# Patient Record
Sex: FEMALE | Race: WHITE | HISPANIC OR LATINO | Employment: PART TIME | URBAN - METROPOLITAN AREA
[De-identification: names, ages, dates, MRNs, and addresses within clinical notes are randomized per-mention and may not be internally consistent; named-entity substitution may affect disease eponyms.]

---

## 2017-02-28 ENCOUNTER — ALLSCRIPTS OFFICE VISIT (OUTPATIENT)
Dept: OTHER | Facility: OTHER | Age: 37
End: 2017-02-28

## 2017-04-05 ENCOUNTER — TRANSCRIBE ORDERS (OUTPATIENT)
Dept: ADMINISTRATIVE | Facility: HOSPITAL | Age: 37
End: 2017-04-05

## 2017-04-05 ENCOUNTER — HOSPITAL ENCOUNTER (OUTPATIENT)
Dept: RADIOLOGY | Facility: HOSPITAL | Age: 37
Discharge: HOME | End: 2017-04-05
Payer: COMMERCIAL

## 2017-04-05 ENCOUNTER — TRANSCRIBE ORDERS (OUTPATIENT)
Dept: RADIOLOGY | Facility: HOSPITAL | Age: 37
End: 2017-04-05

## 2017-04-05 DIAGNOSIS — R10.31 ABDOMINAL PAIN, RIGHT LOWER QUADRANT: Primary | ICD-10-CM

## 2017-04-05 DIAGNOSIS — M25.559 PAIN IN JOINT, PELVIC REGION AND THIGH, UNSPECIFIED LATERALITY: Primary | ICD-10-CM

## 2017-04-05 DIAGNOSIS — M77.11 RIGHT LATERAL EPICONDYLITIS: ICD-10-CM

## 2017-04-05 DIAGNOSIS — M25.559 PAIN IN JOINT, PELVIC REGION AND THIGH, UNSPECIFIED LATERALITY: ICD-10-CM

## 2017-04-05 PROCEDURE — 73080 X-RAY EXAM OF ELBOW: CPT

## 2017-04-05 PROCEDURE — 73521 X-RAY EXAM HIPS BI 2 VIEWS: CPT

## 2017-04-10 ENCOUNTER — HOSPITAL ENCOUNTER (OUTPATIENT)
Dept: RADIOLOGY | Facility: HOSPITAL | Age: 37
Discharge: HOME | End: 2017-04-10
Payer: COMMERCIAL

## 2017-04-10 DIAGNOSIS — R10.31 ABDOMINAL PAIN, RIGHT LOWER QUADRANT: ICD-10-CM

## 2017-04-10 PROCEDURE — 76830 TRANSVAGINAL US NON-OB: CPT

## 2017-04-10 PROCEDURE — 76856 US EXAM PELVIC COMPLETE: CPT

## 2017-05-21 ENCOUNTER — HOSPITAL ENCOUNTER (EMERGENCY)
Facility: HOSPITAL | Age: 37
Discharge: HOME | End: 2017-05-21
Attending: EMERGENCY MEDICINE | Admitting: EMERGENCY MEDICINE
Payer: COMMERCIAL

## 2017-05-21 ENCOUNTER — APPOINTMENT (EMERGENCY)
Dept: RADIOLOGY | Facility: HOSPITAL | Age: 37
End: 2017-05-21
Payer: COMMERCIAL

## 2017-05-21 VITALS
DIASTOLIC BLOOD PRESSURE: 84 MMHG | TEMPERATURE: 97.9 F | SYSTOLIC BLOOD PRESSURE: 145 MMHG | HEART RATE: 67 BPM | HEIGHT: 64 IN | RESPIRATION RATE: 18 BRPM | BODY MASS INDEX: 32.44 KG/M2 | OXYGEN SATURATION: 99 % | WEIGHT: 190 LBS

## 2017-05-21 DIAGNOSIS — S46.911A STRAIN OF RIGHT UPPER ARM, INITIAL ENCOUNTER: Primary | ICD-10-CM

## 2017-05-21 PROCEDURE — 73060 X-RAY EXAM OF HUMERUS: CPT

## 2017-05-21 PROCEDURE — 99283 EMERGENCY DEPT VISIT LOW MDM: CPT

## 2017-05-21 PROCEDURE — 73030 X-RAY EXAM OF SHOULDER: CPT

## 2017-05-21 RX ORDER — NAPROXEN 500 MG/1
500 TABLET ORAL 2 TIMES DAILY WITH MEALS
Qty: 10 TABLET | Refills: 0 | Status: SHIPPED | OUTPATIENT
Start: 2017-05-21 | End: 2017-05-26 | Stop reason: ALTCHOICE

## 2017-05-21 RX ORDER — IBUPROFEN 600 MG/1
600 TABLET ORAL ONCE
Status: COMPLETED | OUTPATIENT
Start: 2017-05-21 | End: 2017-05-21

## 2017-05-21 RX ADMIN — IBUPROFEN 600 MG: 600 TABLET, FILM COATED ORAL at 19:21

## 2017-11-07 ENCOUNTER — ALLSCRIPTS OFFICE VISIT (OUTPATIENT)
Dept: OTHER | Facility: OTHER | Age: 37
End: 2017-11-07

## 2018-01-13 VITALS
WEIGHT: 191 LBS | SYSTOLIC BLOOD PRESSURE: 123 MMHG | DIASTOLIC BLOOD PRESSURE: 76 MMHG | HEIGHT: 64 IN | BODY MASS INDEX: 32.61 KG/M2

## 2018-01-16 NOTE — PROGRESS NOTES
Chief Complaint  depo injection      Active Problems    1  Abdominal pain (789 00) (R10 9)   2  Candidal intertrigo (112 3) (B37 2)   3  Encounter for Depo-Provera contraception (V25 49) (Z30 42)   4  Encounter for routine gynecological examination (V72 31) (Z01 419)   5  General counseling for initiation of other contraceptive measures (V25 02) (Z30 09)   6  Genital herpes simplex (054 10) (A60 00)   7  Hereditary Disease Possibly Affecting Fetus, Affecting Care Of Mother - Antepartum   Condition Or Complication (978 19)   8  Hidradenitis suppurativa (705 83) (L73 2)   9  Obesity (278 00) (E66 9)    Current Meds   1  Clotrimazole-Betamethasone 1-0 05 % External Cream; APPLY  AND RUB  IN A THIN   FILM TO AFFECTED AREAS TWICE DAILY  (AM AND PM); Therapy: [id number] to (Last Rx:01Apr2015)  Requested for: 01Apr2015 Ordered   2  ValACYclovir HCl - 500 MG Oral Tablet; TAKE 1 TABLET Daily As Directed Begin   supression with 1 tab daily  Starting at [address], [place], operated by [facility] gestation; Therapy: [id number] to (Mary Beth Davis)  Requested for: 01Apr2015; Last   Rx:01Apr2015 Ordered    Allergies    1  No Known Drug Allergies    2  No Known Environmental Allergies   3  No Known Food Allergies    Vitals  Signs [Data Includes: Current Encounter]    Systolic: 856  Diastolic: 70  Height: 5 ft 4 in  Weight: 193 lb 3 2 oz  BMI Calculated: 33 16  BSA Calculated: 1 93  Pain Scale: 0    Assessment    1   History of pregnancy (V13 29)    Plan  Encounter for Depo-Provera contraception    · MedroxyPROGESTERone Acetate 150 MG/ML Intramuscular Suspension    Signatures   Electronically signed by : RIP Narvaez ; Apr 29 2016 11:08AM EST                       (Author)

## 2018-01-30 ENCOUNTER — OFFICE VISIT (OUTPATIENT)
Dept: OBGYN CLINIC | Facility: HOSPITAL | Age: 38
End: 2018-01-30
Payer: COMMERCIAL

## 2018-01-30 VITALS
WEIGHT: 182.4 LBS | HEIGHT: 64 IN | BODY MASS INDEX: 31.14 KG/M2 | HEART RATE: 80 BPM | SYSTOLIC BLOOD PRESSURE: 122 MMHG | DIASTOLIC BLOOD PRESSURE: 80 MMHG

## 2018-01-30 DIAGNOSIS — Z30.42 ENCOUNTER FOR SURVEILLANCE OF INJECTABLE CONTRACEPTIVE: Primary | ICD-10-CM

## 2018-01-30 PROCEDURE — 96372 THER/PROPH/DIAG INJ SC/IM: CPT | Performed by: NURSE PRACTITIONER

## 2018-01-30 PROCEDURE — 99213 OFFICE O/P EST LOW 20 MIN: CPT | Performed by: NURSE PRACTITIONER

## 2018-01-30 RX ORDER — MEDROXYPROGESTERONE ACETATE 150 MG/ML
150 INJECTION, SUSPENSION INTRAMUSCULAR ONCE
Status: COMPLETED | OUTPATIENT
Start: 2018-01-30 | End: 2018-01-30

## 2018-01-30 RX ORDER — MEDROXYPROGESTERONE ACETATE 150 MG/ML
150 INJECTION, SUSPENSION INTRAMUSCULAR
Qty: 1 ML | Refills: 0 | Status: SHIPPED | OUTPATIENT
Start: 2018-01-30 | End: 2018-04-24 | Stop reason: SDUPTHER

## 2018-01-30 RX ORDER — MEDROXYPROGESTERONE ACETATE 150 MG/ML
INJECTION, SUSPENSION INTRAMUSCULAR
COMMUNITY
Start: 2016-12-06 | End: 2018-01-30 | Stop reason: SDUPTHER

## 2018-01-30 RX ORDER — VALACYCLOVIR HYDROCHLORIDE 500 MG/1
1 TABLET, FILM COATED ORAL DAILY
COMMUNITY
Start: 2017-02-28 | End: 2018-04-24 | Stop reason: SDUPTHER

## 2018-01-30 RX ADMIN — MEDROXYPROGESTERONE ACETATE 150 MG: 150 INJECTION, SUSPENSION INTRAMUSCULAR at 09:40

## 2018-01-30 NOTE — PATIENT INSTRUCTIONS
Medroxyprogesterone (By injection)   Medroxyprogesterone (wv-vfsl-od-proe-NATALIA-ter-one)  Prevents pregnancy  Also treats endometriosis and is used with other medicines to help relieve symptoms of cancer, including uterine or kidney cancer  Brand Name(s): Depo-Provera, Depo-Provera Contraceptive, Depo-SubQ Provera 104   There may be other brand names for this medicine  When This Medicine Should Not Be Used: This medicine is not right for everyone  You should not receive it if you had an allergic reaction to medroxyprogesterone or if you have a history of breast cancer or blood clots (including heart attack or stroke)  In most cases, you should not use this medicine while you are pregnant  How to Use This Medicine:   Injectable  · A nurse or other health provider will give you this medicine  This medicine is given as a shot into a muscle or just under the skin  · Your exact treatment schedule depends on the reason you are using this medicine  You doctor will explain your personal schedule  ¨ For treatment of cancer symptoms, you may start with a shot once per week  You may need fewer shots as your treatment goes forward  ¨ For birth control or endometriosis, you will need a shot every 3 months (13 weeks)  Read and follow the patient instructions that come with this medicine  Talk to your doctor or pharmacist if you have any questions  ¨ You might need to have the first shot during the first 5 days of your normal menstrual period, to make sure you are not pregnant  If you have just had a baby, you may receive a shot 5 days after birth if you are not breastfeeding or 6 weeks after birth if you are breastfeeding  · Missed dose: You must receive a shot every 3 months if you want to prevent pregnancy  Talk to your doctor or pharmacist if you do not receive your medicine on time, because you may need another form of birth control    Drugs and Foods to Avoid:   Ask your doctor or pharmacist before using any other medicine, including over-the-counter medicines, vitamins, and herbal products  · Some foods and medicines can affect how medroxyprogesterone works  Tell your doctor if you are using any of the following:  ¨ Aminoglutethimide, bosentan, carbamazepine, felbamate, griseofulvin, nefazodone, oxcarbazepine, phenobarbital, phenytoin, rifabutin, rifampin, rifapentine, Magalis's wort, topiramate  ¨ Medicine to treat an infection (including clarithromycin, itraconazole, ketoconazole, telithromycin, voriconazole)  ¨ Medicine to treat HIV/AIDS (including atazanavir, indinavir, nelfinavir, ritonavir, saquinavir)  Warnings While Using This Medicine:   · Tell your doctor right away if you think you have become pregnant  · Tell your doctor if you are breastfeeding or if you have liver disease, kidney disease, asthma, diabetes, heart disease, seizures, migraine headaches, an eating disorder, osteoporosis, or a history of depression  Tell your doctor if you smoke  · This medicine may cause the following problems:  ¨ Blood clots, which could lead to stroke, heart attack, or other serious problems  ¨ Possible increased risk of breast cancer  ¨ Weak or thin bones, especially with long-term use  · You should not use this medicine for long-term birth control unless you cannot use any other form of birth control  · This medicine will not protect you from HIV/AIDS or other sexually transmitted diseases  · Tell any doctor or dentist who treats you that you are using this medicine  This medicine may affect certain medical test results  · Your doctor will check your progress and the effects of this medicine at regular visits  Keep all appointments    Possible Side Effects While Using This Medicine:   Call your doctor right away if you notice any of these side effects:  · Allergic reaction: Itching or hives, swelling in your face or hands, swelling or tingling in your mouth or throat, chest tightness, trouble breathing  · Chest pain, trouble breathing, or coughing up blood  · Dark urine or pale stools, nausea, vomiting, loss of appetite, stomach pain, yellow skin or eyes  · Heavy or nonstop vaginal bleeding  · Loss of vision, double vision  · Numbness or weakness on one side of your body, sudden or severe headache, problems with vision, speech, or walking  · Severe stomach pain or cramps  If you notice these less serious side effects, talk with your doctor:   · Headache  · Light or missed monthly periods, spotting between periods  · Nervousness or dizziness  · Pain, redness, burning, swelling, or a lump under your skin where the shot was given  · Weight gain  If you notice other side effects that you think are caused by this medicine, tell your doctor  Call your doctor for medical advice about side effects  You may report side effects to FDA at 7-169-FDA-3533  © 2017 [address] [employer] Information is for End User's use only and may not be sold, redistributed or otherwise used for commercial purposes  The above information is an  only  It is not intended as medical advice for individual conditions or treatments  Talk to your doctor, nurse or pharmacist before following any medical regimen to see if it is safe and effective for you  Tubal Ligation   AMBULATORY CARE:   What you need to know about a tubal ligation:  A tubal ligation is surgery to close your fallopian tubes to prevent pregnancy  How to prepare for a tubal ligation:  Your healthcare provider will talk to you about how to prepare for surgery  He may tell you not to eat or drink anything within 8 hours before your surgery  He will tell you what medicines to take or not take on the day of your surgery  Arrange for someone to drive you home and stay with you after surgery  What will happen during a tubal ligation:   · You may be given general anesthesia to keep you asleep and free from pain during surgery   You may instead be given regional anesthesia or local anesthesia  Regional anesthesia numbs the lower part of your body  Local anesthesia numbs the surgery area  With local anesthesia, you may still feel pressure or pushing during surgery, but you should not feel any pain  You may have a minilaparotomy or laparoscopic tubal ligation  During a minilaparotomy, your surgeon will make a small incision in your lower abdomen  · During laparoscopic tubal ligation, your surgeon will make one or more small incisions in your abdomen  A laparoscope and other instruments will be put into your abdomen through the small incisions  The laparoscope is a long metal tube with a light and camera on the end  Your abdomen will be filled with a gas  This allows your surgeon to see inside your abdomen more clearly  Your fallopian tubes will be cut and closed with thread  Your healthcare provider may instead seal your tubes with heat, or with a clip or ring  After the surgery is done, your incisions are closed with stitches or staples  The incisions may be covered with a bandage  What will happen after a tubal ligation:  You will have abdominal pain and cramps for the first few days after surgery  You may feel dizzy, nauseated, bloated, or have gas  You may also have pain in your shoulders or near your ribs if gas was put in your abdomen  Risks of a tubal ligation:  You may bleed more than expected or get an infection  Blood vessels or organs such as your bowel or bladder could be injured during surgery  Although pregnancy is unlikely after a tubal ligation, there is still a small chance that you may get pregnant  If pregnancy does occur, there is an increased risk for an ectopic pregnancy (tubal pregnancy)  You may get a blood clot in your leg or arm  This may become life-threatening  Call 911 for any of the following:   · You cough up blood  · You feel lightheaded, short of breath, and have chest pain    Seek care immediately if:   · Your arm or leg feels warm, tender, and painful  It may look swollen and red  · You have severe abdominal pain  Contact your surgeon or gynecologist if:   · You have a fever  · You have heavy bleeding from your incisions  · Your stitches or staples come apart  · You have trouble urinating, burning when you urinate, or bloody urine  · Your incision is swollen, red, or has pus coming from it  · You have questions or concerns about your condition or care  Medicines: You may need any of the following:  · Prescription pain medicine  may be given  Ask how to take this medicine safely  · Acetaminophen  decreases pain  It is available without a doctor's order  Ask how much to take and how often to take it  Follow directions  Acetaminophen can cause liver damage if not taken correctly  · NSAIDs , such as ibuprofen, help decrease swelling, pain, and fever  NSAIDs can cause stomach bleeding or kidney problems in certain people  If you take blood thinner medicine, always ask your healthcare provider if NSAIDs are safe for you  Always read the medicine label and follow directions  · Take your medicine as directed  Contact your healthcare provider if you think your medicine is not helping or if you have side effects  Tell him or her if you are allergic to any medicine  Keep a list of the medicines, vitamins, and herbs you take  Include the amounts, and when and why you take them  Bring the list or the pill bottles to follow-up visits  Carry your medicine list with you in case of an emergency  Activity:   · Avoid strenuous activities  such as lifting heavy objects and intense exercise for 7 days after your surgery  Ask when it is safe for you to drive, return to work, and return to other regular activities  · Do not strain during bowel movements  High-fiber foods and extra liquids can help you prevent constipation  Examples of high-fiber foods are fruit and bran  Prune juice and water are good liquids to drink       · Do not have sex  for at least 1 week  If your tubal ligation surgery was done after you had a baby, you will need to wait longer  Ask your healthcare provider when you can have sex  · Do not go into a bath or hot tub  for 10 days or as directed  Take a shower instead of taking a bath  Wound care:  Care for your wound as directed  Carefully wash the wound with soap and water  Dry the area and put on new, clean bandages as directed  Change your bandages when they get wet or dirty  Follow up with your healthcare provider within 7 to 10 days or as directed:  Write down your questions so you remember to ask them during your visits  © 2017 [address] [facility] Information is for End User's use only and may not be sold, redistributed or otherwise used for commercial purposes  All illustrations and images included in CareNotes® are the copyrighted property of [employer]  or [employer] Marro[employer]  The above information is an  only  It is not intended as medical advice for individual conditions or treatments  Talk to your doctor, nurse or pharmacist before following any medical regimen to see if it is safe and effective for you

## 2018-01-30 NOTE — PROGRESS NOTES
Assessment/Plan:      Diagnoses and all orders for this visit:    Encounter for surveillance of injectable contraceptive  -     medroxyPROGESTERone (DEPO-PROVERA) 150 mg/mL injection; Inject 1 mL (150 mg total) into the shoulder, thigh, or buttocks every 3 (three) months    Other orders  -     Discontinue: medroxyPROGESTERone (DEPO-PROVERA) 150 mg/mL injection; Inject into the shoulder, thigh, or buttocks  -     valACYclovir (VALTREX) 500 mg tablet; Take 1 tablet by mouth daily          Subjective:     Patient ID: Jami Osorio is a 40 y o  female  HPI   27-year-old [id number]  Here for Depo refill  Desires to discuss permanent sterilization  Has completed her family  Doing well on Depo and is satisfied but would prefer not to have to pay Co pays at this point  Last annual exam February of 2017  Last Pap smear 7/2015 resulted NILM  Pap due 2018  Review of Systems   Constitutional: Negative for activity change  Gastrointestinal: Negative  Skin: Negative  Psychiatric/Behavioral: Negative  Objective:     Physical Exam   Constitutional: She is oriented to person, place, and time  She appears well-developed and well-nourished  Cardiovascular: Normal rate, regular rhythm and normal heart sounds  Pulmonary/Chest: Effort normal and breath sounds normal    Neurological: She is alert and oriented to person, place, and time  Skin: Skin is warm and dry  Psychiatric: She has a normal mood and affect  Her behavior is normal        Plan:   Discussed  Tubal ligation versus salpingectomy versus continuation on Depo-Provera  State paper signed today  Written information provided with patient  Medroxyprogesterone 1 injection [facility] to [facility] with no refills  Patient to for annual exam   Will return to office for annual exam in the next 1-2 months  All questions answered at this visit

## 2018-01-31 NOTE — PROGRESS NOTES
I have reviewed the notes, assessments, and/or procedures performed by Soila Duong, I concur with her documentation of [facility]

## 2018-02-02 ENCOUNTER — APPOINTMENT (EMERGENCY)
Dept: RADIOLOGY | Facility: HOSPITAL | Age: 38
End: 2018-02-02
Payer: COMMERCIAL

## 2018-02-02 ENCOUNTER — HOSPITAL ENCOUNTER (EMERGENCY)
Facility: HOSPITAL | Age: 38
Discharge: HOME | End: 2018-02-02
Attending: EMERGENCY MEDICINE
Payer: COMMERCIAL

## 2018-02-02 ENCOUNTER — APPOINTMENT (EMERGENCY)
Dept: CT IMAGING | Facility: HOSPITAL | Age: 38
End: 2018-02-02
Payer: COMMERCIAL

## 2018-02-02 VITALS
TEMPERATURE: 98.4 F | DIASTOLIC BLOOD PRESSURE: 81 MMHG | WEIGHT: 181 LBS | BODY MASS INDEX: 31.07 KG/M2 | HEART RATE: 71 BPM | SYSTOLIC BLOOD PRESSURE: 141 MMHG | OXYGEN SATURATION: 99 % | RESPIRATION RATE: 16 BRPM
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
## 2018-02-02 PROCEDURE — 73030 X-RAY EXAM OF SHOULDER: CPT

## 2018-02-02 PROCEDURE — 70450 CT HEAD/BRAIN W/O DYE: CPT

## 2018-02-02 PROCEDURE — 96374 THER/PROPH/DIAG INJ IV PUSH: CPT

## 2018-02-02 PROCEDURE — 72100 X-RAY EXAM L-S SPINE 2/3 VWS: CPT

## 2018-02-02 PROCEDURE — 99284 EMERGENCY DEPT VISIT MOD MDM: CPT

## 2018-02-02 PROCEDURE — 72220 X-RAY EXAM SACRUM TAILBONE: CPT

## 2018-02-02 PROCEDURE — 72072 X-RAY EXAM THORAC SPINE 3VWS: CPT

## 2018-02-02 PROCEDURE — 72125 CT NECK SPINE W/O DYE: CPT

## 2018-02-02 RX ORDER — ACETAMINOPHEN 500 MG
1000 TABLET ORAL EVERY 8 HOURS PRN
Qty: 30 TABLET | Refills: 0 | Status: SHIPPED | OUTPATIENT
Start: 2018-02-02 | End: 2021-03-25

## 2018-02-02 RX ORDER — KETOROLAC TROMETHAMINE 30 MG/ML
15 INJECTION, SOLUTION INTRAMUSCULAR; INTRAVENOUS ONCE
Status: COMPLETED | OUTPATIENT
Start: 2018-02-02 | End: 2018-02-02

## 2018-02-02 RX ORDER — NAPROXEN 500 MG/1
500 TABLET ORAL 2 TIMES DAILY WITH MEALS
Qty: 14 TABLET | Refills: 0 | Status: SHIPPED | OUTPATIENT
Start: 2018-02-02 | End: 2018-10-11

## 2018-02-02 RX ORDER — METHOCARBAMOL 750 MG/1
750 TABLET, FILM COATED ORAL EVERY 4 HOURS PRN
Qty: 30 TABLET | Refills: 0 | Status: SHIPPED | OUTPATIENT
Start: 2018-02-02 | End: 2018-10-11

## 2018-02-02 RX ORDER — ACETAMINOPHEN 325 MG/1
975 TABLET ORAL ONCE
Status: COMPLETED | OUTPATIENT
Start: 2018-02-02 | End: 2018-02-02

## 2018-02-02 RX ORDER — KETOROLAC TROMETHAMINE 30 MG/ML
30 INJECTION, SOLUTION INTRAMUSCULAR; INTRAVENOUS ONCE
Status: DISCONTINUED | OUTPATIENT
Start: 2018-02-02 | End: 2018-02-02

## 2018-02-02 RX ADMIN — KETOROLAC TROMETHAMINE 15 MG: 30 INJECTION, SOLUTION INTRAMUSCULAR at 19:20

## 2018-02-02 RX ADMIN — ACETAMINOPHEN 975 MG: 325 TABLET, FILM COATED ORAL at 19:20

## 2018-02-03 NOTE — DISCHARGE INSTRUCTIONS

## 2018-02-03 NOTE — ED PROVIDER NOTES
History  Chief Complaint   Patient presents with    Head Injury     Pt reports fall in parking lot this AM, hitting back of head  pt unsure if LOC  Pt c/o dizziness and nausea with pressure in "front to back of head"   Back Pain     C/o pain in "tailbone all the way up"     Patient is a 15-year-old female with no significant past medical history presents to the emergency department for evaluation of fall and head injury  Patient states that about 12 hours prior to arrival she slipped on ice this morning hitting the back of her head on the pavement  She states that she also felt pain in her upper thoracic region as well  She also reports neck pain  She states she able to go to work afterwards but the pain became too intense stated week leave work early  She does report having headache  Slight nausea  No change in vision  No upper lower extremity numbness/tingling  No gait dysfunction  No perineal or saddle paresthesia  No bladder or bowel incontinence  Prior to Admission Medications   Prescriptions Last Dose Informant Patient Reported? Taking? medroxyPROGESTERone (DEPO-PROVERA) 150 mg/mL injection   No No   Sig: Inject 1 mL (150 mg total) into the shoulder, thigh, or buttocks every 3 (three) months   naproxen (NAPROSYN) 500 mg tablet   No No   Sig: Take 1 tablet by mouth 2 (two) times a day with meals for 5 days   valACYclovir (VALTREX) 500 mg tablet  Self Yes No   Sig: Take 1 tablet by mouth daily      Facility-Administered Medications: None       Past Medical History:   Diagnosis Date    Herpes     MRSA carrier        History reviewed  No pertinent surgical history  History reviewed  No pertinent family history  I have reviewed and agree with the history as documented      Social History   Substance Use Topics    Smoking status: Current Every Day Smoker     Packs/day: 0 50     Types: Cigarettes    Smokeless tobacco: Never Used    Alcohol use Yes      Comment: social Review of Systems   Constitutional: Negative for activity change, chills, fatigue and fever  HENT: Negative for congestion, ear pain, mouth sores, sore throat and trouble swallowing  Eyes: Negative for photophobia and visual disturbance  Respiratory: Negative for chest tightness, shortness of breath and wheezing  Cardiovascular: Negative for chest pain and palpitations  Gastrointestinal: Negative for abdominal pain, constipation, diarrhea, nausea and vomiting  Genitourinary: Negative for decreased urine volume, difficulty urinating, dysuria, genital sores and hematuria  Musculoskeletal: Positive for neck pain  Negative for arthralgias, myalgias and neck stiffness  Skin: Negative for rash and wound  Neurological: Positive for headaches  Negative for dizziness, syncope, weakness, light-headedness and numbness  Hematological: Negative for adenopathy  All other systems reviewed and are negative  Physical Exam  ED Triage Vitals   Temperature Pulse Respirations Blood Pressure SpO2   02/02/18 1613 02/02/18 1612 02/02/18 1613 02/02/18 1613 02/02/18 1613   98 4 °F (36 9 °C) 78 16 164/78 100 %      Temp Source Heart Rate Source Patient Position - Orthostatic VS BP Location FiO2 (%)   02/02/18 1612 02/02/18 1924 02/02/18 1924 02/02/18 1924 --   Oral Monitor Lying Right arm       Pain Score       02/02/18 1613       Worst Possible Pain           Orthostatic Vital Signs  Vitals:    02/02/18 1612 02/02/18 1613 02/02/18 1924   BP:  164/78 141/81   Pulse: 78 74 71   Patient Position - Orthostatic VS:   Lying       Physical Exam   Constitutional: She is oriented to person, place, and time  She appears well-developed and well-nourished  No distress  HENT:   Head: Normocephalic and atraumatic     Right Ear: External ear normal    Left Ear: External ear normal    Nose: Nose normal    Mouth/Throat: Oropharynx is clear and moist    Eyes: Conjunctivae and EOM are normal  Pupils are equal, round, and reactive to light  No scleral icterus  Neck: Normal range of motion  Neck supple  Cardiovascular: Normal rate, regular rhythm, normal heart sounds and intact distal pulses  Exam reveals no gallop and no friction rub  No murmur heard  Pulmonary/Chest: Effort normal and breath sounds normal  No respiratory distress  She has no wheezes  Abdominal: Soft  She exhibits no distension  There is no tenderness  There is no guarding  Musculoskeletal: Normal range of motion  She exhibits tenderness  She exhibits no edema or deformity  Cervical and high thoracic midline tenderness, midline lumbar tenderness  Diffuse paraspinal spasm noted  Full passive lower extremity ROM with no radiculopathy noted  Lymphadenopathy:     She has no cervical adenopathy  Neurological: She is alert and oriented to person, place, and time  She exhibits normal muscle tone  Skin: Skin is warm and dry  Capillary refill takes less than 2 seconds  She is not diaphoretic  Nursing note and vitals reviewed  ED Medications  Medications   ketorolac (TORADOL) injection 15 mg (15 mg Intravenous Given 2/2/18 1920)   acetaminophen (TYLENOL) tablet 975 mg (975 mg Oral Given 2/2/18 1920)       Diagnostic Studies  Results Reviewed     None                 XR spine lumbar 2 or 3 views injury   Final Result by Sophie Campbell MD (02/02 1910)      No radiographic evidence of acute lumbar spine fracture  Workstation performed: [id number]         XR sacrum and coccyx   Final Result by Sophie Campbell MD (02/02 1909)      No acute sacral or coccygeal fracture  Workstation performed: [id number]         XR shoulder 2+ views RIGHT   Final Result by Sophie Campbell MD (02/02 1908)      No acute right shoulder fracture or dislocation           Workstation performed: [id number]         XR spine thoracic 3 vw   Final Result by Sophie Campbell MD (02/02 1907)      No radiographic evidence of acute thoracic spine fracture  Workstation performed: [id number]         CT spine cervical without contrast   Final Result by Jenny Rizvi DO (02/02 0735)   1  Progressive, mild degenerative changes  No cervical spine fracture or traumatic malalignment  2   Incidental thyroid nodule(s) for which thyroid routine, outpatient, follow-up ultrasound is recommended  Workstation performed: [id number]         CT head without contrast   Final Result by Rob Brooke MD (02/02 1832)      No acute intracranial hemorrhage, mass effect or extra-axial collection  Workstation performed: [id number]                    Procedures  Procedures       Phone Contacts  ED Phone Contact    ED Course  ED Course                                MDM  Number of Diagnoses or Management Options  Fall, initial encounter: new and requires workup  Diagnosis management comments: Patient is a 60-year-old female with no significant past medical history presents emergency department after fall this morning on ice  Patient states she was walking out of her child  when she slipped on ice in the parking lot striking her upper back, neck, back of head  No loss of consciousness  Patient with progressive soreness throughout the day and development of headache throughout the day as well  No numbness or tingling or weakness in bilateral upper extremities  Patient does have midline cervical and upper thoracic pain to palpation  Mild lumbar midline tenderness to palpation as well as paraspinal muscle spasm  Plan will be to get CT head with reports of in intense, severe head injury  CT cervical spine with midline tenderness  For thoracic x-ray which midline thoracic pain  Lumbar x-ray to rule out x-ray  Will x-ray all other extremities that were found to be tender on physical exam   Will give Toradol if no intracranial bleed noted on CT for pain         Amount and/or Complexity of Data Reviewed  Clinical lab tests: ordered and reviewed  Tests in the radiology section of CPT®: ordered and reviewed    Risk of Complications, Morbidity, and/or Mortality  Presenting problems: low  Diagnostic procedures: low  Management options: low    Patient Progress  Patient progress: stable    CritCare Time    Disposition  Final diagnoses:   Fall, initial encounter     Time reflects when diagnosis was documented in both MDM as applicable and the Disposition within this note     Time User Action Codes Description Comment    2/2/2018  8:05 PM Jona Shine Add [M21  Bonnetta Prader, initial encounter       ED Disposition     ED Disposition Condition Comment    Discharge  [facility] discharge to home/self care      Condition at discharge: Stable        Follow-up Information     Follow up With Specialties Details Why Contact Info Additional Information    Martín Zuluaga MD Family Medicine In 5 days ED follow up  [address] [phone]       [facility] Emergency Department Emergency Medicine  If symptoms worsen [address]  [phone] [facility] ED,  [address], [place], [address]        Discharge Medication List as of 2/2/2018  8:09 PM      START taking these medications    Details   acetaminophen (TYLENOL) 500 mg tablet Take 2 tablets (1,000 mg total) by mouth every 8 (eight) hours as needed for mild pain or moderate pain, Starting Fri 2/2/2018, Print      methocarbamol (ROBAXIN) 750 mg tablet Take 1 tablet (750 mg total) by mouth every 4 (four) hours as needed for muscle spasms, Starting Fri 2/2/2018, Print         CONTINUE these medications which have CHANGED    Details   naproxen (NAPROSYN) 500 mg tablet Take 1 tablet (500 mg total) by mouth 2 (two) times a day with meals, Starting Fri 2/2/2018, Print         CONTINUE these medications which have NOT CHANGED    Details   medroxyPROGESTERone (DEPO-PROVERA) 150 mg/mL injection Inject 1 mL (150 mg total) into the shoulder, thigh, or buttocks every 3 (three) months, Starting Tue 1/30/2018, Normal      valACYclovir (VALTREX) 500 mg tablet Take 1 tablet by mouth daily, Starting Tue 2/28/2017, Historical Med           No discharge procedures on file      ED Provider  Electronically Signed by           Koby Abel PA-C  02/08/18 9386

## 2018-04-24 ENCOUNTER — OFFICE VISIT (OUTPATIENT)
Dept: OBGYN CLINIC | Facility: HOSPITAL | Age: 38
End: 2018-04-24

## 2018-04-24 VITALS
DIASTOLIC BLOOD PRESSURE: 91 MMHG | SYSTOLIC BLOOD PRESSURE: 137 MMHG | HEIGHT: 64 IN | HEART RATE: 77 BPM | BODY MASS INDEX: 31.45 KG/M2 | WEIGHT: 184.2 LBS

## 2018-04-24 DIAGNOSIS — B00.9 HSV INFECTION: ICD-10-CM

## 2018-04-24 DIAGNOSIS — Z30.42 ENCOUNTER FOR SURVEILLANCE OF INJECTABLE CONTRACEPTIVE: ICD-10-CM

## 2018-04-24 DIAGNOSIS — N64.52 NIPPLE DISCHARGE IN FEMALE: ICD-10-CM

## 2018-04-24 DIAGNOSIS — Z01.419 WOMEN'S ANNUAL ROUTINE GYNECOLOGICAL EXAMINATION: Primary | ICD-10-CM

## 2018-04-24 PROCEDURE — 99213 OFFICE O/P EST LOW 20 MIN: CPT | Performed by: NURSE PRACTITIONER

## 2018-04-24 PROCEDURE — 96372 THER/PROPH/DIAG INJ SC/IM: CPT | Performed by: NURSE PRACTITIONER

## 2018-04-24 PROCEDURE — 3725F SCREEN DEPRESSION PERFORMED: CPT | Performed by: NURSE PRACTITIONER

## 2018-04-24 RX ORDER — MEDROXYPROGESTERONE ACETATE 150 MG/ML
150 INJECTION, SUSPENSION INTRAMUSCULAR ONCE
Status: COMPLETED | OUTPATIENT
Start: 2018-04-24 | End: 2018-04-24

## 2018-04-24 RX ORDER — MEDROXYPROGESTERONE ACETATE 150 MG/ML
150 INJECTION, SUSPENSION INTRAMUSCULAR
Qty: 1 ML | Refills: 3 | Status: SHIPPED | OUTPATIENT
Start: 2018-04-24 | End: 2019-06-10 | Stop reason: SDUPTHER

## 2018-04-24 RX ORDER — VALACYCLOVIR HYDROCHLORIDE 500 MG/1
500 TABLET, FILM COATED ORAL DAILY
Qty: 30 TABLET | Refills: 11 | Status: SHIPPED | OUTPATIENT
Start: 2018-04-24 | End: 2019-07-15 | Stop reason: ALTCHOICE

## 2018-04-24 RX ADMIN — MEDROXYPROGESTERONE ACETATE 150 MG: 150 INJECTION, SUSPENSION INTRAMUSCULAR at 14:01

## 2018-04-24 NOTE — PROGRESS NOTES
Yovanny Doss is a 40 y o  female who presents for annual well woman exam  Last pap  6/17/15 resulted NILM and HR HPV negative  Next pap due 2020  LMP approximately 4 years ago, started depo provera after last delivery  C/o b/l nipple discharge x 4 years states " my breasts fill and empty once a day"  Denies headache, visual changes  Requesting refill of Depo-Provera and Valtrex today  Denies spotting, or discharge  Current contraception: Depo-Provera injections  History of abnormal Pap smear: no  Family history of uterine or ovarian cancer: no  Regular self breast exam: yes  History of abnormal mammogram: NA  Family history of breast cancer: no  History of abnormal lipids: no  Menstrual History:  OB History      Para Term  AB Living    3 2 2   1 2    SAB TAB Ectopic Multiple Live Births    1                 Menarche age: 16-   Amenorrhea on Depo-Provera  Patient's last menstrual period was 2014 (within years)  The following portions of the patient's history were reviewed and updated as appropriate: allergies, current medications, past family history, past medical history, past social history, past surgical history and problem list     Review of Systems  Pertinent items are noted in HPI        Objective      /91 (BP Location: Left arm)   Pulse 77   Ht 5' 4" (1 626 m)   Wt 83 6 kg (184 lb 3 2 oz)   LMP 2014 (Within Years)   BMI 31 62 kg/m²     General:   alert and oriented, in no acute distress, alert, appears stated age and cooperative   Heart: regular rate and rhythm, S1, S2 normal, no murmur, click, rub or gallop   Lungs: clear to auscultation bilaterally   Abdomen: soft, non-tender, without masses or organomegaly, nontender and no masses palpated   Vulva: normal   Vagina: normal mucosa, normal discharge   Cervix: no cervical motion tenderness and no lesions   Uterus: non-tender, normal shape and consistency   Adnexa: no mass, fullness, tenderness Breasts: no palpable masses, non-tender, no skin changes BL  Positive nipple discharge  Bilaterally- milky thin consistency       Assessment      @well woman  no contraindication to continue hormonal therapy@   Plan      Breast self exam technique reviewed and patient encouraged to perform self-exam monthly  Contraception: Depo-Provera injections  Diagnosis explained in detail, including differential   Discussed healthy lifestyle modifications  Educational material distributed  Follow up in 12  weeks Depo inj  Follow up as needed    Breast awareness reviewed      Bilateral nipple discharge      - TSH and Prolactin ordered -  Patient aware to fast, avoid nipple stimulation for 48-72 hours prior to blood work,      - RTO 1 month to review labs    HSV     -  Valtrex 500 mg 1 tablet daily refilled

## 2018-04-24 NOTE — PATIENT INSTRUCTIONS
Medroxyprogesterone (By injection)   Medroxyprogesterone (vf-[id number]-[id number]-proe-NATALIA-ter-one)  Prevents pregnancy  Also treats endometriosis and is used with other medicines to help relieve symptoms of cancer, including uterine or kidney cancer  Brand Name(s): Depo-Provera, Depo-Provera Contraceptive, Depo-SubQ Provera 104   There may be other brand names for this medicine  When This Medicine Should Not Be Used: This medicine is not right for everyone  You should not receive it if you had an allergic reaction to medroxyprogesterone or if you have a history of breast cancer or blood clots (including heart attack or stroke)  In most cases, you should not use this medicine while you are pregnant  How to Use This Medicine:   Injectable  · A nurse or other health provider will give you this medicine  This medicine is given as a shot into a muscle or just under the skin  · Your exact treatment schedule depends on the reason you are using this medicine  You doctor will explain your personal schedule  ¨ For treatment of cancer symptoms, you may start with a shot once per week  You may need fewer shots as your treatment goes forward  ¨ For birth control or endometriosis, you will need a shot every 3 months (13 weeks)  Read and follow the patient instructions that come with this medicine  Talk to your doctor or pharmacist if you have any questions  ¨ You might need to have the first shot during the first 5 days of your normal menstrual period, to make sure you are not pregnant  If you have just had a baby, you may receive a shot 5 days after birth if you are not breastfeeding or 6 weeks after birth if you are breastfeeding  · Missed dose: You must receive a shot every 3 months if you want to prevent pregnancy  Talk to your doctor or pharmacist if you do not receive your medicine on time, because you may need another form of birth control    Drugs and Foods to Avoid:   Ask your doctor or pharmacist before using any other medicine, including over-the-counter medicines, vitamins, and herbal products  · Some foods and medicines can affect how medroxyprogesterone works  Tell your doctor if you are using any of the following:  ¨ Aminoglutethimide, bosentan, carbamazepine, felbamate, griseofulvin, nefazodone, oxcarbazepine, phenobarbital, phenytoin, rifabutin, rifampin, rifapentine, Magalis's wort, topiramate  ¨ Medicine to treat an infection (including clarithromycin, itraconazole, ketoconazole, telithromycin, voriconazole)  ¨ Medicine to treat HIV/AIDS (including atazanavir, indinavir, nelfinavir, ritonavir, saquinavir)  Warnings While Using This Medicine:   · Tell your doctor right away if you think you have become pregnant  · Tell your doctor if you are breastfeeding or if you have liver disease, kidney disease, asthma, diabetes, heart disease, seizures, migraine headaches, an eating disorder, osteoporosis, or a history of depression  Tell your doctor if you smoke  · This medicine may cause the following problems:  ¨ Blood clots, which could lead to stroke, heart attack, or other serious problems  ¨ Possible increased risk of breast cancer  ¨ Weak or thin bones, especially with long-term use  · You should not use this medicine for long-term birth control unless you cannot use any other form of birth control  · This medicine will not protect you from HIV/AIDS or other sexually transmitted diseases  · Tell any doctor or dentist who treats you that you are using this medicine  This medicine may affect certain medical test results  · Your doctor will check your progress and the effects of this medicine at regular visits  Keep all appointments    Possible Side Effects While Using This Medicine:   Call your doctor right away if you notice any of these side effects:  · Allergic reaction: Itching or hives, swelling in your face or hands, swelling or tingling in your mouth or throat, chest tightness, trouble breathing  · Chest pain, trouble breathing, or coughing up blood  · Dark urine or pale stools, nausea, vomiting, loss of appetite, stomach pain, yellow skin or eyes  · Heavy or nonstop vaginal bleeding  · Loss of vision, double vision  · Numbness or weakness on one side of your body, sudden or severe headache, problems with vision, speech, or walking  · Severe stomach pain or cramps  If you notice these less serious side effects, talk with your doctor:   · Headache  · Light or missed monthly periods, spotting between periods  · Nervousness or dizziness  · Pain, redness, burning, swelling, or a lump under your skin where the shot was given  · Weight gain  If you notice other side effects that you think are caused by this medicine, tell your doctor  Call your doctor for medical advice about side effects  You may report side effects to FDA at 1-360-FDA-2617  © 2017 [address] Information is for End User's use only and may not be sold, redistributed or otherwise used for commercial purposes  The above information is an  only  It is not intended as medical advice for individual conditions or treatments  Talk to your doctor, nurse or pharmacist before following any medical regimen to see if it is safe and effective for you  Pap Smear   GENERAL INFORMATION:   What is a Pap smear? A Pap smear, or Pap test, is a procedure to check your cervix for abnormal cells  The cervix is the narrow opening at the bottom of your uterus  The cervix meets the top part of the vagina  How do I prepare for a Pap smear? The best time to schedule the test is right after your period stops  Do not have a Pap smear during your monthly period  Do not have intercourse or put anything in your vagina for 24 hours before your test    What will happen during a Pap smear? · You will lie on your back and place your feet on footrests called stirrups  Your caregiver will gently insert a device called a speculum into your vagina   The speculum is used to spread the walls of your vagina so he can see your cervix  He will use a thin brush or cotton swab to collect cells from the inside of your cervix  · Your caregiver will also collect cells from the surface of your cervix with a plastic or wooden tool called a spatula  He may also gently scrape the upper part of your vagina for a sample  The samples are placed in a container with liquid or on a glass slide  They are sent to a lab and examined for abnormal cells  How often do I need a Pap smear? Pap smears are usually done every 1 to 3 years  You may need a Pap smear more often if you have any of the following:  · Positive test result for the human papillomavirus (HPV)    · Cervical intraepithelial neoplasm or cervical cancer    · HIV    · A weak immune system    · Exposure to diethylstilbestrol (BEST) medicine when your mother was pregnant with you  CARE AGREEMENT:   You have the right to help plan your care  Learn about your health condition and how it may be treated  Discuss treatment options with your caregivers to decide what care you want to receive  You always have the right to refuse treatment  The above information is an  only  It is not intended as medical advice for individual conditions or treatments  Talk to your doctor, nurse or pharmacist before following any medical regimen to see if it is safe and effective for you  © 2014 [address] is for End User's use only and may not be sold, redistributed or otherwise used for commercial purposes  All illustrations and images included in CareNotes® are the copyrighted property of "[employer]  or [employer]  Breast Self Exam for Women   WHAT YOU NEED TO KNOW:   What is a breast self-exam (BSE)? A BSE is a way to check your breasts for lumps and other changes  Regular BSEs can help you know how your breasts normally look and feel   Most breast lumps or changes are not cancer, but you should always have them checked by a healthcare provider  Your healthcare provider can also watch you do a BSE and can tell you if you are doing your BSE correctly  Why should I do a BSE? Breast cancer is the most common type of cancer in women  Even if you have mammograms, you may still want to do a BSE regularly  If you know how your breasts normally feel and look, it may help you know when to contact your healthcare provider  Mammograms can miss some cancers  You may find a lump during a BSE that did not show up on your mammogram   When should I do a BSE? Dennys your calendar to help you remember to do BSE on a regular schedule  One easy way to remember to do a BSE is to do the exam on the same day of each month  If you have periods, you may want to do your BSE 1 week after your period ends  This is the time when your breasts may be the least swollen, lumpy, or tender  You can do regular BSEs even if you are breastfeeding or have breast implants  How should I do a BSE? · Look at your breasts in a mirror  Look at the size and shape of each breast and nipple  Check for swelling, lumps, dimpling, scaly skin, or other skin changes  Look for nipple changes, such as a nipple that is painful or beginning to pull inward  Gently squeeze both nipples and check to see if fluid (that is not breast milk) comes out of them  If you find any of these or other breast changes, contact your healthcare provider  Check your breasts while you sit or  the following 3 positions:    [facility] your arms down at your sides  ¨ Raise your hands and join them behind your head  ¨ Put firm pressure with your hands on your hips  Bend slightly forward while you look at your breasts in the mirror  · Lie down and feel your breasts  When you lie down, your breast tissue spreads out evenly over your chest  This makes it easier for you to feel for lumps and anything that may not be normal for your breasts   Do a BSE on one breast at a time  ¨ Place a small pillow or towel under your left shoulder  Put your left arm behind your head  ¨ Use the 3 middle fingers of your right hand  Use your fingertip pads, on the top of your fingers  Your fingertip pad is the most sensitive part of your finger  ¨ Use small circles to feel your breast tissue  Use your fingertip pads to make dime-sized, overlapping circles on your breast and armpits  Use light, medium, and firm pressure  First, press lightly  Second, press with medium pressure to feel a little deeper into the breast  Last, use firm pressure to feel deep within your breast     ¨ Examine your entire breast area  Examine the breast area from above the breast to below the breast where you feel only ribs  Make small circles with your fingertips, starting in the middle of your armpit  Make circles going up and down the breast area  Continue toward your breast and all the way across it  Examine the area from your armpit all the way over to the middle of your chest (breastbone)  Stop at the middle of your chest     ¨ Move the pillow or towel to your right shoulder, and put your right arm behind your head  Use the 3 fingertip pads of your left hand, and repeat the above steps to do a BSE on your right breast        What else can I do to check for breast problems or cancer? Some experts suggest that women 36years of age or older should have a mammogram every year  Other experts suggest that women between the ages of 48and 76years old should have a mammogram every 2 years  Talk to your healthcare provider about when you should have a mammogram   When should I contact my healthcare provider? · You find any lumps or changes in your breasts  · You have breast pain or fluid coming from your nipples  · You have questions or concerns or concerns about your condition or care  CARE AGREEMENT:   You have the right to help plan your care   Learn about your health condition and how it may be treated  Discuss treatment options with your caregivers to decide what care you want to receive  You always have the right to refuse treatment  The above information is an  only  It is not intended as medical advice for individual conditions or treatments  Talk to your doctor, nurse or pharmacist before following any medical regimen to see if it is safe and effective for you  © 2017 [address] Information is for End User's use only and may not be sold, redistributed or otherwise used for commercial purposes  All illustrations and images included in CareNotes® are the copyrighted property of [employer]  or [address]  Genital Herpes Simplex   WHAT YOU NEED TO KNOW:   What is genital herpes? Genital herpes is a sexually transmitted infection (STI) that is caused by the herpes simplex virus (HSV)  It is spread through oral, vaginal, or anal sex  It may be spread even if you do not see blisters  It can also be spread to other areas of your body, including your eyes, by touching open blisters  If you are pregnant, it may be spread to your baby while he is still in your womb or during vaginal delivery  Unprotected sex or sex with multiple partners increases your risk for genital herpes  What are the signs and symptoms of genital herpes? The most common symptoms are blisters that appear on your genital area, thighs, or buttocks  The blisters will open, leak fluid, and then dry up (crust over)  Usually these sores will go away without leaving a scar  Other symptoms may include any of the following:  · Redness, burning, itching, or tingling in your genital area    · Fever or chills    · Headache, body weakness, or muscle pains    · Swollen lymph nodes in your groin    · Sore throat or loss of appetite    · Fluid or blood leaking from your vagina    · Pain when you urinate  How is genital herpes diagnosed?   Your healthcare provider will ask about your health history and examine you  He or she will need to know when your symptoms started  Tell your provider about any STIs you or your partners may have  You may also need any of the following:  · Blood tests  may show the HSV  You may also have this test if you have no symptoms but have a partner with genital herpes  · A fluid sample from a blister  may show the HSV  How is genital herpes treated? There is no cure for genital herpes  You may need any of the following:  · Antivirals  may help decrease your symptoms  · Numbing cream or ointment  may help decrease pain  · NSAIDs , such as ibuprofen, help decrease swelling, pain, and fever  This medicine is available with or without a doctor's order  NSAIDs can cause stomach bleeding or kidney problems in certain people  If you take blood thinner medicine, always ask your healthcare provider if NSAIDs are safe for you  Always read the medicine label and follow directions  How can I manage my symptoms? Do the following to be more comfortable when your infection is active:  · Keep the blisters clean and dry  Wash them with soap and warm water, and pat dry gently  · Wear cotton underwear and loose clothing  This may help to keep the blisters dry and keep clothes from rubbing  · Apply ice  on the area for 15 to 20 minutes every hour or as directed  Use an ice pack, or put crushed ice in a plastic bag  Cover it with a towel  Ice helps prevent tissue damage and decreases swelling and pain  · Apply heat  on the area for 20 to 30 minutes every 2 hours for as many days as directed  A warm bath may also help  Heat helps decrease pain and muscle spasms  How can I prevent the spread of genital herpes? · Use condoms  Use a latex condom when you have oral, genital, and anal sex  Use a new condom each time  Use a polyurethane condom if you are allergic to latex  · Try not to touch your blisters  Wash your hands before and after you touch the area   Do not kiss anyone if you have blisters around your mouth  Do not breastfeed if you have blisters on your breast      · Tell your partners  that you have genital herpes  Do not have sex until he or she knows that you have genital herpes  Ask your healthcare provider for ways to tell partners about your infection  · Tell your healthcare providers  that you have genital herpes  If you are pregnant, your baby may need special monitoring  Inform your healthcare provider of your condition to avoid spreading the infection to your baby  Call 911 for any of the following:   · You have trouble breathing  · You have a seizure  · Your neck is stiff  · You have trouble thinking clearly  When should I contact my healthcare provider? · You have chills or a fever  · You have painful blisters on your penis, vagina, anus, or mouth  · Fluid or blood is coming out of your genitals  · You have trouble urinating  · You think you are pregnant and you are bleeding from your vagina  · You have trouble chewing or swallowing  · Your symptoms do not get better, or they get worse, even after treatment  · You have questions or concerns about your condition or care  CARE AGREEMENT:   You have the right to help plan your care  Learn about your health condition and how it may be treated  Discuss treatment options with your caregivers to decide what care you want to receive  You always have the right to refuse treatment  The above information is an  only  It is not intended as medical advice for individual conditions or treatments  Talk to your doctor, nurse or pharmacist before following any medical regimen to see if it is safe and effective for you  © 2017 [address]  Information is for End User's use only and may not be sold, redistributed or otherwise used for commercial purposes   All illustrations and images included in CareNotes® are the copyrighted property of [employer] Inc  or [facility]  Valacyclovir (By mouth)   Valacyclovir (pem-vv-IAV-kloe-vir)  Treats herpes virus infections, including shingles, cold sores, and genital herpes  This medicine will not cure herpes, but may prevent a breakout of herpes sores or blisters  Also treats chicken pox  Brand Name(s): Valtrex   There may be other brand names for this medicine  When This Medicine Should Not Be Used: This medicine is not right for everyone  Do not use it if you had an allergic reaction to valacyclovir or acyclovir  How to Use This Medicine:   Tablet  · Your doctor will tell you how much medicine to use  Do not use more than directed  · This medicine works best when you take it at the first sign of a herpes breakout  · Drink extra fluids so you will urinate more often and help prevent kidney problems  · Missed dose: Take a dose as soon as you remember  If it is almost time for your next dose, wait until then and take a regular dose  Do not take extra medicine to make up for a missed dose  · Store the medicine in a closed container at room temperature, away from heat, moisture, and direct light  Store the oral liquid in the refrigerator  Discard any unused medicine after 28 days  Drugs and Foods to Avoid:      Ask your doctor or pharmacist before using any other medicine, including over-the-counter medicines, vitamins, and herbal products  Warnings While Using This Medicine:   · Tell your doctor if you are pregnant or breastfeeding, or if you have kidney disease, HIV or AIDS, or had a bone marrow or kidney transplant  · This medicine may cause the following problems:   ¨ Kidney problems  ¨ Nervous system problems  ¨ Thrombotic thrombocytopenic purpura/hemolytic uremic syndrome (in patients who have HIV or had a bone marrow or kidney transplant)  · Do not have sex while you have herpes sores  Valacyclovir will not stop the spread of herpes during sex    · Even if you have no signs of a herpes infection, it is still possible to spread the virus to others  Always use condoms made from latex or polyurethane when you have sexual contact  · Call your doctor if your symptoms do not improve or if they get worse  · Tell any doctor or dentist who treats you that you are using this medicine  This medicine may affect certain medical test results  · Do not stop using this medicine suddenly, or change how you take it, without talking to your doctor  · Keep all medicine out of the reach of children  Never share your medicine with anyone  Possible Side Effects While Using This Medicine:   Call your doctor right away if you notice any of these side effects:  · Allergic reaction: Itching or hives, swelling in your face or hands, swelling or tingling in your mouth or throat, chest tightness, trouble breathing  · Confusion, agitation, depression, or other behavior changes  · Decrease in how much or how often you urinate  · Fast heartbeat, fainting, or extreme weakness  · Pinpoint red spots on your skin, unusual bleeding or bruising, blood in your urine or stools  · Problems with walking, speaking, or coordination, seeing or hearing things that are not there  · Seizures or tremors  · Yellow skin or eyes  If you notice these less serious side effects, talk with your doctor:   · Headache or dizziness  · Nausea, vomiting, diarrhea, stomach pain  If you notice other side effects that you think are caused by this medicine, tell your doctor  Call your doctor for medical advice about side effects  You may report side effects to FDA at 5-322-FDA-6201  © 2017 [address] Information is for End User's use only and may not be sold, redistributed or otherwise used for commercial purposes  The above information is an  only  It is not intended as medical advice for individual conditions or treatments   Talk to your doctor, nurse or pharmacist before following any medical regimen to see if it is safe and effective for you

## 2018-07-17 ENCOUNTER — CLINICAL SUPPORT (OUTPATIENT)
Dept: OBGYN CLINIC | Facility: HOSPITAL | Age: 38
End: 2018-07-17
Payer: COMMERCIAL

## 2018-07-17 DIAGNOSIS — Z30.42 ENCOUNTER FOR DEPO-PROVERA CONTRACEPTION: Primary | ICD-10-CM

## 2018-07-17 PROCEDURE — 96372 THER/PROPH/DIAG INJ SC/IM: CPT

## 2018-07-17 RX ORDER — MEDROXYPROGESTERONE ACETATE 150 MG/ML
150 INJECTION, SUSPENSION INTRAMUSCULAR
Status: COMPLETED | OUTPATIENT
Start: 2018-07-17 | End: 2019-06-10

## 2018-07-17 RX ADMIN — MEDROXYPROGESTERONE ACETATE 150 MG: 150 INJECTION, SUSPENSION INTRAMUSCULAR at 12:52

## 2018-07-17 NOTE — PROGRESS NOTES
Depo-Provera      [x]   Patient provided box yes    Syringe   Last  Annual/Pap Date: 04/24/2018  Cristal Odom Last Depo date: 04/24/2018   Side effects:    HCG; if applicable: n/a   Given by: Papo Whitten RN   Site: Left deltoid   Next appt  due: 11 weeks   Calcium supplement daily teaching, condoms for 2 weeks following first injection dose

## 2018-10-09 ENCOUNTER — CLINICAL SUPPORT (OUTPATIENT)
Dept: OBGYN CLINIC | Facility: HOSPITAL | Age: 38
End: 2018-10-09
Payer: COMMERCIAL

## 2018-10-09 DIAGNOSIS — Z30.42 ENCOUNTER FOR SURVEILLANCE OF INJECTABLE CONTRACEPTIVE: ICD-10-CM

## 2018-10-09 PROCEDURE — 96372 THER/PROPH/DIAG INJ SC/IM: CPT

## 2018-10-09 RX ADMIN — MEDROXYPROGESTERONE ACETATE 150 MG: 150 INJECTION, SUSPENSION INTRAMUSCULAR at 08:43

## 2018-10-09 NOTE — PROGRESS NOTES
Depo-Provera      [x]   Patient provided reginald Cruz Goon or syringe    Last  Annual/Pap Date: sol    Last Depo date:7/2018   Side effects:    HCG; if applicable: na   Given by: cf   Site: ld   Next appt  due: 1/2/19   Calcium supplement daily teaching, condoms for 2 weeks following first injection dose

## 2018-10-11 ENCOUNTER — APPOINTMENT (EMERGENCY)
Dept: RADIOLOGY | Facility: HOSPITAL | Age: 38
End: 2018-10-11
Payer: COMMERCIAL

## 2018-10-11 ENCOUNTER — HOSPITAL ENCOUNTER (EMERGENCY)
Facility: HOSPITAL | Age: 38
Discharge: HOME | End: 2018-10-11
Attending: EMERGENCY MEDICINE | Admitting: EMERGENCY MEDICINE
Payer: COMMERCIAL

## 2018-10-11 VITALS
BODY MASS INDEX: 31.3 KG/M2 | SYSTOLIC BLOOD PRESSURE: 128 MMHG | HEART RATE: 64 BPM | WEIGHT: 182.32 LBS | RESPIRATION RATE: 18 BRPM | OXYGEN SATURATION: 100 % | TEMPERATURE: 98.7 F | DIASTOLIC BLOOD PRESSURE: 71 MMHG

## 2018-10-11 DIAGNOSIS — S63.602A SPRAIN OF LEFT THUMB, UNSPECIFIED SITE OF FINGER, INITIAL ENCOUNTER: Primary | ICD-10-CM

## 2018-10-11 PROCEDURE — 99283 EMERGENCY DEPT VISIT LOW MDM: CPT

## 2018-10-11 PROCEDURE — 73130 X-RAY EXAM OF HAND: CPT

## 2018-10-11 NOTE — DISCHARGE INSTRUCTIONS
Skier's Thumb   WHAT YOU NEED TO KNOW:   Skier's thumb, or gamekeeper's thumb, is when a ligament in your thumb is stretched or torn  Ligaments are strong tissues that connect bones and keep them in place, and support your joints  DISCHARGE INSTRUCTIONS:   Return to the emergency department if:   · You have severe thumb pain  · Your thumb or fingers look pale and feel cold  Contact your healthcare provider if:   · You have numbness or tingling in your thumb or fingers  · Your symptoms get worse, even with treatment  · You have questions or concerns about your condition or care  Medicines:   · NSAIDs , such as ibuprofen, help decrease swelling, pain, and fever  This medicine is available with or without a doctor's order  NSAIDs can cause stomach bleeding or kidney problems in certain people  If you take blood thinner medicine, always ask your healthcare provider if NSAIDs are safe for you  Always read the medicine label and follow directions  · Acetaminophen  decreases pain and fever  It is available without a doctor's order  Ask how much to take and how often to take it  Follow directions  Acetaminophen can cause liver damage if not taken correctly  · Prescription pain medicine  may be given  Ask your healthcare provider how to take this medicine safely  Some prescription pain medicines contain acetaminophen  Do not take other medicines that contain acetaminophen without talking to your healthcare provider  Too much acetaminophen may cause liver damage  Prescription pain medicine may cause constipation  Ask your healthcare provider how to prevent or treat constipation  · Take your medicine as directed  Contact your healthcare provider if you think your medicine is not helping or if you have side effects  Tell him of her if you are allergic to any medicine  Keep a list of the medicines, vitamins, and herbs you take  Include the amounts, and when and why you take them   Bring the list or the pill bottles to follow-up visits  Carry your medicine list with you in case of an emergency  Support devices:  Support devices include a removable splint, elastic bandage, or thumb cast  These are used to decrease or prevent movement of your thumb so it can heal  They are also used to prevent further damage to your thumb  These devices may be worn for 3 to 6 weeks  Manage your symptoms:   · Rest  your hand as directed  You will need to limit certain activities while your injury heals  Ask your healthcare provider what activities are safe to do  · Apply ice  on your thumb for 15 to 20 minutes every hour or as directed  Use an ice pack, or put crushed ice in a plastic bag  Cover it with a towel  Ice helps prevent tissue damage and decreases swelling and pain  · Elevate  your hand above the level of your heart as often as you can  This will help decrease swelling and pain  Prop your arm on pillows or blankets to keep it elevated comfortably  Physical therapy:  Physical therapy may be recommended after your injury has healed  A physical therapist teaches you exercises to help improve movement and strength, and to decrease pain  Follow up with your healthcare provider as directed:  Write down your questions so you remember to ask them during your visits  © 2017 [employer] Information is for End User's use only and may not be sold, redistributed or otherwise used for commercial purposes  All illustrations and images included in CareNotes® are the copyrighted property of [employer]  or [employer]  The above information is an  only  It is not intended as medical advice for individual conditions or treatments  Talk to your doctor, nurse or pharmacist before following any medical regimen to see if it is safe and effective for you
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
## 2019-01-02 ENCOUNTER — CLINICAL SUPPORT (OUTPATIENT)
Dept: OBGYN CLINIC | Facility: CLINIC | Age: 39
End: 2019-01-02

## 2019-01-02 DIAGNOSIS — Z30.42 ENCOUNTER FOR DEPO-PROVERA CONTRACEPTION: ICD-10-CM

## 2019-01-02 PROCEDURE — 96372 THER/PROPH/DIAG INJ SC/IM: CPT

## 2019-01-02 RX ADMIN — MEDROXYPROGESTERONE ACETATE 150 MG: 150 INJECTION, SUSPENSION INTRAMUSCULAR at 14:12

## 2019-01-02 NOTE — PROGRESS NOTES
Depo-Provera      [x]   Patient provided box    Syringe    Last  Annual/Pap Date: 04/24/2018  [place] Last Depo date: 10/09/2018   Side effects: None   HCG; if applicable: N/a   Given by: Radha Canseco RN   Site: Left deltoid   Next appt  due: 03/20/2019   Calcium supplement daily teaching, condoms for 2 weeks following first injection dose

## 2019-03-19 ENCOUNTER — CLINICAL SUPPORT (OUTPATIENT)
Dept: OBGYN CLINIC | Facility: CLINIC | Age: 39
End: 2019-03-19

## 2019-03-19 DIAGNOSIS — Z30.42 ENCOUNTER FOR SURVEILLANCE OF INJECTABLE CONTRACEPTIVE: ICD-10-CM

## 2019-03-19 PROCEDURE — 96372 THER/PROPH/DIAG INJ SC/IM: CPT

## 2019-03-19 RX ADMIN — MEDROXYPROGESTERONE ACETATE 150 MG: 150 INJECTION, SUSPENSION INTRAMUSCULAR at 08:23

## 2019-03-19 NOTE — PROGRESS NOTES
Depo-Provera      [x]   Patient provided box yes   Vial or syringe    Last  Annual/Pap Date: 4/24/19  [facility] Last Depo date: 1/2/19   Side effects:    HCG; if applicable: no   Given by: rosa isela   Site: [place]   Next appt  due: 12w   Calcium supplement daily teaching, condoms for 2 weeks following first injection dose

## 2019-04-25 ENCOUNTER — APPOINTMENT (OUTPATIENT)
Dept: LAB | Facility: HOSPITAL | Age: 39
End: 2019-04-25
Payer: COMMERCIAL

## 2019-04-25 ENCOUNTER — TRANSCRIBE ORDERS (OUTPATIENT)
Dept: LAB | Facility: HOSPITAL | Age: 39
End: 2019-04-25

## 2019-04-25 DIAGNOSIS — M25.50 PAIN IN JOINT, MULTIPLE SITES: Primary | ICD-10-CM

## 2019-04-25 DIAGNOSIS — Z00.00 ROUTINE GENERAL MEDICAL EXAMINATION AT A HEALTH CARE FACILITY: ICD-10-CM

## 2019-04-25 DIAGNOSIS — M25.50 PAIN IN JOINT, MULTIPLE SITES: ICD-10-CM

## 2019-04-25 LAB
25(OH)D3 SERPL-MCNC: 10.7 NG/ML (ref 30–100)
ALBUMIN SERPL BCP-MCNC: 4 G/DL (ref 3.5–5)
ALP SERPL-CCNC: 64 U/L (ref 46–116)
ALT SERPL W P-5'-P-CCNC: 14 U/L (ref 12–78)
ANION GAP SERPL CALCULATED.3IONS-SCNC: 6 MMOL/L (ref 4–13)
AST SERPL W P-5'-P-CCNC: 9 U/L (ref 5–45)
BASOPHILS # BLD AUTO: 0.06 THOUSANDS/ΜL (ref 0–0.1)
BASOPHILS NFR BLD AUTO: 1 % (ref 0–1)
BILIRUB SERPL-MCNC: 0.41 MG/DL (ref 0.2–1)
BUN SERPL-MCNC: 5 MG/DL (ref 5–25)
CALCIUM SERPL-MCNC: 8.2 MG/DL (ref 8.3–10.1)
CHLORIDE SERPL-SCNC: 109 MMOL/L (ref 100–108)
CHOLEST SERPL-MCNC: 149 MG/DL (ref 50–200)
CO2 SERPL-SCNC: 23 MMOL/L (ref 21–32)
CREAT SERPL-MCNC: 0.7 MG/DL (ref 0.6–1.3)
EOSINOPHIL # BLD AUTO: 0.31 THOUSAND/ΜL (ref 0–0.61)
EOSINOPHIL NFR BLD AUTO: 3 % (ref 0–6)
ERYTHROCYTE [DISTWIDTH] IN BLOOD BY AUTOMATED COUNT: 13.8 % (ref 11.6–15.1)
ERYTHROCYTE [SEDIMENTATION RATE] IN BLOOD: 16 MM/HOUR (ref 0–20)
GFR SERPL CREATININE-BSD FRML MDRD: 111 ML/MIN/1.73SQ M
GLUCOSE P FAST SERPL-MCNC: 71 MG/DL (ref 65–99)
HCT VFR BLD AUTO: 45.3 % (ref 34.8–46.1)
HDLC SERPL-MCNC: 33 MG/DL (ref 40–60)
HGB BLD-MCNC: 14.7 G/DL (ref 11.5–15.4)
IMM GRANULOCYTES # BLD AUTO: 0.04 THOUSAND/UL (ref 0–0.2)
IMM GRANULOCYTES NFR BLD AUTO: 0 % (ref 0–2)
LDLC SERPL CALC-MCNC: 103 MG/DL (ref 0–100)
LYMPHOCYTES # BLD AUTO: 4.2 THOUSANDS/ΜL (ref 0.6–4.47)
LYMPHOCYTES NFR BLD AUTO: 34 % (ref 14–44)
MCH RBC QN AUTO: 29.2 PG (ref 26.8–34.3)
MCHC RBC AUTO-ENTMCNC: 32.5 G/DL (ref 31.4–37.4)
MCV RBC AUTO: 90 FL (ref 82–98)
MONOCYTES # BLD AUTO: 0.74 THOUSAND/ΜL (ref 0.17–1.22)
MONOCYTES NFR BLD AUTO: 6 % (ref 4–12)
NEUTROPHILS # BLD AUTO: 7.07 THOUSANDS/ΜL (ref 1.85–7.62)
NEUTS SEG NFR BLD AUTO: 56 % (ref 43–75)
NONHDLC SERPL-MCNC: 116 MG/DL
NRBC BLD AUTO-RTO: 0 /100 WBCS
PLATELET # BLD AUTO: 340 THOUSANDS/UL (ref 149–390)
PMV BLD AUTO: 9.3 FL (ref 8.9–12.7)
POTASSIUM SERPL-SCNC: 3.5 MMOL/L (ref 3.5–5.3)
PROT SERPL-MCNC: 8.1 G/DL (ref 6.4–8.2)
RBC # BLD AUTO: 5.03 MILLION/UL (ref 3.81–5.12)
SODIUM SERPL-SCNC: 138 MMOL/L (ref 136–145)
TRIGL SERPL-MCNC: 64 MG/DL
URATE SERPL-MCNC: 3.4 MG/DL (ref 2–6.8)
WBC # BLD AUTO: 12.42 THOUSAND/UL (ref 4.31–10.16)

## 2019-04-25 PROCEDURE — 86618 LYME DISEASE ANTIBODY: CPT

## 2019-04-25 PROCEDURE — 84550 ASSAY OF BLOOD/URIC ACID: CPT

## 2019-04-25 PROCEDURE — 85025 COMPLETE CBC W/AUTO DIFF WBC: CPT

## 2019-04-25 PROCEDURE — 80061 LIPID PANEL: CPT

## 2019-04-25 PROCEDURE — 36415 COLL VENOUS BLD VENIPUNCTURE: CPT

## 2019-04-25 PROCEDURE — 85652 RBC SED RATE AUTOMATED: CPT

## 2019-04-25 PROCEDURE — 80053 COMPREHEN METABOLIC PANEL: CPT

## 2019-04-25 PROCEDURE — 82306 VITAMIN D 25 HYDROXY: CPT

## 2019-04-25 PROCEDURE — 86430 RHEUMATOID FACTOR TEST QUAL: CPT

## 2019-04-26 LAB — RHEUMATOID FACT SER QL LA: NEGATIVE

## 2019-04-27 LAB
B BURGDOR IGG SER IA-ACNC: 0.25
B BURGDOR IGM SER IA-ACNC: 0.59

## 2019-05-20 ENCOUNTER — OFFICE VISIT (OUTPATIENT)
Dept: OBGYN CLINIC | Facility: HOSPITAL | Age: 39
End: 2019-05-20
Payer: COMMERCIAL

## 2019-05-20 VITALS
SYSTOLIC BLOOD PRESSURE: 125 MMHG | BODY MASS INDEX: 31.34 KG/M2 | WEIGHT: 183.6 LBS | HEIGHT: 64 IN | HEART RATE: 71 BPM | DIASTOLIC BLOOD PRESSURE: 87 MMHG

## 2019-05-20 DIAGNOSIS — M75.41 IMPINGEMENT SYNDROME OF RIGHT SHOULDER: Primary | ICD-10-CM

## 2019-05-20 PROCEDURE — 20610 DRAIN/INJ JOINT/BURSA W/O US: CPT | Performed by: ORTHOPAEDIC SURGERY

## 2019-05-20 PROCEDURE — 99203 OFFICE O/P NEW LOW 30 MIN: CPT | Performed by: ORTHOPAEDIC SURGERY

## 2019-05-20 RX ORDER — BETAMETHASONE SODIUM PHOSPHATE AND BETAMETHASONE ACETATE 3; 3 MG/ML; MG/ML
6 INJECTION, SUSPENSION INTRA-ARTICULAR; INTRALESIONAL; INTRAMUSCULAR; SOFT TISSUE
Status: COMPLETED | OUTPATIENT
Start: 2019-05-20 | End: 2019-05-20

## 2019-05-20 RX ORDER — BUPIVACAINE HYDROCHLORIDE 2.5 MG/ML
2 INJECTION, SOLUTION INFILTRATION; PERINEURAL
Status: COMPLETED | OUTPATIENT
Start: 2019-05-20 | End: 2019-05-20

## 2019-05-20 RX ORDER — MELOXICAM 15 MG/1
15 TABLET ORAL DAILY
Refills: 0 | COMMUNITY
Start: 2019-04-23 | End: 2019-07-15 | Stop reason: ALTCHOICE

## 2019-05-20 RX ADMIN — BUPIVACAINE HYDROCHLORIDE 2 ML: 2.5 INJECTION, SOLUTION INFILTRATION; PERINEURAL at 15:35

## 2019-05-20 RX ADMIN — BETAMETHASONE SODIUM PHOSPHATE AND BETAMETHASONE ACETATE 6 MG: 3; 3 INJECTION, SUSPENSION INTRA-ARTICULAR; INTRALESIONAL; INTRAMUSCULAR; SOFT TISSUE at 15:35

## 2019-06-04 ENCOUNTER — APPOINTMENT (OUTPATIENT)
Dept: LAB | Facility: HOSPITAL | Age: 39
End: 2019-06-04
Payer: COMMERCIAL

## 2019-06-04 ENCOUNTER — OFFICE VISIT (OUTPATIENT)
Dept: OBGYN CLINIC | Facility: HOSPITAL | Age: 39
End: 2019-06-04
Payer: COMMERCIAL

## 2019-06-04 ENCOUNTER — HOSPITAL ENCOUNTER (OUTPATIENT)
Dept: RADIOLOGY | Facility: HOSPITAL | Age: 39
Discharge: HOME | End: 2019-06-04
Attending: ORTHOPAEDIC SURGERY
Payer: COMMERCIAL

## 2019-06-04 VITALS
HEIGHT: 64 IN | BODY MASS INDEX: 31.07 KG/M2 | DIASTOLIC BLOOD PRESSURE: 89 MMHG | WEIGHT: 182 LBS | SYSTOLIC BLOOD PRESSURE: 133 MMHG | HEART RATE: 73 BPM

## 2019-06-04 DIAGNOSIS — M25.551 PAIN IN RIGHT HIP: ICD-10-CM

## 2019-06-04 DIAGNOSIS — M25.551 PAIN IN RIGHT HIP: Primary | ICD-10-CM

## 2019-06-04 DIAGNOSIS — M05.20 RHEUMATOID ARTERITIS (HCC): ICD-10-CM

## 2019-06-04 LAB
ALBUMIN SERPL BCP-MCNC: 3.6 G/DL (ref 3.5–5)
ALP SERPL-CCNC: 54 U/L (ref 46–116)
ALT SERPL W P-5'-P-CCNC: 14 U/L (ref 12–78)
ANION GAP SERPL CALCULATED.3IONS-SCNC: 5 MMOL/L (ref 4–13)
AST SERPL W P-5'-P-CCNC: 6 U/L (ref 5–45)
BILIRUB SERPL-MCNC: 0.48 MG/DL (ref 0.2–1)
BUN SERPL-MCNC: 4 MG/DL (ref 5–25)
CALCIUM SERPL-MCNC: 8 MG/DL (ref 8.3–10.1)
CHLORIDE SERPL-SCNC: 109 MMOL/L (ref 100–108)
CO2 SERPL-SCNC: 24 MMOL/L (ref 21–32)
CREAT SERPL-MCNC: 0.67 MG/DL (ref 0.6–1.3)
CRP SERPL QL: <3 MG/L
ERYTHROCYTE [DISTWIDTH] IN BLOOD BY AUTOMATED COUNT: 14.3 % (ref 11.6–15.1)
ERYTHROCYTE [SEDIMENTATION RATE] IN BLOOD: 18 MM/HOUR (ref 0–20)
GFR SERPL CREATININE-BSD FRML MDRD: 112 ML/MIN/1.73SQ M
GLUCOSE P FAST SERPL-MCNC: 80 MG/DL (ref 65–99)
HCT VFR BLD AUTO: 41.2 % (ref 34.8–46.1)
HGB BLD-MCNC: 13.5 G/DL (ref 11.5–15.4)
MCH RBC QN AUTO: 29.6 PG (ref 26.8–34.3)
MCHC RBC AUTO-ENTMCNC: 32.8 G/DL (ref 31.4–37.4)
MCV RBC AUTO: 90 FL (ref 82–98)
PLATELET # BLD AUTO: 331 THOUSANDS/UL (ref 149–390)
PMV BLD AUTO: 9 FL (ref 8.9–12.7)
POTASSIUM SERPL-SCNC: 3.4 MMOL/L (ref 3.5–5.3)
PROT SERPL-MCNC: 7.5 G/DL (ref 6.4–8.2)
RBC # BLD AUTO: 4.56 MILLION/UL (ref 3.81–5.12)
SODIUM SERPL-SCNC: 138 MMOL/L (ref 136–145)
WBC # BLD AUTO: 13.2 THOUSAND/UL (ref 4.31–10.16)

## 2019-06-04 PROCEDURE — 36415 COLL VENOUS BLD VENIPUNCTURE: CPT

## 2019-06-04 PROCEDURE — 86431 RHEUMATOID FACTOR QUANT: CPT

## 2019-06-04 PROCEDURE — 85652 RBC SED RATE AUTOMATED: CPT

## 2019-06-04 PROCEDURE — 20610 DRAIN/INJ JOINT/BURSA W/O US: CPT | Performed by: PHYSICIAN ASSISTANT

## 2019-06-04 PROCEDURE — 81374 HLA I TYPING 1 ANTIGEN LR: CPT

## 2019-06-04 PROCEDURE — 73502 X-RAY EXAM HIP UNI 2-3 VIEWS: CPT

## 2019-06-04 PROCEDURE — 86140 C-REACTIVE PROTEIN: CPT

## 2019-06-04 PROCEDURE — 80053 COMPREHEN METABOLIC PANEL: CPT

## 2019-06-04 PROCEDURE — 85027 COMPLETE CBC AUTOMATED: CPT

## 2019-06-04 PROCEDURE — 99213 OFFICE O/P EST LOW 20 MIN: CPT | Performed by: PHYSICIAN ASSISTANT

## 2019-06-04 PROCEDURE — 86430 RHEUMATOID FACTOR TEST QUAL: CPT

## 2019-06-04 PROCEDURE — 86038 ANTINUCLEAR ANTIBODIES: CPT

## 2019-06-04 RX ADMIN — BETAMETHASONE SODIUM PHOSPHATE AND BETAMETHASONE ACETATE 6 MG: 3; 3 INJECTION, SUSPENSION INTRA-ARTICULAR; INTRALESIONAL; INTRAMUSCULAR; SOFT TISSUE at 17:26

## 2019-06-04 RX ADMIN — BUPIVACAINE HYDROCHLORIDE 2 ML: 2.5 INJECTION, SOLUTION INFILTRATION; PERINEURAL at 17:26

## 2019-06-04 RX ADMIN — LIDOCAINE HYDROCHLORIDE 1 ML: 20 INJECTION, SOLUTION INFILTRATION; PERINEURAL at 17:26

## 2019-06-05 LAB
CRYOGLOB RF SER-ACNC: ABNORMAL [IU]/ML
RHEUMATOID FACT SER QL LA: POSITIVE

## 2019-06-06 RX ORDER — BUPIVACAINE HYDROCHLORIDE 2.5 MG/ML
2 INJECTION, SOLUTION INFILTRATION; PERINEURAL
Status: COMPLETED | OUTPATIENT
Start: 2019-06-04 | End: 2019-06-04

## 2019-06-06 RX ORDER — LIDOCAINE HYDROCHLORIDE 20 MG/ML
1 INJECTION, SOLUTION INFILTRATION; PERINEURAL
Status: COMPLETED | OUTPATIENT
Start: 2019-06-04 | End: 2019-06-04

## 2019-06-06 RX ORDER — BETAMETHASONE SODIUM PHOSPHATE AND BETAMETHASONE ACETATE 3; 3 MG/ML; MG/ML
6 INJECTION, SUSPENSION INTRA-ARTICULAR; INTRALESIONAL; INTRAMUSCULAR; SOFT TISSUE
Status: COMPLETED | OUTPATIENT
Start: 2019-06-04 | End: 2019-06-04

## 2019-06-07 LAB — RYE IGE QN: NEGATIVE

## 2019-06-10 ENCOUNTER — OFFICE VISIT (OUTPATIENT)
Dept: OBGYN CLINIC | Facility: CLINIC | Age: 39
End: 2019-06-10

## 2019-06-10 VITALS
BODY MASS INDEX: 31.07 KG/M2 | DIASTOLIC BLOOD PRESSURE: 84 MMHG | HEART RATE: 78 BPM | SYSTOLIC BLOOD PRESSURE: 148 MMHG | WEIGHT: 182 LBS | HEIGHT: 64 IN

## 2019-06-10 DIAGNOSIS — E66.09 CLASS 1 OBESITY DUE TO EXCESS CALORIES WITHOUT SERIOUS COMORBIDITY WITH BODY MASS INDEX (BMI) OF 31.0 TO 31.9 IN ADULT: ICD-10-CM

## 2019-06-10 DIAGNOSIS — F17.200 TOBACCO USE DISORDER: ICD-10-CM

## 2019-06-10 DIAGNOSIS — Z01.419 WOMEN'S ANNUAL ROUTINE GYNECOLOGICAL EXAMINATION: Primary | ICD-10-CM

## 2019-06-10 DIAGNOSIS — Z30.42 ENCOUNTER FOR SURVEILLANCE OF INJECTABLE CONTRACEPTIVE: ICD-10-CM

## 2019-06-10 LAB — HLA-B27 QL NAA+PROBE: NEGATIVE

## 2019-06-10 PROCEDURE — 96372 THER/PROPH/DIAG INJ SC/IM: CPT

## 2019-06-10 PROCEDURE — 99395 PREV VISIT EST AGE 18-39: CPT | Performed by: NURSE PRACTITIONER

## 2019-06-10 RX ORDER — MEDROXYPROGESTERONE ACETATE 150 MG/ML
150 INJECTION, SUSPENSION INTRAMUSCULAR
Qty: 1 ML | Refills: 3 | Status: SHIPPED | OUTPATIENT
Start: 2019-06-10 | End: 2020-08-03 | Stop reason: SDUPTHER

## 2019-06-10 RX ADMIN — MEDROXYPROGESTERONE ACETATE 150 MG: 150 INJECTION, SUSPENSION INTRAMUSCULAR at 09:12

## 2019-06-25 ENCOUNTER — EVALUATION (OUTPATIENT)
Dept: PHYSICAL THERAPY | Facility: CLINIC | Age: 39
End: 2019-06-25
Payer: COMMERCIAL

## 2019-06-25 DIAGNOSIS — M25.551 RIGHT HIP PAIN: Primary | ICD-10-CM

## 2019-06-25 DIAGNOSIS — M25.511 CHRONIC RIGHT SHOULDER PAIN: ICD-10-CM

## 2019-06-25 DIAGNOSIS — M54.41 CHRONIC BILATERAL LOW BACK PAIN WITH RIGHT-SIDED SCIATICA: ICD-10-CM

## 2019-06-25 DIAGNOSIS — G89.29 CHRONIC RIGHT SHOULDER PAIN: ICD-10-CM

## 2019-06-25 DIAGNOSIS — G89.29 CHRONIC BILATERAL LOW BACK PAIN WITH RIGHT-SIDED SCIATICA: ICD-10-CM

## 2019-06-25 PROCEDURE — 97110 THERAPEUTIC EXERCISES: CPT | Performed by: PHYSICAL THERAPIST

## 2019-06-25 PROCEDURE — 97162 PT EVAL MOD COMPLEX 30 MIN: CPT | Performed by: PHYSICAL THERAPIST

## 2019-07-02 ENCOUNTER — OFFICE VISIT (OUTPATIENT)
Dept: PHYSICAL THERAPY | Facility: CLINIC | Age: 39
End: 2019-07-02
Payer: COMMERCIAL

## 2019-07-02 DIAGNOSIS — G89.29 CHRONIC RIGHT SHOULDER PAIN: ICD-10-CM

## 2019-07-02 DIAGNOSIS — M54.41 CHRONIC BILATERAL LOW BACK PAIN WITH RIGHT-SIDED SCIATICA: ICD-10-CM

## 2019-07-02 DIAGNOSIS — M25.551 RIGHT HIP PAIN: Primary | ICD-10-CM

## 2019-07-02 DIAGNOSIS — M25.511 CHRONIC RIGHT SHOULDER PAIN: ICD-10-CM

## 2019-07-02 DIAGNOSIS — G89.29 CHRONIC BILATERAL LOW BACK PAIN WITH RIGHT-SIDED SCIATICA: ICD-10-CM

## 2019-07-02 PROCEDURE — 97110 THERAPEUTIC EXERCISES: CPT | Performed by: PHYSICAL THERAPIST

## 2019-07-02 PROCEDURE — 97140 MANUAL THERAPY 1/> REGIONS: CPT | Performed by: PHYSICAL THERAPIST

## 2019-07-02 PROCEDURE — 97112 NEUROMUSCULAR REEDUCATION: CPT | Performed by: PHYSICAL THERAPIST
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
## 2019-07-06 ENCOUNTER — APPOINTMENT (EMERGENCY)
Dept: RADIOLOGY | Facility: HOSPITAL | Age: 39
End: 2019-07-06
Payer: COMMERCIAL

## 2019-07-06 ENCOUNTER — HOSPITAL ENCOUNTER (EMERGENCY)
Facility: HOSPITAL | Age: 39
Discharge: HOME | End: 2019-07-06
Attending: EMERGENCY MEDICINE | Admitting: EMERGENCY MEDICINE
Payer: COMMERCIAL

## 2019-07-06 VITALS
OXYGEN SATURATION: 94 % | WEIGHT: 179 LBS | RESPIRATION RATE: 16 BRPM | BODY MASS INDEX: 30.73 KG/M2 | HEART RATE: 94 BPM | TEMPERATURE: 98.6 F | DIASTOLIC BLOOD PRESSURE: 84 MMHG | SYSTOLIC BLOOD PRESSURE: 139 MMHG

## 2019-07-06 DIAGNOSIS — S46.912A STRAIN OF LEFT ELBOW, INITIAL ENCOUNTER: Primary | ICD-10-CM

## 2019-07-06 PROCEDURE — 73080 X-RAY EXAM OF ELBOW: CPT

## 2019-07-06 PROCEDURE — 99283 EMERGENCY DEPT VISIT LOW MDM: CPT

## 2019-07-06 PROCEDURE — 99283 EMERGENCY DEPT VISIT LOW MDM: CPT | Performed by: EMERGENCY MEDICINE

## 2019-07-06 RX ORDER — MELOXICAM 7.5 MG/1
7.5 TABLET ORAL DAILY
Qty: 30 TABLET | Refills: 0 | Status: SHIPPED | OUTPATIENT
Start: 2019-07-06 | End: 2019-08-26 | Stop reason: SDUPTHER

## 2019-07-06 RX ADMIN — DICLOFENAC 2 G: 10 GEL TOPICAL at 09:24

## 2019-07-06 NOTE — ED PROVIDER NOTES
History  Chief Complaint   Patient presents with    Arm Pain     left elbow pain with radiation down forearm , heard a pop after carrying bags     46 yr female -  In mist of rheumatology workup with pos  Blood work - with various jt comps-- to see rheumatology -- c/o  Left posterior elbow pain  Felt pop in area after carrying [id number]  The other day c/o posterior elbow pain  With decreased rom -- no other comps or injuries      History provided by:  Patient   used: No    Arm Pain       Prior to Admission Medications   Prescriptions Last Dose Informant Patient Reported? Taking?   acetaminophen (TYLENOL) 500 mg tablet  Self No Yes   Sig: Take 2 tablets (1,000 mg total) by mouth every 8 (eight) hours as needed for mild pain or moderate pain   medroxyPROGESTERone (DEPO-PROVERA) 150 mg/mL injection   No Yes   Sig: Inject 1 mL (150 mg total) into a muscle every 3 (three) months   meloxicam (MOBIC) 15 mg tablet   Yes Yes   Sig: Take 15 mg by mouth daily   valACYclovir (VALTREX) 500 mg tablet   No Yes   Sig: Take 1 tablet (500 mg total) by mouth daily      Facility-Administered Medications: None       Past Medical History:   Diagnosis Date    Arthritis     Depression     DJD (degenerative joint disease)     Herpes     Hidradenitis     MRSA carrier        History reviewed  No pertinent surgical history      Family History   Problem Relation Age of Onset    Arthritis Mother     Hypertension Mother     Diabetes Father     Stroke Father     Heart attack Father     Post-traumatic stress disorder Father     Other Father         nerve gas exposure-war    Schizophrenia Sister     Bipolar disorder Brother     Schizophrenia Brother     Sleep apnea Daughter     Obesity Daughter     Depression Son     Post-traumatic stress disorder Son     Heart attack Maternal Grandfather     Diabetes Paternal Grandmother     Stroke Paternal Grandmother     Heart attack Sid Aguirre Post-traumatic stress disorder Paternal Grandfather     Ovarian cysts Sister      I have reviewed and agree with the history as documented  Social History     Tobacco Use    Smoking status: Current Every Day Smoker     Packs/day: 0 50     Types: Cigarettes    Smokeless tobacco: Never Used   Substance Use Topics    Alcohol use: Yes     Comment: social    Drug use: Yes     Types: Marijuana     Comment: few times a month         Review of Systems   Constitutional: Negative  HENT: Negative  Eyes: Negative  Respiratory: Negative  Cardiovascular: Negative  Gastrointestinal: Negative  Endocrine: Negative  Genitourinary: Negative  Musculoskeletal: Negative  Left elbow    Skin: Negative  Allergic/Immunologic: Negative  Neurological: Negative  Hematological: Negative  Psychiatric/Behavioral: Negative  Physical Exam  Physical Exam   Constitutional: She appears well-developed and well-nourished  No distress  avss-- pulse ox 94 % on ra- interpretation is  Low- normal- no intervention    Musculoskeletal:   lue- posterior left elbow tenderness  Decreased extension at elbow- with pain  Posteriorly in elbow - no jt effusion -- no radial head/ lateral epicondylitis type pain -- normal lue distal pulse-sensation/strength/rom/ cap refill   Skin: She is not diaphoretic  Nursing note and vitals reviewed        Vital Signs  ED Triage Vitals [07/06/19 0828]   Temperature Pulse Respirations Blood Pressure SpO2   98 6 °F (37 °C) 94 16 139/84 94 %      Temp src Heart Rate Source Patient Position - Orthostatic VS BP Location FiO2 (%)   -- -- -- -- --      Pain Score       Worst Possible Pain           Vitals:    07/06/19 0828   BP: 139/84   Pulse: 94         Visual Acuity      ED Medications  Medications   diclofenac sodium (VOLTAREN) 1 % topical gel 2 g (has no administration in time range)       Diagnostic Studies  Results Reviewed     None                 XR elbow 3+ views LEFT (Results Pending)              Procedures  Procedures       ED Course  ED Course as of Jul 06 0953   Sat Jul 06, 2019   9476 Er md note- recent labs reviewed by er md       0903 Left elbow xray - no fx/ jt effusion /djd      0909 Er md note-  pt states has skin lesion on posterior upper right thigh-- inspect by er md- no abscess/cellulitis non vesicular      0911 Er md  procedure note- 2 gram of diclofenac gel placed over posterior left elbow and rubbed in by er md- pt tolerated well with no comps      0943 Er md note-  pt - re-evaluated in er feels improved  with left elbow pain- asking for refill of meloxicam rx- will refill and discuss one verse the other  with diclofenac gel                                  MDM    Disposition  Final diagnoses:   None     ED Disposition     None      Follow-up Information    None         Patient's Medications   Discharge Prescriptions    No medications on file     No discharge procedures on file      ED Provider  Electronically Signed by           Brent Foley MD  07/06/19 5735

## 2019-07-06 NOTE — DISCHARGE INSTRUCTIONS
Diagnosis; left elbow strain     - activity/ range of motion of left elbow as tolerated- sling as needed for support - as pain subsides in elbow need to start moving  elbow as tolerated     - can use the diclofenac gel -- 2 gram application - on strip in box- up to 4 times per day to left elbow    - meloxicam -- 1 tablet once a day as needed - note- want to try to minimize using both the gel and the meloxicam together     - if no improvement in elbow after 1 week - please follow up with your orthopedist

## 2019-07-06 NOTE — ED NOTES
Sling applied to left arm, pt instructed to remove it before driving home, receptive     Lorra Necessary, RN  07/06/19 0696

## 2019-07-09 ENCOUNTER — APPOINTMENT (OUTPATIENT)
Dept: PHYSICAL THERAPY | Facility: CLINIC | Age: 39
End: 2019-07-09
Payer: COMMERCIAL

## 2019-07-11 ENCOUNTER — APPOINTMENT (OUTPATIENT)
Dept: PHYSICAL THERAPY | Facility: CLINIC | Age: 39
End: 2019-07-11
Payer: COMMERCIAL

## 2019-07-15 ENCOUNTER — OFFICE VISIT (OUTPATIENT)
Dept: OBGYN CLINIC | Facility: HOSPITAL | Age: 39
End: 2019-07-15
Payer: COMMERCIAL

## 2019-07-15 VITALS
HEIGHT: 64 IN | DIASTOLIC BLOOD PRESSURE: 88 MMHG | WEIGHT: 179 LBS | SYSTOLIC BLOOD PRESSURE: 133 MMHG | HEART RATE: 63 BPM | BODY MASS INDEX: 30.56 KG/M2

## 2019-07-15 DIAGNOSIS — M75.41 IMPINGEMENT SYNDROME OF RIGHT SHOULDER: Primary | ICD-10-CM

## 2019-07-15 PROCEDURE — 99213 OFFICE O/P EST LOW 20 MIN: CPT | Performed by: PHYSICIAN ASSISTANT

## 2019-07-15 NOTE — PROGRESS NOTES
Assessment  Diagnoses and all orders for this visit:    Impingement syndrome of right shoulder      Discussion and Plan:    Britatni Gregory will continue with therapy  She has not had enough therapy to determine if it is working or not  Brittani Gregory will follow up in 6 weeks  If she is not making appropriate improvement after 6 weeks of dedicated therapy, then advanced imaging is required  All questions answered    Subjective:   Patient ID: Susana Kapoor is a 46 y o  female      Brittani Gregory presents in follow up of the right shoulder  She states she still has pain but has only attended 2 therapy sessions  She is also being treated for her back and hip in therapy  She is trying to be compliant with home exercise program, but she has a lot going on at work and home  She recently injured the left elbow and presents in a sling today  She was evaluated in the ER and told to rest the elbow  She denies numbness or tingling in the right arm  /88   Pulse 63   Ht 5' 4" (1 626 m)   Wt 81 2 kg (179 lb)   BMI 30 73 kg/m²     The following portions of the patient's history were reviewed and updated as appropriate: allergies, current medications, past family history, past medical history, past social history, past surgical history and problem list     Review of Systems   Constitutional: Negative for chills and fever  HENT: Negative for hearing loss  Eyes: Negative for visual disturbance  Respiratory: Negative for shortness of breath  Cardiovascular: Negative for chest pain  Gastrointestinal: Negative for abdominal pain  Musculoskeletal:        As reviewed in the HPI   Skin: Negative for rash  Neurological:        As reviewed in the HPI   Psychiatric/Behavioral: Negative for agitation  Objective:  Right Shoulder Exam     Tenderness   Right shoulder tenderness location: trapezius  Range of Motion   The patient has normal right shoulder ROM      Muscle Strength   External rotation: 5/5 Supraspinatus: 4/5     Tests   Moses test: positive  Impingement: positive  Drop arm: negative    Other   Erythema: absent  Sensation: normal  Pulse: present    Comments:    Positive [place]riens  Positive Speeds            Physical Exam   Constitutional: She is oriented to person, place, and time  She appears well-developed and well-nourished  HENT:   Head: Normocephalic  Eyes: EOM are normal    Neck: Normal range of motion  Pulmonary/Chest: Breath sounds normal  She has no wheezes  Neurological: She is alert and oriented to person, place, and time  Skin: Skin is warm and dry  Psychiatric: She has a normal mood and affect   Her behavior is normal  Judgment and thought content normal

## 2019-07-16 ENCOUNTER — OFFICE VISIT (OUTPATIENT)
Dept: OBGYN CLINIC | Facility: HOSPITAL | Age: 39
End: 2019-07-16
Payer: COMMERCIAL

## 2019-07-16 ENCOUNTER — APPOINTMENT (OUTPATIENT)
Dept: PHYSICAL THERAPY | Facility: CLINIC | Age: 39
End: 2019-07-16
Payer: COMMERCIAL

## 2019-07-16 VITALS — BODY MASS INDEX: 30.56 KG/M2 | HEIGHT: 64 IN | WEIGHT: 179 LBS

## 2019-07-16 DIAGNOSIS — M05.20 RHEUMATOID ARTERITIS (HCC): Primary | ICD-10-CM

## 2019-07-16 PROCEDURE — 99213 OFFICE O/P EST LOW 20 MIN: CPT | Performed by: ORTHOPAEDIC SURGERY

## 2019-07-16 NOTE — PROGRESS NOTES
46 y o  female presenting to the office for follow up of right hip pain  Patient describes the pain as aching which is continuous  Patient state that the pain gets worse with activiites  Patient did not have substantial improvement in pain from greater troch bursitis injection back in June  She has continues to have groin pain  Patient does have functional limitations preventing her from doing regular daily activities  Patient does have systemic symptoms including back, shoulder, and elbow pain  She has not had substantial improvement with medications at this time, however, she does have some minimal improvement with her meloxicam   Patient denies any other acute complaints  ROS  Review of Systems   Constitutional: Negative for fever and unexpected weight change  HENT: Negative for hearing loss, nosebleeds and sore throat  Eyes: Negative for pain, redness and visual disturbance  Respiratory: Negative for cough, shortness of breath and wheezing  Cardiovascular: Negative for chest pain, palpitations and leg swelling  Gastrointestinal: Negative for abdominal pain, nausea and vomiting  Endocrine: Negative for polydipsia and polyuria  Genitourinary: Negative for dysuria and hematuria  Skin: Negative for rash and wound  Neurological: Negative for dizziness and headaches  Psychiatric/Behavioral: Negative for agitation and suicidal ideas  Past Medical History:   Diagnosis Date    Arthritis     Depression     DJD (degenerative joint disease)     Herpes     Hidradenitis     MRSA carrier      History reviewed  No pertinent surgical history  Results Reviewed     None          Physical Exam  Physical Exam   Constitutional: She is oriented to person, place, and time  She appears well-developed and well-nourished  HENT:   Head: Normocephalic and atraumatic  Eyes: Pupils are equal, round, and reactive to light  EOM are normal    Neck: Neck supple  No tracheal deviation present  Cardiovascular: Normal rate and regular rhythm  Pulmonary/Chest: Effort normal and breath sounds normal    Abdominal: There is no guarding  Neurological: She is alert and oriented to person, place, and time  Skin: Skin is warm and dry  Psychiatric: She has a normal mood and affect  Her behavior is normal      Right Hip Exam     Tenderness   Right hip tenderness location: Diffuse  Range of Motion   Flexion: 110   External rotation: 30   Internal rotation: 20     Muscle Strength   Flexion: 5/5     Other   Sensation: normal        Assessment/Plan  46 y o  female    1  Rheumatoid arteritis  - continue with NSAID management  - FL injection right hip (non arthrogram);  Future  - keep rheumatology appointment with likely need to start DMARD therapy  - follow up after hip injection to assess for improvement

## 2019-07-18 ENCOUNTER — APPOINTMENT (OUTPATIENT)
Dept: PHYSICAL THERAPY | Facility: CLINIC | Age: 39
End: 2019-07-18
Payer: COMMERCIAL

## 2019-07-18 ENCOUNTER — OFFICE VISIT (OUTPATIENT)
Dept: PHYSICAL THERAPY | Facility: CLINIC | Age: 39
End: 2019-07-18
Payer: COMMERCIAL

## 2019-07-18 DIAGNOSIS — G89.29 CHRONIC RIGHT SHOULDER PAIN: ICD-10-CM

## 2019-07-18 DIAGNOSIS — M54.41 CHRONIC BILATERAL LOW BACK PAIN WITH RIGHT-SIDED SCIATICA: ICD-10-CM

## 2019-07-18 DIAGNOSIS — M25.511 CHRONIC RIGHT SHOULDER PAIN: ICD-10-CM

## 2019-07-18 DIAGNOSIS — M25.551 RIGHT HIP PAIN: Primary | ICD-10-CM

## 2019-07-18 DIAGNOSIS — G89.29 CHRONIC BILATERAL LOW BACK PAIN WITH RIGHT-SIDED SCIATICA: ICD-10-CM

## 2019-07-18 PROCEDURE — 97140 MANUAL THERAPY 1/> REGIONS: CPT | Performed by: PHYSICAL THERAPIST

## 2019-07-18 PROCEDURE — 97112 NEUROMUSCULAR REEDUCATION: CPT | Performed by: PHYSICAL THERAPIST

## 2019-07-18 PROCEDURE — 97110 THERAPEUTIC EXERCISES: CPT | Performed by: PHYSICAL THERAPIST
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
## 2019-07-23 ENCOUNTER — APPOINTMENT (OUTPATIENT)
Dept: PHYSICAL THERAPY | Facility: CLINIC | Age: 39
End: 2019-07-23
Payer: COMMERCIAL

## 2019-07-24 ENCOUNTER — HOSPITAL ENCOUNTER (OUTPATIENT)
Dept: RADIOLOGY | Facility: HOSPITAL | Age: 39
Discharge: HOME | End: 2019-07-24
Payer: COMMERCIAL

## 2019-07-24 ENCOUNTER — TRANSCRIBE ORDERS (OUTPATIENT)
Dept: RADIOLOGY | Facility: HOSPITAL | Age: 39
End: 2019-07-24

## 2019-07-24 DIAGNOSIS — M05.20 RHEUMATOID ARTERITIS (HCC): ICD-10-CM

## 2019-07-24 PROCEDURE — 20610 DRAIN/INJ JOINT/BURSA W/O US: CPT

## 2019-07-24 PROCEDURE — 77002 NEEDLE LOCALIZATION BY XRAY: CPT

## 2019-07-24 RX ORDER — METHYLPREDNISOLONE ACETATE 80 MG/ML
80 INJECTION, SUSPENSION INTRA-ARTICULAR; INTRALESIONAL; INTRAMUSCULAR; SOFT TISSUE
Status: COMPLETED | OUTPATIENT
Start: 2019-07-24 | End: 2019-07-24

## 2019-07-24 RX ORDER — BUPIVACAINE HYDROCHLORIDE 2.5 MG/ML
5 INJECTION, SOLUTION EPIDURAL; INFILTRATION; INTRACAUDAL
Status: COMPLETED | OUTPATIENT
Start: 2019-07-24 | End: 2019-07-24

## 2019-07-24 RX ORDER — LIDOCAINE HYDROCHLORIDE 10 MG/ML
10 INJECTION, SOLUTION EPIDURAL; INFILTRATION; INTRACAUDAL; PERINEURAL
Status: COMPLETED | OUTPATIENT
Start: 2019-07-24 | End: 2019-07-24

## 2019-07-24 RX ADMIN — LIDOCAINE HYDROCHLORIDE 10 ML: 10 INJECTION, SOLUTION EPIDURAL; INFILTRATION; INTRACAUDAL; PERINEURAL at 16:00

## 2019-07-24 RX ADMIN — IOHEXOL 3 ML: 300 INJECTION, SOLUTION INTRAVENOUS at 16:00

## 2019-07-24 RX ADMIN — BUPIVACAINE HYDROCHLORIDE 5 ML: 2.5 INJECTION, SOLUTION EPIDURAL; INFILTRATION; INTRACAUDAL; PERINEURAL at 16:00

## 2019-07-24 RX ADMIN — METHYLPREDNISOLONE ACETATE 80 MG: 80 INJECTION, SUSPENSION INTRA-ARTICULAR; INTRALESIONAL; INTRAMUSCULAR; SOFT TISSUE at 16:00

## 2019-07-25 ENCOUNTER — APPOINTMENT (OUTPATIENT)
Dept: PHYSICAL THERAPY | Facility: CLINIC | Age: 39
End: 2019-07-25
Payer: COMMERCIAL

## 2019-07-25 ENCOUNTER — TELEPHONE (OUTPATIENT)
Dept: OBGYN CLINIC | Facility: CLINIC | Age: 39
End: 2019-07-25

## 2019-07-25 DIAGNOSIS — B00.9 HSV INFECTION: Primary | ICD-10-CM

## 2019-07-25 RX ORDER — VALACYCLOVIR HYDROCHLORIDE 500 MG/1
500 TABLET, FILM COATED ORAL DAILY
Qty: 90 TABLET | Refills: 3 | Status: SHIPPED | OUTPATIENT
Start: 2019-07-25 | End: 2022-06-20 | Stop reason: SDUPTHER

## 2019-07-25 NOTE — TELEPHONE ENCOUNTER
Pt takes Valtrex daily  Send it to [address] [place] [address] on [facility]  Thank you

## 2019-07-25 NOTE — TELEPHONE ENCOUNTER
Pt called requesting a refill on valtrex  Annual was done 06/10/2019 and 04/24/2018  Valtrex was ordered on 04/24/2018  Please send a refill to pharmacy on file  Thank you      Galdino Faria RN

## 2019-07-26 ENCOUNTER — OFFICE VISIT (OUTPATIENT)
Dept: OBGYN CLINIC | Facility: HOSPITAL | Age: 39
End: 2019-07-26
Payer: COMMERCIAL

## 2019-07-26 VITALS — BODY MASS INDEX: 30.73 KG/M2 | WEIGHT: 180 LBS | HEIGHT: 64 IN

## 2019-07-26 DIAGNOSIS — M77.02 MEDIAL EPICONDYLITIS OF LEFT ELBOW: Primary | ICD-10-CM

## 2019-07-26 DIAGNOSIS — M77.12 LATERAL EPICONDYLITIS OF LEFT ELBOW: ICD-10-CM

## 2019-07-26 PROCEDURE — 99213 OFFICE O/P EST LOW 20 MIN: CPT | Performed by: ORTHOPAEDIC SURGERY

## 2019-07-26 NOTE — PROGRESS NOTES
Chief Complaint   Patient presents with    Left Elbow - Pain         Assessment:  Medial epicondylitis-left elbow    Lateral epicondylitis-left elbow    Plan:  You have significant tendinitis on both sides of the elbow  I showed her exercises to do for both the flexor and extensor muscles across this area  She should ice in a large trash bag or bag of frozen peas to that left elbow for 15 minutes 4 times a day  Alternatively, she can make a frozen Natalee cup and do ice massage for 5-10 minutes 4 times a day when needed  She can take Advil, Aleve, or Tylenol if needed  She may work as her pain permits, letting pain be the warning guide to her activity level  I also suggested that she try a sporting goods store or drug store to get a little firmer and thicker elbow sleeve to give her some support  I will see her back again in 7 weeks to see if this conservative approach is helping    HPI:  This is a 44-year-old female presenting today for orthopedic evaluation regarding her left elbow pain  She states that she has been having pain for about a month after 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  She states that she had previously had this issue on the right side in the past and treated conservatively with a strap and resolved  She localizes her pain to the medial and lateral aspect of the left elbow and has pain with elbow range of motion  She states that she can use his pain numbness and tingling shooting down the hand but has also been diagnosed with carpal tunnel  She reported to the ED 2 weeks ago and was given a sling and told rest the elbow  She is being treated for multiple other issues within our department but has not been placed on any work restrictions at this point  She was recently diagnosed with rheumatoid arthritis and is beginning treatments for this shortly  Past medical history, family history,  social history, medication history, and allergies are reviewed  Please see HPI for pertinent review of systems  All other systems reviewed are negative     Exam: Ht 5' 4" (1 626 m)   Wt 81 6 kg (180 lb)   BMI 30 90 kg/m²    On today's exam of the left elbow, there was no swelling, redness, ecchymosis or signs of infection  Skin is warm and well perfused  She is tender to palpation on the medial lateral epicondyles  She had pain in the elbow with resisted wrist flexion and extension  She had normal range of motion of the left elbow compared to the opposite right side  The elbow is stable to varus valgus stress  She had normal sensation to light touch in all fingers  Brisk capillary refill 2+ distal radial pulse  There is no antecubital adenopathy or cellulitis noted  Studies Reviewed:  I personally reviewed left elbow x-rays  There is no fracture, dislocation degenerative change, or soft tissue calcification    I agree with the radiologist's report which I also reviewed    Scribe Attestation    I,:   Venessa Mora MA am acting as a scribe while in the presence of the attending physician :        I,:   Ken Clarke MD personally performed the services described in this documentation    as scribed in my presence :

## 2019-07-26 NOTE — PATIENT INSTRUCTIONS
You have significant tendinitis on both sides of the elbow  I showed her exercises to do for both the flexor and extensor muscles across this area  She should ice in a large trash bag or bag of frozen peas to that left elbow for 15 minutes 4 times a day  Alternatively, she can make a frozen [place] cup and do ice massage for 5-10 minutes 4 times a day when needed  She can take Advil, Aleve, or Tylenol if needed  She may work as her pain permits, letting pain be the warning guide to her activity level  I also suggested that she try a sporting goods store or drug store to get a little firmer and thicker elbow sleeve to give her some support    I will see her back again in 7 weeks to see if this conservative approach is helping

## 2019-07-26 NOTE — LETTER
July 26, 2019     Patient: Kenisha Finley   YOB: 1980   Date of Visit: 7/26/2019       To Whom it May Concern:    Kenisha Finley is under my professional care  She was seen in my office on 7/26/2019  She has sustained a left elbow strain from lifting and can do work, but only light duty until this tendinitis quiets down  I will re-evaluate her progress in 7 weeks and increase her duty level at that time  If you have any questions or concerns, please don't hesitate to call           Sincerely,          Page Leon MD        CC: No Recipients

## 2019-07-29 ENCOUNTER — APPOINTMENT (OUTPATIENT)
Dept: PHYSICAL THERAPY | Facility: CLINIC | Age: 39
End: 2019-07-29
Payer: COMMERCIAL

## 2019-07-29 NOTE — PROGRESS NOTES
Assessment and Plan: This is a 46year old female with PMH of osteoarthritis presenting today for initial rheumatology consultation for evaluation of elevate RF of 10, referred by orthopedic provider, Shell Merino PA-C  The patient reports a several month history of widespread joint pain and occasional joint swelling affecting the right shoulder and right elbow  She was referred here by Orthopedics for further evaluation however has been receiving cortisone injections into several joints including her right shoulder and right hip with some improvement  She does not describe classic inflammatory features of her current joint pain  She has found improvement with the use of meloxicam   She was asked to begin taking vitamin-D supplementations of 1000 International Units  daily however has not completed a ev dose for a vitamin-D level of 10 3  Her exam today does not reveal any synovitis or joint swelling  She does have tenderness of several joints with myofascial tender points throughout the lumbar paraspinals  Patient's history and physical examination is not fully consistent with an inflammatory arthritis  She did have a minimally elevated rheumatoid factor of 10  Given lack of findings on exam, I would like to investigate patient's elevated rheumatoid factor further with additional blood work and x-rays of her hands but my suspicion is low that we are dealing with inflammatory arthritis such as RA  It is possible that patient has an elevated rheumatoid factor due to autoimmune proclivity and may develop symptoms in the future  We can also see false positive rheumatoid factor in conditions like hepatitis, therefore I will check a hepatitis panel  Low titer RF can also be seen as a normal variant in the general population with no clinical significance    All other autoimmune work up thus far has been negative including HEATHER and HLA B27, with normal inflammatory markers, uric acid and negative Lyme which is reassuring  It is possible that patient's vitamin-D level remains low and therefore she would benefit from supplementation, I will recheck a vitamin-D level at this time  A significantly low vitamin D level of 10 can result in joint pain  For now, she will continue with meloxicam for her current joint complaints of return to the office in 3 months time with Dr Gato Bach for further evaluation  She will most likely not need long-term rheumatology follow-up  Plan:  Diagnoses and all orders for this visit:    Polyarthralgia  -     Vitamin D 25 hydroxy  -     HIV 1/2 Antigen/Antibody,Fourth Generation W/Rfl (Historical)  -     Chronic Hepatitis Panel  -     XR hand 3+ vw left; Future  -     XR hand 3+ vw right; Future  -     Cyclic citrul peptide antibody, IgG    Rheumatoid arteritis  -     Ambulatory referral to Rheumatology    Rheumatoid factor positive  -     Vitamin D 25 hydroxy  -     HIV 1/2 Antigen/Antibody,Fourth Generation W/Rfl (Historical)  -     Chronic Hepatitis Panel  -     XR hand 3+ vw left; Future  -     XR hand 3+ vw right; Future  -     Cyclic citrul peptide antibody, IgG    Malaise and fatigue  -     Vitamin D 25 hydroxy  -     HIV 1/2 Antigen/Antibody,Fourth Generation W/Rfl (Historical)  -     Chronic Hepatitis Panel  -     XR hand 3+ vw left; Future  -     XR hand 3+ vw right; Future  -     Cyclic citrul peptide antibody, IgG    Primary osteoarthritis involving multiple joints  -     Vitamin D 25 hydroxy  -     HIV 1/2 Antigen/Antibody,Fourth Generation W/Rfl (Historical)  -     Chronic Hepatitis Panel  -     XR hand 3+ vw left; Future  -     XR hand 3+ vw right; Future  -     Cyclic citrul peptide antibody, IgG        Follow-up plan: F/U in 3 months with Dr Izzy Garcia she is "falling apart"  Abnormal labs in June of RF  Referred by Dr Sadia Dale  Treating her right hip with cortisone injection (bursa and intraarticular)with some improvement     Pain in the low back, hips, knees, shoulders, and ankles x months ago  Gradually worse due to being short staffed at work  Swelling in the right shoulder, right elbow  Completing PT with some improvement  Dealing with left elbow tendonitis by Dr Manuel Caban  Treating with Stephanie for shoulder  States work makes pain worse  +AM stiffness x 5-10 minutes  +Difficulty with sleep due to joint pain in the legs and shoulders  +Fatigue  Taking Mobic with some relief  Also taking Ibuprofen/APAP  Activity makes pain worse  No PsO or IBD  Mother: arthritis, not sure what kind  Taking vitamin D 1000 IUs daily  Valentina Casper is a 46 y o   female who presents today for initial rheumatology consultation for evaluation of elevate RF of 10  Review of Systems  Review of Systems   Constitutional: Positive for fatigue  Negative for appetite change, chills, fever and unexpected weight change  HENT: Negative for congestion, mouth sores and sore throat  No recent infxn    Eyes: Negative for pain, redness and visual disturbance  No sicca sx    Respiratory: Positive for shortness of breath (on exertion )  Negative for cough and chest tightness  Cardiovascular: Negative for chest pain and leg swelling  Gastrointestinal: Negative for abdominal pain, blood in stool, constipation, diarrhea, nausea and vomiting  Endocrine: Negative for polydipsia and polyuria  Genitourinary: Negative for frequency and hematuria  Skin: Negative for color change and rash  No hair loss or nail changes  +/- purplish color change in fingers with cold  Neurological: Negative for weakness, light-headedness, numbness and headaches  Hematological: Negative for adenopathy  Psychiatric/Behavioral: Negative for behavioral problems  The patient is not nervous/anxious          Allergies  No Known Allergies    Home Medications    Current Outpatient Medications:     acetaminophen (TYLENOL) 500 mg tablet, Take 2 tablets (1,000 mg total) by mouth every 8 (eight) hours as needed for mild pain or moderate pain, Disp: 30 tablet, Rfl: 0    diclofenac sodium (VOLTAREN) 1 %, Apply 2 g topically 4 (four) times a day, Disp: , Rfl:     medroxyPROGESTERone (DEPO-PROVERA) 150 mg/mL injection, Inject 1 mL (150 mg total) into a muscle every 3 (three) months, Disp: 1 mL, Rfl: 3    meloxicam (MOBIC) 7 5 mg tablet, Take 1 tablet (7 5 mg total) by mouth daily for 30 doses, Disp: 30 tablet, Rfl: 0    valACYclovir (VALTREX) 500 mg tablet, Take 1 tablet (500 mg total) by mouth daily, Disp: 90 tablet, Rfl: 3    Past Medical History  Past Medical History:   Diagnosis Date    Arthritis     Depression     DJD (degenerative joint disease)     Herpes     Hidradenitis     MRSA carrier        Past Surgical History   Past Surgical History:   Procedure Laterality Date    FL INJECTION RIGHT HIP (NON ARTHROGRAM)  7/24/2019       Family History  No known family history of autoimmune or inflammatory diseases    Family History   Problem Relation Age of Onset    Arthritis Mother     Hypertension Mother     Diabetes Father     Stroke Father     Heart attack Father     Post-traumatic stress disorder Father     Other Father         nerve gas exposure-war    Schizophrenia Sister     Bipolar disorder Brother     Schizophrenia Brother     Sleep apnea Daughter     Obesity Daughter     Depression Son     Post-traumatic stress disorder Son     Heart attack Maternal Grandfather     Diabetes Paternal Grandmother     Stroke Paternal Grandmother     Heart attack Paternal Grandfather     Post-traumatic stress disorder Paternal Grandfather     Ovarian cysts Sister        Social History  Occupation: Employed   Social History     Substance and Sexual Activity   Alcohol Use Yes    Comment: social     Social History     Substance and Sexual Activity   Drug Use Yes    Types: Marijuana    Comment: few times a month      Social History     Tobacco Use   Smoking Status Current Every Day Smoker    Packs/day: 0 50    Types: Cigarettes   Smokeless Tobacco Never Used       Objective:    Vitals:    08/01/19 1405   BP: 136/84   Pulse: 64   Weight: 81 6 kg (180 lb)   Height: 5' 4" (1 626 m)       Physical Exam   Constitutional: She is oriented to person, place, and time  She appears well-developed and well-nourished  HENT:   Head: Normocephalic  Mouth/Throat: Oropharynx is clear and moist    Eyes: Pupils are equal, round, and reactive to light  Conjunctivae are normal    Neck: Normal range of motion  Neck supple  Cardiovascular: Normal rate, regular rhythm and normal heart sounds  Pulmonary/Chest: Effort normal and breath sounds normal    Musculoskeletal:   No synovitis or joint swelling  She does have tenderness of several joints with myofascial tender points throughout the lumbar paraspinals  No increased warmth of her joints  +Crepitus of the knees  Strength: 5/5 of the BUE and BLE  Neurological: She is alert and oriented to person, place, and time  Skin: Skin is warm and dry  Psychiatric: She has a normal mood and affect  Her behavior is normal        Imaging:   XR Left elbow 7/6/2019  IMPRESSION:  No acute osseous abnormality  XR Left Hand 10/11/2018  IMPRESSION:  No acute osseous abnormality  XR Right shoulder 2/2/2018  IMPRESSION:  No acute right shoulder fracture or dislocation  XR Right Hip/Pelvis 6/4/2019  IMPRESSION:  No acute osseous abnormality or significant degenerative change        Labs:   Component      Latest Ref Rng & Units 4/25/2019   WBC      4 31 - 10 16 Thousand/uL 12 42 (H)   Red Blood Cell Count      3 81 - 5 12 Million/uL 5 03   Hemoglobin      11 5 - 15 4 g/dL 14 7   HCT      34 8 - 46 1 % 45 3   MCV      82 - 98 fL 90   MCH      26 8 - 34 3 pg 29 2   MCHC      31 4 - 37 4 g/dL 32 5   RDW      11 6 - 15 1 % 13 8   MPV      8 9 - 12 7 fL 9 3   Platelet Count      614 - 390 Thousands/uL 340   nRBC      /100 WBCs 0   Neutrophils %      43 - 75 % 56   Immat GRANS %      0 - 2 % 0   Lymphocytes Relative      14 - 44 % 34   Monocytes Relative      4 - 12 % 6   Eosinophils      0 - 6 % 3   Basophils Relative      0 - 1 % 1   Absolute Neutrophils      1 85 - 7 62 Thousands/µL 7 07   Immature Grans Absolute      0 00 - 0 20 Thousand/uL 0 04   Lymphocytes Absolute      0 60 - 4 47 Thousands/µL 4 20   Absolute Monocytes      0 17 - 1 22 Thousand/µL 0 74   Absolute Eosinophils      0 00 - 0 61 Thousand/µL 0 31   Basophils Absolute      0 00 - 0 10 Thousands/µL 0 06   Sodium      136 - 145 mmol/L 138   Potassium      3 5 - 5 3 mmol/L 3 5   Chloride      100 - 108 mmol/L 109 (H)   CO2      21 - 32 mmol/L 23   Anion Gap      4 - 13 mmol/L 6   BUN      5 - 25 mg/dL 5   Creatinine      0 60 - 1 30 mg/dL 0 70   GLUCOSE FASTING      65 - 99 mg/dL 71   Calcium      8 3 - 10 1 mg/dL 8 2 (L)   AST      5 - 45 U/L 9   ALT      12 - 78 U/L 14   Alkaline Phosphatase      46 - 116 U/L 64   Total Protein      6 4 - 8 2 g/dL 8 1   Albumin      3 5 - 5 0 g/dL 4 0   TOTAL BILIRUBIN      0 20 - 1 00 mg/dL 0 41   eGFR      ml/min/1 73sq m 111   LYME AB IGG      0 00 - 0 79 0 25   LYME AB IGM      0 00 - 0 79 0 59   Vit D, 25-Hydroxy      30 0 - 100 0 ng/mL 10 7 (L)   RHEUMATOID FACTOR      Negative Negative   Sed Rate      0 - 20 mm/hour 16   URIC ACID      2 0 - 6 8 mg/dL 3 4     Component      Latest Ref Rng & Units 6/4/2019   Sodium      136 - 145 mmol/L 138   Potassium      3 5 - 5 3 mmol/L 3 4 (L)   Chloride      100 - 108 mmol/L 109 (H)   CO2      21 - 32 mmol/L 24   Anion Gap      4 - 13 mmol/L 5   BUN      5 - 25 mg/dL 4 (L)   Creatinine      0 60 - 1 30 mg/dL 0 67   GLUCOSE FASTING      65 - 99 mg/dL 80   Calcium      8 3 - 10 1 mg/dL 8 0 (L)   AST      5 - 45 U/L 6   ALT      12 - 78 U/L 14   Alkaline Phosphatase      46 - 116 U/L 54   Total Protein      6 4 - 8 2 g/dL 7 5   Albumin      3 5 - 5 0 g/dL 3 6   TOTAL BILIRUBIN      0 20 - 1 00 mg/dL 0 48   eGFR ml/min/1 73sq m 112   WBC      4 31 - 10 16 Thousand/uL 13 20 (H)   Red Blood Cell Count      3 81 - 5 12 Million/uL 4 56   Hemoglobin      11 5 - 15 4 g/dL 13 5   HCT      34 8 - 46 1 % 41 2   MCV      82 - 98 fL 90   MCH      26 8 - 34 3 pg 29 6   MCHC      31 4 - 37 4 g/dL 32 8   RDW      11 6 - 15 1 % 14 3   Platelet Count      917 - 390 Thousands/uL 331   MPV      8 9 - 12 7 fL 9 0   ANTI-NUCLEAR ANTIBODY (HEATHER)      Negative Negative   C-REACTIVE PROTEIN      <3 0 mg/L <3 0   HLA B27       Negative   RHEUMATOID FACTOR      Negative Positive (A)   Sed Rate      0 - 20 mm/hour 18   RF Quantitation      (none) 10 IU/mL (A)

## 2019-08-01 ENCOUNTER — OFFICE VISIT (OUTPATIENT)
Dept: PHYSICAL THERAPY | Facility: CLINIC | Age: 39
End: 2019-08-01
Payer: COMMERCIAL

## 2019-08-01 ENCOUNTER — OFFICE VISIT (OUTPATIENT)
Dept: RHEUMATOLOGY | Facility: CLINIC | Age: 39
End: 2019-08-01
Payer: COMMERCIAL

## 2019-08-01 VITALS
WEIGHT: 180 LBS | BODY MASS INDEX: 30.73 KG/M2 | HEART RATE: 64 BPM | SYSTOLIC BLOOD PRESSURE: 136 MMHG | HEIGHT: 64 IN | DIASTOLIC BLOOD PRESSURE: 84 MMHG

## 2019-08-01 DIAGNOSIS — R53.81 MALAISE AND FATIGUE: ICD-10-CM

## 2019-08-01 DIAGNOSIS — M25.50 POLYARTHRALGIA: Primary | ICD-10-CM

## 2019-08-01 DIAGNOSIS — M05.20 RHEUMATOID ARTERITIS (HCC): ICD-10-CM

## 2019-08-01 DIAGNOSIS — G89.29 CHRONIC BILATERAL LOW BACK PAIN WITH RIGHT-SIDED SCIATICA: ICD-10-CM

## 2019-08-01 DIAGNOSIS — R53.83 MALAISE AND FATIGUE: ICD-10-CM

## 2019-08-01 DIAGNOSIS — M25.511 CHRONIC RIGHT SHOULDER PAIN: ICD-10-CM

## 2019-08-01 DIAGNOSIS — G89.29 CHRONIC RIGHT SHOULDER PAIN: ICD-10-CM

## 2019-08-01 DIAGNOSIS — R76.8 RHEUMATOID FACTOR POSITIVE: ICD-10-CM

## 2019-08-01 DIAGNOSIS — M54.41 CHRONIC BILATERAL LOW BACK PAIN WITH RIGHT-SIDED SCIATICA: ICD-10-CM

## 2019-08-01 DIAGNOSIS — M25.551 RIGHT HIP PAIN: Primary | ICD-10-CM

## 2019-08-01 DIAGNOSIS — M15.9 PRIMARY OSTEOARTHRITIS INVOLVING MULTIPLE JOINTS: ICD-10-CM

## 2019-08-01 PROCEDURE — 99204 OFFICE O/P NEW MOD 45 MIN: CPT | Performed by: PHYSICIAN ASSISTANT

## 2019-08-01 PROCEDURE — 97140 MANUAL THERAPY 1/> REGIONS: CPT | Performed by: PHYSICAL THERAPIST

## 2019-08-01 PROCEDURE — 97112 NEUROMUSCULAR REEDUCATION: CPT | Performed by: PHYSICAL THERAPIST

## 2019-08-01 PROCEDURE — 97110 THERAPEUTIC EXERCISES: CPT | Performed by: PHYSICAL THERAPIST
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
## 2019-08-06 ENCOUNTER — OFFICE VISIT (OUTPATIENT)
Dept: PHYSICAL THERAPY | Facility: CLINIC | Age: 39
End: 2019-08-06
Payer: COMMERCIAL

## 2019-08-06 DIAGNOSIS — G89.29 CHRONIC BILATERAL LOW BACK PAIN WITH RIGHT-SIDED SCIATICA: ICD-10-CM

## 2019-08-06 DIAGNOSIS — M54.41 CHRONIC BILATERAL LOW BACK PAIN WITH RIGHT-SIDED SCIATICA: ICD-10-CM

## 2019-08-06 DIAGNOSIS — G89.29 CHRONIC RIGHT SHOULDER PAIN: ICD-10-CM

## 2019-08-06 DIAGNOSIS — M25.511 CHRONIC RIGHT SHOULDER PAIN: ICD-10-CM

## 2019-08-06 DIAGNOSIS — M25.551 RIGHT HIP PAIN: Primary | ICD-10-CM

## 2019-08-06 PROCEDURE — 97140 MANUAL THERAPY 1/> REGIONS: CPT | Performed by: PHYSICAL THERAPIST

## 2019-08-06 PROCEDURE — 97110 THERAPEUTIC EXERCISES: CPT | Performed by: PHYSICAL THERAPIST

## 2019-08-06 PROCEDURE — 97112 NEUROMUSCULAR REEDUCATION: CPT | Performed by: PHYSICAL THERAPIST
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
## 2019-08-08 ENCOUNTER — APPOINTMENT (OUTPATIENT)
Dept: PHYSICAL THERAPY | Facility: CLINIC | Age: 39
End: 2019-08-08
Payer: COMMERCIAL

## 2019-08-09 ENCOUNTER — OFFICE VISIT (OUTPATIENT)
Dept: PHYSICAL THERAPY | Facility: CLINIC | Age: 39
End: 2019-08-09
Payer: COMMERCIAL

## 2019-08-09 DIAGNOSIS — M54.41 CHRONIC BILATERAL LOW BACK PAIN WITH RIGHT-SIDED SCIATICA: ICD-10-CM

## 2019-08-09 DIAGNOSIS — G89.29 CHRONIC RIGHT SHOULDER PAIN: ICD-10-CM

## 2019-08-09 DIAGNOSIS — G89.29 CHRONIC BILATERAL LOW BACK PAIN WITH RIGHT-SIDED SCIATICA: ICD-10-CM

## 2019-08-09 DIAGNOSIS — M25.511 CHRONIC RIGHT SHOULDER PAIN: ICD-10-CM

## 2019-08-09 DIAGNOSIS — M25.551 RIGHT HIP PAIN: Primary | ICD-10-CM

## 2019-08-09 PROCEDURE — 97112 NEUROMUSCULAR REEDUCATION: CPT

## 2019-08-09 PROCEDURE — 97110 THERAPEUTIC EXERCISES: CPT

## 2019-08-09 PROCEDURE — 97140 MANUAL THERAPY 1/> REGIONS: CPT
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
## 2019-08-26 ENCOUNTER — OFFICE VISIT (OUTPATIENT)
Dept: OBGYN CLINIC | Facility: HOSPITAL | Age: 39
End: 2019-08-26
Payer: COMMERCIAL

## 2019-08-26 ENCOUNTER — PREP FOR PROCEDURE (OUTPATIENT)
Dept: OBGYN CLINIC | Facility: HOSPITAL | Age: 39
End: 2019-08-26

## 2019-08-26 VITALS
BODY MASS INDEX: 29.88 KG/M2 | DIASTOLIC BLOOD PRESSURE: 86 MMHG | HEIGHT: 64 IN | WEIGHT: 175 LBS | SYSTOLIC BLOOD PRESSURE: 126 MMHG | HEART RATE: 80 BPM

## 2019-08-26 DIAGNOSIS — M75.41 IMPINGEMENT SYNDROME OF RIGHT SHOULDER: Primary | ICD-10-CM

## 2019-08-26 DIAGNOSIS — S46.912A STRAIN OF LEFT ELBOW, INITIAL ENCOUNTER: ICD-10-CM

## 2019-08-26 PROCEDURE — 99213 OFFICE O/P EST LOW 20 MIN: CPT | Performed by: PHYSICIAN ASSISTANT

## 2019-08-26 RX ORDER — MELOXICAM 7.5 MG/1
7.5 TABLET ORAL DAILY
Qty: 30 TABLET | Refills: 1 | Status: SHIPPED | OUTPATIENT
Start: 2019-08-26 | End: 2019-11-01 | Stop reason: SDUPTHER

## 2019-08-26 NOTE — PROGRESS NOTES
Assessment  Diagnoses and all orders for this visit:    Impingement syndrome of right shoulder  -     MRI arthrogram right shoulder; Future  -     FL arthrogram shoulder right; Future  -     meloxicam (MOBIC) 7 5 mg tablet; Take 1 tablet (7 5 mg total) by mouth daily for 30 days    Discussion and Plan:    1  MR arthrogram of the right shoulder  2  Follow up after MRI with Dr Glen Emmanuel to discuss results and determine the next step in her care  3  Refill of Meloxicam provided  4  Activities to tolerance  5  Continue with therapy     Exam and symptoms of painful popping are worrisome for labral pathology so MRI arthrogram will be ordered      Subjective:   Patient ID: Eugene Marti is a 46 y o  female      Jase Mederos presents to the office in follow up of the right shoulder  She notes improvement in the shoulder with therapy, but she continues to have pain that limits sleep and daily activities  She admits to painful popping of the shoulder  She had some improvement with the steroid injection, but it did not provide lasting relief  She continues to work with therapy on her back and hip in addition to the shoulder  She denies new injury or trauma      /86   Pulse 80   Ht 5' 4" (1 626 m)   Wt 79 4 kg (175 lb)   BMI 30 04 kg/m²       The following portions of the patient's history were reviewed and updated as appropriate: allergies, current medications, past family history, past medical history, past social history, past surgical history and problem list     Review of Systems   Constitutional: Negative for chills and fever  HENT: Negative for hearing loss  Eyes: Negative for visual disturbance  Respiratory: Negative for shortness of breath  Cardiovascular: Negative for chest pain  Gastrointestinal: Negative for abdominal pain  Musculoskeletal:        As reviewed in the HPI   Skin: Negative for rash  Neurological:        As reviewed in the HPI   Psychiatric/Behavioral: Negative for agitation  Objective:  Right Shoulder Exam     Tenderness   The patient is experiencing no tenderness  Range of Motion   The patient has normal right shoulder ROM  Muscle Strength   External rotation: 5/5   Supraspinatus: 5/5   Subscapularis: 5/5     Tests   Moses test: positive  Impingement: positive  Drop arm: negative    Other   Erythema: absent  Sensation: normal  Pulse: present    Comments:  Positive Speeds  Positive O'Briens            Physical Exam   Constitutional: She is oriented to person, place, and time  She appears well-developed and well-nourished  HENT:   Head: Normocephalic  Eyes: EOM are normal    Neck: Normal range of motion  Pulmonary/Chest: Breath sounds normal  She has no wheezes  Neurological: She is alert and oriented to person, place, and time  Skin: Skin is warm and dry  Psychiatric: She has a normal mood and affect   Her behavior is normal  Judgment and thought content normal

## 2019-08-28 LAB
25(OH)D3 SERPL-MCNC: 24 NG/ML (ref 30–100)
CCP IGG SERPL-ACNC: <16 UNITS
HAV IGM SERPL QL IA: NORMAL
HBV CORE IGM SERPL QL IA: NORMAL
HBV SURFACE AG SERPL QL IA: NORMAL
HCV AB S/CO SERPL IA: 0.03
HCV AB SERPL QL IA: NORMAL
HIV 1+2 AB+HIV1 P24 AG SERPL QL IA: NORMAL

## 2019-09-03 ENCOUNTER — CLINICAL SUPPORT (OUTPATIENT)
Dept: OBGYN CLINIC | Facility: CLINIC | Age: 39
End: 2019-09-03

## 2019-09-03 DIAGNOSIS — Z30.42 ENCOUNTER FOR DEPO-PROVERA CONTRACEPTION: Primary | ICD-10-CM

## 2019-09-03 PROCEDURE — 96372 THER/PROPH/DIAG INJ SC/IM: CPT

## 2019-09-03 NOTE — PROGRESS NOTES
Depo-Provera      [x]   Patient provided box     Syringe   o 2 refills remain    Last  Annual/Pap Date: 06/10/19   Last Depo date: 06/10/19   Side effects: no   HCG: no     Given by: David Solitario RN      Site: Left deltoid     Calcium supplement daily teaching, condoms for 2 weeks following first injection dose

## 2019-09-05 RX ORDER — MEDROXYPROGESTERONE ACETATE 150 MG/ML
150 INJECTION, SUSPENSION INTRAMUSCULAR
Status: SHIPPED | OUTPATIENT
Start: 2019-09-05

## 2019-09-05 RX ADMIN — MEDROXYPROGESTERONE ACETATE 150 MG: 150 INJECTION, SUSPENSION INTRAMUSCULAR at 12:00

## 2019-10-01 ENCOUNTER — OFFICE VISIT (OUTPATIENT)
Dept: OBGYN CLINIC | Facility: HOSPITAL | Age: 39
End: 2019-10-01
Payer: COMMERCIAL

## 2019-10-01 ENCOUNTER — HOSPITAL ENCOUNTER (OUTPATIENT)
Dept: RADIOLOGY | Facility: HOSPITAL | Age: 39
Discharge: HOME | End: 2019-10-01
Payer: COMMERCIAL

## 2019-10-01 VITALS
HEART RATE: 81 BPM | WEIGHT: 180 LBS | BODY MASS INDEX: 30.73 KG/M2 | DIASTOLIC BLOOD PRESSURE: 83 MMHG | SYSTOLIC BLOOD PRESSURE: 118 MMHG | HEIGHT: 64 IN

## 2019-10-01 DIAGNOSIS — R76.8 RHEUMATOID FACTOR POSITIVE: ICD-10-CM

## 2019-10-01 DIAGNOSIS — M15.9 PRIMARY OSTEOARTHRITIS INVOLVING MULTIPLE JOINTS: ICD-10-CM

## 2019-10-01 DIAGNOSIS — R53.83 MALAISE AND FATIGUE: ICD-10-CM

## 2019-10-01 DIAGNOSIS — R53.81 MALAISE AND FATIGUE: ICD-10-CM

## 2019-10-01 DIAGNOSIS — M25.50 POLYARTHRALGIA: ICD-10-CM

## 2019-10-01 DIAGNOSIS — M05.20 RHEUMATOID ARTERITIS (HCC): Primary | ICD-10-CM

## 2019-10-01 PROCEDURE — 73130 X-RAY EXAM OF HAND: CPT

## 2019-10-01 PROCEDURE — 99213 OFFICE O/P EST LOW 20 MIN: CPT | Performed by: ORTHOPAEDIC SURGERY

## 2019-10-01 NOTE — LETTER
October 1, 2019     Patient: Edna العلي   YOB: 1980   Date of Visit: 10/1/2019       To Whom it May Concern:    Edna العلي is under my professional care  She was seen in my office on 10/1/2019  She may return to work with limitations on 10/3/19 - light duty activities  If you have any questions or concerns, please don't hesitate to call           Sincerely,          Rex Whitaker MD        CC: Edna العلي

## 2019-10-01 NOTE — PROGRESS NOTES
46 y o  female presenting to the office for evaluation of right hip pain  Patient continues to have significant pain in multiple joints diffusely  She does have an appointment set up with Rheumatology, however, she has not been able to go yet  She has been doing physical therapy for her multiple arthritic conditions  She denies any significant improvement  Patient has been able to manage her regular day-to-day activities, however, she does have significant distress doing these activities due to pain  She denies any new injuries  She denies any acute complaints  ROS  Review of Systems   Constitutional: Negative for fever and unexpected weight change  HENT: Negative for hearing loss, nosebleeds and sore throat  Eyes: Negative for pain, redness and visual disturbance  Respiratory: Negative for cough, shortness of breath and wheezing  Cardiovascular: Negative for chest pain, palpitations and leg swelling  Gastrointestinal: Negative for abdominal pain, nausea and vomiting  Endocrine: Negative for polydipsia and polyuria  Genitourinary: Negative for dysuria and hematuria  Skin: Negative for rash and wound  Neurological: Negative for dizziness and headaches  Psychiatric/Behavioral: Negative for agitation and suicidal ideas  Past Medical History:   Diagnosis Date    Arthritis     Depression     DJD (degenerative joint disease)     Herpes     Hidradenitis     MRSA carrier      Past Surgical History:   Procedure Laterality Date    FL INJECTION RIGHT HIP (NON ARTHROGRAM)  7/24/2019     Results Reviewed     None          Physical Exam  Physical Exam   Constitutional: She is oriented to person, place, and time  She appears well-developed and well-nourished  HENT:   Head: Normocephalic and atraumatic  Eyes: Pupils are equal, round, and reactive to light  EOM are normal    Neck: Neck supple  No tracheal deviation present  Cardiovascular: Normal rate and regular rhythm  Pulmonary/Chest: Effort normal and breath sounds normal    Abdominal: There is no guarding  Neurological: She is alert and oriented to person, place, and time  Skin: Skin is warm and dry  Psychiatric: She has a normal mood and affect  Her behavior is normal      Right Hip Exam     Tenderness   The patient is experiencing tenderness in the anterior  Range of Motion   Flexion: 110   External rotation: 40   Internal rotation: 20     Muscle Strength   Flexion: 5/5     Other   Sensation: normal        Assessment/Plan  46 y o  female    1  Rheumatoid arteritis (HCC)  - FL injection right hip (non arthrogram); Future    Schedule patient for right hip corticosteroid injection to help alleviate her hip symptoms  Recommend the patient see Rheumatology, she will likely require some DMARD medications  Patient can get steroid injections for the hips Q 3 months as needed  Follow-up in 3 months for assessment of conditions

## 2019-10-07 ENCOUNTER — HOSPITAL ENCOUNTER (OUTPATIENT)
Dept: MRI IMAGING | Facility: HOSPITAL | Age: 39
Discharge: HOME | End: 2019-10-07
Attending: ORTHOPAEDIC SURGERY
Payer: COMMERCIAL

## 2019-10-07 ENCOUNTER — HOSPITAL ENCOUNTER (OUTPATIENT)
Dept: RADIOLOGY | Facility: HOSPITAL | Age: 39
Discharge: HOME | End: 2019-10-07
Attending: ORTHOPAEDIC SURGERY
Payer: COMMERCIAL

## 2019-10-07 DIAGNOSIS — M75.41 IMPINGEMENT SYNDROME OF RIGHT SHOULDER: ICD-10-CM

## 2019-10-07 PROCEDURE — 73222 MRI JOINT UPR EXTREM W/DYE: CPT

## 2019-10-07 PROCEDURE — A9585 GADOBUTROL INJECTION: HCPCS | Performed by: PHYSICIAN ASSISTANT

## 2019-10-07 PROCEDURE — 23350 INJECTION FOR SHOULDER X-RAY: CPT

## 2019-10-07 PROCEDURE — 77002 NEEDLE LOCALIZATION BY XRAY: CPT

## 2019-10-07 RX ORDER — LIDOCAINE HYDROCHLORIDE 10 MG/ML
10 INJECTION, SOLUTION INFILTRATION; PERINEURAL
Status: COMPLETED | OUTPATIENT
Start: 2019-10-07 | End: 2019-10-07

## 2019-10-07 RX ORDER — 0.9 % SODIUM CHLORIDE 0.9 %
50 VIAL (ML) INJECTION
Status: COMPLETED | OUTPATIENT
Start: 2019-10-07 | End: 2019-10-07

## 2019-10-07 RX ADMIN — LIDOCAINE HYDROCHLORIDE 9 ML: 10 INJECTION, SOLUTION INFILTRATION; PERINEURAL at 11:25

## 2019-10-07 RX ADMIN — GADOBUTROL 0.2 ML: 604.72 INJECTION INTRAVENOUS at 12:08

## 2019-10-07 RX ADMIN — SODIUM CHLORIDE 15 ML: 9 INJECTION, SOLUTION INTRAMUSCULAR; INTRAVENOUS; SUBCUTANEOUS at 11:26

## 2019-10-07 RX ADMIN — IOHEXOL 3 ML: 300 INJECTION, SOLUTION INTRAVENOUS at 11:25

## 2019-11-01 ENCOUNTER — OFFICE VISIT (OUTPATIENT)
Dept: RHEUMATOLOGY | Facility: CLINIC | Age: 39
End: 2019-11-01
Payer: COMMERCIAL

## 2019-11-01 VITALS
WEIGHT: 180 LBS | DIASTOLIC BLOOD PRESSURE: 76 MMHG | HEART RATE: 76 BPM | HEIGHT: 64 IN | BODY MASS INDEX: 30.73 KG/M2 | SYSTOLIC BLOOD PRESSURE: 134 MMHG

## 2019-11-01 DIAGNOSIS — M25.50 POLYARTHRALGIA: ICD-10-CM

## 2019-11-01 DIAGNOSIS — S46.912A STRAIN OF LEFT ELBOW, INITIAL ENCOUNTER: ICD-10-CM

## 2019-11-01 DIAGNOSIS — M15.9 PRIMARY OSTEOARTHRITIS INVOLVING MULTIPLE JOINTS: ICD-10-CM

## 2019-11-01 DIAGNOSIS — R76.8 RHEUMATOID FACTOR POSITIVE: Primary | ICD-10-CM

## 2019-11-01 PROCEDURE — 99214 OFFICE O/P EST MOD 30 MIN: CPT | Performed by: INTERNAL MEDICINE

## 2019-11-01 RX ORDER — MELOXICAM 7.5 MG/1
7.5 TABLET ORAL DAILY
Qty: 30 TABLET | Refills: 1 | Status: SHIPPED | OUTPATIENT
Start: 2019-11-01 | End: 2019-12-10

## 2019-11-01 NOTE — PROGRESS NOTES
Assessment and Plan:   Patient is a 18-year-old female who presents for rheumatology follow-up for borderline positive rheumatoid factor and multiple sites of joint pain  Appropriate workup was done factor which was essentially negative and additional workup was also negative  Hand x-rays did not reveal any evidence of inflammatory changes and were grossly normal   Discussion with her does not reveal any symptoms of inflammatory arthritis and she seems to be dealing with a mix of and myofascial pain  Her exam confirmed this as there is no evidence of inflammatory arthritis on exam   We reviewed her workup and that was negative for autoimmune diseases  She does not require any additional Rheumatology follow-up  I did refill meloxicam for her today but advised her in the future she will need to get refills through her family doctor if she would like to stay on the medication as she does not need to see us any longer  Plan:  Diagnoses and all orders for this visit:    Rheumatoid factor positive    Polyarthralgia    Strain of left elbow, initial encounter  -     meloxicam (MOBIC) 7 5 mg tablet; Take 1 tablet (7 5 mg total) by mouth daily    Primary osteoarthritis involving multiple joints        Follow-up plan: no rheumatology follow-up needed       Rheumatic Disease Summary  1  +RF, polyarthralgia   -Rheum consult 8/1/19 for low +RF and polyarthralgia of hips, shoulders, ankles, low back; following with ortho for right hip pain s/p injection   -XR right hip/pelvis 6/4/19: normal; XR right shoulder 2018: normal; XR left hand 10/2018: normal; XR left elbow 7/2019: normal; XR SI joints 2/2018: normal   -Labs 4/25/19: negative RF, lyme ESR 16, Vit D 10 7  -Labs 6/4/19: +RF 10, negative HEATHER, B27, ESR 18, CRP<3  -Labs 8/26/19: negative CCP, hep panel, HIV, vit D 24  -XR hands 10/1/19: normal   2   Right shoulder pain  -MR arthrogram 10/7/19: full-thickness insertional tear of supraspinatus, moderate tendinosis  -Following with shoulder ortho Dr Nadia Baez      [facility]  Najma Fairchild is a 46 y o   female who presents for rheumatology follow-up for borderline positive rheumatoid factor and multiple sites of joint pain  She reports her main issues are her knees which have OA and her right shoulder which has the RTC tear  She has h/o of "tenidinitis" in her left elbow, feels this is returning  She has seen various orthopedic doctors for each of her issues  Her whole body hurts after working all day  Her hands bother her and feel weak  Was told years ago she CTS on an EMG study but this was many years ago  Wants to go back to hand ortho  She does not have any joint swelling  No skin rashes or history of psoriasis  She has been using the meloxicam that July gave her at last visit and this helped her and she requests a refill today  The following portions of the patient's history were reviewed and updated as appropriate: allergies, current medications, past family history, past medical history, past social history, past surgical history and problem list     Review of Systems:   Review of Systems   Constitutional: Negative for chills, fatigue, fever and unexpected weight change  HENT: Negative for mouth sores and trouble swallowing  Eyes: Negative for pain and visual disturbance  Respiratory: Negative for cough and shortness of breath  Cardiovascular: Negative for chest pain and leg swelling  Gastrointestinal: Negative for abdominal pain, blood in stool, constipation, diarrhea and nausea  Genitourinary: Negative for hematuria  Musculoskeletal: Positive for arthralgias and myalgias  Negative for back pain and joint swelling  Skin: Negative for rash  Neurological: Negative for weakness and numbness  Hematological: Negative for adenopathy  Psychiatric/Behavioral: Negative for sleep disturbance         Home Medications:    Current Outpatient Medications:     acetaminophen (TYLENOL) 500 mg tablet, Take 2 tablets (1,000 mg total) by mouth every 8 (eight) hours as needed for mild pain or moderate pain, Disp: 30 tablet, Rfl: 0    diclofenac sodium (VOLTAREN) 1 %, Apply 2 g topically 4 (four) times a day, Disp: , Rfl:     medroxyPROGESTERone (DEPO-PROVERA) 150 mg/mL injection, Inject 1 mL (150 mg total) into a muscle every 3 (three) months, Disp: 1 mL, Rfl: 3    valACYclovir (VALTREX) 500 mg tablet, Take 1 tablet (500 mg total) by mouth daily, Disp: 90 tablet, Rfl: 3    meloxicam (MOBIC) 7 5 mg tablet, Take 1 tablet (7 5 mg total) by mouth daily, Disp: 30 tablet, Rfl: 1    Current Facility-Administered Medications:     medroxyPROGESTERone acetate (DEPO-PROVERA SYRINGE) IM injection 150 mg, 150 mg, Intramuscular, Q3 Months, KEYANA Zeng, 150 mg at 09/05/19 1200    Objective:    Vitals:    11/01/19 1451   BP: 134/76   Pulse: 76   Weight: 81 6 kg (180 lb)   Height: 5' 4" (1 626 m)       Physical Exam   Constitutional: She appears well-developed and well-nourished  She is cooperative  HENT:   Head: Normocephalic and atraumatic  Eyes: Conjunctivae and EOM are normal  No scleral icterus  Neck: No spinous process tenderness and no muscular tenderness present  No thyromegaly present  Musculoskeletal:   No joint swelling or synovitis anywhere  Tenderness in the right shoulder, right knee  No swelling or warmth  Generalized soft tissue tenderness present  Lymphadenopathy:     She has no cervical adenopathy  Neurological: She is alert  No sensory deficit  Skin: Skin is warm and dry  No rash noted  Nails show no clubbing  Psychiatric: She has a normal mood and affect       Imaging:   XR hands 10/1/19:  Images personally reviewed in PACS and my impression is:  Normal, no inflammatory changes    Labs:   Component      Latest Ref Rng & Units 8/26/2019          12:39 PM   HIV AG/AB, 4th Gen      NON-REACTIVE NON-REACTIVE   Cyclic Citrullinated Peptide (CCP) Ab (IgG)      UNITS <16 EXTERNAL VITAMIN D,25      30 - 100 ng/mL 24 (L)     Component      Latest Ref Rng & Units 8/26/2019          12:39 PM   HEPATITIS A IGM ANTIBODY      NON-REACTIVE NON-REACTIVE   HBsAg Screen      NON-REACTIVE NON-REACTIVE   HEPATITIS B CORE IGM ANTIBODY      NON-REACTIVE NON-REACTIVE   HEPATITIS C ANTIBODY      NON-REACTIVE NON-REACTIVE   SIGNAL TO CUT-OFF      <1 00 0 03     Component      Latest Ref Rng & Units 6/4/2019   Sodium      136 - 145 mmol/L 138   Potassium      3 5 - 5 3 mmol/L 3 4 (L)   Chloride      100 - 108 mmol/L 109 (H)   CO2      21 - 32 mmol/L 24   Anion Gap      4 - 13 mmol/L 5   BUN      5 - 25 mg/dL 4 (L)   Creatinine      0 60 - 1 30 mg/dL 0 67   GLUCOSE FASTING      65 - 99 mg/dL 80   Calcium      8 3 - 10 1 mg/dL 8 0 (L)   AST      5 - 45 U/L 6   ALT      12 - 78 U/L 14   Alkaline Phosphatase      46 - 116 U/L 54   Total Protein      6 4 - 8 2 g/dL 7 5   Albumin      3 5 - 5 0 g/dL 3 6   TOTAL BILIRUBIN      0 20 - 1 00 mg/dL 0 48   eGFR      ml/min/1 73sq m 112   WBC      4 31 - 10 16 Thousand/uL 13 20 (H)   Red Blood Cell Count      3 81 - 5 12 Million/uL 4 56   Hemoglobin      11 5 - 15 4 g/dL 13 5   HCT      34 8 - 46 1 % 41 2   MCV      82 - 98 fL 90   MCH      26 8 - 34 3 pg 29 6   MCHC      31 4 - 37 4 g/dL 32 8   RDW      11 6 - 15 1 % 14 3   Platelet Count      930 - 390 Thousands/uL 331   MPV      8 9 - 12 7 fL 9 0   ANTI-NUCLEAR ANTIBODY (HEATHER)      Negative Negative   C-REACTIVE PROTEIN      <3 0 mg/L <3 0   HLA B27       Negative   RHEUMATOID FACTOR      Negative Positive (A)   Sed Rate      0 - 20 mm/hour 18   RF Quantitation      (none) 10 IU/mL (A)

## 2019-11-11 ENCOUNTER — PREP FOR PROCEDURE (OUTPATIENT)
Dept: OBGYN CLINIC | Facility: HOSPITAL | Age: 39
End: 2019-11-11

## 2019-11-11 ENCOUNTER — OFFICE VISIT (OUTPATIENT)
Dept: OBGYN CLINIC | Facility: HOSPITAL | Age: 39
End: 2019-11-11
Payer: COMMERCIAL

## 2019-11-11 VITALS
HEIGHT: 64 IN | DIASTOLIC BLOOD PRESSURE: 92 MMHG | BODY MASS INDEX: 31.92 KG/M2 | WEIGHT: 187 LBS | HEART RATE: 79 BPM | SYSTOLIC BLOOD PRESSURE: 143 MMHG

## 2019-11-11 DIAGNOSIS — M75.101 TEAR OF RIGHT SUPRASPINATUS TENDON: Primary | ICD-10-CM

## 2019-11-11 DIAGNOSIS — M75.41 IMPINGEMENT SYNDROME OF RIGHT SHOULDER: ICD-10-CM

## 2019-11-11 PROCEDURE — 99214 OFFICE O/P EST MOD 30 MIN: CPT | Performed by: ORTHOPAEDIC SURGERY

## 2019-11-11 RX ORDER — CEFAZOLIN SODIUM 2 G/50ML
2000 SOLUTION INTRAVENOUS ONCE
Status: CANCELLED | OUTPATIENT
Start: 2019-11-11 | End: 2019-11-11

## 2019-11-11 NOTE — PROGRESS NOTES
Assessment  Diagnoses and all orders for this visit:    Tear of right supraspinatus tendon        Discussion and Plan:    · I did review the MRI arthrogram findings of a full-thickness anterior supraspinatus tendon tear with the patient  Given her young age and her persistence of symptoms despite appropriate nonoperative care she is indicated for arthroscopic rotator cuff repair of her right shoulder and does wish to proceed forward with scheduling for this procedure  · A thorough discussion was performed with the patient regarding the risks and benefit of operative and nonoperative treatment of their rotator cuff tear  Risks discussed include but were not limited to infection, neurovascular injury, recurrent tear, nonhealing of the repair, need for further surgery, need for biceps tenodesis or tenotomy, stiffness, need for prolonged rehabilitation, as well as the risk of anesthesia  After this discussion all questions were answered and informed consent was obtained in the office for arthroscopic rotator cuff repair of the right shoulder  The patient will be scheduled for this procedure accordingly  Subjective:   Patient ID: Sara Leal is a 46 y o  female      HPI  44-year-old female presents to the office today for follow-up visit for her right shoulder as well as to discuss MRI arthrogram results  Patient continues to note intermittent symptoms about the right shoulder especially with repetitive and overhead maneuvers  Patient has tried conservative treatment such as cortisone injection and a course of formal physical therapy and home exercise program both with some improvement of her symptoms but she states after a few weeks her symptoms have returned  Denies numbness and tingling, fever, chills        The following portions of the patient's history were reviewed and updated as appropriate: allergies, current medications, past family history, past medical history, past social history, past surgical history and problem list     Review of Systems   Constitutional: Negative for chills, fever and unexpected weight change  HENT: Negative for hearing loss, nosebleeds and sore throat  Eyes: Negative for pain, redness and visual disturbance  Respiratory: Negative for cough, shortness of breath and wheezing  Cardiovascular: Negative for chest pain, palpitations and leg swelling  Gastrointestinal: Negative for abdominal distention, nausea and vomiting  Endocrine: Negative for polydipsia and polyuria  Genitourinary: Negative for dysuria and hematuria  Skin: Negative for rash and wound  Neurological: Negative for dizziness, numbness and headaches  Psychiatric/Behavioral: Negative for decreased concentration and suicidal ideas  Objective:  /92   Pulse 79   Ht 5' 4" (1 626 m)   Wt 84 8 kg (187 lb)   BMI 32 10 kg/m²       Right Shoulder Exam     Tenderness   The patient is experiencing no tenderness  Range of Motion   The patient has normal right shoulder ROM  Muscle Strength   Abduction: 4/5   External rotation: 5/5     Tests   Drop arm: positive    Other   Erythema: absent  Sensation: normal  Pulse: present            Physical Exam   Constitutional: She is oriented to person, place, and time  She appears well-developed and well-nourished  HENT:   Head: Normocephalic and atraumatic  Eyes: Pupils are equal, round, and reactive to light  Neck: Normal range of motion  Neck supple  Cardiovascular: Normal rate, regular rhythm and normal heart sounds  Pulmonary/Chest: Effort normal and breath sounds normal  No respiratory distress  Abdominal: Soft  She exhibits no distension  Neurological: She is alert and oriented to person, place, and time  Skin: Skin is warm and dry  Psychiatric: She has a normal mood and affect  Her behavior is normal  Judgment and thought content normal    Nursing note and vitals reviewed          I have personally reviewed pertinent films in PACS and my interpretation is as follows  MRI Arthrogram Right Shoulder: Full thickness insertional tear of the anterior leading edge of the supraspinatus tendon       Scribe Attestation    I,:   Tushar Guerrero am acting as a scribe while in the presence of the attending physician :        I,:   Desirae Diaz MD personally performed the services described in this documentation    as scribed in my presence :

## 2019-11-11 NOTE — PATIENT INSTRUCTIONS
You are being scheduled for a shoulder arthroscopy to treat your symptoms  Below are some instructions and information on what to expect before and after your surgery  Pre-Surgical Preparation for Arthroscopic Shoulder Surgery: You will be contacted the evening prior to your surgery to confirm the scheduled time of the procedure and when to arrive at the hospital    Do not eat or drink anything after midnight the night before your surgery  Since you are having out-patient surgery, make sure that you have someone who can drive you home later in the day  Also, prepare that person for a long day, as the process of safely preparing for and recovering from the procedure is more time consuming than the actual procedure! As you will be in a sling after surgery, please wear or bring a loose fitting button-down shirt so that you can easily place this over the sling when you leave the surgical suite  This avoids having to place the operative arm in a sleeve  Most patients find that this is the easiest outfit to wear for the first week or so after surgery so you may want to plan accordingly  Most patients find that lying down in bed after shoulder surgery accentuates their discomfort  This is likely related to the effect of gravity on the swelling in the shoulder  As a result, most patients sleep better in a recliner or in bed with pillows propped up behind their back for the first few days or weeks after surgery  It is a good idea to plan for this ahead of time so there will be less hassle getting things set up the night after surgery  What to Expect After Arthroscopic Shoulder Surgery: It is normal to have swelling and discomfort in the shoulder for several days or a week after surgery  It is also normal to have a small amount of drainage from the surgical wounds (especially the first few days after surgery), as we put fluid into the shoulder to visualize the structures during surgery  It is NOT normal to have foul smelling, purulent drainage and if this is noted, please contact the office immediately or proceed to the emergency room for evaluation as this may indicate an infection  Applying ice bags to the shoulder may help with pain that is not controlled by the regional block  Ice should be applied 20-30 minutes at a time, every hour or two  Make sure to put a thin towel or T-shirt next to your skin to avoid direct contact of the ice with the skin  Icing is most helpful in the first 48 hours, although many people find that continuing past this time frame lessens their postoperative pain  Please note that your post-operative dressing is not conductive to ice, so if you need to, it is okay to remove that dressing even the night after surgery and place band-aids over the wounds in order for the ice to take effect  Pain Control    Most patients will receive a nerve block, the local anesthetic may keep your whole arm numb for up to 4 days  You will be given a prescription for narcotic pain medication when you are discharged from the hospital   With the newer nerve block that is being utilized, patients are rarely requiring the use of this narcotic pain medication  If you find you do not tolerate that type of pain medicine well, call our office and we will try another one  In addition to the narcotic pain medication, it is safe to use an anti-inflammatory (unless the patient has a medical condition that would not allow safe use of this mediation)  This includes the Advil, Motrin, Ibuprofen and Alleve category of medications  Simply follow the over the counter dosing on the package and use as indicated as another adjunct  Importantly since these medications are all very similar, use only one of them  Tylenol is a separate medication that can be utilized as well and can be taken at the same time as the other medication or given in a "staggered" manner    Just make sure that you follow the dosing on the over the counter bottle instructions  Also make sure that the pain medication prescribed by Dr Jolene Alfonso team does not contain acetaminophen (this is found in Percocet and Vicodin)  Typically we do not prescribe those types of pain medications but if for some reason that has been prescribed DO NOT add more Tylenol (acetaminophen) as you could end up taking too much of that medication  As mentioned above, most patients find that lying down accentuates their discomfort  You might sleep better in a recliner, or propped up in bed  Dr Lorri Carrera encourages patients to safely ambulate around the house as much as possible in the first few days after the procedure as this can help with blood circulation in the legs  While the incidence of blood clots is very rare following shoulder surgery, early ambulation is a great way to help decrease the already low rate  24 hours after the surgery you may remove the bandage and cover incisions with Band-Aids if needed  At that time you may shower, the wounds will have a surgical glue that will protect them from shower water but do not submerge your incisions directly (bathing or swimming) until at least 2 weeks post-operatively  It is safe to let the arm hang at the side and take a shower and put on a shirt without the sling on  Just make sure that you do not use the operative are to reach out and grab anything as that may damage the repair  When you are done showering and getting dressed please return the operative arm to the sling  Unless noted otherwise in your discharge paperwork, Dr Lorri Carrera uses absorbable sutures so they do not need to be removed  Dr Kera Goldsmith physician assistant (PA) will see you in the office a few days after the procedure to review the intra-operative findings and to initiate physical therapy if appropriate    A post-operative appointment should have been scheduled for you already, but if for some reason this did not happen, please call the office to make one  Physical therapy is important after nearly all shoulder surgeries and a detailed rehabilitation plan based on the specific intra-operative findings and procedures will be provided to your therapist at the first post-operative office visit  Most patients have post-operative therapy appointments scheduled pre-operatively, but if you do not, that will be handled at the first post-operative office visit  Unless expressly directed otherwise it is safe to remove the sling even the first day after the surgery and let the arm hang by the side  This allows patients to shower and even put a shirt on (bad arm in the sleeve first)  It is also safe to flex and extend their wrist, hand and fingers as much as possible when the block wears off  These simple motions can serve to pump fluid out of the forearm and decrease swelling in the arm

## 2019-11-11 NOTE — LETTER
November 11, 2019     Patient: Emilio Linder   YOB: 1980   Date of Visit: 11/11/2019       To Whom it May Concern:    Emilio Linder is under my professional care  She was seen in my office on 11/11/2019  She may return to work without limitations    If you have any questions or concerns, please don't hesitate to call           Sincerely,          Shameka Cabrera MD        CC: No Recipients

## 2019-11-12 ENCOUNTER — TELEPHONE (OUTPATIENT)
Dept: OBGYN CLINIC | Facility: HOSPITAL | Age: 39
End: 2019-11-12

## 2019-11-12 PROBLEM — M75.101 TEAR OF RIGHT SUPRASPINATUS TENDON: Status: ACTIVE | Noted: 2019-11-12

## 2019-11-12 NOTE — TELEPHONE ENCOUNTER
Please be advise I spoke to pt  Who is requesting an MRI of her hip   Pt  Stated the injection to her hip and PT didn't work  Pt  Is scheduled to follow up with you on 11/19/2019  Pt  Wanted to know if she can get an order for an MRI today or should she wait to see you in the office next week

## 2019-11-12 NOTE — TELEPHONE ENCOUNTER
Sina Lazar  [phone]    Dr Javier Maciel    Patient scheduled f/u with Byron Cason on 11/19, Injection wore off and pain level 10  Please advise

## 2019-11-15 ENCOUNTER — OFFICE VISIT (OUTPATIENT)
Dept: OBGYN CLINIC | Facility: HOSPITAL | Age: 39
End: 2019-11-15
Payer: COMMERCIAL

## 2019-11-15 VITALS — BODY MASS INDEX: 31.92 KG/M2 | HEIGHT: 64 IN | WEIGHT: 187 LBS

## 2019-11-15 DIAGNOSIS — M77.12 LATERAL EPICONDYLITIS OF LEFT ELBOW: ICD-10-CM

## 2019-11-15 DIAGNOSIS — M77.02 MEDIAL EPICONDYLITIS OF LEFT ELBOW: Primary | ICD-10-CM

## 2019-11-15 PROCEDURE — 99213 OFFICE O/P EST LOW 20 MIN: CPT | Performed by: ORTHOPAEDIC SURGERY

## 2019-11-15 NOTE — PROGRESS NOTES
Chief Complaint   Patient presents with    Left Elbow - Follow-up         Assessment:  Medial epicondylitis-left elbow    Lateral epicondylitis-left elbow    Plan: It is disappointing that none of the conservative measures that we have all tried have made any  significant difference either in her shoulder, hip, or elbow pain  These multiple arthralgias speaks more to systemic complaints that it does to something localized that we can fix surgically  As far as the tennis elbow goes both medially and laterally of the left elbow, I would like to try physical therapy over pain long-term both as an outpatient and at home to try to quiet this down  The recent hand literature suggests that injections and surgery are not as successful as they were thought to be in the past and therefore that should be held in the background  She should continue with ice to the area for 15 minutes 4 times a day if needed and can take Advil, Aleve, or Tylenol if needed  She is currently on meloxicam at a low dose  I want her to check with her family doctor to see if either the higher dose of the meloxicam  or a different NSAID could be used to try to quiet her multiple joint aches  I really do not have much more to offer from the orthopedic surgery standpoint but would be happy to see her back again in the future if needed  Further rheumatologic or physical medicine consult might be of some benefit    HPI:  This is a 41-year-old female presenting today for orthopedic evaluation regarding her left elbow pain  She states that she has been having pain for about a month after 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  She states that she had previously had this issue on the right side in the past and treated conservatively with a strap and resolved  She localizes her pain to the medial and lateral aspect of the left elbow and has pain with elbow range of motion    She states that she can use his pain numbness and tingling shooting down the hand but has also been diagnosed with carpal tunnel  She reported to the ED 2 weeks ago and was given a sling and told rest the elbow  She is being treated for multiple other issues within our department but has not been placed on any work restrictions at this point  She was recently diagnosed with rheumatoid arthritis and is beginning treatments for this shortly  Patient returns, 11/15/19, for a follow up for her lateral and medial epicondylitis of the left elbow  She has been treating conservatively with HEP, icing, Meloxicam and Tylenol along with partial relief  She has had no injections at this time  She is a seasonal manager at [employer]  She reportedly saw her rheumatologist and fact does not have rheumatoid arthritis because her blood work does not confirm this      Past medical history, family history,  social history, medication history, and allergies are reviewed  Please see HPI for pertinent review of systems  All other systems reviewed are negative  She has a small right rotator cuff tear, MRSA carrier, and has a diagnosis of depression  Exam: Ht 5' 4" (1 626 m)   Wt 84 8 kg (187 lb)   BMI 32 10 kg/m²   There is no swelling, redness, or ecchymosis about this left elbow  She had full elbow extension and flexion  She remain tender over both the medial and lateral epicondyles  She had pain on resisted wrist flexion of the extended elbow and extension at the wrist of the extended elbow  She had full supination  She had no antecubital adenopathy  Radial pulse was normal    strength and sensation all 5 fingers was also normal    Studies Reviewed:  I personally reviewed left elbow x-rays  There is no fracture, dislocation degenerative change, or soft tissue calcification    I agree with the radiologist's report which I also reviewed    Scribe Attestation    I,:   Elton Anthony am acting as a scribe while in the presence of the attending physician :        I,:   Ky Amado MD personally performed the services described in this documentation    as scribed in my presence :

## 2019-11-15 NOTE — PATIENT INSTRUCTIONS
Plan:  It is disappointing that none of the conservative measures that we have all tried have made any  significant difference either in her shoulder, hip, or elbow pain  These multiple arthralgias speaks more to systemic complaints that it does to something localized that we can fix surgically  As far as the tennis elbow goes both medially and laterally of the left elbow, I would like to try physical therapy over pain long-term both as an outpatient and at home to try to quiet this down  The recent hand literature suggests that injections and surgery are not as successful as they were thought to be in the past and therefore that should be held in the background  She should continue with ice to the area for 15 minutes 4 times a day if needed and can take Advil, Aleve, or Tylenol if needed  She is currently on meloxicam at a low dose  I want her to check with her family doctor to see if either the higher dose of the meloxicam  or a different NSAID could be used to try to quiet her multiple joint aches  I really do not have much more to offer from the orthopedic surgery standpoint but would be happy to see her back again in the future if needed    Further rheumatologic or physical medicine consult might be of some benefit

## 2019-11-19 ENCOUNTER — HOSPITAL ENCOUNTER (OUTPATIENT)
Dept: RADIOLOGY | Facility: HOSPITAL | Age: 39
Discharge: HOME | End: 2019-11-19
Payer: COMMERCIAL

## 2019-11-19 ENCOUNTER — OFFICE VISIT (OUTPATIENT)
Dept: OBGYN CLINIC | Facility: HOSPITAL | Age: 39
End: 2019-11-19
Payer: COMMERCIAL

## 2019-11-19 VITALS
SYSTOLIC BLOOD PRESSURE: 139 MMHG | BODY MASS INDEX: 31.92 KG/M2 | DIASTOLIC BLOOD PRESSURE: 89 MMHG | WEIGHT: 187 LBS | HEIGHT: 64 IN | HEART RATE: 70 BPM

## 2019-11-19 DIAGNOSIS — M54.16 RADICULOPATHY, LUMBAR REGION: ICD-10-CM

## 2019-11-19 DIAGNOSIS — M25.851 HIP IMPINGEMENT SYNDROME, RIGHT: Primary | ICD-10-CM

## 2019-11-19 PROCEDURE — 99213 OFFICE O/P EST LOW 20 MIN: CPT | Performed by: PHYSICIAN ASSISTANT

## 2019-11-19 PROCEDURE — 72110 X-RAY EXAM L-2 SPINE 4/>VWS: CPT

## 2019-12-03 ENCOUNTER — CLINICAL SUPPORT (OUTPATIENT)
Dept: OBGYN CLINIC | Facility: CLINIC | Age: 39
End: 2019-12-03

## 2019-12-03 DIAGNOSIS — Z30.42 ENCOUNTER FOR SURVEILLANCE OF INJECTABLE CONTRACEPTIVE: ICD-10-CM

## 2019-12-03 PROCEDURE — 96372 THER/PROPH/DIAG INJ SC/IM: CPT

## 2019-12-03 NOTE — PROGRESS NOTES
Depo-Provera      [x]   Patient provided box yes  o 1 Refills remain   Last  Annual Date: 06/10/19   Last Depo date: 9/3/19   Side effects: no   HCG: no   Given by: Karishma Martel CMA   Site: left deltoid   Calcium supplement daily teaching, condoms for 2 weeks following first injection dose

## 2019-12-03 NOTE — PROGRESS NOTES
46 y o  female presenting to the office for evaluation of right Hip and low back pain  Patient has had corticosteroid injections to the right hip and physical therapy for both the right hip and low back, which have not overall improved her pain condition  She continues to have substantial issues with mobility and pain, limiting her day-to-day home and work activities  She has pain in the groin area as well as pain that radiates down her leg  She states that sometimes she feels that her hip gets stuck  She feels that her strength is decreased secondary to pain  She denies any falls or injuries  She denies any other new or acute complaints  Patient is undergoing workup and management with Dr Hipolito Mrorison for her shoulder, and will be undergoing a shoulder arthroscopy in January ROS  Review of Systems   Constitutional: Negative for fever and unexpected weight change  HENT: Negative for hearing loss, nosebleeds and sore throat  Eyes: Negative for pain, redness and visual disturbance  Respiratory: Negative for cough, shortness of breath and wheezing  Cardiovascular: Negative for chest pain, palpitations and leg swelling  Gastrointestinal: Negative for abdominal pain, nausea and vomiting  Endocrine: Negative for polydipsia and polyuria  Genitourinary: Negative for dysuria and hematuria  Skin: Negative for rash and wound  Neurological: Negative for dizziness  Psychiatric/Behavioral: Negative for agitation and suicidal ideas  Past Medical History:   Diagnosis Date    Arthritis     Depression     DJD (degenerative joint disease)     Herpes     Hidradenitis     MRSA carrier      Past Surgical History:   Procedure Laterality Date    FL INJECTION RIGHT HIP (NON ARTHROGRAM)  7/24/2019    FL INJECTION RIGHT SHOULDER (ARTHROGRAM)  10/7/2019     Results Reviewed     None          Physical Exam  Physical Exam   Constitutional: She is oriented to person, place, and time   She appears well-developed and well-nourished  HENT:   Head: Normocephalic and atraumatic  Eyes: Pupils are equal, round, and reactive to light  EOM are normal    Neck: Neck supple  No tracheal deviation present  Cardiovascular: Normal rate and regular rhythm  Pulmonary/Chest: Effort normal and breath sounds normal    Abdominal: There is no guarding  Neurological: She is alert and oriented to person, place, and time  Skin: Skin is warm and dry  Psychiatric: She has a normal mood and affect  Her behavior is normal      Right Hip Exam     Tenderness   The patient is experiencing tenderness in the anterior and greater trochanter  Range of Motion   Flexion: 110   External rotation: 50   Internal rotation: 20     Muscle Strength   Flexion: 5/5     Other   Sensation: normal    Comments:  Patient has increased pain with resisted motion of the hip despite normal strength  Back Exam     Tenderness   The patient is experiencing tenderness in the lumbar  Range of Motion   Extension: abnormal   Flexion: abnormal   Lateral bend left: abnormal   Rotation right: abnormal   Rotation left: abnormal     Muscle Strength   The patient has normal back strength  Other   Sensation: normal  Gait: abnormal         Assessment/Plan  46 y o  female    1  Hip impingement syndrome, right  - MRI arthrogram right hip; Future  - FL injection right hip (arthrogram); Future    2  Radiculopathy, lumbar region  - MRI lumbar spine wo contrast; Future  - XR spine lumbar minimum 4 views non injury; Future    as patient has failed conservative management, we will get MRI of the hip and low back to direct therapy    Will consider arthroscopy if patient has any labral tear issues and CHUCHO injections for any findings on the lumbar MRI

## 2019-12-10 ENCOUNTER — HOSPITAL ENCOUNTER (OUTPATIENT)
Dept: RADIOLOGY | Facility: HOSPITAL | Age: 39
Discharge: HOME | End: 2019-12-10

## 2019-12-10 ENCOUNTER — TELEPHONE (OUTPATIENT)
Dept: OBGYN CLINIC | Facility: HOSPITAL | Age: 39
End: 2019-12-10

## 2019-12-10 PROBLEM — M19.90 DJD (DEGENERATIVE JOINT DISEASE): Status: ACTIVE | Noted: 2019-12-10

## 2019-12-10 NOTE — TELEPHONE ENCOUNTER
Patient sees Dr Jj Caro  She is calling stating that she was notified that her MRI requires more info  They are not going to approve it until this info is received   Please call #[phone]

## 2019-12-12 NOTE — TELEPHONE ENCOUNTER
Both MRI'S HAVE BEEN APPROVED AND ALL OF PATIENTS APT HAVE BEEN R/S   PT ALSO REQUESTED TO R/S SX, THAT HAS BEEN DONE ALONG WITH HER PO APT  ROMAINE COULD YOU R/S PATIENTS PT APT ?     THANKS

## 2019-12-13 ENCOUNTER — HOSPITAL ENCOUNTER (OUTPATIENT)
Dept: MRI IMAGING | Facility: HOSPITAL | Age: 39
Discharge: HOME | End: 2019-12-13
Payer: COMMERCIAL

## 2019-12-13 DIAGNOSIS — M54.16 RADICULOPATHY, LUMBAR REGION: ICD-10-CM

## 2019-12-13 PROCEDURE — 72148 MRI LUMBAR SPINE W/O DYE: CPT

## 2020-01-06 ENCOUNTER — HOSPITAL ENCOUNTER (OUTPATIENT)
Dept: MRI IMAGING | Facility: HOSPITAL | Age: 40
Discharge: HOME | End: 2020-01-06
Payer: COMMERCIAL

## 2020-01-06 ENCOUNTER — HOSPITAL ENCOUNTER (OUTPATIENT)
Dept: RADIOLOGY | Facility: HOSPITAL | Age: 40
Discharge: HOME | End: 2020-01-06
Payer: COMMERCIAL

## 2020-01-06 DIAGNOSIS — M25.851 HIP IMPINGEMENT SYNDROME, RIGHT: ICD-10-CM

## 2020-01-06 PROCEDURE — 77002 NEEDLE LOCALIZATION BY XRAY: CPT

## 2020-01-06 PROCEDURE — 27095 INJECTION FOR HIP X-RAY: CPT

## 2020-01-06 PROCEDURE — 73722 MRI JOINT OF LWR EXTR W/DYE: CPT

## 2020-01-06 PROCEDURE — A9585 GADOBUTROL INJECTION: HCPCS | Performed by: PHYSICIAN ASSISTANT

## 2020-01-06 RX ORDER — SODIUM CHLORIDE 9 MG/ML
50 INJECTION INTRAVENOUS
Status: COMPLETED | OUTPATIENT
Start: 2020-01-06 | End: 2020-01-06

## 2020-01-06 RX ORDER — LIDOCAINE HYDROCHLORIDE 10 MG/ML
10 INJECTION, SOLUTION EPIDURAL; INFILTRATION; INTRACAUDAL; PERINEURAL
Status: COMPLETED | OUTPATIENT
Start: 2020-01-06 | End: 2020-01-06

## 2020-01-06 RX ADMIN — SODIUM CHLORIDE 12 ML: 9 INJECTION, SOLUTION INTRAMUSCULAR; INTRAVENOUS; SUBCUTANEOUS at 12:16

## 2020-01-06 RX ADMIN — GADOBUTROL 0.2 ML: 604.72 INJECTION INTRAVENOUS at 12:08

## 2020-01-06 RX ADMIN — IOHEXOL 3 ML: 300 INJECTION, SOLUTION INTRAVENOUS at 11:20

## 2020-01-06 RX ADMIN — LIDOCAINE HYDROCHLORIDE 9 ML: 10 INJECTION, SOLUTION EPIDURAL; INFILTRATION; INTRACAUDAL; PERINEURAL at 12:16

## 2020-01-14 ENCOUNTER — OFFICE VISIT (OUTPATIENT)
Dept: OBGYN CLINIC | Facility: HOSPITAL | Age: 40
End: 2020-01-14
Payer: COMMERCIAL

## 2020-01-14 VITALS
SYSTOLIC BLOOD PRESSURE: 129 MMHG | HEART RATE: 73 BPM | DIASTOLIC BLOOD PRESSURE: 91 MMHG | BODY MASS INDEX: 31.69 KG/M2 | WEIGHT: 184.6 LBS

## 2020-01-14 DIAGNOSIS — M51.26 LUMBAR DISC HERNIATION: Primary | ICD-10-CM

## 2020-01-14 DIAGNOSIS — M54.16 RADICULOPATHY, LUMBAR REGION: ICD-10-CM

## 2020-01-14 DIAGNOSIS — M25.851 HIP IMPINGEMENT SYNDROME, RIGHT: ICD-10-CM

## 2020-01-14 PROCEDURE — 99213 OFFICE O/P EST LOW 20 MIN: CPT | Performed by: ORTHOPAEDIC SURGERY

## 2020-01-14 RX ORDER — METHYLPREDNISOLONE 4 MG/1
TABLET ORAL
Qty: 21 TABLET | Refills: 0 | Status: SHIPPED | OUTPATIENT
Start: 2020-01-14 | End: 2020-06-29

## 2020-01-14 NOTE — PROGRESS NOTES
Patient Name:  Ayaan Colon  MRN:  [id number]    Assessment & Plan     L4-5 Disc herniation, L5 radiculopathy, right hip impingement    1  A medrol dosepak was prescribed today and sent to her pharmacy electronically  2  If the Medrol dosepak is somewhat beneficial for her a right hip CSI may be performed as diagnostic purposes, if the intra-articular hip CSI is not beneficial her pain is mostly likely coming from her back   3  She may also be started in PT for hip and back strengthening   4  Follow up in 2 weeks time     Subjective     46 y o  female presents tot he office today for MRI lumbar spine and right hip MRI arthrogram results  Rebekah Bellamy has tried right hip CSI as well as PT, without relief in her symptoms  She also has completed PT for her back, without relief of her symptoms  Rebekah Bellamy continues to have difficulty's with ADL due to her pain  Rafjayshree Mia does state that when she was undergoing the right hip arthrogram, she experienced increased pain with the injection portion  She notes a sensation of her hip "poping" out of place with ambulation  The patient does note lower back pain and notes that her pain increases with ambulation  Rebekah Bellamy is Rheumatoid positive  General ROS:  Negative for fever or chills  Neurological ROS:  Negative for numbness or tingling  Objective     /91   Pulse 73   Wt 83 7 kg (184 lb 9 6 oz)   BMI 31 69 kg/m²     Lumbar spine:   Ambulates without assistance   Lumbar spine tender to palpation   Normal ROM   Normal strength   Skin warm and well perfused   Same exam as 11/19/19  Psychiatric: Mood and affect are appropriate    Data Review     I have personally reviewed pertinent films in PACS, and my interpretation follows  MRI arthrogram right hip: No labral tear  Subchondral cystic change noted within the superolateral acetabulum  Degenerative changes within the sacroiliac joints with bone marrow edema and sclerosis, right greater than left    This is more than expected for typical age-related degenerative change  Consider sacroiliitis  MRI lumbar spine: Left paramedian protrusion type disc herniation L4-5 results in lateral recess stenosis likely impinging the descending left L5 nerve root  Correlate for left L5 radiculopathy  There is mild central and mild bilateral foraminal narrowing at this level        Social History     Tobacco Use    Smoking status: Current Every Day Smoker     Packs/day: 0 25     Types: Cigarettes    Smokeless tobacco: Current User   Substance Use Topics    Alcohol use: Yes     Frequency: Monthly or less     Drinks per session: 1 or 2     Comment: holidays     Drug use: Yes     Types: Marijuana       Scribe Attestation    I,:   Amara Belcher am acting as a scribe while in the presence of the attending physician :        I,:   Naye Hsieh MD personally performed the services described in this documentation    as scribed in my presence :

## 2020-02-10 ENCOUNTER — TELEPHONE (OUTPATIENT)
Dept: OBGYN CLINIC | Facility: HOSPITAL | Age: 40
End: 2020-02-10

## 2020-02-10 NOTE — TELEPHONE ENCOUNTER
Kya Poole - I think you scheduled Ms Flakita Junior  Can you give her a call and reschedule    Thanks

## 2020-02-10 NOTE — TELEPHONE ENCOUNTER
Dr Brower Angry Sx [phone]     Patient called to reschedule Sx with DR Brower Angry    Patient requesting April    Phone # [phone]

## 2020-02-18 ENCOUNTER — CLINICAL SUPPORT (OUTPATIENT)
Dept: OBGYN CLINIC | Facility: CLINIC | Age: 40
End: 2020-02-18

## 2020-02-18 DIAGNOSIS — Z30.42 ENCOUNTER FOR SURVEILLANCE OF INJECTABLE CONTRACEPTIVE: ICD-10-CM

## 2020-02-18 PROCEDURE — 96372 THER/PROPH/DIAG INJ SC/IM: CPT

## 2020-02-18 PROCEDURE — T1015 CLINIC SERVICE: HCPCS

## 2020-02-18 RX ADMIN — MEDROXYPROGESTERONE ACETATE 150 MG: 150 INJECTION, SUSPENSION INTRAMUSCULAR at 13:31

## 2020-05-08 ENCOUNTER — TELEPHONE (OUTPATIENT)
Dept: OBGYN CLINIC | Facility: CLINIC | Age: 40
End: 2020-05-08

## 2020-05-11 ENCOUNTER — CLINICAL SUPPORT (OUTPATIENT)
Dept: OBGYN CLINIC | Facility: CLINIC | Age: 40
End: 2020-05-11

## 2020-05-11 DIAGNOSIS — Z30.42 ENCOUNTER FOR DEPO-PROVERA CONTRACEPTION: Primary | ICD-10-CM

## 2020-05-11 PROCEDURE — 96372 THER/PROPH/DIAG INJ SC/IM: CPT

## 2020-05-11 RX ADMIN — MEDROXYPROGESTERONE ACETATE 150 MG: 150 INJECTION, SUSPENSION INTRAMUSCULAR at 09:10

## 2020-06-10 ENCOUNTER — TELEPHONE (OUTPATIENT)
Dept: OBGYN CLINIC | Facility: HOSPITAL | Age: 40
End: 2020-06-10

## 2020-06-11 ENCOUNTER — APPOINTMENT (EMERGENCY)
Dept: RADIOLOGY | Facility: HOSPITAL | Age: 40
End: 2020-06-11
Payer: COMMERCIAL

## 2020-06-11 ENCOUNTER — HOSPITAL ENCOUNTER (EMERGENCY)
Facility: HOSPITAL | Age: 40
Discharge: HOME | End: 2020-06-11
Attending: EMERGENCY MEDICINE | Admitting: EMERGENCY MEDICINE
Payer: COMMERCIAL

## 2020-06-11 VITALS
RESPIRATION RATE: 16 BRPM | HEART RATE: 58 BPM | OXYGEN SATURATION: 100 % | TEMPERATURE: 98.9 F | SYSTOLIC BLOOD PRESSURE: 134 MMHG | DIASTOLIC BLOOD PRESSURE: 70 MMHG

## 2020-06-11 DIAGNOSIS — S92.354A NONDISPLACED FRACTURE OF FIFTH METATARSAL BONE, RIGHT FOOT, INITIAL ENCOUNTER FOR CLOSED FRACTURE: Primary | ICD-10-CM

## 2020-06-11 PROCEDURE — 73610 X-RAY EXAM OF ANKLE: CPT

## 2020-06-11 PROCEDURE — 99283 EMERGENCY DEPT VISIT LOW MDM: CPT

## 2020-06-11 PROCEDURE — 73630 X-RAY EXAM OF FOOT: CPT

## 2020-06-11 PROCEDURE — 99284 EMERGENCY DEPT VISIT MOD MDM: CPT | Performed by: EMERGENCY MEDICINE

## 2020-06-11 PROCEDURE — 96372 THER/PROPH/DIAG INJ SC/IM: CPT

## 2020-06-11 RX ORDER — OXYCODONE HYDROCHLORIDE AND ACETAMINOPHEN 5; 325 MG/1; MG/1
1 TABLET ORAL ONCE
Status: COMPLETED | OUTPATIENT
Start: 2020-06-11 | End: 2020-06-11

## 2020-06-11 RX ORDER — MELOXICAM 15 MG/1
15 TABLET ORAL DAILY
Qty: 30 TABLET | Refills: 0 | Status: SHIPPED | OUTPATIENT
Start: 2020-06-11 | End: 2021-03-25

## 2020-06-11 RX ORDER — KETOROLAC TROMETHAMINE 30 MG/ML
30 INJECTION, SOLUTION INTRAMUSCULAR; INTRAVENOUS ONCE
Status: COMPLETED | OUTPATIENT
Start: 2020-06-11 | End: 2020-06-11

## 2020-06-11 RX ORDER — OXYCODONE HYDROCHLORIDE AND ACETAMINOPHEN 5; 325 MG/1; MG/1
1 TABLET ORAL EVERY 4 HOURS PRN
Qty: 20 TABLET | Refills: 0 | Status: SHIPPED | OUTPATIENT
Start: 2020-06-11 | End: 2020-06-15

## 2020-06-11 RX ADMIN — OXYCODONE HYDROCHLORIDE AND ACETAMINOPHEN 1 TABLET: 5; 325 TABLET ORAL at 23:19

## 2020-06-11 RX ADMIN — KETOROLAC TROMETHAMINE 30 MG: 30 INJECTION, SOLUTION INTRAMUSCULAR at 22:37

## 2020-06-29 ENCOUNTER — OFFICE VISIT (OUTPATIENT)
Dept: PODIATRY | Facility: CLINIC | Age: 40
End: 2020-06-29
Payer: COMMERCIAL

## 2020-06-29 VITALS
DIASTOLIC BLOOD PRESSURE: 70 MMHG | WEIGHT: 189 LBS | SYSTOLIC BLOOD PRESSURE: 130 MMHG | BODY MASS INDEX: 32.27 KG/M2 | HEIGHT: 64 IN

## 2020-06-29 DIAGNOSIS — S92.351A CLOSED FRACTURE OF BASE OF FIFTH METATARSAL BONE OF RIGHT FOOT, INITIAL ENCOUNTER: Primary | ICD-10-CM

## 2020-06-29 PROCEDURE — 99243 OFF/OP CNSLTJ NEW/EST LOW 30: CPT | Performed by: PODIATRIST

## 2020-06-29 PROCEDURE — 3008F BODY MASS INDEX DOCD: CPT | Performed by: PODIATRIST

## 2020-07-06 ENCOUNTER — TELEPHONE (OUTPATIENT)
Dept: OBGYN CLINIC | Facility: HOSPITAL | Age: 40
End: 2020-07-06

## 2020-07-06 NOTE — TELEPHONE ENCOUNTER
Left message with patient to see if she was still ok with scheduled surgery for 07/15  She has not returned previous voicemail and I need to know if she is able to have surgery or not  I asked for a return call at earliest convenience  Will wait for call back to schedule all related appointments

## 2020-07-10 NOTE — TELEPHONE ENCOUNTER
07/10 - called and left another voicemail for patient  She told PAT department that she has a foot fracture and would need to postpone surgery again  I left a voicemail stating that at this time her surgery will be cancelled and after she is cleared from her foot fracture we can schedule surgery  She has not been seen 11/2019  She may need to come back into office but that will be determined when she calls back   Thank you

## 2020-08-03 ENCOUNTER — TELEPHONE (OUTPATIENT)
Dept: OBGYN CLINIC | Facility: CLINIC | Age: 40
End: 2020-08-03

## 2020-08-03 ENCOUNTER — ANNUAL EXAM (OUTPATIENT)
Dept: OBGYN CLINIC | Facility: CLINIC | Age: 40
End: 2020-08-03

## 2020-08-03 VITALS
SYSTOLIC BLOOD PRESSURE: 145 MMHG | WEIGHT: 193 LBS | HEIGHT: 64 IN | BODY MASS INDEX: 32.95 KG/M2 | DIASTOLIC BLOOD PRESSURE: 97 MMHG | TEMPERATURE: 98.2 F | HEART RATE: 89 BPM

## 2020-08-03 DIAGNOSIS — Z01.419 WOMEN'S ANNUAL ROUTINE GYNECOLOGICAL EXAMINATION: Primary | ICD-10-CM

## 2020-08-03 DIAGNOSIS — Z30.42 ENCOUNTER FOR SURVEILLANCE OF INJECTABLE CONTRACEPTIVE: ICD-10-CM

## 2020-08-03 PROCEDURE — 87624 HPV HI-RISK TYP POOLED RSLT: CPT | Performed by: NURSE PRACTITIONER

## 2020-08-03 PROCEDURE — G0145 SCR C/V CYTO,THINLAYER,RESCR: HCPCS | Performed by: NURSE PRACTITIONER

## 2020-08-03 PROCEDURE — 99395 PREV VISIT EST AGE 18-39: CPT | Performed by: NURSE PRACTITIONER

## 2020-08-03 PROCEDURE — 96372 THER/PROPH/DIAG INJ SC/IM: CPT | Performed by: NURSE PRACTITIONER

## 2020-08-03 RX ORDER — MEDROXYPROGESTERONE ACETATE 150 MG/ML
150 INJECTION, SUSPENSION INTRAMUSCULAR
Qty: 1 ML | Refills: 3 | Status: SHIPPED | OUTPATIENT
Start: 2020-10-02 | End: 2021-04-01 | Stop reason: SDUPTHER

## 2020-08-03 RX ADMIN — MEDROXYPROGESTERONE ACETATE 150 MG: 150 INJECTION, SUSPENSION INTRAMUSCULAR at 11:20

## 2020-08-03 NOTE — PATIENT INSTRUCTIONS
Human Papillomavirus Vaccine (By injection)   Human Papillomavirus Vaccine (HUE-man pap-ah-DONALD-ramy-VYE-sly VAX-een)  Helps prevent genital warts and cancer of the anus, cervix, vagina, or vulva, which may be caused by human papillomavirus (HPV)  Brand Name(s): Cervarix, Gardasil, Gardasil 9   There may be other brand names for this medicine  When This Medicine Should Not Be Used: This vaccine is not right for everyone  You should not receive it if you had an allergic reaction to human papillomavirus vaccine or to yeast   How to Use This Medicine:   Injectable  · Your doctor will prescribe your exact dose and tell you how often it should be given  This medicine is given as a shot into one of your muscles  · A nurse or other health provider will give you this medicine  · This vaccine must be given as 2 or 3 doses based on the brand  · Read and follow the patient instructions that come with this medicine  Talk to your doctor or pharmacist if you have any questions  · Missed dose: This vaccine needs to be given on a fixed schedule  If you miss your scheduled shot, call your doctor to make another appointment as soon as possible  Drugs and Foods to Avoid:   Ask your doctor or pharmacist before using any other medicine, including over-the-counter medicines, vitamins, and herbal products  · Some medicines can affect how this vaccine works  Tell your doctor if you are using any treatment that weakens the immune system, such as cancer medicine, radiation treatment, or a steroid  Warnings While Using This Medicine:   · Tell your doctor if you are pregnant or breastfeeding, or if you have a weak immune system or are allergic to latex  · This vaccine will not protect you against sexually transmitted diseases that are not caused by HPV  · You still need to see your doctor for routine screening tests for anal or cervical cancer even after you receive this vaccine    · You may feel faint, lightheaded, or dizzy right after you receive this vaccine  Your doctor may ask you to wait 15 minutes before standing  Possible Side Effects While Using This Medicine:   Call your doctor right away if you notice any of these side effects:  · Allergic reaction: Itching or hives, swelling in your face or hands, swelling or tingling in your mouth or throat, chest tightness, trouble breathing  · Lightheadedness, dizziness, or fainting  If you notice these less serious side effects, talk with your doctor:   · Headache, tiredness  · Mild fever  · Pain, redness, itching, or swelling where the shot is given  If you notice other side effects that you think are caused by this medicine, tell your doctor  Call your doctor for medical advice about side effects  You may report side effects to FDA at 4-164-LMX-1686  © 2017 [address]  Information is for End User's use only and may not be sold, redistributed or otherwise used for commercial purposes  The above information is an  only  It is not intended as medical advice for individual conditions or treatments  Talk to your doctor, nurse or pharmacist before following any medical regimen to see if it is safe and effective for you  Pap Smear   GENERAL INFORMATION:   What is a Pap smear? A Pap smear, or Pap test, is a procedure to check your cervix for abnormal cells  The cervix is the narrow opening at the bottom of your uterus  The cervix meets the top part of the vagina  How do I prepare for a Pap smear? The best time to schedule the test is right after your period stops  Do not have a Pap smear during your monthly period  Do not have intercourse or put anything in your vagina for 24 hours before your test    What will happen during a Pap smear? · You will lie on your back and place your feet on footrests called stirrups  Your caregiver will gently insert a device called a speculum into your vagina   The speculum is used to spread the walls of your vagina so he can see your cervix  He will use a thin brush or cotton swab to collect cells from the inside of your cervix  · Your caregiver will also collect cells from the surface of your cervix with a plastic or wooden tool called a spatula  He may also gently scrape the upper part of your vagina for a sample  The samples are placed in a container with liquid or on a glass slide  They are sent to a lab and examined for abnormal cells  How often do I need a Pap smear? Pap smears are usually done every 1 to 3 years  You may need a Pap smear more often if you have any of the following:  · Positive test result for the human papillomavirus (HPV)    · Cervical intraepithelial neoplasm or cervical cancer    · HIV    · A weak immune system    · Exposure to diethylstilbestrol (BEST) medicine when your mother was pregnant with you  CARE AGREEMENT:   You have the right to help plan your care  Learn about your health condition and how it may be treated  Discuss treatment options with your caregivers to decide what care you want to receive  You always have the right to refuse treatment  The above information is an  only  It is not intended as medical advice for individual conditions or treatments  Talk to your doctor, nurse or pharmacist before following any medical regimen to see if it is safe and effective for you  © 2014 [address] is for End User's use only and may not be sold, redistributed or otherwise used for commercial purposes  All illustrations and images included in CareNotes® are the copyrighted property of [employer]  or [employer] Cruz Lopez  Breast Self Exam for Women   WHAT YOU NEED TO KNOW:   What is a breast self-exam (BSE)? A BSE is a way to check your breasts for lumps and other changes  Regular BSEs can help you know how your breasts normally look and feel   Most breast lumps or changes are not cancer, but you should always have them checked by a healthcare provider  Your healthcare provider can also watch you do a BSE and can tell you if you are doing your BSE correctly  Why should I do a BSE? Breast cancer is the most common type of cancer in women  Even if you have mammograms, you may still want to do a BSE regularly  If you know how your breasts normally feel and look, it may help you know when to contact your healthcare provider  Mammograms can miss some cancers  You may find a lump during a BSE that did not show up on your mammogram   When should I do a BSE? Dennys your calendar to help you remember to do BSE on a regular schedule  One easy way to remember to do a BSE is to do the exam on the same day of each month  If you have periods, you may want to do your BSE 1 week after your period ends  This is the time when your breasts may be the least swollen, lumpy, or tender  You can do regular BSEs even if you are breastfeeding or have breast implants  How should I do a BSE? · Look at your breasts in a mirror  Look at the size and shape of each breast and nipple  Check for swelling, lumps, dimpling, scaly skin, or other skin changes  Look for nipple changes, such as a nipple that is painful or beginning to pull inward  Gently squeeze both nipples and check to see if fluid (that is not breast milk) comes out of them  If you find any of these or other breast changes, contact your healthcare provider  Check your breasts while you sit or  the following 3 positions:    [facility] your arms down at your sides  ¨ Raise your hands and join them behind your head  ¨ Put firm pressure with your hands on your hips  Bend slightly forward while you look at your breasts in the mirror  · Lie down and feel your breasts  When you lie down, your breast tissue spreads out evenly over your chest  This makes it easier for you to feel for lumps and anything that may not be normal for your breasts  Do a BSE on one breast at a time      ¨ Place a small pillow or towel under your left shoulder  Put your left arm behind your head  ¨ Use the 3 middle fingers of your right hand  Use your fingertip pads, on the top of your fingers  Your fingertip pad is the most sensitive part of your finger  ¨ Use small circles to feel your breast tissue  Use your fingertip pads to make dime-sized, overlapping circles on your breast and armpits  Use light, medium, and firm pressure  First, press lightly  Second, press with medium pressure to feel a little deeper into the breast  Last, use firm pressure to feel deep within your breast     ¨ Examine your entire breast area  Examine the breast area from above the breast to below the breast where you feel only ribs  Make small circles with your fingertips, starting in the middle of your armpit  Make circles going up and down the breast area  Continue toward your breast and all the way across it  Examine the area from your armpit all the way over to the middle of your chest (breastbone)  Stop at the middle of your chest     ¨ Move the pillow or towel to your right shoulder, and put your right arm behind your head  Use the 3 fingertip pads of your left hand, and repeat the above steps to do a BSE on your right breast        What else can I do to check for breast problems or cancer? Some experts suggest that women 36years of age or older should have a mammogram every year  Other experts suggest that women between the ages of 48and 76years old should have a mammogram every 2 years  Talk to your healthcare provider about when you should have a mammogram   When should I contact my healthcare provider? · You find any lumps or changes in your breasts  · You have breast pain or fluid coming from your nipples  · You have questions or concerns or concerns about your condition or care  CARE AGREEMENT:   You have the right to help plan your care  Learn about your health condition and how it may be treated   Discuss treatment options with your caregivers to decide what care you want to receive  You always have the right to refuse treatment  The above information is an  only  It is not intended as medical advice for individual conditions or treatments  Talk to your doctor, nurse or pharmacist before following any medical regimen to see if it is safe and effective for you  © 2017 [address] Information is for End User's use only and may not be sold, redistributed or otherwise used for commercial purposes  All illustrations and images included in CareNotes® are the copyrighted property of [employer]  or Jose Cruz Lopez  Medroxyprogesterone (By injection)   Medroxyprogesterone (yh-tdjq-mn-proe-NATALIA-ter-one)  Prevents pregnancy  Also treats endometriosis and is used with other medicines to help relieve symptoms of cancer, including uterine or kidney cancer  Brand Name(s): Depo-Provera, Depo-Provera Contraceptive, Depo-SubQ Provera 104   There may be other brand names for this medicine  When This Medicine Should Not Be Used: This medicine is not right for everyone  You should not receive it if you had an allergic reaction to medroxyprogesterone or if you have a history of breast cancer or blood clots (including heart attack or stroke)  In most cases, you should not use this medicine while you are pregnant  How to Use This Medicine:   Injectable  · A nurse or other health provider will give you this medicine  This medicine is given as a shot into a muscle or just under the skin  · Your exact treatment schedule depends on the reason you are using this medicine  You doctor will explain your personal schedule  ¨ For treatment of cancer symptoms, you may start with a shot once per week  You may need fewer shots as your treatment goes forward  ¨ For birth control or endometriosis, you will need a shot every 3 months (13 weeks)  Read and follow the patient instructions that come with this medicine   Talk to your doctor or pharmacist if you have any questions  ¨ You might need to have the first shot during the first 5 days of your normal menstrual period, to make sure you are not pregnant  If you have just had a baby, you may receive a shot 5 days after birth if you are not breastfeeding or 6 weeks after birth if you are breastfeeding  · Missed dose: You must receive a shot every 3 months if you want to prevent pregnancy  Talk to your doctor or pharmacist if you do not receive your medicine on time, because you may need another form of birth control  Drugs and Foods to Avoid:   Ask your doctor or pharmacist before using any other medicine, including over-the-counter medicines, vitamins, and herbal products  · Some foods and medicines can affect how medroxyprogesterone works  Tell your doctor if you are using any of the following:  ¨ Aminoglutethimide, bosentan, carbamazepine, felbamate, griseofulvin, nefazodone, oxcarbazepine, phenobarbital, phenytoin, rifabutin, rifampin, rifapentine, Magalis's wort, topiramate  ¨ Medicine to treat an infection (including clarithromycin, itraconazole, ketoconazole, telithromycin, voriconazole)  ¨ Medicine to treat HIV/AIDS (including atazanavir, indinavir, nelfinavir, ritonavir, saquinavir)  Warnings While Using This Medicine:   · Tell your doctor right away if you think you have become pregnant  · Tell your doctor if you are breastfeeding or if you have liver disease, kidney disease, asthma, diabetes, heart disease, seizures, migraine headaches, an eating disorder, osteoporosis, or a history of depression  Tell your doctor if you smoke    · This medicine may cause the following problems:  ¨ Blood clots, which could lead to stroke, heart attack, or other serious problems  ¨ Possible increased risk of breast cancer  ¨ Weak or thin bones, especially with long-term use  · You should not use this medicine for long-term birth control unless you cannot use any other form of birth control  · This medicine will not protect you from HIV/AIDS or other sexually transmitted diseases  · Tell any doctor or dentist who treats you that you are using this medicine  This medicine may affect certain medical test results  · Your doctor will check your progress and the effects of this medicine at regular visits  Keep all appointments  Possible Side Effects While Using This Medicine:   Call your doctor right away if you notice any of these side effects:  · Allergic reaction: Itching or hives, swelling in your face or hands, swelling or tingling in your mouth or throat, chest tightness, trouble breathing  · Chest pain, trouble breathing, or coughing up blood  · Dark urine or pale stools, nausea, vomiting, loss of appetite, stomach pain, yellow skin or eyes  · Heavy or nonstop vaginal bleeding  · Loss of vision, double vision  · Numbness or weakness on one side of your body, sudden or severe headache, problems with vision, speech, or walking  · Severe stomach pain or cramps  If you notice these less serious side effects, talk with your doctor:   · Headache  · Light or missed monthly periods, spotting between periods  · Nervousness or dizziness  · Pain, redness, burning, swelling, or a lump under your skin where the shot was given  · Weight gain  If you notice other side effects that you think are caused by this medicine, tell your doctor  Call your doctor for medical advice about side effects  You may report side effects to FDA at 6-895-FDA-9342  © 2017 [address] Information is for End User's use only and may not be sold, redistributed or otherwise used for commercial purposes  The above information is an  only  It is not intended as medical advice for individual conditions or treatments  Talk to your doctor, nurse or pharmacist before following any medical regimen to see if it is safe and effective for you

## 2020-08-03 NOTE — PROGRESS NOTES
Depo-Provera      [x]   Patient provided box/ yes  o (3) Refills remain   Last  Annual Date: Due Aug 3,2021   Last Depo date: 5/11/20   Side effects:not reported   HCG:N/A   Given by: [facility]    Site :LD     Calcium supplement daily teaching, condoms for 2 weeks following first injection dose

## 2020-08-03 NOTE — PROGRESS NOTES
Subjective      Fredi Lindo is a 45 y o  female who presents for annual well woman exam   Last Pap smear 6/17/15 resulted NILM/ HR HPV negative  Pap smear due today  Periods are rare on Depo-Provera for contraception  intermenstrual bleeding, spotting, or discharge  Current contraception: Depo-Provera injections  History of abnormal Pap smear: no  Family history of uterine or ovarian cancer: no  Regular self breast exam: no  History of abnormal mammogram:  Mammograms to begin age 36 unless otherwise clinically indicated  Family history of breast cancer: no  History of abnormal lipids: no  Gardasil:  No    Menstrual History:  OB History        3    Para   2    Term   2            AB   1    Living   2       SAB   1    TAB        Ectopic        Multiple        Live Births                    Menarche age: 7  Patient's last menstrual period was 2020  Period Pattern: (!) Irregular(On Depo)  Dysmenorrhea: None    The following portions of the patient's history were reviewed and updated as appropriate: allergies, current medications, past family history, past medical history, past social history, past surgical history and problem list     Review of Systems  Pertinent items are noted in HPI        Objective      /97   Pulse 89   Temp 98 2 °F (36 8 °C) (Skin)   Ht 5' 4"   Wt 87 5 kg (193 lb)   LMP 2020   BMI 33 13 kg/m²     General:   alert and oriented, in no acute distress, alert, appears stated age and cooperative, hirsuitism    Heart: regular rate and rhythm, S1, S2 normal, no murmur, click, rub or gallop   Lungs: clear to auscultation bilaterally   Abdomen: soft, non-tender, without masses or organomegaly, nondistended and normal bowel sounds   Vulva: normal, Bartholin's, Urethra, Williford's normal   Vagina: normal mucosa, normal discharge, no palpable nodules   Cervix: no bleeding following Pap, no cervical motion tenderness and no lesions   Uterus: normal size, non-tender, normal shape and consistency   Adnexa: normal adnexa and no mass, fullness, tenderness   Breast:  Nontender, no palpable masses, no nipple discharge, no skin changes bilaterally          Assessment      @well woman  no contraindication to continue hormonal therapy@   Plan      All questions answered  Await pap smear results  Breast self exam technique reviewed and patient encouraged to perform self-exam monthly  Contraception: Depo-Provera injections  Diagnosis explained in detail, including differential   Dietary diary  Discussed healthy lifestyle modifications  Educational material distributed  Follow up in 3 Months for Depo-Provera and 1 year for annual exam   Follow up as needed  Thin prep Pap smear  Breast awareness reviewed    Encouraged healthy diet, exercise and lifestyle  Encouraged follow-up with PCP and specialists conditions  Reviewed recommendations for Gardasil vaccine  Written information provided  Patient declines today  Encouraged marijuana cessation  Will call with results  VBI-   BMI Counseling: Body mass index is 33 13 kg/m²  The BMI is above normal  Nutrition recommendations include reducing portion sizes, decreasing overall calorie intake, 3-5 servings of fruits/vegetables daily and reducing fast food intake  Exercise recommendations include moderate aerobic physical activity for 150 minutes/week, exercising 3-5 times per week and strength training exercises  Tobacco Cessation Counseling: Tobacco cessation counseling was not provided  The patient is sincerely urged to quit consumption of tobacco  She is not ready to quit tobacco  The numerous health risks of tobacco consumption were discussed  Verbalizes understanding of items to assist with smoking cessation

## 2020-08-05 LAB
HPV HR 12 DNA CVX QL NAA+PROBE: NEGATIVE
HPV16 DNA CVX QL NAA+PROBE: NEGATIVE
HPV18 DNA CVX QL NAA+PROBE: NEGATIVE

## 2020-08-06 ENCOUNTER — HOSPITAL ENCOUNTER (OUTPATIENT)
Dept: RADIOLOGY | Facility: HOSPITAL | Age: 40
Discharge: HOME | End: 2020-08-06
Attending: PODIATRIST
Payer: COMMERCIAL

## 2020-08-06 DIAGNOSIS — S92.351A CLOSED FRACTURE OF BASE OF FIFTH METATARSAL BONE OF RIGHT FOOT, INITIAL ENCOUNTER: ICD-10-CM

## 2020-08-06 PROCEDURE — 73630 X-RAY EXAM OF FOOT: CPT

## 2020-08-07 LAB
LAB AP GYN PRIMARY INTERPRETATION: NORMAL
Lab: NORMAL

## 2020-08-10 ENCOUNTER — OFFICE VISIT (OUTPATIENT)
Dept: PODIATRY | Facility: CLINIC | Age: 40
End: 2020-08-10
Payer: COMMERCIAL

## 2020-08-10 VITALS
DIASTOLIC BLOOD PRESSURE: 84 MMHG | HEART RATE: 75 BPM | HEIGHT: 64 IN | SYSTOLIC BLOOD PRESSURE: 140 MMHG | BODY MASS INDEX: 32.91 KG/M2 | TEMPERATURE: 98.8 F | WEIGHT: 192.8 LBS

## 2020-08-10 DIAGNOSIS — S92.351A CLOSED FRACTURE OF BASE OF FIFTH METATARSAL BONE OF RIGHT FOOT, INITIAL ENCOUNTER: Primary | ICD-10-CM

## 2020-08-10 PROCEDURE — 99213 OFFICE O/P EST LOW 20 MIN: CPT | Performed by: PODIATRIST

## 2020-08-10 PROCEDURE — 3008F BODY MASS INDEX DOCD: CPT | Performed by: PODIATRIST

## 2020-08-10 NOTE — PROGRESS NOTES
Assessment/Plan:      Diagnoses and all orders for this visit:    Closed fracture of base of fifth metatarsal bone of right foot, initial encounter      XRay shows increased density across fracture, no displacement  Good interval healing  She has not worn the boot as much as she should but overall done well  I would recommend CAM boot for 2 more weeks then transition to sneaker  Check once more in 6 weeks  Can return to work as a  but please allow to sit  Note written for work  Subjective:     Patient ID: Jewell Bajwa is a 45 y o  female  PAtient is following up for right 5th metatarsal base fracture  She states it is still achy  She is in [employer] because she left the boot at home  It is unclear how often she was wearing the boot  She got new XRays on Friday      Review of Systems   Constitutional: Negative  HENT: Negative for sinus pressure and sinus pain  Respiratory: Negative for cough and shortness of breath  Cardiovascular: Negative for leg swelling  Gastrointestinal: Negative for constipation, diarrhea and nausea  Musculoskeletal: Positive for arthralgias and joint swelling  Skin: Negative for color change and wound  Neurological: Negative for numbness  Objective:     Physical Exam      Vitals reviewed    Constitutional: Patient is not distressed  Patient is well developed  Vascular: Dorsalis pedis and posterior tibial pulses palpable  Capillary refill time within normal limits to all digits  No erythema  No edema  No significant varicosities  Dermatology: No rash  No open lesions  Present pedal hair  Skin has healthy turgor  Musculoskeletal: Normal range of motion to ankle, subtalar joint, and midtarsal joint  Normal range of motion first MTPJ  Manual muscle testing 5 out of 5 for inversion/eversion/dorsiflexion/plantarflexion  On stance patients feet are generally rectus  Tenderness right 5th metatarsal base   Peroneal strength 4/5 due to pain and guarding, tendon is intact and not painful, just at insertion  Neurological exam: Monofilament sensation is intact  Vibratory sensation is intact  Achilles reflex is normal  Patient is AAOx3  XRay right foot reviewed in office  Good interval healing of 5th metatarsal base fracture  Stable without any displacement

## 2020-08-10 NOTE — LETTER
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

## 2020-10-20 ENCOUNTER — CLINICAL SUPPORT (OUTPATIENT)
Dept: OBGYN CLINIC | Facility: CLINIC | Age: 40
End: 2020-10-20

## 2020-10-20 DIAGNOSIS — Z30.42 ENCOUNTER FOR DEPO-PROVERA CONTRACEPTION: Primary | ICD-10-CM

## 2020-10-20 PROCEDURE — 96372 THER/PROPH/DIAG INJ SC/IM: CPT

## 2020-10-20 RX ADMIN — MEDROXYPROGESTERONE ACETATE 150 MG: 150 INJECTION, SUSPENSION INTRAMUSCULAR at 10:55

## 2020-10-26 ENCOUNTER — APPOINTMENT (EMERGENCY)
Dept: RADIOLOGY | Facility: HOSPITAL | Age: 40
End: 2020-10-26
Payer: COMMERCIAL

## 2020-10-26 ENCOUNTER — HOSPITAL ENCOUNTER (EMERGENCY)
Facility: HOSPITAL | Age: 40
Discharge: HOME | End: 2020-10-26
Attending: EMERGENCY MEDICINE
Payer: COMMERCIAL

## 2020-10-26 VITALS
BODY MASS INDEX: 33.06 KG/M2 | RESPIRATION RATE: 18 BRPM | TEMPERATURE: 98.4 F | DIASTOLIC BLOOD PRESSURE: 76 MMHG | SYSTOLIC BLOOD PRESSURE: 159 MMHG | OXYGEN SATURATION: 100 % | WEIGHT: 192.6 LBS | HEART RATE: 71 BPM

## 2020-10-26 DIAGNOSIS — S82.65XA CLOSED NONDISPLACED FRACTURE OF LATERAL MALLEOLUS OF LEFT FIBULA, INITIAL ENCOUNTER: Primary | ICD-10-CM

## 2020-10-26 PROCEDURE — 99284 EMERGENCY DEPT VISIT MOD MDM: CPT | Performed by: EMERGENCY MEDICINE

## 2020-10-26 PROCEDURE — 99283 EMERGENCY DEPT VISIT LOW MDM: CPT

## 2020-10-26 PROCEDURE — 73610 X-RAY EXAM OF ANKLE: CPT

## 2020-10-26 RX ORDER — ACETAMINOPHEN 325 MG/1
650 TABLET ORAL ONCE
Status: COMPLETED | OUTPATIENT
Start: 2020-10-26 | End: 2020-10-26

## 2020-10-26 RX ORDER — IBUPROFEN 600 MG/1
600 TABLET ORAL ONCE
Status: COMPLETED | OUTPATIENT
Start: 2020-10-26 | End: 2020-10-26

## 2020-10-26 RX ADMIN — ACETAMINOPHEN 650 MG: 325 TABLET, FILM COATED ORAL at 19:29

## 2020-10-26 RX ADMIN — IBUPROFEN 600 MG: 600 TABLET, FILM COATED ORAL at 19:29

## 2020-10-27 ENCOUNTER — OFFICE VISIT (OUTPATIENT)
Dept: OBGYN CLINIC | Facility: CLINIC | Age: 40
End: 2020-10-27
Payer: COMMERCIAL

## 2020-10-27 VITALS
SYSTOLIC BLOOD PRESSURE: 182 MMHG | DIASTOLIC BLOOD PRESSURE: 47 MMHG | HEART RATE: 76 BPM | BODY MASS INDEX: 33.06 KG/M2 | HEIGHT: 64 IN

## 2020-10-27 DIAGNOSIS — S93.492A SPRAIN OF ANTERIOR TALOFIBULAR LIGAMENT OF LEFT ANKLE, INITIAL ENCOUNTER: Primary | ICD-10-CM

## 2020-10-27 PROCEDURE — 99213 OFFICE O/P EST LOW 20 MIN: CPT | Performed by: ORTHOPAEDIC SURGERY

## 2020-11-06 DIAGNOSIS — Z20.828 EXPOSURE TO SARS-ASSOCIATED CORONAVIRUS: ICD-10-CM

## 2020-11-06 PROCEDURE — U0003 INFECTIOUS AGENT DETECTION BY NUCLEIC ACID (DNA OR RNA); SEVERE ACUTE RESPIRATORY SYNDROME CORONAVIRUS 2 (SARS-COV-2) (CORONAVIRUS DISEASE [COVID-19]), AMPLIFIED PROBE TECHNIQUE, MAKING USE OF HIGH THROUGHPUT TECHNOLOGIES AS DESCRIBED BY CMS-2020-01-R: HCPCS | Performed by: FAMILY MEDICINE

## 2020-11-07 LAB — SARS-COV-2 RNA SPEC QL NAA+PROBE: NOT DETECTED

## 2020-12-15 ENCOUNTER — OFFICE VISIT (OUTPATIENT)
Dept: OBGYN CLINIC | Facility: CLINIC | Age: 40
End: 2020-12-15
Payer: COMMERCIAL

## 2020-12-15 VITALS
WEIGHT: 192 LBS | BODY MASS INDEX: 32.78 KG/M2 | DIASTOLIC BLOOD PRESSURE: 86 MMHG | HEART RATE: 62 BPM | HEIGHT: 64 IN | SYSTOLIC BLOOD PRESSURE: 127 MMHG

## 2020-12-15 DIAGNOSIS — S93.492D SPRAIN OF ANTERIOR TALOFIBULAR LIGAMENT OF LEFT ANKLE, SUBSEQUENT ENCOUNTER: Primary | ICD-10-CM

## 2020-12-15 PROCEDURE — 99213 OFFICE O/P EST LOW 20 MIN: CPT | Performed by: ORTHOPAEDIC SURGERY

## 2021-01-12 ENCOUNTER — CLINICAL SUPPORT (OUTPATIENT)
Dept: OBGYN CLINIC | Facility: CLINIC | Age: 41
End: 2021-01-12

## 2021-01-12 DIAGNOSIS — Z30.42 ENCOUNTER FOR MANAGEMENT AND INJECTION OF DEPO-PROVERA: Primary | ICD-10-CM

## 2021-01-12 PROCEDURE — 96372 THER/PROPH/DIAG INJ SC/IM: CPT

## 2021-01-12 RX ADMIN — MEDROXYPROGESTERONE ACETATE 150 MG: 150 INJECTION, SUSPENSION INTRAMUSCULAR at 13:30

## 2021-01-12 NOTE — PROGRESS NOTES
Depo-Provera      [x]   Patient provided box    o 0 Refills remain  o Refills submitted yes  o Verbal called to [phone]  Annual Date / Birth control check : 08/03/2020   Last Depo date: 10/20/2020   Side effects: no   HCG: no   Given by: Krysta Mcpherson, RN   Site: Left deltoid    o Calcium supplement daily teaching

## 2021-02-23 DIAGNOSIS — B34.9 VIRAL INFECTION, UNSPECIFIED: ICD-10-CM

## 2021-02-23 PROCEDURE — U0003 INFECTIOUS AGENT DETECTION BY NUCLEIC ACID (DNA OR RNA); SEVERE ACUTE RESPIRATORY SYNDROME CORONAVIRUS 2 (SARS-COV-2) (CORONAVIRUS DISEASE [COVID-19]), AMPLIFIED PROBE TECHNIQUE, MAKING USE OF HIGH THROUGHPUT TECHNOLOGIES AS DESCRIBED BY CMS-2020-01-R: HCPCS | Performed by: FAMILY MEDICINE

## 2021-02-23 PROCEDURE — U0005 INFEC AGEN DETEC AMPLI PROBE: HCPCS | Performed by: FAMILY MEDICINE

## 2021-02-24 LAB — SARS-COV-2 RNA RESP QL NAA+PROBE: NEGATIVE

## 2021-03-08 ENCOUNTER — APPOINTMENT (OUTPATIENT)
Dept: RADIOLOGY | Age: 41
End: 2021-03-08
Payer: COMMERCIAL

## 2021-03-08 ENCOUNTER — OFFICE VISIT (OUTPATIENT)
Dept: URGENT CARE | Age: 41
End: 2021-03-08
Payer: COMMERCIAL

## 2021-03-08 VITALS
DIASTOLIC BLOOD PRESSURE: 81 MMHG | SYSTOLIC BLOOD PRESSURE: 158 MMHG | RESPIRATION RATE: 20 BRPM | OXYGEN SATURATION: 97 % | HEART RATE: 73 BPM | TEMPERATURE: 97.9 F

## 2021-03-08 DIAGNOSIS — S49.92XA INJURY OF LEFT SHOULDER, INITIAL ENCOUNTER: Primary | ICD-10-CM

## 2021-03-08 DIAGNOSIS — T14.90XA INJURY: ICD-10-CM

## 2021-03-08 PROCEDURE — 73060 X-RAY EXAM OF HUMERUS: CPT

## 2021-03-08 PROCEDURE — 99283 EMERGENCY DEPT VISIT LOW MDM: CPT | Performed by: NURSE PRACTITIONER

## 2021-03-08 PROCEDURE — G0382 LEV 3 HOSP TYPE B ED VISIT: HCPCS | Performed by: NURSE PRACTITIONER

## 2021-03-08 PROCEDURE — 99203 OFFICE O/P NEW LOW 30 MIN: CPT | Performed by: NURSE PRACTITIONER

## 2021-03-08 PROCEDURE — 73030 X-RAY EXAM OF SHOULDER: CPT

## 2021-03-08 RX ORDER — PREDNISONE 20 MG/1
20 TABLET ORAL 2 TIMES DAILY WITH MEALS
Qty: 10 TABLET | Refills: 0 | Status: SHIPPED | OUTPATIENT
Start: 2021-03-08 | End: 2021-03-13

## 2021-03-08 NOTE — PROGRESS NOTES
[facility]        NAME: Errol Escalante is a 45 y o  female  : 1980    MRN: [id number]  DATE: 2021  TIME: 6:24 PM    Assessment and Plan   Injury of left shoulder, initial encounter [S49 92XA]  1  Injury of left shoulder, initial encounter  XR humerus left    XR shoulder 2+ vw left    predniSONE 20 mg tablet         Patient Instructions     X-ray of left humerus and left shoulder  ibuprofen OTC prn for pain   Follow up with PCP in 2-5 days  Proceed to  ER if symptoms worsen  Chief Complaint     Chief Complaint   Patient presents with    Arm Injury     pt states slipped and fell on ice today, tried to stop self with left arm, c/o pain left shoulder and left upper arm         History of Present Illness       HPI   Reports she was walking earlier today, slid and fell on ice  Landed on her left arm  Has been having severe pain on the left humerus and the left shoulder  No radiation of pain, worse with certain movements of the left arm  Denies weakness or numbness of the left arm  No head trauma    Review of Systems   Review of Systems   Gastrointestinal: Negative for nausea and vomiting  Musculoskeletal: Positive for arthralgias (left shoulder) and myalgias (left arm)  Skin: Negative for rash and wound  Neurological: Negative for weakness, numbness and headaches           Current Medications       Current Outpatient Medications:     medroxyPROGESTERone (DEPO-PROVERA) 150 mg/mL injection, Inject 1 mL (150 mg total) into a muscle every 3 (three) months, Disp: 1 mL, Rfl: 3    acetaminophen (TYLENOL) 500 mg tablet, Take 2 tablets (1,000 mg total) by mouth every 8 (eight) hours as needed for mild pain or moderate pain (Patient not taking: Reported on 3/8/2021), Disp: 30 tablet, Rfl: 0    meloxicam (MOBIC) 15 mg tablet, Take 1 tablet (15 mg total) by mouth daily (Patient not taking: Reported on 3/8/2021), Disp: 30 tablet, Rfl: 0    predniSONE 20 mg tablet, Take 1 tablet (20 mg total) by mouth 2 (two) times a day with meals for 5 days, Disp: 10 tablet, Rfl: 0    valACYclovir (VALTREX) 500 mg tablet, Take 1 tablet (500 mg total) by mouth daily, Disp: 90 tablet, Rfl: 3    Current Facility-Administered Medications:     medroxyPROGESTERone acetate (DEPO-PROVERA SYRINGE) IM injection 150 mg, 150 mg, Intramuscular, Q3 Months, KEYANA Zeng, 150 mg at 01/12/21 1330    Current Allergies     Allergies as of 03/08/2021    (No Known Allergies)            The following portions of the patient's history were reviewed and updated as appropriate: allergies, current medications, past family history, past medical history, past social history, past surgical history and problem list      Past Medical History:   Diagnosis Date    Arthritis     Depression     DJD (degenerative joint disease)     Herpes     Hidradenitis     MRSA carrier        Past Surgical History:   Procedure Laterality Date    FL INJECTION RIGHT HIP (ARTHROGRAM)  1/6/2020    FL INJECTION RIGHT HIP (NON ARTHROGRAM)  7/24/2019    FL INJECTION RIGHT SHOULDER (ARTHROGRAM)  10/7/2019    NO PAST SURGERIES         Family History   Problem Relation Age of Onset    Arthritis Mother     Hypertension Mother     Diabetes Father     Stroke Father     Heart attack Father     Post-traumatic stress disorder Father     Other Father         nerve gas exposure-war    Schizophrenia Sister     Bipolar disorder Brother     Schizophrenia Brother     Sleep apnea Daughter     Obesity Daughter     Depression Son     Post-traumatic stress disorder Son     Arthritis Maternal Grandmother     Heart attack Maternal Grandfather     Diabetes Paternal Grandmother     Stroke Paternal Grandmother     Heart attack Paternal Grandfather     Post-traumatic stress disorder Paternal Grandfather     Ovarian cysts Sister          Medications have been verified          Objective   /81   Pulse 73   Temp 97 9 °F (36 6 °C)   Resp 20   SpO2 97% No LMP recorded  Patient has had an injection  Physical Exam     Physical Exam  Constitutional:       Appearance: She is not ill-appearing or diaphoretic  Musculoskeletal:         General: Tenderness (with palpation of the left humerus and the left shoulder  Limited ROM of the L shoulder secondary to pain   strength is normal  L wrist movement is normal  Able to flexion and extend the L elbow) present  Skin:     Capillary Refill: Capillary refill takes less than 2 seconds  Findings: No bruising, erythema or lesion  Neurological:      Mental Status: She is alert

## 2021-03-08 NOTE — LETTER
March 8, 2021     Patient: Berle Cowden   YOB: 1980   Date of Visit: 3/8/2021       To Whom it May Concern:    Ronan Hdz was seen in my clinic on 3/8/2021  She may return to work on 03/10/2021  If you have any questions or concerns, please don't hesitate to call           Sincerely,          [facility]        CC: No Recipients

## 2021-03-08 NOTE — PATIENT INSTRUCTIONS
Shoulder Sprain   WHAT YOU NEED TO KNOW:   A shoulder sprain happens when a ligament in your shoulder is stretched or torn  Ligaments are the tough tissues that connect bones  Ligaments allow you to lift, lower, and rotate your arm  DISCHARGE INSTRUCTIONS:   Return to the emergency department if:   · You feel severe pain in your shoulder when you move it, or it is touched  · Your skin feels cold or clammy  · You have numbness, tingling, or a feeling of pins and needles in your shoulder  · The skin on your injured shoulder looks blue or pale  Call your doctor if:   · You have new or increased swelling and pain in your shoulder  · You have new or increased stiffness when you move your injured shoulder  · Your symptoms do not improve within 5 to 7 days  · You have questions or concerns about your condition or care  Medicines: You may need any of the following:  · Acetaminophen  decreases pain and fever  It is available without a doctor's order  Ask how much to take and how often to take it  Follow directions  Read the labels of all other medicines you are using to see if they also contain acetaminophen, or ask your doctor or pharmacist  Acetaminophen can cause liver damage if not taken correctly  Do not use more than 4 grams (4,000 milligrams) total of acetaminophen in one day  · NSAIDs , such as ibuprofen, help decrease swelling, pain, and fever  This medicine is available with or without a doctor's order  NSAIDs can cause stomach bleeding or kidney problems in certain people  If you take blood thinner medicine, always ask your healthcare provider if NSAIDs are safe for you  Always read the medicine label and follow directions  · Prescription pain medicine  may be given  Ask your healthcare provider how to take this medicine safely  Some prescription pain medicines contain acetaminophen   Do not take other medicines that contain acetaminophen without talking to your healthcare provider  Too much acetaminophen may cause liver damage  Prescription pain medicine may cause constipation  Ask your healthcare provider how to prevent or treat constipation  · Take your medicine as directed  Contact your healthcare provider if you think your medicine is not helping or if you have side effects  Tell him or her if you are allergic to any medicine  Keep a list of the medicines, vitamins, and herbs you take  Include the amounts, and when and why you take them  Bring the list or the pill bottles to follow-up visits  Carry your medicine list with you in case of an emergency  Follow up with your healthcare provider as directed:  Write down your questions so you remember to ask them during your visits  Self-care:   · Rest  your shoulder so it can heal  Avoid moving your shoulder as your injury heals  This will help decrease the risk of more damage to your shoulder  · Apply ice  on your shoulder for 20 to 30 minutes every 2 hours or as directed  Use an ice pack, or put crushed ice in a plastic bag  Cover it with a towel before you apply it to your shoulder  Ice helps prevent tissue damage and decreases swelling and pain  · Compress your shoulder as directed  Compression provides support and helps decrease swelling and movement so your shoulder can heal  For mild sprains, you may be given a sling to support your arm  You may need a padded brace or a plaster cast to hold your shoulder in place if the sprain is more serious  How to wear a brace, sling, or splint:  A brace, sling, or splint may be needed to limit your movement and protect your injured shoulder  · Wear your brace, sling, or splint as directed  You may need to wear it all the time and take it off only to bathe or do exercises  Ask your healthcare provider how long you should wear it  · Keep your skin clean and dry  Padding under your armpit will help absorb sweat and prevent sores on your skin      · Do not hunch your shoulders  This may cause pain  Keep your shoulders relaxed  · Position the sling over your arm and hand so that it also covers your knuckles  This will help the sling support your wrist and hand  Position your wrist higher than your elbow  Your wrist may start to hurt or go numb if your sling is too short  Physical therapy:  A physical therapist teaches you exercises to help improve movement and strength, and to decrease pain  Prevent another injury:   · Do not exercise when you are tired or in pain  Warm up and stretch before you exercise  · Wear equipment to protect yourself when you play sports  · Wear shoes that fit well and run on flat surfaces to prevent falls  © Copyright [address] Information is for End User's use only and may not be sold, redistributed or otherwise used for commercial purposes  All illustrations and images included in CareNotes® are the copyrighted property of [employer]  or [facility] Lul Morse   The above information is an  only  It is not intended as medical advice for individual conditions or treatments  Talk to your doctor, nurse or pharmacist before following any medical regimen to see if it is safe and effective for you

## 2021-04-01 DIAGNOSIS — Z30.42 ENCOUNTER FOR SURVEILLANCE OF INJECTABLE CONTRACEPTIVE: ICD-10-CM

## 2021-04-01 RX ORDER — MEDROXYPROGESTERONE ACETATE 150 MG/ML
150 INJECTION, SUSPENSION INTRAMUSCULAR
Qty: 1 ML | Refills: 0 | Status: SHIPPED | OUTPATIENT
Start: 2021-04-01 | End: 2021-06-23 | Stop reason: SDUPTHER

## 2021-04-06 ENCOUNTER — CLINICAL SUPPORT (OUTPATIENT)
Dept: OBGYN CLINIC | Facility: CLINIC | Age: 41
End: 2021-04-06

## 2021-04-06 DIAGNOSIS — Z30.42 ENCOUNTER FOR SURVEILLANCE OF INJECTABLE CONTRACEPTIVE: Primary | ICD-10-CM

## 2021-04-06 PROCEDURE — 96372 THER/PROPH/DIAG INJ SC/IM: CPT

## 2021-04-06 RX ADMIN — MEDROXYPROGESTERONE ACETATE 150 MG: 150 INJECTION, SUSPENSION INTRAMUSCULAR at 11:52

## 2021-04-06 NOTE — PROGRESS NOTES
Depo-Provera      [x]   Patient provided box   o 0 Refills remain  o Refills submitted no   Last  Annual Date / Birth control check :    Last Depo date:4/6/2021   Side effects: no   HCG: no   Given by: Yosi Nash MA   Site: right deltoid       o Calcium supplement daily teaching  o Condoms for 2 weeks following first injection dose

## 2021-04-08 DIAGNOSIS — Z23 ENCOUNTER FOR IMMUNIZATION: ICD-10-CM

## 2021-04-13 ENCOUNTER — OFFICE VISIT (OUTPATIENT)
Dept: OBGYN CLINIC | Facility: CLINIC | Age: 41
End: 2021-04-13
Payer: COMMERCIAL

## 2021-04-13 VITALS
HEIGHT: 64 IN | SYSTOLIC BLOOD PRESSURE: 147 MMHG | BODY MASS INDEX: 31.58 KG/M2 | WEIGHT: 185 LBS | HEART RATE: 93 BPM | DIASTOLIC BLOOD PRESSURE: 93 MMHG

## 2021-04-13 DIAGNOSIS — M24.812 INTERNAL DERANGEMENT OF LEFT SHOULDER: Primary | ICD-10-CM

## 2021-04-13 PROCEDURE — 3008F BODY MASS INDEX DOCD: CPT | Performed by: ORTHOPAEDIC SURGERY

## 2021-04-13 PROCEDURE — 99214 OFFICE O/P EST MOD 30 MIN: CPT | Performed by: ORTHOPAEDIC SURGERY

## 2021-04-13 NOTE — PROGRESS NOTES
Patient Name:  Sarah Galdamez  MRN:  [id number]    Assessment & Plan     Left shoulder pain, possible rotator cuff tear after fall onto left side on 3/8/2021  1  MRI left shoulder to evaluate for rotator cuff tear or occult fracture after fall  2  Continue meloxicam and activity modification as needed  3  Unable to work until next evaluation  4  Follow up after MRI  Chief Complaint     Left shoulder pain    History of the Present Illness     Sarah Galdamez is a 45 y o  female presents today for an evaluation of her left shoulder  Patient states that on 3/8/2021 she slipped and fell on ice onto her left shoulder and arm  She states that she tried to catch herself with her left upper extremity  Today, she continues to note significant pain about the anterolateral aspect of the shoulder, especially with any motion  She states that she is unable to lift objects due to ROM limitations due to pain and weakness  She has been using a sling and Mobic for pain relief with minimal benefit  Denies numbness and tingling, fevers or chills  Physical Exam     /93   Pulse 93   Ht 5' 4" (1 626 m)   Wt 83 9 kg (185 lb)   BMI 31 76 kg/m²     Left  shoulder:  Tenderness:  Diffuse  Range of motion:   FF:  130, 130 R   ER-abduction:  100, 100 R   IR-abduction:  50, 50 R  Strength:  FF 4/5  Impingement signs:  Positive  Empty can test:  Positive  Speed's test:  Positive  Cross-body adduction test:  Positive    Eyes:  Anicteric sclerae  ENT:  Trachea midline  Lungs:  Normal respiratory effort  Cardiovascular:  Capillary refill is less than 2 seconds  Lymphatic:  No palpable lymphadenopathy  Skin:  Intact without erythema  Neurologic:  Sensation grossly intact to light touch  Psychiatric:  Mood and affect are appropriate  Data Review     I have personally reviewed pertinent films in PACS, and my interpretation follows:    XR left shoulder 3/8/2021: No acute osseous abnormalities   No significant degenerative changes        Past Medical History:   Diagnosis Date    Arthritis     Depression     DJD (degenerative joint disease)     Herpes     Hidradenitis     MRSA carrier        Past Surgical History:   Procedure Laterality Date    FL INJECTION RIGHT HIP (ARTHROGRAM)  1/6/2020    FL INJECTION RIGHT HIP (NON ARTHROGRAM)  7/24/2019    FL INJECTION RIGHT SHOULDER (ARTHROGRAM)  10/7/2019    NO PAST SURGERIES         No Known Allergies    Current Outpatient Medications on File Prior to Visit   Medication Sig Dispense Refill    medroxyPROGESTERone (DEPO-PROVERA) 150 mg/mL injection Inject 1 mL (150 mg total) into a muscle every 3 (three) months 1 mL 0    valACYclovir (VALTREX) 500 mg tablet Take 1 tablet (500 mg total) by mouth daily 90 tablet 3     Current Facility-Administered Medications on File Prior to Visit   Medication Dose Route Frequency Provider Last Rate Last Admin    medroxyPROGESTERone acetate (DEPO-PROVERA SYRINGE) IM injection 150 mg  150 mg Intramuscular Q3 Months KEYANA Zeng   150 mg at 04/06/21 1152       Social History     Tobacco Use    Smoking status: Current Every Day Smoker     Packs/day: 0 25     Types: Cigarettes    Smokeless tobacco: Former User   Substance Use Topics    Alcohol use: Yes     Frequency: Monthly or less     Drinks per session: 1 or 2     Binge frequency: Never     Comment: holidays     Drug use: Yes     Types: Marijuana     Comment: few times a day        Family History   Problem Relation Age of Onset    Arthritis Mother     Hypertension Mother     Diabetes Father     Stroke Father     Heart attack Father     Post-traumatic stress disorder Father     Other Father         nerve gas exposure-war    Schizophrenia Sister     Bipolar disorder Brother     Schizophrenia Brother     Sleep apnea Daughter     Obesity Daughter     Depression Son     Post-traumatic stress disorder Son     Arthritis Maternal Grandmother     Heart attack Maternal Grandfather  Diabetes Paternal Grandmother     Stroke Paternal Grandmother     Heart attack Paternal Grandfather     Post-traumatic stress disorder Paternal Grandfather     Ovarian cysts Sister        Review of Systems     As stated in the HPI  All other systems were reviewed and are negative         Scribe Attestation    I,:  Hayder Munguia am acting as a scribe while in the presence of the attending physician :       I,:  Saintclair Erie, MD personally performed the services described in this documentation    as scribed in my presence :

## 2021-04-13 NOTE — LETTER
April 13, 2021     Patient: Rebecca Blackman   YOB: 1980   Date of Visit: 4/13/2021       To Whom it May Concern:    B[place] Elias is under my professional care  She was seen in my office on 4/13/2021  She is to remain out of work until her next follow up evaluation to discuss MRI results    If you have any questions or concerns, please don't hesitate to call           Sincerely,          Valeriano Armando MD        CC: No Recipients

## 2021-04-27 ENCOUNTER — EVALUATION (OUTPATIENT)
Dept: PHYSICAL THERAPY | Facility: CLINIC | Age: 41
End: 2021-04-27
Payer: COMMERCIAL

## 2021-04-27 DIAGNOSIS — S93.492D SPRAIN OF ANTERIOR TALOFIBULAR LIGAMENT OF LEFT ANKLE, SUBSEQUENT ENCOUNTER: Primary | ICD-10-CM

## 2021-04-27 PROCEDURE — 97161 PT EVAL LOW COMPLEX 20 MIN: CPT | Performed by: PHYSICAL THERAPIST

## 2021-04-27 PROCEDURE — 97110 THERAPEUTIC EXERCISES: CPT | Performed by: PHYSICAL THERAPIST

## 2021-04-27 PROCEDURE — 97140 MANUAL THERAPY 1/> REGIONS: CPT | Performed by: PHYSICAL THERAPIST

## 2021-04-27 NOTE — PROGRESS NOTES
PT Evaluation     Today's date: 2021  Patient name: Hood Karimi  : 1980  MRN: [id number]  Referring provider: Ramses Reed  Dx:   Encounter Diagnosis     ICD-10-CM    1  Sprain of anterior talofibular ligament of left ankle, subsequent encounter  S93 492D Ambulatory referral to Physical Therapy                  Assessment  Assessment details: Pt presents with s/s consistent with a Grade 3 L ATFL sprain  Pt demonstrated decreased ROM, strength, function, balance, abnormal gait pattern and increased pain and edema  Pt will benefit from PT to address impairments and restore function  Impairments: abnormal gait, abnormal or restricted ROM, abnormal movement, activity intolerance, impaired physical strength, lacks appropriate home exercise program, pain with function and poor body mechanics    Goals  ST-4 weeks  1   Pt will decrease pain > 50%  2   Pt will normalize her gait pattern  LT-6 weeks  1   Pt will decrease pain > 75%  2   Pt will be able to stand and walk t/o her entire workday without pain  Plan  Planned modality interventions: low level laser therapy  Planned therapy interventions: manual therapy, neuromuscular re-education, patient education, strengthening, stretching, therapeutic activities, therapeutic exercise, therapeutic training, transfer training, gait training, functional ROM exercises, flexibility, graded activity, graded exercise, home exercise program, balance and activity modification  Frequency: 2x week  Duration in weeks: 6        Subjective Evaluation    History of Present Illness  Mechanism of injury: Pt is a 45 yof c/o L lateral, posterior and medial ankle pain after rolling her ankle inward on 10/26/21    Pain  Current pain ratin  At worst pain ratin  Quality: dull ache and throbbing  Relieving factors: ice, rest and support  Aggravating factors: standing, walking and stair climbing  Progression: no change      Diagnostic Tests  X-ray: normal (10/26/21)  Patient Goals  Patient goals for therapy: decreased edema, decreased pain, improved balance, increased motion, increased strength, independence with ADLs/IADLs and return to sport/leisure activities          Objective     Tenderness   Left Ankle/Foot   Tenderness in the Achilles insertion, anterior talofibular ligament, calcaneofibular ligament, deltoid ligament and lateral malleolus  No tenderness in the medial malleolus  Active Range of Motion   Left Ankle/Foot   Dorsiflexion (ke): -10 degrees with pain  Plantar flexion: 50 degrees with pain  Inversion: 5 degrees with pain  Eversion: 5 degrees with pain  Great toe flexion: WFL  Great toe extension: WFL  Lesser toes: WFL    Passive Range of Motion   Left Ankle/Foot    Dorsiflexion (ke): -10 degrees with pain  Plantar flexion: 55 degrees   Inversion: 10 degrees with pain  Eversion: 10 degrees   Great toe flexion: WFL  Great toe extension: WFL  Lesser toes: WFL    Strength/Myotome Testing     Left Ankle/Foot   Dorsiflexion: 4-  Plantar flexion: 4-  Inversion: 3+  Eversion: 3+  Great toe flexion: 5  Great toe extension: 5    Tests   Left Ankle/Foot   Positive for varus tilt  Negative for anterior drawer  Additional Tests Details  Gait: pt demonstrated a step to gait pattern  Edema: 2+ lateral ankle, achilles tendon, deltoid lig  Precautions:   L ATFL Gr 3 sprain        Manuals 4/28            Laser L ATFL, CFL, achilles, deltoid lig 4000J                                                   Neuro Re-Ed                                                                                                        Ther Ex             AROM DF/PF 1x20 1x20           Supine GA towel stretch 15"x3 15"x3           PF TB  Y/ 1x15 Y/ 3x15           Ankle FERNANDEZ iso 5"x10 5"x15           Towel scrunches 1x30 1x50           Seated wobbleboard A-P AROM  1x30                                     Ther Activity                                       Gait Training             Alter G  50% BW/ 8 min x 1 mph                        Modalities

## 2021-04-28 ENCOUNTER — TELEPHONE (OUTPATIENT)
Dept: OBGYN CLINIC | Facility: HOSPITAL | Age: 41
End: 2021-04-28

## 2021-04-28 NOTE — TELEPHONE ENCOUNTER
Patient sees Dr Jose Boogie  Patient called to check the status of authorization for MRI of Left shoulder  Patient has an appointment for tomorrow, 4/29 and still pending     # [phone]

## 2021-04-29 ENCOUNTER — HOSPITAL ENCOUNTER (OUTPATIENT)
Dept: MRI IMAGING | Facility: HOSPITAL | Age: 41
Discharge: HOME | End: 2021-04-29
Attending: ORTHOPAEDIC SURGERY
Payer: COMMERCIAL

## 2021-04-29 DIAGNOSIS — M24.812 INTERNAL DERANGEMENT OF LEFT SHOULDER: ICD-10-CM

## 2021-04-29 PROCEDURE — G1004 CDSM NDSC: HCPCS

## 2021-04-29 PROCEDURE — 73221 MRI JOINT UPR EXTREM W/O DYE: CPT

## 2021-05-01 ENCOUNTER — IMMUNIZATIONS (OUTPATIENT)
Dept: FAMILY MEDICINE CLINIC | Facility: HOSPITAL | Age: 41
End: 2021-05-01

## 2021-05-01 DIAGNOSIS — Z23 ENCOUNTER FOR IMMUNIZATION: Primary | ICD-10-CM

## 2021-05-01 PROCEDURE — 0001A SARS-COV-2 / COVID-19 MRNA VACCINE (PFIZER-BIONTECH) 30 MCG: CPT

## 2021-05-01 PROCEDURE — 91300 SARS-COV-2 / COVID-19 MRNA VACCINE (PFIZER-BIONTECH) 30 MCG: CPT

## 2021-05-04 ENCOUNTER — APPOINTMENT (OUTPATIENT)
Dept: PHYSICAL THERAPY | Facility: CLINIC | Age: 41
End: 2021-05-04
Payer: COMMERCIAL

## 2021-05-06 ENCOUNTER — OFFICE VISIT (OUTPATIENT)
Dept: PHYSICAL THERAPY | Facility: CLINIC | Age: 41
End: 2021-05-06
Payer: COMMERCIAL

## 2021-05-06 DIAGNOSIS — S93.492D SPRAIN OF ANTERIOR TALOFIBULAR LIGAMENT OF LEFT ANKLE, SUBSEQUENT ENCOUNTER: Primary | ICD-10-CM

## 2021-05-06 PROCEDURE — 97140 MANUAL THERAPY 1/> REGIONS: CPT

## 2021-05-06 PROCEDURE — 97110 THERAPEUTIC EXERCISES: CPT

## 2021-05-06 NOTE — PROGRESS NOTES
Daily Note     Today's date: 2021  Patient name: Girma Busby  : 1980  MRN: [id number]  Referring provider: Marcella Dyer  Dx:   Encounter Diagnosis     ICD-10-CM    1  Sprain of anterior talofibular ligament of left ankle, subsequent encounter  S93 010D                   Subjective: Pt reports 5/10 left ankle pain pre tx  Objective: See treatment diary below      Assessment:  Pt demonstrated good tolerance to TE program, continued moderate pain with ambulation and weight bearing  Unable to perform Alter G 2* equipment being unavailable  Plan: Assess response to tx nv  Add Alter G nv  Precautions:   L ATFL Gr 3 sprain        Manuals  56           Laser L ATFL, CFL, achilles, deltoid lig 4000J [id number]                                                  Neuro Re-Ed                                                                                                        Ther Ex             AROM DF/PF 1x20 1x20           Supine GA towel stretch 15"x3 15"x3           PF TB  Y/ 1x15 Y/ 3x15           Ankle FERNANDEZ iso 5"x10 5"x15           Towel scrunches 1x30 1x50           Seated wobbleboard A-P AROM  1x30                                     Ther Activity                                       Gait Training             Alter G   50% BW/ 8 min x 1 mph                       Modalities

## 2021-05-07 ENCOUNTER — TELEPHONE (OUTPATIENT)
Dept: OBGYN CLINIC | Facility: HOSPITAL | Age: 41
End: 2021-05-07

## 2021-05-07 ENCOUNTER — OFFICE VISIT (OUTPATIENT)
Dept: OBGYN CLINIC | Facility: CLINIC | Age: 41
End: 2021-05-07
Payer: COMMERCIAL

## 2021-05-07 VITALS
DIASTOLIC BLOOD PRESSURE: 85 MMHG | HEIGHT: 64 IN | SYSTOLIC BLOOD PRESSURE: 129 MMHG | BODY MASS INDEX: 32.27 KG/M2 | HEART RATE: 86 BPM | WEIGHT: 189 LBS

## 2021-05-07 DIAGNOSIS — S46.012A TRAUMATIC INCOMPLETE TEAR OF LEFT ROTATOR CUFF, INITIAL ENCOUNTER: Primary | ICD-10-CM

## 2021-05-07 PROCEDURE — 99213 OFFICE O/P EST LOW 20 MIN: CPT | Performed by: ORTHOPAEDIC SURGERY

## 2021-05-07 NOTE — LETTER
May 14, 2021     Patient: Lani Payment   YOB: 1980               To Whom It May Concern:     Lorri Montes is under my professional care  She was seen in my office on 5/7/2021  She may return to work with restrictions of 5 hours per day, maximum 20 hours per week  Minimal use of the right upper extremity  Maximum lifting 5 lbs occasionally   No work of any type above shoulder level, this would include no stocking and no other type of work that would be done above the level of her shoulders      If you have any questions or concerns, please don't hesitate to call            Sincerely,           Clydene Eisenmenger, PA-C        CC: No Recipients

## 2021-05-07 NOTE — TELEPHONE ENCOUNTER
Patient is calling to request more specific work restriction in reference to her right shoulder  Patient's employer is asking for a more specific work note  Please advise       Yonis Ramírez [phone]

## 2021-05-07 NOTE — LETTER
May 7, 2021     Patient: Sarah Galdamez   YOB: 1980   Date of Visit: 5/7/2021       To Whom it May Concern:    Janine Pfeiffer is under my professional care  She was seen in my office on 5/7/2021  She may return to work with restrictions of 5 hours per day, maximum 20 hours per week  If you have any questions or concerns, please don't hesitate to call           Sincerely,          Nathalie Teresa MD        CC: No Recipients

## 2021-05-07 NOTE — PROGRESS NOTES
Patient Name:  Bill Mayorga  MRN:  [id number]    Assessment & Plan     Left shoulder partial rotator cuff tear  1  Recommended a course of physical therapy (minimum 3 months)  2  Max 5 hours per day and 20 hours per week at work  3  Wean from sling as tolerated  4  Consider surgery if patient fails conservative management  She wants to avoid this if possible  5  Follow up in 2 months  History of the Present Illness     Patient returns today for recheck after MRI left shoulder after a fall on 3/8/2021  She has ongoing anterolateral shoulder pain with reaching or repetitive use  She has been wearing a sling with limited improvement  General ROS:  Negative for fever or chills  Neurological ROS:  Negative for numbness or tingling  Physical Exam     /85   Pulse 86   Ht 5' 4" (1 626 m)   Wt 85 7 kg (189 lb)   BMI 32 44 kg/m²     Left shoulder:  Tenderness to palpation diffusely  Full ROM  Impingement signs are positive  Empty can test is positive  Speed's test is positive  Cross-body adduction test is positive  2+ radial pulse  Data Review     I have personally reviewed pertinent films in PACS, and my interpretation follows  MRI left shoulder 4/29/2021: High grade partial thickness tear of the supraspinatus tendon  Severe tendinosis vs partial tear superior bundle subscapularis tendon  Social History     Tobacco Use    Smoking status: Current Every Day Smoker     Packs/day: 0 25     Types: Cigarettes    Smokeless tobacco: Former User   Substance Use Topics    Alcohol use: Yes     Frequency: Monthly or less     Drinks per session: 1 or 2     Binge frequency: Never     Comment: holidays     Drug use: Yes     Types: Marijuana     Comment: few times a day        Counseling     The patient was counseled regarding diagnostic results, impressions, patient/family education, instructions for management, risks and benefits of treatment options, and prognosis    The total time of the encounter was 20 minutes, and more than 50% of that time was spent in counseling and coordination of care        Scribe Attestation    I,:  Maureen Le am acting as a scribe while in the presence of the attending physician :       I,:  Alex Borrego MD personally performed the services described in this documentation    as scribed in my presence :

## 2021-05-11 ENCOUNTER — OFFICE VISIT (OUTPATIENT)
Dept: PHYSICAL THERAPY | Facility: CLINIC | Age: 41
End: 2021-05-11
Payer: COMMERCIAL

## 2021-05-11 DIAGNOSIS — S93.492D SPRAIN OF ANTERIOR TALOFIBULAR LIGAMENT OF LEFT ANKLE, SUBSEQUENT ENCOUNTER: Primary | ICD-10-CM

## 2021-05-11 PROCEDURE — 97140 MANUAL THERAPY 1/> REGIONS: CPT

## 2021-05-11 PROCEDURE — 97110 THERAPEUTIC EXERCISES: CPT

## 2021-05-11 NOTE — PROGRESS NOTES
Daily Note     Today's date: 2021  Patient name: Kendy Hirsch  : 1980  MRN: [id number]  Referring provider: Geogre Acevedo  Dx:   Encounter Diagnosis     ICD-10-CM    1  Sprain of anterior talofibular ligament of left ankle, subsequent encounter  W26 859H                   Subjective: Pt reports 5/10 left ankle pain pre tx  States her WB and amb tolerance has increased since lv  Objective: See treatment diary below  Added Laser to L shoulder, table slides, ER, and scap squeezes for L shoulder per PT  Added scap squeezes and ER to HEP  Assessment:  Pt demonstrated good tolerance to TE program without increased irritability  Good response to shoulder TE and demonstrated proper form with instruction from therapist  Unable to perform [address] [facility],  [address] 2* equipment being unavailable  Plan: Assess response to tx nv  Add AlterG nv  Precautions:   L ATFL Gr 3 sprain        Manuals           Laser L ATFL, CFL, achilles, deltoid lig 4000J 5400J 5400J          Laser L subacromial space   3000J                                    Neuro Re-Ed                                                                                                        Ther Ex             AROM DF/PF 1x20 1x20 1x20          Supine GA towel stretch 15"x3 15"x3 15"x3          PF TB  Y/ 1x15 Y/ 3x15 Y/  3x15          Ankle FERNANDEZ iso 5"x10 5"x15 5"x15          Towel scrunches 1x30 1x50 1x50          Seated wobbleboard A-P AROM  1x30 1x40          scap squeezes   5"x10          TB ER   YTB  3x10          Bent over table slides   3x10          Ther Activity                                       Gait Training             Alter G    50% BW/ 8 min x 1 mph                      Modalities

## 2021-05-12 NOTE — PROGRESS NOTES
Assessment was performed on L shoulder  S: Pt reports 7/10 ant, post and deep pain with overhead reaching that began after falling onto her shoulder during the winter  O: AROM: flex:  145, ABD: 135*, IR: L3*, ER: C6*  MMT: 4-/5 flex, ADB, ER, IR, pain with ER, IR, ABD   (+) Russ pierce  A: Pt presents with s/s consistent with a partial thickness RTC tear  Added TB ER, wallslides and scap squeezes to POC  P: Cont POC

## 2021-05-13 ENCOUNTER — OFFICE VISIT (OUTPATIENT)
Dept: PHYSICAL THERAPY | Facility: CLINIC | Age: 41
End: 2021-05-13
Payer: COMMERCIAL

## 2021-05-13 DIAGNOSIS — S93.492D SPRAIN OF ANTERIOR TALOFIBULAR LIGAMENT OF LEFT ANKLE, SUBSEQUENT ENCOUNTER: Primary | ICD-10-CM

## 2021-05-13 PROCEDURE — 97140 MANUAL THERAPY 1/> REGIONS: CPT

## 2021-05-13 PROCEDURE — 97116 GAIT TRAINING THERAPY: CPT

## 2021-05-13 PROCEDURE — 97110 THERAPEUTIC EXERCISES: CPT

## 2021-05-13 NOTE — PROGRESS NOTES
Daily Note     Today's date: 2021  Patient name: Lillian Mondragon  : 1980  MRN: [id number]  Referring provider: Lance Bush  Dx:   Encounter Diagnosis     ICD-10-CM    1  Sprain of anterior talofibular ligament of left ankle, subsequent encounter  S93 492D                   Subjective: Pt reports 6/10 left shoulder pain, 3/10 left ankle pain pre tx  Objective: See treatment diary below      Assessment: Pt demonstrated good heel-toe gait pattern with Alter G ambulation, fatigue with ankle and shoulder strengthening  Pt demonstrated good response to Laser  Plan: Assess response to tx nv  Precautions:   L ATFL Gr 3 sprain  Manuals          Laser L ATFL, CFL, achilles, deltoid lig 4000J 5400J 5400J 5400J         Laser L subacromial space   3000J 3000J                                   Neuro Re-Ed                                                                                                        Ther Ex             AROM DF/PF 1x20 1x20 1x20 1x20         Supine GA towel stretch 15"x3 15"x3 15"x3 15"x3         PF TB  Y/ 1x15 Y/ 3x15 Y/  3x15 Y/  3x15         Ankle FERNANDEZ iso 5"x10 5"x15 5"x15 5"x15         Towel scrunches 1x30 1x50 1x50 1x50         Seated wobbleboard A-P AROM  1x30 1x40 1x40         scap squeezes   5"x10 5"x10         TB ER   YTB  3x10 YTB  3x10         Bent over table slides   3x10 3x10         Ther Activity                                       Gait Training             Alter G    50% BW/ 8 min x 1 mph                      Modalities                                              P: Cont POC

## 2021-05-13 NOTE — TELEPHONE ENCOUNTER
Patient needs the letter revised again  Please change the last sentence in the letter that states No Work above shoulder level, but should state No stocking above shoulder level  Please advise when the letter is ready she would like to pick it up      [phone]

## 2021-05-24 ENCOUNTER — IMMUNIZATIONS (OUTPATIENT)
Dept: FAMILY MEDICINE CLINIC | Facility: HOSPITAL | Age: 41
End: 2021-05-24

## 2021-05-24 ENCOUNTER — OFFICE VISIT (OUTPATIENT)
Dept: PHYSICAL THERAPY | Facility: CLINIC | Age: 41
End: 2021-05-24
Payer: COMMERCIAL

## 2021-05-24 DIAGNOSIS — Z23 ENCOUNTER FOR IMMUNIZATION: Primary | ICD-10-CM

## 2021-05-24 DIAGNOSIS — S93.492D SPRAIN OF ANTERIOR TALOFIBULAR LIGAMENT OF LEFT ANKLE, SUBSEQUENT ENCOUNTER: Primary | ICD-10-CM

## 2021-05-24 DIAGNOSIS — M75.112 INCOMPLETE TEAR OF LEFT ROTATOR CUFF, UNSPECIFIED WHETHER TRAUMATIC: ICD-10-CM

## 2021-05-24 PROCEDURE — 97140 MANUAL THERAPY 1/> REGIONS: CPT

## 2021-05-24 PROCEDURE — 91300 SARS-COV-2 / COVID-19 MRNA VACCINE (PFIZER-BIONTECH) 30 MCG: CPT

## 2021-05-24 PROCEDURE — 0002A SARS-COV-2 / COVID-19 MRNA VACCINE (PFIZER-BIONTECH) 30 MCG: CPT

## 2021-05-24 PROCEDURE — 97110 THERAPEUTIC EXERCISES: CPT

## 2021-05-24 NOTE — PROGRESS NOTES
Daily Note     Today's date: 2021  Patient name: Adeline Jeffers  : 1980  MRN: [id number]  Referring provider: Darnell Mack  Dx:   Encounter Diagnosis     ICD-10-CM    1  Sprain of anterior talofibular ligament of left ankle, subsequent encounter  S93 492D    2  Incomplete tear of left rotator cuff, unspecified whether traumatic  M75 112                   Subjective: Pt reports 3/10 left shoulder pain, 2/10 left ankle pain pre tx  Objective: See treatment diary below      Assessment: Pt demonstrated increased tolerance to RTC and left ankle PF strengthening  Unable to perform Alter G 2* time constraints  Plan: Continue poc as per PT  Precautions:   L ATFL Gr 3 sprain  Manuals         Laser L ATFL, CFL, achilles, deltoid lig 4000J 5400J 5400J 5400J 5400J        Laser L subacromial space   3000J 3000J 3000J                                  Neuro Re-Ed                                                                                                        Ther Ex             AROM DF/PF 1x20 1x20 1x20 1x20 np        Supine GA towel stretch 15"x3 15"x3 15"x3 15"x3 15"x3        PF TB  Y/ 1x15 Y/ 3x15 Y/  3x15 Y/  3x15 Y/  3x20        Ankle FERNANDEZ iso 5"x10 5"x15 5"x15 5"x15 5"x15        Towel scrunches 1x30 1x50 1x50 1x50 1x50        Seated wobbleboard A-P AROM  1x30 1x40 1x40 1x40        scap squeezes   5"x10 5"x10 5"x10        TB ER   YTB  3x10 YTB  3x10 YTB  3x12        Bent over table slides   3x10 3x10 3x10        Ther Activity                                       Gait Training             Alter G    50% BW/ 8 min x 1 mph np                     Modalities                                              P: Cont POC

## 2021-05-27 ENCOUNTER — APPOINTMENT (OUTPATIENT)
Dept: PHYSICAL THERAPY | Facility: CLINIC | Age: 41
End: 2021-05-27
Payer: COMMERCIAL

## 2021-06-02 ENCOUNTER — OFFICE VISIT (OUTPATIENT)
Dept: PHYSICAL THERAPY | Facility: CLINIC | Age: 41
End: 2021-06-02
Payer: COMMERCIAL

## 2021-06-02 DIAGNOSIS — M75.112 INCOMPLETE TEAR OF LEFT ROTATOR CUFF, UNSPECIFIED WHETHER TRAUMATIC: ICD-10-CM

## 2021-06-02 DIAGNOSIS — S93.492D SPRAIN OF ANTERIOR TALOFIBULAR LIGAMENT OF LEFT ANKLE, SUBSEQUENT ENCOUNTER: Primary | ICD-10-CM

## 2021-06-02 PROCEDURE — 97116 GAIT TRAINING THERAPY: CPT

## 2021-06-02 PROCEDURE — 97110 THERAPEUTIC EXERCISES: CPT

## 2021-06-02 PROCEDURE — 97140 MANUAL THERAPY 1/> REGIONS: CPT

## 2021-06-02 NOTE — PROGRESS NOTES
Daily Note     Today's date: 2021  Patient name: Annabelle Arshad  : 1980  MRN: [id number]  Referring provider: Hector Artis  Dx:   Encounter Diagnosis     ICD-10-CM    1  Sprain of anterior talofibular ligament of left ankle, subsequent encounter  S93 492D    2  Incomplete tear of left rotator cuff, unspecified whether traumatic  M75 112                   Subjective: Pt reports 5-6/10 left shoulder pain, 3-4/10 left ankle pain after stepping on a rock yesterday and irritating it  Objective: See treatment diary below      Assessment: Pt demonstrated decreased WB tolerance with Alter G ambulation, fatigue with RTC strengthening  Plan: Continue to progress strengthening as per pt tolerance  Precautions:   L ATFL Gr 3 sprain  Manuals        Laser L ATFL, CFL, achilles, deltoid lig 4000J 5400J 5400J 5400J 5400J 5400J       Laser L subacromial space   3000J 3000J 3000J 3000J                                 Neuro Re-Ed                                                                                                        Ther Ex             AROM DF/PF 1x20 1x20 1x20 1x20 np 1x20       Supine GA towel stretch 15"x3 15"x3 15"x3 15"x3 15"x3 15"x3       PF TB  Y/ 1x15 Y/ 3x15 Y/  3x15 Y/  3x15 Y/  3x20 Y/  3x20       Ankle FERNANDEZ iso 5"x10 5"x15 5"x15 5"x15 5"x15 5"x15       Towel scrunches 1x30 1x50 1x50 1x50 1x50 1x50       Seated wobbleboard A-P AROM  1x30 1x40 1x40 1x40 1x40       scap squeezes   5"x10 5"x10 5"x10 5"x10       TB ER   YTB  3x10 YTB  3x10 YTB  3x12 YTB  3x12       Bent over table slides   3x10 3x10 3x10 3x10       Ther Activity                                       Gait Training             Alter G    50% BW/ 8 min x 1 mph np 40% BW/ 8 min x 1 mph                    Modalities                                              P: Cont POC

## 2021-06-03 ENCOUNTER — APPOINTMENT (OUTPATIENT)
Dept: PHYSICAL THERAPY | Facility: CLINIC | Age: 41
End: 2021-06-03
Payer: COMMERCIAL

## 2021-06-08 ENCOUNTER — OFFICE VISIT (OUTPATIENT)
Dept: PHYSICAL THERAPY | Facility: CLINIC | Age: 41
End: 2021-06-08
Payer: COMMERCIAL

## 2021-06-08 ENCOUNTER — OFFICE VISIT (OUTPATIENT)
Dept: OBGYN CLINIC | Facility: CLINIC | Age: 41
End: 2021-06-08
Payer: COMMERCIAL

## 2021-06-08 VITALS
WEIGHT: 190 LBS | BODY MASS INDEX: 34.96 KG/M2 | HEIGHT: 62 IN | DIASTOLIC BLOOD PRESSURE: 74 MMHG | SYSTOLIC BLOOD PRESSURE: 126 MMHG | RESPIRATION RATE: 18 BRPM | HEART RATE: 81 BPM

## 2021-06-08 DIAGNOSIS — S93.492D SPRAIN OF ANTERIOR TALOFIBULAR LIGAMENT OF LEFT ANKLE, SUBSEQUENT ENCOUNTER: Primary | ICD-10-CM

## 2021-06-08 DIAGNOSIS — M75.112 INCOMPLETE TEAR OF LEFT ROTATOR CUFF, UNSPECIFIED WHETHER TRAUMATIC: ICD-10-CM

## 2021-06-08 PROBLEM — S93.492A SPRAIN OF ANTERIOR TALOFIBULAR LIGAMENT OF LEFT ANKLE: Status: ACTIVE | Noted: 2021-06-08

## 2021-06-08 PROCEDURE — 3008F BODY MASS INDEX DOCD: CPT | Performed by: ORTHOPAEDIC SURGERY

## 2021-06-08 PROCEDURE — 97140 MANUAL THERAPY 1/> REGIONS: CPT

## 2021-06-08 PROCEDURE — 99213 OFFICE O/P EST LOW 20 MIN: CPT | Performed by: ORTHOPAEDIC SURGERY

## 2021-06-08 PROCEDURE — 97116 GAIT TRAINING THERAPY: CPT

## 2021-06-08 PROCEDURE — 97110 THERAPEUTIC EXERCISES: CPT

## 2021-06-08 NOTE — PROGRESS NOTES
Daily Note     Today's date: 2021  Patient name: Tor Lara  : 1980  MRN: [id number]  Referring provider: Yomaira Bazan  Dx:   Encounter Diagnosis     ICD-10-CM    1  Sprain of anterior talofibular ligament of left ankle, subsequent encounter  S93 492D    2  Incomplete tear of left rotator cuff, unspecified whether traumatic  M75 112        Start Time: 1105          Subjective: Pt reports 3/10 left shoulder pain, 3/10 left ankle pain today  Reports 5/10 highest pain in the past week  Limited amb tolerance, about 20-30 minutes  She is able to use ice and elevation to relieve swelling  Objective: See treatment diary below      Assessment: Pt demonstrated increased tolerance to ER strengthening  Low irritability with ankle ROM and strengthening  Increased tolerance to WB with amb on Alter G today  Plan: Continue to progress strengthening as per pt tolerance  Precautions:   L ATFL Gr 3 sprain        Manuals  6      Laser L ATFL, CFL, achilles, deltoid lig 4000J 5400J 5400J 5400J 5400J 5400J 5400J      Laser L subacromial space   3000J 3000J 3000J 3000J 3000J                                Neuro Re-Ed                                                                                                        Ther Ex             AROM DF/PF 1x20 1x20 1x20 1x20 np 1x20 1x20      Supine GA towel stretch 15"x3 15"x3 15"x3 15"x3 15"x3 15"x3 15"x3      PF TB  Y/ 1x15 Y/ 3x15 Y/  3x15 Y/  3x15 Y/  3x20 Y/  3x20 Y/  3x20      Ankle FERNANDEZ iso 5"x10 5"x15 5"x15 5"x15 5"x15 5"x15 5"x15      Towel scrunches 1x30 1x50 1x50 1x50 1x50 1x50 1x50      Seated wobbleboard A-P AROM  1x30 1x40 1x40 1x40 1x40 1x40      scap squeezes   5"x10 5"x10 5"x10 5"x10 5"x10      TB ER   YTB  3x10 YTB  3x10 YTB  3x12 YTB  3x12 YTB  3x12      Bent over table slides   3x10 3x10 3x10 3x10 3x10      Ther Activity                                       Gait Training             Alter G    50% BW/ 8 min x 1 mph np 40% BW/ 8 min x 1 mph 40% BW  8 min  1 mph                   Modalities                                              P: Cont POC

## 2021-06-08 NOTE — PROGRESS NOTES
RIP Ellison  Attending, Orthopaedic Surgery  Foot and [address] [facility] [address] [facility]      ORTHOPAEDIC FOOT AND ANKLE CLINIC VISIT     Assessment:     Encounter Diagnosis   Name Primary?  Sprain of anterior talofibular ligament of left ankle, subsequent encounter Yes            Plan:   · The patient verbalized understanding of exam findings and treatment plan  We engaged in the shared decision-making process and treatment options were discussed at length with the patient  Surgical and conservative management discussed today along with risks and benefits  · Nakul Quintero has made significant improvement with PT    · Continue PT until discharged by therapist to [facility]  · Compression stocking as needed for swelling control  · Ice and elevate as needed for pain and swelling control  Return if symptoms worsen or fail to improve  History of Present Illness:   Chief Complaint:   Chief Complaint   Patient presents with    Left Ankle - Pain, Follow-up     Jaquan Garcia is a 37 y o  female who is being seen in follow-up for left ATFL sprain  When we last saw she we recommended  Begin formal PT to work on range of motion and strengthening  ASO to be worn when performing strenuous activity and discussed proper shoe wear  Patient states she attending PT and has not seen any relief  Pain has not improved  Residual pain is localized at lateral ankle with minimal radiating and described as sharp and severe        Pain/symptom timing:  Worse during the day when active  Pain/symptom context:  Worse with activites and work  Pain/symptom modifying factors:  Rest makes better, activities make worse  Pain/symptom associated signs/symptoms: none    Prior treatment   · NSAIDsYes   · Injections No   · Bracing/Orthotics Yes    · Physical Therapy Yes     Orthopedic Surgical History:   See Below    Past Medical, Surgical and Social History:  Past Medical History:  has a past medical history of Arthritis, Depression, DJD (degenerative joint disease), Herpes, Hidradenitis, and MRSA carrier  Problem List: does not have any pertinent problems on file  Past Surgical History:  has a past surgical history that includes FL injection right hip (non arthrogram) (7/24/2019); FL injection right shoulder (arthrogram) (10/7/2019); FL injection right hip (arthrogram) (1/6/2020); and No past surgeries  Family History: family history includes Arthritis in her maternal grandmother and mother; Bipolar disorder in her brother; Depression in her son; Diabetes in her father and paternal grandmother; Heart attack in her father, maternal grandfather, and paternal grandfather; Hypertension in her mother; Obesity in her daughter; Other in her father; Ovarian cysts in her sister; Post-traumatic stress disorder in her father, paternal grandfather, and son; Schizophrenia in her brother and sister; Sleep apnea in her daughter; Stroke in her father and paternal grandmother  Social History:  reports that she has been smoking cigarettes  She has been smoking about 0 25 packs per day  She has quit using smokeless tobacco  She reports current alcohol use  She reports current drug use  Drug: Marijuana  Current Medications: has a current medication list which includes the following prescription(s): medroxyprogesterone, valacyclovir, and cephalexin, and the following Facility-Administered Medications: medroxyprogesterone acetate  Allergies: has No Known Allergies       Review of Systems:  General- denies fever/chills  HEENT- denies hearing loss or sore throat  Eyes- denies eye pain or visual disturbances, denies red eyes  Respiratory- denies cough or SOB  Cardio- denies chest pain or palpitations  GI- denies abdominal pain  Endocrine- denies urinary frequency  Urinary- denies pain with urination  Musculoskeletal- Negative except noted above  Skin- denies rashes or wounds  Neurological- denies dizziness or headache  Psychiatric- denies anxiety or difficulty concentrating    Physical Exam:   /74 (BP Location: Right arm, Patient Position: Sitting)   Pulse 81   Resp 18   Ht 5' 2" (1 575 m)   Wt 86 2 kg (190 lb)   BMI 34 75 kg/m²   General/Constitutional: No apparent distress: well-nourished and well developed  Eyes: normal ocular motion  Lymphatic: No appreciable lymphadenopathy  Respiratory: Non-labored breathing  Vascular: No edema, swelling or tenderness, except as noted in detailed exam   Integumentary: No impressive skin lesions present, except as noted in detailed exam   Neuro: No ataxia or tremors noted  Psych: Normal mood and affect, oriented to person, place and time  Appropriate affect  Musculoskeletal: Normal, except as noted in detailed exam and in HPI  Examination    Left    Gait Normal   Musculoskeletal Tender to palpation at lateral ankle     Skin Normal       Nails Normal    Range of Motion  15 degrees dorsiflexion, 30 degrees plantarflexion  Subtalar motion: normal    Stability Stable    Muscle Strength 5/5 tibialis anterior  5/5 gastrocnemius-soleus  5/5 posterior tibialis  5/5 peroneal/eversion strength  5/5 EHL  5/5 FHL    Neurologic Normal    Sensation  Intact to light touch throughout sural, saphenous, superficial peroneal, deep peroneal and medial/lateral plantar nerve distributions  Pollock-Jenny 5 07 filament (10g) testing  deferred  Cardiovascular Brisk capillary refill < 2 seconds,intact DP and PT pulses    Special Tests None      Imaging Studies:   No new imaging      James R Lachman, MD  Foot & Ankle Surgery   Department of [place] [address]      I personally performed the service  Rogert Adas Lachman, MD    Scribe Attestation    I,:  Shakir Asencio MA am acting as a scribe while in the presence of the attending physician :       I,:  Moira Santana MD personally performed the services described in this documentation    as scribed in my presence :

## 2021-06-09 ENCOUNTER — OFFICE VISIT (OUTPATIENT)
Dept: PHYSICAL THERAPY | Facility: CLINIC | Age: 41
End: 2021-06-09
Payer: COMMERCIAL

## 2021-06-09 DIAGNOSIS — S93.492D SPRAIN OF ANTERIOR TALOFIBULAR LIGAMENT OF LEFT ANKLE, SUBSEQUENT ENCOUNTER: Primary | ICD-10-CM

## 2021-06-09 DIAGNOSIS — M75.112 INCOMPLETE TEAR OF LEFT ROTATOR CUFF, UNSPECIFIED WHETHER TRAUMATIC: ICD-10-CM

## 2021-06-09 PROCEDURE — 97140 MANUAL THERAPY 1/> REGIONS: CPT

## 2021-06-09 PROCEDURE — 97110 THERAPEUTIC EXERCISES: CPT

## 2021-06-09 PROCEDURE — 97116 GAIT TRAINING THERAPY: CPT

## 2021-06-09 NOTE — PROGRESS NOTES
Daily Note     Today's date: 2021  Patient name: Rudi Price  : 1980  MRN: [id number]  Referring provider: Cindy Zambrano  Dx:   Encounter Diagnosis     ICD-10-CM    1  Sprain of anterior talofibular ligament of left ankle, subsequent encounter  S93 492D    2  Incomplete tear of left rotator cuff, unspecified whether traumatic  M75 112                   Subjective: Pt reports 2/10 left shoulder pain, 2/10 left ankle pain pre tx  Objective: See treatment diary below      Assessment: Pt demonstrated increased tolerance to left shoulder strengthening, pain-free tolerance to progressing weight bearing with Alter G ambulation  Plan: Assess response to tx nv  Precautions:   L ATFL Gr 3 sprain        Manuals      Laser L ATFL, CFL, achilles, deltoid lig 4000J 5400J 5400J 5400J 5400J 5400J 5400J 5400J     Laser L subacromial space   3000J 3000J 3000J 3000J 3000J 3000J                               Neuro Re-Ed                                                                                                        Ther Ex             AROM DF/PF 1x20 1x20 1x20 1x20 np 1x20 1x20 1x20     Supine GA towel stretch 15"x3 15"x3 15"x3 15"x3 15"x3 15"x3 15"x3 15"x3     PF TB  Y/ 1x15 Y/ 3x15 Y/  3x15 Y/  3x15 Y/  3x20 Y/  3x20 Y/  3x20 R/  3x15     Ankle FERNANDEZ iso 5"x10 5"x15 5"x15 5"x15 5"x15 5"x15 5"x15 5"x15     Towel scrunches 1x30 1x50 1x50 1x50 1x50 1x50 1x50 1x60     Seated wobbleboard A-P AROM  1x30 1x40 1x40 1x40 1x40 1x40 1x40     scap squeezes   5"x10 5"x10 5"x10 5"x10 5"x10 5"x15     TB ER   YTB  3x10 YTB  3x10 YTB  3x12 YTB  3x12 YTB  3x12 YTB  3x15     Bent over table slides   3x10 3x10 3x10 3x10 3x10 45 deg incline  3x10     Ther Activity                                       Gait Training             Alter G    50% BW/ 8 min x 1 mph np 40% BW/ 8 min x 1 mph 40% BW  8 min  1 mph 50% BW  8 min  1 2 mph                  Modalities P: Cont POC

## 2021-06-10 ENCOUNTER — APPOINTMENT (OUTPATIENT)
Dept: PHYSICAL THERAPY | Facility: CLINIC | Age: 41
End: 2021-06-10
Payer: COMMERCIAL

## 2021-06-15 ENCOUNTER — APPOINTMENT (OUTPATIENT)
Dept: PHYSICAL THERAPY | Facility: CLINIC | Age: 41
End: 2021-06-15
Payer: COMMERCIAL

## 2021-06-15 NOTE — PROGRESS NOTES
Daily Note     Today's date: 6/15/2021  Patient name: Ady Sotelo  : 1980  MRN: [id number]  Referring provider: Jay Cason  Dx:   Encounter Diagnosis     ICD-10-CM    1  Sprain of anterior talofibular ligament of left ankle, subsequent encounter  S93 492D    2  Incomplete tear of left rotator cuff, unspecified whether traumatic  M75 112                   Subjective: ***      Objective: See treatment diary below      Assessment: Tolerated treatment {Tolerated treatment :8863788840}  Patient {assessment:0863303436}      Plan: {PLAN:0932942606}     Precautions:   L ATFL Gr 3 sprain        Manuals      Laser L ATFL, CFL, achilles, deltoid lig 4000J 5400J 5400J 5400J 5400J 5400J 5400J 5400J     Laser L subacromial space   3000J 3000J 3000J 3000J 3000J 3000J                               Neuro Re-Ed                                                                                                        Ther Ex             AROM DF/PF 1x20 1x20 1x20 1x20 np 1x20 1x20 1x20     Supine GA towel stretch 15"x3 15"x3 15"x3 15"x3 15"x3 15"x3 15"x3 15"x3     PF TB  Y/ 1x15 Y/ 3x15 Y/  3x15 Y/  3x15 Y/  3x20 Y/  3x20 Y/  3x20 R/  3x15     Ankle FERNANDEZ iso 5"x10 5"x15 5"x15 5"x15 5"x15 5"x15 5"x15 5"x15     Towel scrunches 1x30 1x50 1x50 1x50 1x50 1x50 1x50 1x60     Seated wobbleboard A-P AROM  1x30 1x40 1x40 1x40 1x40 1x40 1x40     scap squeezes   5"x10 5"x10 5"x10 5"x10 5"x10 5"x15     TB ER   YTB  3x10 YTB  3x10 YTB  3x12 YTB  3x12 YTB  3x12 YTB  3x15     Bent over table slides   3x10 3x10 3x10 3x10 3x10 45 deg incline  3x10     Ther Activity                                       Gait Training             Alter G    50% BW/ 8 min x 1 mph np 40% BW/ 8 min x 1 mph 40% BW  8 min  1 mph 50% BW  8 min  1 2 mph                  Modalities

## 2021-06-16 ENCOUNTER — APPOINTMENT (OUTPATIENT)
Dept: PHYSICAL THERAPY | Facility: CLINIC | Age: 41
End: 2021-06-16
Payer: COMMERCIAL

## 2021-06-22 ENCOUNTER — APPOINTMENT (OUTPATIENT)
Dept: PHYSICAL THERAPY | Facility: CLINIC | Age: 41
End: 2021-06-22
Payer: COMMERCIAL

## 2021-06-23 ENCOUNTER — OFFICE VISIT (OUTPATIENT)
Dept: PHYSICAL THERAPY | Facility: CLINIC | Age: 41
End: 2021-06-23
Payer: COMMERCIAL

## 2021-06-23 DIAGNOSIS — S93.492D SPRAIN OF ANTERIOR TALOFIBULAR LIGAMENT OF LEFT ANKLE, SUBSEQUENT ENCOUNTER: Primary | ICD-10-CM

## 2021-06-23 DIAGNOSIS — M75.112 INCOMPLETE TEAR OF LEFT ROTATOR CUFF, UNSPECIFIED WHETHER TRAUMATIC: ICD-10-CM

## 2021-06-23 DIAGNOSIS — Z30.42 ENCOUNTER FOR SURVEILLANCE OF INJECTABLE CONTRACEPTIVE: ICD-10-CM

## 2021-06-23 PROCEDURE — 97140 MANUAL THERAPY 1/> REGIONS: CPT

## 2021-06-23 PROCEDURE — 97110 THERAPEUTIC EXERCISES: CPT

## 2021-06-23 PROCEDURE — 97116 GAIT TRAINING THERAPY: CPT

## 2021-06-23 NOTE — PROGRESS NOTES
Daily Note     Today's date: 2021  Patient name: Kimberly Saha  : 1980  MRN: [id number]  Referring provider: Paige Okeefe  Dx:   Encounter Diagnosis     ICD-10-CM    1  Sprain of anterior talofibular ligament of left ankle, subsequent encounter  S93 492D    2  Incomplete tear of left rotator cuff, unspecified whether traumatic  M75 112                   Subjective: Pt reports 2/10 left shoulder pain and 2/10 left ankle pain pre tx  Objective: See treatment diary below      Assessment: Pt demonstrated good tolerance to WB strength progressions, increased tolerance to RTC strengthening  Plan: Assess response to tx nv  Precautions:   L ATFL Gr 3 sprain        Manuals     Laser L ATFL, CFL, achilles, deltoid lig 4000J 5400J 5400J 5400J 5400J 5400J 5400J 5400J 4000J    Laser L subacromial space   3000J 3000J 3000J 3000J 3000J 3000J 4000J                              Neuro Re-Ed                                                                                                        Ther Ex             AROM DF/PF 1x20 1x20 1x20 1x20 np 1x20 1x20 1x20     Supine GA towel stretch 15"x3 15"x3 15"x3 15"x3 15"x3 15"x3 15"x3 15"x3 15"x3    PF TB  Y/ 1x15 Y/ 3x15 Y/  3x15 Y/  3x15 Y/  3x20 Y/  3x20 Y/  3x20 R/  3x15 R/  3x20    Ankle FERNANDEZ iso 5"x10 5"x15 5"x15 5"x15 5"x15 5"x15 5"x15 5"x15     Towel scrunches 1x30 1x50 1x50 1x50 1x50 1x50 1x50 1x60 1x70    Seated wobbleboard A-P AROM  1x30 1x40 1x40 1x40 1x40 1x40 1x40     scap squeezes   5"x10 5"x10 5"x10 5"x10 5"x10 5"x15     TB ER   YTB  3x10 YTB  3x10 YTB  3x12 YTB  3x12 YTB  3x12 YTB  3x15 RTB  3x10    Alter G heel raises         60% BW  2x10    Bent over table slides   3x10 3x10 3x10 3x10 3x10 45 deg incline  3x10 45 deg incline  3x10    Ther Activity                                       Gait Training             Alter G    50% BW/ 8 min x 1 mph np 40% BW/ 8 min x 1 mph 40% BW  8 min  1 mph 50% BW  8 min  1 2 mph 60% BW  8 min  1 2 mph                 Modalities                                              P: Cont POC

## 2021-06-24 ENCOUNTER — APPOINTMENT (OUTPATIENT)
Dept: PHYSICAL THERAPY | Facility: CLINIC | Age: 41
End: 2021-06-24
Payer: COMMERCIAL

## 2021-06-24 RX ORDER — MEDROXYPROGESTERONE ACETATE 150 MG/ML
150 INJECTION, SUSPENSION INTRAMUSCULAR
Qty: 1 ML | Refills: 0 | Status: SHIPPED | OUTPATIENT
Start: 2021-06-24 | End: 2021-08-23 | Stop reason: SDUPTHER

## 2021-06-29 ENCOUNTER — CLINICAL SUPPORT (OUTPATIENT)
Dept: OBGYN CLINIC | Facility: CLINIC | Age: 41
End: 2021-06-29

## 2021-06-29 ENCOUNTER — APPOINTMENT (OUTPATIENT)
Dept: PHYSICAL THERAPY | Facility: CLINIC | Age: 41
End: 2021-06-29
Payer: COMMERCIAL

## 2021-06-29 DIAGNOSIS — Z30.42 ENCOUNTER FOR SURVEILLANCE OF INJECTABLE CONTRACEPTIVE: ICD-10-CM

## 2021-06-29 PROCEDURE — T1015 CLINIC SERVICE: HCPCS

## 2021-06-29 PROCEDURE — 96372 THER/PROPH/DIAG INJ SC/IM: CPT

## 2021-06-29 NOTE — PROGRESS NOTES
Depo-Provera      [x]   Patient provided box   o 0 Refills remain  o Refills submitted no   Last  Annual Date / Birth control check :    Last Depo date: 6/29/2021   Side effects: no   HCG: no   Given by: Wallace Fair MA   Site: left deltoid    o Calcium supplement daily teaching  o Condoms for 2 weeks following first injection dose

## 2021-06-29 NOTE — PROGRESS NOTES
Daily Note     Today's date: 2021  Patient name: Nik Hong  : 1980  MRN: [id number]  Referring provider: Anisa Pompa  Dx:   Encounter Diagnosis     ICD-10-CM    1  Sprain of anterior talofibular ligament of left ankle, subsequent encounter  S93 492D    2  Incomplete tear of left rotator cuff, unspecified whether traumatic  M75 112                   Subjective: ***      Objective: See treatment diary below      Assessment: Tolerated treatment {Tolerated treatment :1195775473}  Patient {assessment:0207262354}      Plan: {PLAN:1757789891}     Precautions:   L ATFL Gr 3 sprain        Manuals     Laser L ATFL, CFL, achilles, deltoid lig 4000J 5400J 5400J 5400J 5400J 5400J 5400J 5400J 4000J    Laser L subacromial space   3000J 3000J 3000J 3000J 3000J 3000J 4000J                              Neuro Re-Ed                                                                                                        Ther Ex             AROM DF/PF 1x20 1x20 1x20 1x20 np 1x20 1x20 1x20     Supine GA towel stretch 15"x3 15"x3 15"x3 15"x3 15"x3 15"x3 15"x3 15"x3 15"x3    PF TB  Y/ 1x15 Y/ 3x15 Y/  3x15 Y/  3x15 Y/  3x20 Y/  3x20 Y/  3x20 R/  3x15 R/  3x20    Ankle FERNANDEZ iso 5"x10 5"x15 5"x15 5"x15 5"x15 5"x15 5"x15 5"x15     Towel scrunches 1x30 1x50 1x50 1x50 1x50 1x50 1x50 1x60 1x70    Seated wobbleboard A-P AROM  1x30 1x40 1x40 1x40 1x40 1x40 1x40     scap squeezes   5"x10 5"x10 5"x10 5"x10 5"x10 5"x15     TB ER   YTB  3x10 YTB  3x10 YTB  3x12 YTB  3x12 YTB  3x12 YTB  3x15 RTB  3x10    Alter G heel raises         60% BW  2x10    Bent over table slides   3x10 3x10 3x10 3x10 3x10 45 deg incline  3x10 45 deg incline  3x10    Ther Activity                                       Gait Training             Alter G    50% BW/ 8 min x 1 mph np 40% BW/ 8 min x 1 mph 40% BW  8 min  1 mph 50% BW  8 min  1 2 mph 60% BW  8 min  1 2 mph                 Modalities

## 2021-07-07 ENCOUNTER — OFFICE VISIT (OUTPATIENT)
Dept: OBGYN CLINIC | Facility: CLINIC | Age: 41
End: 2021-07-07
Payer: COMMERCIAL

## 2021-07-07 ENCOUNTER — TELEPHONE (OUTPATIENT)
Dept: OBGYN CLINIC | Facility: OTHER | Age: 41
End: 2021-07-07

## 2021-07-07 VITALS
HEIGHT: 62 IN | SYSTOLIC BLOOD PRESSURE: 164 MMHG | BODY MASS INDEX: 35.33 KG/M2 | HEART RATE: 62 BPM | DIASTOLIC BLOOD PRESSURE: 103 MMHG | WEIGHT: 192 LBS

## 2021-07-07 DIAGNOSIS — S46.012A TRAUMATIC INCOMPLETE TEAR OF LEFT ROTATOR CUFF, INITIAL ENCOUNTER: Primary | ICD-10-CM

## 2021-07-07 PROCEDURE — 99213 OFFICE O/P EST LOW 20 MIN: CPT | Performed by: ORTHOPAEDIC SURGERY

## 2021-07-07 PROCEDURE — 3008F BODY MASS INDEX DOCD: CPT | Performed by: ORTHOPAEDIC SURGERY

## 2021-07-07 NOTE — PROGRESS NOTES
Patient Name:  Archie Curry  MRN:  [id number]    Assessment & Plan     Left shoulder partial rotator cuff tear  1  Patient notes full resolution of her symptoms with physical therapy  Surgery not indicated at this time  2  Continue physical therapy, HEP as appropriate  3  Patient is cleared to return to work full duty without restrictions  Note provided  4  Follow-up as needed  History of the Present Illness     71-year-old female returns to the office today for follow-up regarding her left shoulder  She was last seen on 5/7/21  At that time patient opted to continue conservative management with physical therapy  Today she notes overall improvement  She notes near full resolution of her symptoms  She notes only occasional intermittent discomfort  She denies any weakness or instability  She is happy with her progress and result  Patient wishes to return to work full duty  She continues to take meloxicam     General ROS:  Negative for fever or chills  Neurological ROS:  Negative for numbness or tingling  Physical Exam     BP (!) 164/103   Pulse 62   Ht 5' 2" (1 575 m)   Wt 87 1 kg (192 lb)   BMI 35 12 kg/m²     Left shoulder: No gross deformity  No tenderness to palpation  PROM is , ER-abd 100, IR-abd 50  Impingement signs are negative  Negative empty can test   Negative speed's test   Negative cross-body adduction test   5/5 forward flexion strength  Sensation intact axillary, median, ulnar and radial nerves  2+ radial pulse  Appropriate mood and affect  Data Review     I have personally reviewed pertinent films in PACS, and my interpretation follows  MRI left shoulder 4/29/2021: High grade partial thickness tear of the supraspinatus tendon  Severe tendinosis vs partial tear superior bundle subscapularis tendon      Social History     Tobacco Use    Smoking status: Current Every Day Smoker     Packs/day: 0 25     Types: Cigarettes    Smokeless tobacco: Former User Vaping Use    Vaping Use: Never used   Substance Use Topics    Alcohol use: Yes     Comment: holidays     Drug use: Yes     Types: Marijuana     Comment: few times a day          Scribe Attestation    I,:  Blayne Morgan PA-C am acting as a scribe while in the presence of the attending physician :       I,:  Meka Hopper MD personally performed the services described in this documentation    as scribed in my presence :

## 2021-07-07 NOTE — TELEPHONE ENCOUNTER
Patient called in wanting to know if you could please place another Physical Therapy referral in her chart for her Left Ankle

## 2021-07-07 NOTE — TELEPHONE ENCOUNTER
Patient called in wanting to know if you could please place another Physical Therapy referral in her chart      Thank you    C/b # [phone]

## 2021-07-07 NOTE — LETTER
July 7, 2021     Patient: Chaitanya Renee   YOB: 1980   Date of Visit: 7/7/2021       To Whom it May Concern:    Santa Mcknight is under my professional care  She was seen in my office on 7/7/2021  She is cleared to return to work full duty without restrictions on 7/8/21  If you have any questions or concerns, please don't hesitate to call           Sincerely,          Jessica Ch PA-C

## 2021-07-20 ENCOUNTER — EVALUATION (OUTPATIENT)
Dept: PHYSICAL THERAPY | Facility: CLINIC | Age: 41
End: 2021-07-20
Payer: COMMERCIAL

## 2021-07-20 DIAGNOSIS — S93.492D SPRAIN OF ANTERIOR TALOFIBULAR LIGAMENT OF LEFT ANKLE, SUBSEQUENT ENCOUNTER: Primary | ICD-10-CM

## 2021-07-20 DIAGNOSIS — M75.112 INCOMPLETE TEAR OF LEFT ROTATOR CUFF, UNSPECIFIED WHETHER TRAUMATIC: ICD-10-CM

## 2021-07-20 PROCEDURE — 97140 MANUAL THERAPY 1/> REGIONS: CPT | Performed by: PHYSICAL THERAPIST

## 2021-07-20 PROCEDURE — 97112 NEUROMUSCULAR REEDUCATION: CPT | Performed by: PHYSICAL THERAPIST

## 2021-07-20 PROCEDURE — 97110 THERAPEUTIC EXERCISES: CPT | Performed by: PHYSICAL THERAPIST

## 2021-07-20 NOTE — PROGRESS NOTES
PT Re-Evaluation     Today's date: 2021  Patient name: Ellen Newton  : 1980  MRN: [id number]  Referring provider: Jean Hall  Dx:   Encounter Diagnosis     ICD-10-CM    1  Sprain of anterior talofibular ligament of left ankle, subsequent encounter  S93 492D    2  Incomplete tear of left rotator cuff, unspecified whether traumatic  M75 112                   Assessment  Assessment details: Pt demonstrates improved L shoulder and ankle ROM, strength, edema, and pain  She will continue to benefit from skilled PT services to address her impairments in order to further improve pain and function  Impairments: abnormal gait, abnormal or restricted ROM, abnormal movement, activity intolerance, impaired physical strength, lacks appropriate home exercise program, pain with function and poor body mechanics    Goals  ST-4 weeks  1   Pt will decrease pain > 50%  (met)  2  Pt will normalize her gait pattern  (met)    LT-6 weeks  1   Pt will decrease pain > 75%  (met)  2  Pt will be able to stand and walk t/o her entire workday without pain  (not met)    Plan  Planned modality interventions: low level laser therapy  Planned therapy interventions: manual therapy, neuromuscular re-education, patient education, strengthening, stretching, therapeutic activities, therapeutic exercise, therapeutic training, transfer training, gait training, functional ROM exercises, flexibility, graded activity, graded exercise, home exercise program, balance, activity modification and joint mobilization  Frequency: 1x week  Duration in weeks: 6  Treatment plan discussed with: family        Subjective Evaluation    History of Present Illness  Mechanism of injury: Pt returns to PT after a 4-wk absence during which time she missed 5 consecutive appts  She reports 3/10 L anterior shoulder and L lateral and posterior ankle pain at worst with repetitive reaching or standing at work    Pain  Current pain ratin  At best pain ratin  At worst pain rating: 3  Quality: dull ache and tight  Relieving factors: ice, rest and support  Aggravating factors: standing, walking and stair climbing  Progression: improved      Diagnostic Tests  X-ray: normal (10/26/21)  Treatments  Previous treatment: physical therapy  Current treatment: physical therapy  Patient Goals  Patient goals for therapy: decreased edema, decreased pain, improved balance, increased motion, increased strength, independence with ADLs/IADLs and return to sport/leisure activities          Objective     Tenderness   Left Ankle/Foot   Tenderness in the calcaneofibular ligament, lateral malleolus and posterior talofibular ligament  No tenderness in the Achilles insertion, anterior talofibular ligament, deltoid ligament and medial malleolus  Active Range of Motion   Left Shoulder   Flexion: 145 (ecc lowering) degrees with pain  Abduction: 145 degrees   External rotation BTH: C7   Internal rotation BTB: L3 with pain    Right Shoulder   Internal rotation BTB: L1   Left Ankle/Foot   Dorsiflexion (ke): 0 degrees   Plantar flexion: 60 degrees   Inversion: 30 degrees   Eversion: 10 degrees   Great toe flexion: WFL  Great toe extension: WFL  Lesser toes: WFL    Passive Range of Motion   Left Ankle/Foot    Dorsiflexion (ke): 0 degrees   Plantar flexion: 65 degrees   Inversion: 35 degrees   Eversion: 15 degrees with pain  Great toe flexion: WFL  Great toe extension: WFL  Lesser toes: WFL    Strength/Myotome Testing     Left Shoulder     Planes of Motion   Flexion: 4-   Abduction: 4-   External rotation at 0°: 4   External rotation at 90°: 3+   Internal rotation at 0°: 4-     Left Ankle/Foot   Dorsiflexion: 4  Plantar flexion: 4-  Inversion: 4-  Eversion: 4-  Great toe flexion: 5  Great toe extension: 5    Tests   Left Ankle/Foot   Positive for varus tilt  Negative for anterior drawer  Additional Tests Details  Gait: pt demonstrated a step through gait pattern    Edema: trace lateral ankle    SLS EO: L: 10 sec             Precautions: RA  Dx: L ATFL Gr 3 sprain, L partial-thickness RTC tear    Manuals 7/19            Laser L ATFL, CFL, achilles 4000J            Laser L subacromial space 4000J            L ankle FERNANDEZ distal fib MWM 2x10                         Neuro Re-Ed             SLS EO 30"x2            Prone T's 2x10            Wall slides FF 2x10                                                                Ther Ex                          Supine GA towel stretch 15"x3            PF TB  G/  3x15            Standing GA stretch             ecc DL heel raises             TB ER R/  3x15                                      Ther Activity                                       Gait Training             Alter G                          Modalities

## 2021-07-22 ENCOUNTER — APPOINTMENT (OUTPATIENT)
Dept: PHYSICAL THERAPY | Facility: CLINIC | Age: 41
End: 2021-07-22
Payer: COMMERCIAL

## 2021-07-26 ENCOUNTER — APPOINTMENT (OUTPATIENT)
Dept: PHYSICAL THERAPY | Facility: CLINIC | Age: 41
End: 2021-07-26
Payer: COMMERCIAL

## 2021-07-29 ENCOUNTER — APPOINTMENT (OUTPATIENT)
Dept: PHYSICAL THERAPY | Facility: CLINIC | Age: 41
End: 2021-07-29
Payer: COMMERCIAL

## 2021-07-29 NOTE — PROGRESS NOTES
Daily Note     Today's date: 2021  Patient name: Ady Sotelo  : 1980  MRN: [id number]  Referring provider: Jay Cason  Dx:   Encounter Diagnosis     ICD-10-CM    1  Sprain of anterior talofibular ligament of left ankle, subsequent encounter  S93 492D    2  Incomplete tear of left rotator cuff, unspecified whether traumatic  M75 112                   Subjective: ***      Objective: See treatment diary below      Assessment: Tolerated treatment {Tolerated treatment :7924209094}   Patient {assessment:1461323835}      Plan: {PLAN:7073516816}     Precautions: RA  Dx: L ATFL Gr 3 sprain, L partial-thickness RTC tear    Manuals             Laser L ATFL, CFL, achilles 4000J            Laser L subacromial space 4000J            L ankle FERNANDEZ distal fib MWM 2x10                         Neuro Re-Ed             SLS EO 30"x2            Prone T's 2x10            Wall slides FF 2x10                                                                Ther Ex                          Supine GA towel stretch 15"x3            PF TB  G/  3x15            Standing GA stretch             ecc DL heel raises             TB ER R/  3x15                                      Ther Activity                                       Gait Training             Alter G                          Modalities

## 2021-08-03 ENCOUNTER — OFFICE VISIT (OUTPATIENT)
Dept: PHYSICAL THERAPY | Facility: CLINIC | Age: 41
End: 2021-08-03
Payer: COMMERCIAL

## 2021-08-03 DIAGNOSIS — M75.112 INCOMPLETE TEAR OF LEFT ROTATOR CUFF, UNSPECIFIED WHETHER TRAUMATIC: ICD-10-CM

## 2021-08-03 DIAGNOSIS — S93.492D SPRAIN OF ANTERIOR TALOFIBULAR LIGAMENT OF LEFT ANKLE, SUBSEQUENT ENCOUNTER: Primary | ICD-10-CM

## 2021-08-03 PROCEDURE — 97112 NEUROMUSCULAR REEDUCATION: CPT

## 2021-08-03 PROCEDURE — 97110 THERAPEUTIC EXERCISES: CPT

## 2021-08-03 PROCEDURE — 97140 MANUAL THERAPY 1/> REGIONS: CPT

## 2021-08-03 NOTE — PROGRESS NOTES
Daily Note     Today's date: 8/3/2021  Patient name: Haylee Olson  : 1980  MRN: [id number]  Referring provider: Nomi Panda  Dx:   Encounter Diagnosis     ICD-10-CM    1  Sprain of anterior talofibular ligament of left ankle, subsequent encounter  S93 492D    2  Incomplete tear of left rotator cuff, unspecified whether traumatic  M75 112        Start Time: 1700  Stop Time: 1745  Total time in clinic (min): 45 minutes    Subjective: Patient notes increased soreness in her ankles secondary to standing today at work  No change with the shoulders  States, "The laser is the best part"  Objective: See treatment diary below    Assessment: Tolerated treatment well  Patient demonstrated fatigue post treatment, exhibited good technique with therapeutic exercises and would benefit from continued PT  Progress as able  Plan: Continue per plan of care    1x a week as per Carlitos Collier, FRANCES     Precautions: RA  Dx: L ATFL Gr 3 sprain, L partial-thickness RTC tear    Manuals 7/19 8/3           Laser L ATFL, CFL, achilles 4000J 4000J           Laser L subacromial space 4000J 4000J           L ankle FERNANDEZ distal fib MWM 2x10                         Neuro Re-Ed             SLS EO 30"x2 30"x2 ea           Prone T's 2x10 2x10           Wall slides FF 2x10 3x10                                                               Ther Ex                          Supine GA towel stretch 15"x3 15"x3           PF TB  G/  3x15 G 3x15           Standing GA stretch             ecc DL heel raises  2x10           TB ER R/  3x15 R  3x15                                     Ther Activity                                       Gait Training             Alter G                          Modalities

## 2021-08-11 ENCOUNTER — OFFICE VISIT (OUTPATIENT)
Dept: PHYSICAL THERAPY | Facility: CLINIC | Age: 41
End: 2021-08-11
Payer: COMMERCIAL

## 2021-08-11 DIAGNOSIS — S93.492D SPRAIN OF ANTERIOR TALOFIBULAR LIGAMENT OF LEFT ANKLE, SUBSEQUENT ENCOUNTER: Primary | ICD-10-CM

## 2021-08-11 DIAGNOSIS — M75.112 INCOMPLETE TEAR OF LEFT ROTATOR CUFF, UNSPECIFIED WHETHER TRAUMATIC: ICD-10-CM

## 2021-08-11 PROCEDURE — 97110 THERAPEUTIC EXERCISES: CPT

## 2021-08-11 PROCEDURE — 97140 MANUAL THERAPY 1/> REGIONS: CPT

## 2021-08-11 PROCEDURE — 97112 NEUROMUSCULAR REEDUCATION: CPT

## 2021-08-11 NOTE — PROGRESS NOTES
Daily Note     Today's date: 2021  Patient name: Dewey Summers  : 1980  MRN: [id number]  Referring provider: Alee May  Dx:   Encounter Diagnosis     ICD-10-CM    1  Sprain of anterior talofibular ligament of left ankle, subsequent encounter  S93 492D    2  Incomplete tear of left rotator cuff, unspecified whether traumatic  M75 112                   Subjective: Pt reports about 4/10 pain in L ankle, and 3/10 pain in L shldr   Notes she has been standing at work for roughly 6-7 hours and has also been using her LUE frequently at work  Pt notes feeling "much better" post treatment, compared to at start of session  Objective: See treatment diary below      Assessment: Tolerated treatment well  Increased ankle strategy noted with SLS today with slight unsteadiness most noted on LLE, but was able to self stabilize without use of hands  Is appropriate challenged with current program, and was able to complete all assigned exercise with no complaints of increase in symptoms  Patient would benefit from continued PT to further improve strength, reduce pain, and maximize overall function  Plan: Continue per plan of care        Precautions: RA  Dx: L ATFL Gr 3 sprain, L partial-thickness RTC tear    Manuals 7/19 8/3 8/11          Laser L ATFL, CFL, achilles 4000J 4000J 4000J            Laser L subacromial space 4000J 4000J 4000J          L ankle FERNANDEZ distal fib MWM 2x10                         Neuro Re-Ed             SLS EO 30"x2 30"x2 ea 30"x2 ea          Prone T's 2x10 2x10 2x10          Wall slides FF 2x10 3x10 3x10                                                              Ther Ex                          Supine GA towel stretch 15"x3 15"x3           PF TB  G/  3x15 G 3x15 G 3x15          Standing GA stretch   15"x3          ecc DL heel raises  2x10 2x10          TB ER R/  3x15 R  3x15 R/  3x15          Seated wobbleboard A-P AROM   1x30                       Ther Activity Gait Training             Alter G                          Modalities

## 2021-08-18 ENCOUNTER — OFFICE VISIT (OUTPATIENT)
Dept: PHYSICAL THERAPY | Facility: CLINIC | Age: 41
End: 2021-08-18
Payer: COMMERCIAL

## 2021-08-18 DIAGNOSIS — S93.492D SPRAIN OF ANTERIOR TALOFIBULAR LIGAMENT OF LEFT ANKLE, SUBSEQUENT ENCOUNTER: Primary | ICD-10-CM

## 2021-08-18 DIAGNOSIS — M75.112 INCOMPLETE TEAR OF LEFT ROTATOR CUFF, UNSPECIFIED WHETHER TRAUMATIC: ICD-10-CM

## 2021-08-18 PROCEDURE — 97140 MANUAL THERAPY 1/> REGIONS: CPT

## 2021-08-18 PROCEDURE — 97112 NEUROMUSCULAR REEDUCATION: CPT

## 2021-08-18 PROCEDURE — 97110 THERAPEUTIC EXERCISES: CPT

## 2021-08-18 NOTE — PROGRESS NOTES
Daily Note     Today's date: 2021  Patient name: Merari Query  : 1980  MRN: [id number]  Referring provider: Lucia Cavanaugh  Dx:   Encounter Diagnosis     ICD-10-CM    1  Sprain of anterior talofibular ligament of left ankle, subsequent encounter  S93 492D    2  Incomplete tear of left rotator cuff, unspecified whether traumatic  M75 112                   Subjective: Pt reports she "tapped" the back of her heel against the wooden leg of a table at home while trying to put her shoe on before coming to therapy  States her L ankle was already "burning" toward the end of her work shift today, but this tapping of her heel caused increased sharp pain along L achilles that radiated up to the back of her knee  "It's probably at a 10/10 now "  Feels she has noticed increased swelling along L ankle/heel since this occurrence also  L shldr rated as a 1/10 prior to start of treatment today  Objective: See treatment diary below  Assessment: Tolerated treatment well  Laser dosage increased today in effort to control inflammation/swelling of L ankle  Was able to perform all LE exercise today with no increase in symptoms, and actually noted feeling "much better" post treatment  LUE exercise progressed this visit, demonstrating continued improvements in strength  Patient would benefit from continued PT  Plan: Continue per plan of care        Precautions: RA  Dx: L ATFL Gr 3 sprain, L partial-thickness RTC tear    Manuals 7/19 8/3 8/11 8/18         Laser L ATFL, CFL, achilles 4000J 4000J 4000J   5000J         Laser L subacromial space 4000J 4000J 4000J 4000J         L ankle FERNANDEZ distal fib MWM 2x10                         Neuro Re-Ed             SLS EO 30"x2 30"x2 ea 30"x2 ea 30"x2 ea         Prone T's 2x10 2x10 2x10 2x12         Wall slides FF 2x10 3x10 3x10 3x10                                                             Ther Ex                          Supine GA towel stretch 15"x3 15"x3  15"x3         PF TB  G/  3x15 G 3x15 G 3x15 G/ 3x10         Standing GA stretch   15"x3 15"x3         ecc DL heel raises  2x10 2x10 2x10         TB ER R/  3x15 R  3x15 R/  3x15 R/  3x20         Seated wobbleboard A-P AROM   1x30 1x30                      Ther Activity                                       Gait Training             Alter G                          Modalities

## 2021-08-19 ENCOUNTER — APPOINTMENT (OUTPATIENT)
Dept: PHYSICAL THERAPY | Facility: CLINIC | Age: 41
End: 2021-08-19
Payer: COMMERCIAL

## 2021-08-23 ENCOUNTER — ANNUAL EXAM (OUTPATIENT)
Dept: OBGYN CLINIC | Facility: CLINIC | Age: 41
End: 2021-08-23

## 2021-08-23 VITALS
SYSTOLIC BLOOD PRESSURE: 149 MMHG | BODY MASS INDEX: 30.73 KG/M2 | HEART RATE: 67 BPM | HEIGHT: 62 IN | DIASTOLIC BLOOD PRESSURE: 90 MMHG | WEIGHT: 167 LBS

## 2021-08-23 DIAGNOSIS — Z12.4 CERVICAL CANCER SCREENING: ICD-10-CM

## 2021-08-23 DIAGNOSIS — Z11.3 SCREENING FOR STDS (SEXUALLY TRANSMITTED DISEASES): Primary | ICD-10-CM

## 2021-08-23 DIAGNOSIS — Z01.419 WOMEN'S ANNUAL ROUTINE GYNECOLOGICAL EXAMINATION: ICD-10-CM

## 2021-08-23 DIAGNOSIS — Z30.42 ENCOUNTER FOR SURVEILLANCE OF INJECTABLE CONTRACEPTIVE: ICD-10-CM

## 2021-08-23 DIAGNOSIS — Z12.39 ENCOUNTER FOR BREAST CANCER SCREENING USING NON-MAMMOGRAM MODALITY: ICD-10-CM

## 2021-08-23 PROCEDURE — 87491 CHLMYD TRACH DNA AMP PROBE: CPT | Performed by: NURSE PRACTITIONER

## 2021-08-23 PROCEDURE — 87591 N.GONORRHOEAE DNA AMP PROB: CPT | Performed by: NURSE PRACTITIONER

## 2021-08-23 PROCEDURE — 99396 PREV VISIT EST AGE 40-64: CPT | Performed by: OBSTETRICS & GYNECOLOGY

## 2021-08-23 RX ORDER — MEDROXYPROGESTERONE ACETATE 150 MG/ML
150 INJECTION, SUSPENSION INTRAMUSCULAR
Qty: 1 ML | Refills: 3 | Status: SHIPPED | OUTPATIENT
Start: 2021-08-23 | End: 2022-06-02 | Stop reason: SDUPTHER

## 2021-08-23 NOTE — PATIENT INSTRUCTIONS
Cigarette Smoking and Your Health     What are the risks to my health if I smoke tobacco?  Nicotine and other chemicals found in tobacco damage every cell in your body  Even if you are a light smoker, you have an increased risk for cancer, heart disease, and lung disease  If you are pregnant or have diabetes, smoking increases your risk for complications  What are the benefits to my health if I stop smoking? · You decrease respiratory symptoms such as coughing, wheezing, and shortness of breath  · You reduce your risk for cancers of the lung, mouth, throat, kidney, bladder, pancreas, stomach, and cervix  If you already have cancer, you increase the benefits of chemotherapy  You also reduce your risk for cancer returning or a second cancer from developing  · You reduce your risk for heart disease, blood clots, heart attack, and stroke  · You reduce your risk for lung infections, and diseases such as pneumonia, asthma, chronic bronchitis, and emphysema  · Your circulation improves  More oxygen can be delivered to your body  If you have diabetes, you lower your risk for complications, such as kidney, artery, and eye diseases  You also lower your risk for nerve damage  Nerve damage can lead to amputations, poor vision, and blindness  · You improve your body's ability to heal and to fight infections  What are the health benefits to others if I stop smoking? Tobacco is harmful to nonsmokers who breathe in your secondhand smoke  The following are ways the health of others around you may improve when you stop smoking:  · You lower the risks for lung cancer and heart disease in nonsmoking adults  · If you are pregnant, you lower the risk for miscarriage, early delivery, low birth weight, and stillbirth  You also lower your baby's risk for SIDS, obesity, developmental delay, and neurobehavioral problems, such as ADHD       · If you have children, you lower their risk for ear infections, colds, pneumonia, bronchitis, and asthma  How to Stop Smoking     You will improve your health and the health of others around you  if you stop smoking  Your risk for heart and lung disease, cancer, stroke, heart attack, and vision problems will also decrease  You can benefit from quitting no matter how long you have smoked  PREPARE to stop smoking  Nicotine is a highly addictive drug found in cigarettes  Withdrawal symptoms can happen when you stop smoking and make it hard to quit  These include anxiety, depression, irritability, trouble sleeping, and increased appetite  You increase your chances of success if you PREPARE to quit  · Set a quit date  Zain Vyas a date that is within the next 2 weeks  Do not pick a day that you think may be stressful or busy  Write down the day or [place] it on your calender  · Tell friends and family that you plan to quit  Explain that you may have withdrawal symptoms when you try to quit  Ask them to support you  They may be able to encourage you and help reduce your stress to make it easier for you to quit  · Make a list of your reasons for quitting  Put the list somewhere you will see it every day, such as your refrigerator  You can look at the list when you have a craving  · Remove all tobacco and nicotine products from your home, car, and workplace  Also, remove anything else that will tempt you to smoke, such as lighters, matches, or ashtrays  Clean your car, home, and places at work that smell like smoke  The smell of smoke can trigger a craving  · Identify triggers that make you want to smoke  This may include activities, feelings, or people  Also write down 1 way you can deal with each of your triggers  For example, if you want to smoke as soon as you wake up, plan another activity during this time, such as exercise  · Make a plan for how you will quit    Learn about the tools that can help you quit, such as medicine, counseling, or nicotine replacement therapy  Choose at least 2 options to help you quit  Tools to help you stop smoking:     · Counseling  from a trained healthcare provider can provide you with support and skills to quit smoking  The provider will also teach you to manage your withdrawal symptoms and cravings  You may receive counseling from one counselor, in group therapy, or through phone therapy called a quit line  · Nicotine replacement therapy (NRT)  such as nicotine patches, gum, or lozenges may help reduce your nicotine cravings  You may get these without a doctor's order  Do not use e-cigarettes or smokeless tobacco in place of cigarettes or to help you quit  They still contain nicotine  · Prescription medicines  such as nasal sprays or nicotine inhalers may help reduce your withdrawal symptoms  Other medicines may also be used to reduce your urge to smoke  Ask your healthcare provider about these medicines  You may need to start certain medicines 2 weeks before your quit date for them to work well  · Hypnosis  is a practice that helps guide you through thoughts and feelings  Hypnosis may help decrease your cravings and make you more willing to quit  · Acupuncture therapy  uses very thin needles to balance energy channels in the body  This is thought to help decrease cravings and symptoms of nicotine withdrawal      · Support groups  let you talk to others who are trying to quit or have already quit  It may be helpful to speak with others about how they quit  Manage your cravings:     · Avoid situations, people, and places that tempt you to smoke  Go to nonsmoking places, such as libraries or restaurants  Understand what tempts you and try to avoid these things  · Keep your hands busy  Hold things such as a stress ball or pen  · Put candy or toothpicks in your mouth  Keep lollipops, sugarless gum, or toothpicks with you at all times  · Do not have alcohol or caffeine    These drinks may tempt you to smoke  Drink healthy liquids such as water or juice instead  · Reward yourself when you resist your cravings  Rewards will motivate you and help you stay positive  · Do an activity that distracts you from your craving  Examples include going for a walk, exercising, or cleaning  Prevent weight gain after you quit:  You may gain a few pounds after you quit smoking  It is healthier for you to gain a few pounds than to continue to smoke  The following can help you prevent weight gain:    · Eat healthy foods  These include fruits, vegetables, whole-grain breads, low-fat dairy products, beans, lean meats, and fish  Eat healthy snacks, such as low-fat yogurt, if you get hungry between meals  · Drink water before, during, and between meals  This will make your stomach feel full and help prevent you from overeating  Ask your healthcare provider how much liquid to drink each day and which liquids are best for you  · Exercise  Take a walk or do some kind of exercise every day  Ask your healthcare provider what exercise is right for you  This may help reduce your cravings and reduce stress  OBESITY     Obesity is defined as a body mass index (BMI) which is greater than 30  Your Body mass index is 30 54 kg/m²       The risks of obesity include  many health problems, such as injuries or physical disability  You may need tests to check for the following:  Diabetes     High blood pressure or high cholesterol     Heart disease     Gallbladder or liver disease     Cancer of the colon, breast, prostate, liver, or kidney     Sleep apnea     Arthritis or gout    Seek care immediately if:   You have a severe headache, confusion, or difficulty speaking  You have weakness on one side of your body  You have chest pain, sweating, or shortness of breath  Contact your healthcare provider if:   You have symptoms of gallbladder or liver disease, such as pain in your upper abdomen      You have knee or hip pain and discomfort while walking  You have symptoms of diabetes, such as intense hunger and thirst, and frequent urination  You have symptoms of sleep apnea, such as snoring or daytime sleepiness  You have questions or concerns about your condition or care  Treatment for obesity  focuses on helping you lose weight to improve your health  Even a small decrease in BMI can reduce the risk for many health problems  Your healthcare provider will help you set a weight-loss goal   Lifestyle changes  are the first step in treating obesity  These include making healthy food choices and getting regular physical activity  Your healthcare provider may suggest a weight-loss program that involves coaching, education, and therapy  Medicine  may help you lose weight when it is used with a healthy diet and physical activity  Surgery  can help you lose weight if you are very obese and have other health problems  There are several types of weight-loss surgery  Ask your healthcare provider for more information  Be successful losing weight:   Set small, realistic goals  An example of a small goal is to walk for 20 minutes 5 days a week  Anther goal is to lose 5% of your body weight  Tell friends, family members, and coworkers about your goals  and ask for their support  Ask a friend to lose weight with you, or join a weight-loss support group  Identify foods or triggers that may cause you to overeat , and find ways to avoid them  Remove tempting high-calorie foods from your home and workplace  Place a bowl of fresh fruit on your kitchen counter  If stress causes you to eat, then find other ways to cope with stress  Keep a diary to track what you eat and drink  Also write down how many minutes of physical activity you do each day  Weigh yourself once a week and record it in your diary  Eating changes:   You will need to eat 500 to 1,000 fewer calories each day than you currently eat to lose 1 to 2 pounds a week  The following changes will help you cut calories:  Eat smaller portions  Use small plates, no larger than 9 inches in diameter  Fill your plate half full of fruits and vegetables  Measure your food using measuring cups until you know what a serving size looks like  Eat 3 meals and 1 or 2 snacks each day  Plan your meals in advance  Sahra Tate and eat at home most of the time  Eat slowly  Eat fruits and vegetables at every meal   They are low in calories and high in fiber, which makes you feel full  Do not add butter, margarine, or cream sauce to vegetables  Use herbs to season steamed vegetables  Eat less fat and fewer fried foods  Eat more baked or grilled chicken and fish  These protein sources are lower in calories and fat than red meat  Limit fast food  Dress your salads with olive oil and vinegar instead of bottled dressing  Limit the amount of sugar you eat  Do not drink sugary beverages  Limit alcohol  Activity changes:  Physical activity is good for your body in many ways  It helps you burn calories and build strong muscles  It decreases stress and depression, and improves your mood  It can also help you sleep better  Talk to your healthcare provider before you begin an exercise program   Exercise for at least 30 minutes 5 days a week  Start slowly  Set aside time each day for physical activity that you enjoy and that is convenient for you  It is best to do both weight training and an activity that increases your heart rate, such as walking, bicycling, or swimming  Find ways to be more active  Do yard work and housecleaning  Walk up the stairs instead of using elevators  Spend your leisure time going to events that require walking, such as outdoor festivals or fairs  This extra physical activity can help you lose weight and keep it off  Follow up with your primary healthcare provider as directed  You may need to meet with a dietitian   Write down your questions so you remember to ask them during your visits

## 2021-08-23 NOTE — PROGRESS NOTES
[address] [place] [address] Meme is a 37 y o  female who presents today for annual GYN exam   Her last pap smear was performed 2020 and result was NILM, HR HPV negative  She reports no history of abnormal pap smears in her past  She did not have the Gardasil vaccine series  She has not yet had her first mammogram, orders are placed in the chart to schedule  She reports menses as occasional due to depo  No LMP recorded  Patient has had an injection  Her contraceptive method is depo-provera  Her general medical history has been reviewed and she reports it as follows:     Past Medical History:   Diagnosis Date    Arthritis     Depression     DJD (degenerative joint disease)     Herpes     Hidradenitis     MRSA carrier      Past Surgical History:   Procedure Laterality Date    FL INJECTION RIGHT HIP (ARTHROGRAM)  2020    FL INJECTION RIGHT HIP (NON ARTHROGRAM)  2019    FL INJECTION RIGHT SHOULDER (ARTHROGRAM)  10/7/2019    NO PAST SURGERIES       OB History        3    Para   2    Term   2            AB   1    Living   2       SAB   1    TAB        Ectopic        Multiple        Live Births                   Social History     Tobacco Use    Smoking status: Current Every Day Smoker     Packs/day: 0 25     Types: Cigarettes    Smokeless tobacco: Former User   Vaping Use    Vaping Use: Never used   Substance Use Topics    Alcohol use: Yes     Comment: holidays     Drug use: Yes     Types: Marijuana     Comment: few times a day      Cancer-related family history is not on file  Current Outpatient Medications   Medication Instructions    medroxyPROGESTERone (DEPO-PROVERA) 150 mg, Intramuscular, Every 3 months    meloxicam (MOBIC) 15 mg, Oral, Daily    mupirocin (BACTROBAN) 2 % ointment Topical, 3 times daily    valACYclovir (VALTREX) 500 mg, Oral, Daily       Review of Systems:  Review of Systems   Constitutional: Negative  Gastrointestinal: Negative  Genitourinary: Negative  Skin: Negative  Physical Exam:  Ht 5' 2" (1 575 m)   Wt 75 8 kg (167 lb)   BMI 30 54 kg/m²   Physical Exam  Constitutional:       General: She is not in acute distress  Appearance: Normal appearance  She is obese  Genitourinary:      Vulva, inguinal canal, urethra, bladder, vagina, cervix, uterus and right adnexa normal    Cardiovascular:      Rate and Rhythm: Normal rate and regular rhythm  Pulmonary:      Effort: Pulmonary effort is normal       Breath sounds: Normal breath sounds  Abdominal:      General: Abdomen is flat  Palpations: Abdomen is soft  Musculoskeletal:      Cervical back: Neck supple  Neurological:      Mental Status: She is alert  Skin:     General: Skin is warm and dry  Psychiatric:         Mood and Affect: Mood normal          Behavior: Behavior normal    Vitals reviewed  Assessment/Plan: 1  Normal well-woman GYN exam   2  Cervical cancer screening:  Normal cervical exam    3  STD screening:  Orders placed for vaginal GC/CT cultures   4  Breast cancer screening:  Normal breast exam  Orders in place for first mammogram  Reviewed breast self-awareness  5  Depression Screening: Patient's depression screening was assessed with a PHQ-2 score of 1  Their PHQ-9 score was 7  Clinically patient does not have depression  No treatment is required  6  BMI Counseling: Body mass index is 30 54 kg/m²  Discussed the patient's BMI with her  The BMI is above normal  Nutrition recommendations include reducing portion sizes, decreasing overall calorie intake, 3-5 servings of fruits/vegetables daily and consuming healthier snacks  Exercise recommendations include moderate aerobic physical activity for 150 minutes/week and exercising 3-5 times per week  7  Declined Gardasil vaccine series   8  Tobacco Cessation Counseling: Tobacco cessation counseling and education was provided   The patient is sincerely urged to quit consumption of tobacco  She is not ready to quit tobacco  The numerous health risks of tobacco consumption were discussed  If she decides to quit, there are a number of helpful adjunctive aids, and she can see me to discuss nicotine replacement therapy, chantix, or bupropion anytime in the future  9  Contraception:  Depo-provera   10  Return to office for annual exam in one year and for continued depo provera injections  Reviewed with patient that test results are available in [facility] immediately, but that they will not necessarily be reviewed by me immediately  Explained that I will review results at my earliest opportunity and contact patient appropriately

## 2021-08-24 LAB
C TRACH DNA SPEC QL NAA+PROBE: NEGATIVE
N GONORRHOEA DNA SPEC QL NAA+PROBE: NEGATIVE

## 2021-08-25 ENCOUNTER — APPOINTMENT (OUTPATIENT)
Dept: PHYSICAL THERAPY | Facility: CLINIC | Age: 41
End: 2021-08-25
Payer: COMMERCIAL

## 2021-08-25 NOTE — PROGRESS NOTES
Daily Note     Today's date: 2021  Patient name: Cris Perera  : 1980  MRN: [id number]  Referring provider: Richa Munguia  Dx:   Encounter Diagnosis     ICD-10-CM    1  Sprain of anterior talofibular ligament of left ankle, subsequent encounter  S93 492D    2  Incomplete tear of left rotator cuff, unspecified whether traumatic  M75 [id number]                   Subjective: ***      Objective: See treatment diary below      Assessment: Tolerated treatment {Tolerated treatment :[id number]}   Patient {assessment:[id number]}      Plan: {PLAN:4490280226}     Precautions: RA  Dx: L ATFL Gr 3 sprain, L partial-thickness RTC tear    Manuals 7/19 8/3 8/11 8/18         Laser L ATFL, CFL, achilles 4000J 4000J 4000J   5000J         Laser L subacromial space 4000J 4000J 4000J 4000J         L ankle FERNANDEZ distal fib MWM 2x10                         Neuro Re-Ed             SLS EO 30"x2 30"x2 ea 30"x2 ea 30"x2 ea         Prone T's 2x10 2x10 2x10 2x12         Wall slides FF 2x10 3x10 3x10 3x10                                                             Ther Ex                          Supine GA towel stretch 15"x3 15"x3  15"x3         PF TB  G/  3x15 G 3x15 G 3x15 G/ 3x10         Standing GA stretch   15"x3 15"x3         ecc DL heel raises  2x10 2x10 2x10         TB ER R/  3x15 R  3x15 R/  3x15 R/  3x20         Seated wobbleboard A-P AROM   1x30 1x30                      Ther Activity                                       Gait Training             Alter G                          Modalities

## 2021-09-09 NOTE — PROGRESS NOTES
Depo-Provera      [x]   Patient provided box yes  o 0 Refills remain   Last  Annual Date: 6/10/19  Sally Martinez Last Depo date: 12/3/19   Side effects: no   HCG: no  o if applicable: negative   Given by: cf   Site: rd     Calcium supplement daily teaching, condoms for 2 weeks following first injection dose  20

## 2021-09-21 ENCOUNTER — CLINICAL SUPPORT (OUTPATIENT)
Dept: OBGYN CLINIC | Facility: CLINIC | Age: 41
End: 2021-09-21

## 2021-09-21 DIAGNOSIS — Z30.42 ENCOUNTER FOR SURVEILLANCE OF INJECTABLE CONTRACEPTIVE: ICD-10-CM

## 2021-09-21 PROCEDURE — 96372 THER/PROPH/DIAG INJ SC/IM: CPT

## 2021-09-21 RX ADMIN — MEDROXYPROGESTERONE ACETATE 150 MG: 150 INJECTION, SUSPENSION INTRAMUSCULAR at 11:26

## 2021-09-21 NOTE — PROGRESS NOTES
Depo-Provera      [x]   Patient provided box   o 3 Refills remain  o Refills submitted yes   Last  Annual Date / Birth control check : 8/23/2021   Last Depo date:6/29/2021    Side effects: no   HCG: no  o if applicable: negative   Given by: Nonda Crigler, MA   Site: LD    o Calcium supplement daily teaching  o Condoms for 2 weeks following first injection dose         Pt is aware depo refills were sent to [facility]

## 2021-12-14 ENCOUNTER — CLINICAL SUPPORT (OUTPATIENT)
Dept: OBGYN CLINIC | Facility: CLINIC | Age: 41
End: 2021-12-14

## 2021-12-14 DIAGNOSIS — Z30.42 ENCOUNTER FOR SURVEILLANCE OF INJECTABLE CONTRACEPTIVE: ICD-10-CM

## 2021-12-14 PROCEDURE — 96372 THER/PROPH/DIAG INJ SC/IM: CPT

## 2021-12-14 RX ADMIN — MEDROXYPROGESTERONE ACETATE 150 MG: 150 INJECTION, SUSPENSION INTRAMUSCULAR at 11:22

## 2022-02-08 ENCOUNTER — APPOINTMENT (OUTPATIENT)
Dept: RADIOLOGY | Facility: AMBULARY SURGERY CENTER | Age: 42
End: 2022-02-08
Attending: ORTHOPAEDIC SURGERY
Payer: COMMERCIAL

## 2022-02-08 ENCOUNTER — OFFICE VISIT (OUTPATIENT)
Dept: OBGYN CLINIC | Facility: CLINIC | Age: 42
End: 2022-02-08
Payer: COMMERCIAL

## 2022-02-08 VITALS
WEIGHT: 167 LBS | BODY MASS INDEX: 30.73 KG/M2 | HEIGHT: 62 IN | HEART RATE: 78 BPM | DIASTOLIC BLOOD PRESSURE: 90 MMHG | SYSTOLIC BLOOD PRESSURE: 144 MMHG

## 2022-02-08 DIAGNOSIS — S93.492D SPRAIN OF ANTERIOR TALOFIBULAR LIGAMENT OF LEFT ANKLE, SUBSEQUENT ENCOUNTER: Primary | ICD-10-CM

## 2022-02-08 DIAGNOSIS — M94.9 OSTEOCHONDRAL LESION OF TALAR DOME: ICD-10-CM

## 2022-02-08 DIAGNOSIS — M89.9 OSTEOCHONDRAL LESION OF TALAR DOME: ICD-10-CM

## 2022-02-08 DIAGNOSIS — S93.492D SPRAIN OF ANTERIOR TALOFIBULAR LIGAMENT OF LEFT ANKLE, SUBSEQUENT ENCOUNTER: ICD-10-CM

## 2022-02-08 PROCEDURE — 73610 X-RAY EXAM OF ANKLE: CPT

## 2022-02-08 PROCEDURE — 99213 OFFICE O/P EST LOW 20 MIN: CPT | Performed by: ORTHOPAEDIC SURGERY

## 2022-02-08 NOTE — PROGRESS NOTES
RIP Corley  Attending, Orthopaedic Surgery  [facility] and [address] [facility] [address] [facility]      ORTHOPAEDIC FOOT AND ANKLE CLINIC VISIT     Assessment:     Encounter Diagnoses   Name Primary?  Sprain of anterior talofibular ligament of left ankle, subsequent encounter Yes    Osteochondral lesion of talar dome             Plan:   · The patient verbalized understanding of exam findings and treatment plan  We engaged in the shared decision-making process and treatment options were discussed at length with the patient  Surgical and conservative management discussed today along with risks and benefits  · Dimas Carvalho has an ankle sprain that is now over 1 year since her injury  She had improved with PT but pain has returned  · She has failed PT, rest, activity modification and NSAIDs so we will obtain an MRI of the ankle to evaluate for OCD talus lesion  Return for after MRI  History of Present Illness:   Chief Complaint:   Chief Complaint   Patient presents with    Left Ankle - Follow-up, Pain, Swelling     Rebecca Blackman is a 37 y o  female who is being seen in follow-up for left ankle sprain  When we last saw she we recommended return to activity as tolerated  Pain has not improved  Residual pain is localized at lateral ankle with minimal radiating and described as sharp and severe  She reports that 4 months ago after finishing PT she had worsening pain and now has daily pain with standing and walking        Pain/symptom timing:  Worse during the day when active  Pain/symptom context:  Worse with activites and work  Pain/symptom modifying factors:  Rest makes better, activities make worse  Pain/symptom associated signs/symptoms: none    Prior treatment   · NSAIDsYes   · Injections No   · Bracing/Orthotics Yes    · Physical Therapy Yes     Orthopedic Surgical History:   None    Past Medical, Surgical and Social History:  Past Medical History:  has a past medical history of Arthritis, Depression, DJD (degenerative joint disease), Herpes, Hidradenitis, and MRSA carrier  Problem List: does not have any pertinent problems on file  Past Surgical History:  has a past surgical history that includes FL injection right hip (non arthrogram) (7/24/2019); FL injection right shoulder (arthrogram) (10/7/2019); FL injection right hip (arthrogram) (1/6/2020); and No past surgeries  Family History: family history includes ADD / ADHD in her daughter; Arthritis in her maternal grandmother and mother; Bipolar disorder in her brother; Depression in her son; Diabetes in her father and paternal grandmother; Heart attack in her father, maternal grandfather, and paternal grandfather; Hypertension in her daughter and mother; Obesity in her daughter; Other in her father; Ovarian cysts in her sister; Post-traumatic stress disorder in her father, paternal grandfather, and son; Schizophrenia in her brother and sister; Sleep apnea in her daughter; Stroke in her father and paternal grandmother  Social History:  reports that she has been smoking cigarettes  She has been smoking about 0 25 packs per day  She has quit using smokeless tobacco  She reports current alcohol use  She reports current drug use  Drug: Marijuana  Current Medications: has a current medication list which includes the following prescription(s): medroxyprogesterone, meloxicam, mupirocin, and valacyclovir, and the following Facility-Administered Medications: medroxyprogesterone acetate  Allergies: has No Known Allergies       Review of Systems:  General- denies fever/chills  HEENT- denies hearing loss or sore throat  Eyes- denies eye pain or visual disturbances, denies red eyes  Respiratory- denies cough or SOB  Cardio- denies chest pain or palpitations  GI- denies abdominal pain  Endocrine- denies urinary frequency  Urinary- denies pain with urination  Musculoskeletal- Negative except noted above  Skin- denies rashes or wounds  Neurological- denies dizziness or headache  Psychiatric- denies anxiety or difficulty concentrating    Physical Exam:   /90 (BP Location: Right arm, Patient Position: Sitting, Cuff Size: Adult)   Pulse 78   Ht 5' 2" (1 575 m)   Wt 75 8 kg (167 lb)   BMI 30 54 kg/m²   General/Constitutional: No apparent distress: well-nourished and well developed  Eyes: normal ocular motion  Lymphatic: No appreciable lymphadenopathy  Respiratory: Non-labored breathing  Vascular: No edema, swelling or tenderness, except as noted in detailed exam   Integumentary: No impressive skin lesions present, except as noted in detailed exam   Neuro: No ataxia or tremors noted  Psych: Normal mood and affect, oriented to person, place and time  Appropriate affect  Musculoskeletal: Normal, except as noted in detailed exam and in HPI  Examination    Left    Gait Antalgic   Musculoskeletal Tender to palpation at ATFL    Skin Normal       Nails Normal    Range of Motion  10 degrees dorsiflexion, 30 degrees plantarflexion  Subtalar motion: normal    Stability Stable    Muscle Strength 5/5 tibialis anterior  5/5 gastrocnemius-soleus  4/5 posterior tibialis  4/5 peroneal/eversion strength  5/5 EHL  5/5 FHL    Neurologic Normal    Sensation  Intact to light touch throughout sural, saphenous, superficial peroneal, deep peroneal and medial/lateral plantar nerve distributions  [place] 5 07 filament (10g) testing was deferred  Cardiovascular Brisk capillary refill < 2 seconds,intact DP and PT pulses    Special Tests Negative anterior drawer      Imaging Studies:     3 views of the Left ankle were obtained, reviewed and interpreted independently which demonstrate no acute finding, normal alignment  Reviewed by me personally  Billi Lane Lachman, MD  Foot & Ankle Surgery   Department of [address] [place] [address]      I personally performed the service  Billi Lane Lachman, MD    Scribe Attestation    I,:  Rip Cousins, HAFSA am acting as a scribe while in the presence of the attending physician :       I,:  Myriam Rahman MD personally performed the services described in this documentation    as scribed in my presence :

## 2022-03-07 ENCOUNTER — TELEPHONE (OUTPATIENT)
Dept: OBGYN CLINIC | Facility: CLINIC | Age: 42
End: 2022-03-07

## 2022-03-15 ENCOUNTER — CLINICAL SUPPORT (OUTPATIENT)
Dept: OBGYN CLINIC | Facility: CLINIC | Age: 42
End: 2022-03-15

## 2022-03-15 DIAGNOSIS — Z30.42 ENCOUNTER FOR SURVEILLANCE OF INJECTABLE CONTRACEPTIVE: ICD-10-CM

## 2022-03-15 PROCEDURE — 96372 THER/PROPH/DIAG INJ SC/IM: CPT

## 2022-03-15 RX ADMIN — MEDROXYPROGESTERONE ACETATE 150 MG: 150 INJECTION, SUSPENSION INTRAMUSCULAR at 14:39

## 2022-03-15 NOTE — PROGRESS NOTES
Depo-Provera      [x]   Patient provided box / Yes  o (2) Refills remain  o Refills submitted : No   Last  Annual Date / Due - 8/23/22   Last Depo date: 12/14/21   Side effects:None reported   HCG: N/A   Given by: K Reyes   Site: LD    o Calcium supplement daily teaching  o Condoms for 2 weeks following first injection dose

## 2022-04-11 ENCOUNTER — HOSPITAL ENCOUNTER (OUTPATIENT)
Dept: RADIOLOGY | Age: 42
Discharge: HOME | End: 2022-04-11
Payer: COMMERCIAL

## 2022-04-11 DIAGNOSIS — M89.9 OSTEOCHONDRAL LESION OF TALAR DOME: ICD-10-CM

## 2022-04-11 DIAGNOSIS — M94.9 OSTEOCHONDRAL LESION OF TALAR DOME: ICD-10-CM

## 2022-04-11 PROCEDURE — G1004 CDSM NDSC: HCPCS

## 2022-04-11 PROCEDURE — 73721 MRI JNT OF LWR EXTRE W/O DYE: CPT

## 2022-05-25 ENCOUNTER — OFFICE VISIT (OUTPATIENT)
Dept: URGENT CARE | Age: 42
End: 2022-05-25
Payer: COMMERCIAL

## 2022-05-25 VITALS
HEART RATE: 81 BPM | TEMPERATURE: 97.5 F | WEIGHT: 195 LBS | RESPIRATION RATE: 18 BRPM | BODY MASS INDEX: 35.88 KG/M2 | DIASTOLIC BLOOD PRESSURE: 110 MMHG | HEIGHT: 62 IN | SYSTOLIC BLOOD PRESSURE: 183 MMHG | OXYGEN SATURATION: 99 %

## 2022-05-25 DIAGNOSIS — M54.50 LOW BACK PAIN, UNSPECIFIED BACK PAIN LATERALITY, UNSPECIFIED CHRONICITY, UNSPECIFIED WHETHER SCIATICA PRESENT: Primary | ICD-10-CM

## 2022-05-25 PROCEDURE — 99213 OFFICE O/P EST LOW 20 MIN: CPT | Performed by: STUDENT IN AN ORGANIZED HEALTH CARE EDUCATION/TRAINING PROGRAM

## 2022-05-25 RX ORDER — SULFAMETHOXAZOLE AND TRIMETHOPRIM 800; 160 MG/1; MG/1
1 TABLET ORAL 2 TIMES DAILY
COMMUNITY
Start: 2022-05-02

## 2022-05-25 RX ORDER — CYCLOBENZAPRINE HCL 10 MG
10 TABLET ORAL 3 TIMES DAILY PRN
Qty: 30 TABLET | Refills: 0 | Status: SHIPPED | OUTPATIENT
Start: 2022-05-25

## 2022-05-25 RX ORDER — PREDNISONE 10 MG/1
10 TABLET ORAL DAILY
Qty: 21 TABLET | Refills: 0 | Status: SHIPPED | OUTPATIENT
Start: 2022-05-25

## 2022-05-25 NOTE — PROGRESS NOTES
[facility]        NAME: Mathew Solo is a 40 y o  female  : 1980    MRN: [id number]  DATE: May 25, 2022  TIME: 7:04 PM    Assessment and Plan   Low back pain, unspecified back pain laterality, unspecified chronicity, unspecified whether sciatica present [M54 50]  1  Low back pain, unspecified back pain laterality, unspecified chronicity, unspecified whether sciatica present  predniSONE 10 mg tablet    cyclobenzaprine (FLEXERIL) 10 mg tablet         Patient Instructions       Follow up with PCP in 3-5 days  Proceed to  ER if symptoms worsen  Chief Complaint     Chief Complaint   Patient presents with    Back Pain     Patient stated she been having back pain more on the right side it happen Monday  History of Present Illness       HPI   Patient presents complaining of low back pain ongoing for the past 2 days, she states she injured her knee on  and since then has been sitting in awkward positions which may have aggravated her back pain  She has been going to work which has been aggravating her back pain as well    Patient is unable to bend down completely, and walks with a very stiff gait    Review of Systems   Review of Systems  Per hpi     Current Medications       Current Outpatient Medications:     cyclobenzaprine (FLEXERIL) 10 mg tablet, Take 1 tablet (10 mg total) by mouth 3 (three) times a day as needed for muscle spasms, Disp: 30 tablet, Rfl: 0    predniSONE 10 mg tablet, Take 1 tablet (10 mg total) by mouth daily 6 tab day 1, 5 tab day 2, 4 tab day 3, 3 tab day 4, 2 tab day 5, 1 tab day 6, Disp: 21 tablet, Rfl: 0    Diclofenac Sodium (VOLTAREN) 1 %, , Disp: , Rfl:     medroxyPROGESTERone (DEPO-PROVERA) 150 mg/mL injection, Inject 1 mL (150 mg total) into a muscle every 3 (three) months, Disp: 1 mL, Rfl: 3    meloxicam (MOBIC) 15 mg tablet, Take 1 tablet (15 mg total) by mouth daily, Disp: 90 tablet, Rfl: 3    mupirocin (BACTROBAN) 2 % ointment, Apply topically 3 (three) times a day, Disp: 22 g, Rfl: 0    sulfamethoxazole-trimethoprim (BACTRIM DS) 800-160 mg per tablet, Take 1 tablet by mouth 2 (two) times a day, Disp: , Rfl:     valACYclovir (VALTREX) 500 mg tablet, Take 1 tablet (500 mg total) by mouth daily, Disp: 90 tablet, Rfl: 3    Current Facility-Administered Medications:     medroxyPROGESTERone acetate (DEPO-PROVERA SYRINGE) IM injection 150 mg, 150 mg, Intramuscular, Q3 Months, KEYANA Zeng, 150 mg at 03/15/22 1439    Current Allergies     Allergies as of 05/25/2022    (No Known Allergies)            The following portions of the patient's history were reviewed and updated as appropriate: allergies, current medications, past family history, past medical history, past social history, past surgical history and problem list      Past Medical History:   Diagnosis Date    Arthritis     Depression     DJD (degenerative joint disease)     Herpes     Hidradenitis     MRSA carrier        Past Surgical History:   Procedure Laterality Date    FL INJECTION RIGHT HIP (ARTHROGRAM)  1/6/2020    FL INJECTION RIGHT HIP (NON ARTHROGRAM)  7/24/2019    FL INJECTION RIGHT SHOULDER (ARTHROGRAM)  10/7/2019    NO PAST SURGERIES         Family History   Problem Relation Age of Onset    Arthritis Mother     Hypertension Mother     Diabetes Father     Stroke Father     Heart attack Father     Post-traumatic stress disorder Father     Other Father         nerve gas exposure-war    Schizophrenia Sister     Bipolar disorder Brother     Schizophrenia Brother     Sleep apnea Daughter     Obesity Daughter     Hypertension Daughter     ADD / ADHD Daughter     Depression Son     Post-traumatic stress disorder Son     Arthritis Maternal Grandmother     Heart attack Maternal Grandfather     Diabetes Paternal Grandmother     Stroke Paternal Grandmother     Heart attack Paternal Grandfather     Post-traumatic stress disorder Paternal Grandfather     Ovarian cysts Sister          Medications have been verified  Objective   BP (!) 183/110   Pulse 81   Temp 97 5 °F (36 4 °C) (Temporal)   Resp 18   Ht 5' 2" (1 575 m)   Wt 88 5 kg (195 lb)   SpO2 99%   BMI 35 67 kg/m²   No LMP recorded  Physical Exam     Physical Exam  Constitutional:       General: She is not in acute distress  Appearance: Normal appearance  HENT:      Head: Normocephalic  Nose: No congestion or rhinorrhea  Mouth/Throat:      Mouth: Mucous membranes are moist       Pharynx: No oropharyngeal exudate or posterior oropharyngeal erythema  Eyes:      General:         Right eye: No discharge  Left eye: No discharge  Conjunctiva/sclera: Conjunctivae normal    Cardiovascular:      Rate and Rhythm: Normal rate and regular rhythm  Pulses: Normal pulses  Pulmonary:      Effort: Pulmonary effort is normal  No respiratory distress  Abdominal:      General: Abdomen is flat  There is no distension  Palpations: Abdomen is soft  Tenderness: There is no abdominal tenderness  Musculoskeletal:         General: Tenderness present  Cervical back: Neck supple  Comments: Tenderness noted over the the right and left paraspinal areas with tight musculature noted  Patient unable to bend more than 25° on flexion of lumbar spine   Skin:     General: Skin is warm  Capillary Refill: Capillary refill takes less than 2 seconds  Neurological:      Mental Status: She is alert and oriented to person, place, and time

## 2022-05-25 NOTE — LETTER
May 25, 2022     Patient: Ezekiel Councilman   YOB: 1980   Date of Visit: 5/25/2022       To Whom It May Concern: It is my medical opinion that Edelmira Ramey be excused from work duties on 05/25, 5/26 and 5/27 of the year 2022    If you have any questions or concerns, please don't hesitate to call           Sincerely,        Miguel Ángel Lopez MD    CC: No Recipients

## 2022-05-27 NOTE — TELEPHONE ENCOUNTER
New work note in epic Spoke to patient    CONFIRMED procedure arrival time of 5:15    Reiterated instructions including:  -Directions to check in desk  -NPO after midnight night prior to procedure  -Pre-procedure LABS labs reviewed no alerts noted  -COVID test n/a, pt vaccinated  -Confirmed no fever, cough, or shortness of breath in the past 30 days  -Do not wear mascara day of procedure  -Bathe night prior and morning prior to procedure with Hibiclens solution or an antibacterial soap  -Reviewed current visitor policy    Patient verbalizes understanding of above and appreciates call.

## 2022-06-02 ENCOUNTER — CLINICAL SUPPORT (OUTPATIENT)
Dept: OBGYN CLINIC | Facility: CLINIC | Age: 42
End: 2022-06-02

## 2022-06-02 DIAGNOSIS — Z30.42 ENCOUNTER FOR SURVEILLANCE OF INJECTABLE CONTRACEPTIVE: ICD-10-CM

## 2022-06-02 PROCEDURE — 96372 THER/PROPH/DIAG INJ SC/IM: CPT

## 2022-06-02 RX ORDER — MEDROXYPROGESTERONE ACETATE 150 MG/ML
150 INJECTION, SUSPENSION INTRAMUSCULAR
Qty: 1 ML | Refills: 3 | Status: SHIPPED | OUTPATIENT
Start: 2022-06-02

## 2022-06-02 RX ADMIN — MEDROXYPROGESTERONE ACETATE 150 MG: 150 INJECTION, SUSPENSION INTRAMUSCULAR at 10:54

## 2022-06-02 NOTE — PROGRESS NOTES
Depo-Provera      [x]   Patient provided box / Yes  o (1) Refills remain  o Refills submitted / No   Last  Annual Date / Scheduled 8/24/22   Last Depo date:3/15/22   Side effects: None reported   HCG: No   Given by: K Reyes   Site: LD    o Calcium supplement daily teaching  o Condoms for 2 weeks following first injection dose

## 2022-06-20 DIAGNOSIS — B00.9 HSV INFECTION: ICD-10-CM

## 2022-06-20 RX ORDER — VALACYCLOVIR HYDROCHLORIDE 500 MG/1
500 TABLET, FILM COATED ORAL DAILY
Qty: 90 TABLET | Refills: 3 | Status: SHIPPED | OUTPATIENT
Start: 2022-06-20 | End: 2023-06-20

## 2022-07-24 ENCOUNTER — APPOINTMENT (EMERGENCY)
Dept: CT IMAGING | Facility: HOSPITAL | Age: 42
End: 2022-07-24
Payer: COMMERCIAL

## 2022-07-24 ENCOUNTER — APPOINTMENT (EMERGENCY)
Dept: RADIOLOGY | Facility: HOSPITAL | Age: 42
End: 2022-07-24
Payer: COMMERCIAL

## 2022-07-24 ENCOUNTER — HOSPITAL ENCOUNTER (EMERGENCY)
Facility: HOSPITAL | Age: 42
Discharge: HOME | End: 2022-07-24
Attending: EMERGENCY MEDICINE
Payer: COMMERCIAL

## 2022-07-24 VITALS
TEMPERATURE: 98.6 F | OXYGEN SATURATION: 98 % | RESPIRATION RATE: 20 BRPM | DIASTOLIC BLOOD PRESSURE: 107 MMHG | HEART RATE: 106 BPM | SYSTOLIC BLOOD PRESSURE: 171 MMHG

## 2022-07-24 DIAGNOSIS — M25.469 TRAUMATIC EFFUSION OF KNEE JOINT: Primary | ICD-10-CM

## 2022-07-24 PROCEDURE — 99284 EMERGENCY DEPT VISIT MOD MDM: CPT

## 2022-07-24 PROCEDURE — 73564 X-RAY EXAM KNEE 4 OR MORE: CPT

## 2022-07-24 PROCEDURE — G1004 CDSM NDSC: HCPCS

## 2022-07-24 PROCEDURE — 73700 CT LOWER EXTREMITY W/O DYE: CPT

## 2022-07-24 PROCEDURE — 99283 EMERGENCY DEPT VISIT LOW MDM: CPT | Performed by: PHYSICIAN ASSISTANT

## 2022-07-24 RX ORDER — OXYCODONE HYDROCHLORIDE 5 MG/1
5 TABLET ORAL ONCE
Status: COMPLETED | OUTPATIENT
Start: 2022-07-24 | End: 2022-07-24

## 2022-07-24 RX ORDER — OXYCODONE HYDROCHLORIDE 5 MG/1
5 TABLET ORAL EVERY 6 HOURS PRN
Qty: 12 TABLET | Refills: 0 | Status: SHIPPED | OUTPATIENT
Start: 2022-07-24 | End: 2022-07-27

## 2022-07-24 RX ADMIN — OXYCODONE HYDROCHLORIDE 5 MG: 5 TABLET ORAL at 14:48

## 2022-07-24 NOTE — Clinical Note
Gertrude Leon was seen and treated in our emergency department on 7/24/2022  No walking on the left leg until cleared by orthopedics  Diagnosis:     Erasmo Sy  may return to work on return date  She may return on this date: 07/27/2022         If you have any questions or concerns, please don't hesitate to call        Kayce Arroyo PA-C    ______________________________           _______________          _______________  Hospital Representative                              Date                                Time

## 2022-07-24 NOTE — ED PROVIDER NOTES
History  Chief Complaint   Patient presents with    Fall     Pt present to the ED with left knee injury  States falling down 10 steps while carrying laundry x 1 day  50-year-old female presents the emergency department with complaints of left-sided knee pain after a fall  States she fell down 10 steps yesterday while carrying a laundry basket  Denies head injury or loss of consciousness  States she has had persistent left-sided knee pain and inability to bear weight due to feelings of instability and pain  Has been taking her usual meloxicam and Tylenol without improvement of symptoms  History provided by:  Patient   used: No    Fall  Mechanism of injury: fall    Injury location:  Leg  Leg injury location:  L knee  Incident location:  Home  Time since incident:  1 day  Arrived directly from scene: no    Fall:     Fall occurred:  Down stairs    Impact surface:  Hard floor    Entrapped after fall: no    Suspicion of alcohol use: no    Suspicion of drug use: no    Associated symptoms: no abdominal pain, no back pain, no chest pain and no neck pain        Prior to Admission Medications   Prescriptions Last Dose Informant Patient Reported? Taking?    Diclofenac Sodium (VOLTAREN) 1 %   Yes No   cyclobenzaprine (FLEXERIL) 10 mg tablet   No No   Sig: Take 1 tablet (10 mg total) by mouth 3 (three) times a day as needed for muscle spasms   medroxyPROGESTERone (DEPO-PROVERA) 150 mg/mL injection   No No   Sig: Inject 1 mL (150 mg total) into a muscle every 3 (three) months   meloxicam (MOBIC) 15 mg tablet   No No   Sig: take 1 tablet by mouth once daily   mupirocin (BACTROBAN) 2 % ointment  Self No No   Sig: Apply topically 3 (three) times a day   predniSONE 10 mg tablet   No No   Sig: Take 1 tablet (10 mg total) by mouth daily 6 tab day 1, 5 tab day 2, 4 tab day 3, 3 tab day 4, 2 tab day 5, 1 tab day 6   sulfamethoxazole-trimethoprim (BACTRIM DS) 800-160 mg per tablet   Yes No   Sig: Take 1 tablet by mouth 2 (two) times a day   valACYclovir (VALTREX) 500 mg tablet   No No   Sig: Take 1 tablet (500 mg total) by mouth daily      Facility-Administered Medications Last Administration Doses Remaining   medroxyPROGESTERone acetate (DEPO-PROVERA SYRINGE) IM injection 150 mg 6/2/2022 10:54 AM           Past Medical History:   Diagnosis Date    Arthritis     Depression     DJD (degenerative joint disease)     Herpes     Hidradenitis     MRSA carrier        Past Surgical History:   Procedure Laterality Date    FL INJECTION RIGHT HIP (ARTHROGRAM)  1/6/2020    FL INJECTION RIGHT HIP (NON ARTHROGRAM)  7/24/2019    FL INJECTION RIGHT SHOULDER (ARTHROGRAM)  10/7/2019    NO PAST SURGERIES         Family History   Problem Relation Age of Onset    Arthritis Mother     Hypertension Mother     Diabetes Father     Stroke Father     Heart attack Father     Post-traumatic stress disorder Father     Other Father         nerve gas exposure-war    Schizophrenia Sister     Bipolar disorder Brother     Schizophrenia Brother     Sleep apnea Daughter     Obesity Daughter     Hypertension Daughter     ADD / ADHD Daughter     Depression Son     Post-traumatic stress disorder Son     Arthritis Maternal Grandmother     Heart attack Maternal Grandfather     Diabetes Paternal Grandmother     Stroke Paternal Grandmother     Heart attack Paternal Grandfather     Post-traumatic stress disorder Paternal Grandfather     Ovarian cysts Sister      I have reviewed and agree with the history as documented      E-Cigarette/Vaping    E-Cigarette Use Never User      E-Cigarette/Vaping Substances    Nicotine No     THC No     CBD No     Flavoring No     Other No     Unknown No      Social History     Tobacco Use    Smoking status: Current Every Day Smoker     Packs/day: 0 25     Types: Cigarettes    Smokeless tobacco: Former User   Vaping Use    Vaping Use: Never used   Substance Use Topics    Alcohol use: Yes     Comment: holidays     Drug use: Yes     Types: Marijuana     Comment: few times a day        Review of Systems   Constitutional: Negative for chills and fever  HENT: Negative for congestion, dental problem and sore throat  Respiratory: Negative for cough  Cardiovascular: Negative for chest pain  Gastrointestinal: Negative for abdominal pain  Musculoskeletal: Positive for arthralgias (knee pain)  Negative for back pain and neck pain  Skin: Negative for rash and wound  All other systems reviewed and are negative  Physical Exam  Physical Exam  Vitals and nursing note reviewed  Constitutional:       Appearance: She is well-developed  HENT:      Head: Normocephalic and atraumatic  Cardiovascular:      Rate and Rhythm: Normal rate and regular rhythm  Pulmonary:      Effort: Pulmonary effort is normal  No respiratory distress  Breath sounds: No wheezing, rhonchi or rales  Musculoskeletal:      Left knee: Swelling, effusion and ecchymosis present  No deformity, erythema, lacerations, bony tenderness or crepitus  Decreased range of motion  Tenderness present  Skin:     General: Skin is warm and dry  Neurological:      Mental Status: She is alert and oriented to person, place, and time     Psychiatric:         Mood and Affect: Mood normal          Behavior: Behavior normal          Vital Signs  ED Triage Vitals   Temperature Pulse Respirations Blood Pressure SpO2   07/24/22 1408 07/24/22 1408 07/24/22 1408 07/24/22 1409 07/24/22 1408   98 6 °F (37 °C) (!) 106 20 (!) 171/107 98 %      Temp Source Heart Rate Source Patient Position - Orthostatic VS BP Location FiO2 (%)   07/24/22 1408 07/24/22 1408 07/24/22 1408 07/24/22 1408 --   Oral Monitor Sitting Right arm       Pain Score       07/24/22 1408       10 - Worst Possible Pain           Vitals:    07/24/22 1408 07/24/22 1409   BP:  (!) 171/107   Pulse: (!) 106    Patient Position - Orthostatic VS: Sitting          Visual Acuity      ED Medications  Medications   oxyCODONE (ROXICODONE) IR tablet 5 mg (5 mg Oral Given 7/24/22 1448)       Diagnostic Studies  Results Reviewed     None                 XR knee 4+ views left injury    (Results Pending)   CT lower extremity wo contrast left    (Results Pending)              Procedures  Procedures         ED Course  ED Course as of 07/24/22 1649   Sun Jul 24, 2022   1608 Reading room called for CT reading     1612 Discussed CT results from my review with patient  I did not see any fracture  Will place in knee immobilizer and fit for walker for likely discharge  Will need note for work and pain medications sent to pharmacy  MDM  Number of Diagnoses or Management Options  Diagnosis management comments: Differential diagnosis includes but not limited to:  Knee contusion, knee sprain, knee fracture, ligamentous injury, traumatic effusion         Amount and/or Complexity of Data Reviewed  Tests in the radiology section of CPT®: ordered and reviewed  Independent visualization of images, tracings, or specimens: yes        Disposition  Final diagnoses:   None     ED Disposition     None      Follow-up Information    None         Patient's Medications   Discharge Prescriptions    No medications on file       No discharge procedures on file      PDMP Review       Value Time User    PDMP Reviewed  Yes 6/11/2020 10:18 PM Anais Petit MD          ED Provider  Electronically Signed by           Sarah Lambert PA-C  07/24/22 [id number]

## 2022-07-25 ENCOUNTER — TELEPHONE (OUTPATIENT)
Dept: OBGYN CLINIC | Facility: HOSPITAL | Age: 42
End: 2022-07-25

## 2022-07-25 NOTE — TELEPHONE ENCOUNTER
Hello,  Please advise if the following patient can be forced onto the schedule:    Patient: Tea Torrez    RXN:[id number]    NVK:[phone]    Call back #: [phone]    Insurance: [employer]    Reason for appointment:nsurance:  2 mm depressed fracture involving the posterior aspect of the lateral tibial plateau  Small joint effusion  Requested doctor/location: Cristopher Mccord       Thank you    Patient scheduled for 8/2 with Eduar Frye  Sooner appointment needed  Then to cancel her 8/2 appointment

## 2022-07-27 ENCOUNTER — OFFICE VISIT (OUTPATIENT)
Dept: OBGYN CLINIC | Facility: HOSPITAL | Age: 42
End: 2022-07-27
Payer: COMMERCIAL

## 2022-07-27 VITALS
HEIGHT: 62 IN | SYSTOLIC BLOOD PRESSURE: 158 MMHG | WEIGHT: 189 LBS | HEART RATE: 87 BPM | BODY MASS INDEX: 34.78 KG/M2 | DIASTOLIC BLOOD PRESSURE: 97 MMHG

## 2022-07-27 DIAGNOSIS — M25.469 TRAUMATIC EFFUSION OF KNEE JOINT: ICD-10-CM

## 2022-07-27 DIAGNOSIS — S82.142A CLOSED FRACTURE OF LEFT TIBIAL PLATEAU, INITIAL ENCOUNTER: Primary | ICD-10-CM

## 2022-07-27 PROCEDURE — 99213 OFFICE O/P EST LOW 20 MIN: CPT | Performed by: ORTHOPAEDIC SURGERY

## 2022-07-27 NOTE — PROGRESS NOTES
Assessment:   Diagnosis ICD-10-CM Associated Orders   1  Closed fracture of left lateral  tibial plateau, initial encounter  S82 142A T-Rom  Post Op Knee Brace   2  Traumatic effusion of knee joint  [id number] Ambulatory Referral to Orthopedic Surgery    left       Plan:  Patient has left lateral tibial plateau fracture, DOI 7/23/22     · Discussed with patient she is to be nonweightbearing left lower extremity in T ROM brace with the use of a walker ; TROM unlocked for ROM exercises  · Work note was given out today:  Unable to return back to work for 6-8 weeks  Will re-evaluate patient in four weeks  · She is to follow-up in four weeks    To do next visit:  Return for left knee /repeat x-rays   The above stated was discussed in layman's terms and the patient expressed understanding  All questions were answered to the patient's satisfaction  Scribe Attestation    I,:  Cecelia Blizzard am acting as a scribe while in the presence of the attending physician :       I,:  Rajesh Britton MD personally performed the services described in this documentation    as scribed in my presence :             Subjective:   Darryl Gan is a 40 y o  female who presents for consultation for left knee pain  She had a injury on 07/23/2022  She states she was caring laundry and fell down 10 steps  She states she felt a pop in the left knee and was unable to weight bear  She was seen in the ED had x-rays taken the left knee which showed moderate joint effusion and no fractures dislocations  She had a  CT scan left lower extremity which showed a lateral tibial plateau fracture  She was also provided with a knee immobilizer and was prescribed oxycodone 5 mg  Patient states she stop wearing the knee immobilizer and is wearing over-the-counter knee brace  She states she is having diffuse left knee pain  She does state swelling  She has been taking Tylenol, meloxicam 15 mg and oxycodone 5 mg as needed pain relief  She states she has been nonweightbearing left lower extremity with the use of a walker  Review of systems negative unless otherwise specified in HPI  Review of Systems   Constitutional: Negative for chills, fever and unexpected weight change  HENT: Negative for ear pain, hearing loss, nosebleeds, postnasal drip and sore throat  Eyes: Negative for pain, redness and visual disturbance  Respiratory: Negative for cough, shortness of breath and wheezing  Cardiovascular: Negative for chest pain, palpitations and leg swelling  Gastrointestinal: Negative for abdominal pain, nausea and vomiting  Endocrine: Negative for polydipsia and polyuria  Genitourinary: Negative for dysuria and hematuria  Musculoskeletal: Positive for joint swelling and myalgias  Negative for arthralgias and back pain  Skin: Negative for color change, rash and wound  Neurological: Negative for dizziness, seizures, syncope, numbness and headaches  Psychiatric/Behavioral: Negative for decreased concentration and suicidal ideas  The patient is not nervous/anxious  All other systems reviewed and are negative        Past Medical History:   Diagnosis Date    Arthritis     Depression     DJD (degenerative joint disease)     Herpes     Hidradenitis     MRSA carrier        Past Surgical History:   Procedure Laterality Date    FL INJECTION RIGHT HIP (ARTHROGRAM)  1/6/2020    FL INJECTION RIGHT HIP (NON ARTHROGRAM)  7/24/2019    FL INJECTION RIGHT SHOULDER (ARTHROGRAM)  10/7/2019    NO PAST SURGERIES         Family History   Problem Relation Age of Onset    Arthritis Mother     Hypertension Mother     Diabetes Father     Stroke Father     Heart attack Father     Post-traumatic stress disorder Father     Other Father         nerve gas exposure-war    Schizophrenia Sister     Bipolar disorder Brother     Schizophrenia Brother     Sleep apnea Daughter     Obesity Daughter     Hypertension Daughter     ADD / ADHD Daughter     Depression Son     Post-traumatic stress disorder Son     Arthritis Maternal Grandmother     Heart attack Maternal Grandfather     Diabetes Paternal Grandmother     Stroke Paternal Grandmother     Heart attack Paternal Grandfather     Post-traumatic stress disorder Paternal Grandfather     Ovarian cysts Sister        Social History     Occupational History    Not on file   Tobacco Use    Smoking status: Current Every Day Smoker     Packs/day: 0 25     Types: Cigarettes    Smokeless tobacco: Former User   Vaping Use    Vaping Use: Never used   Substance and Sexual Activity    Alcohol use: Yes     Comment: holidays     Drug use: Yes     Types: Marijuana     Comment: few times a day     Sexual activity: Yes     Partners: Male     Birth control/protection: Injection         Current Outpatient Medications:     cyclobenzaprine (FLEXERIL) 10 mg tablet, Take 1 tablet (10 mg total) by mouth 3 (three) times a day as needed for muscle spasms, Disp: 30 tablet, Rfl: 0    Diclofenac Sodium (VOLTAREN) 1 %, , Disp: , Rfl:     medroxyPROGESTERone (DEPO-PROVERA) 150 mg/mL injection, Inject 1 mL (150 mg total) into a muscle every 3 (three) months, Disp: 1 mL, Rfl: 3    meloxicam (MOBIC) 15 mg tablet, take 1 tablet by mouth once daily, Disp: 90 tablet, Rfl: 3    mupirocin (BACTROBAN) 2 % ointment, Apply topically 3 (three) times a day, Disp: 22 g, Rfl: 0    oxyCODONE (Roxicodone) 5 immediate release tablet, Take 1 tablet (5 mg total) by mouth every 6 (six) hours as needed for moderate pain for up to 3 days Max Daily Amount: 20 mg, Disp: 12 tablet, Rfl: 0    predniSONE 10 mg tablet, Take 1 tablet (10 mg total) by mouth daily 6 tab day 1, 5 tab day 2, 4 tab day 3, 3 tab day 4, 2 tab day 5, 1 tab day 6, Disp: 21 tablet, Rfl: 0    sulfamethoxazole-trimethoprim (BACTRIM DS) 800-160 mg per tablet, Take 1 tablet by mouth 2 (two) times a day, Disp: , Rfl:     valACYclovir (VALTREX) 500 mg tablet, Take 1 tablet (500 mg total) by mouth daily, Disp: 90 tablet, Rfl: 3    Current Facility-Administered Medications:     medroxyPROGESTERone acetate (DEPO-PROVERA SYRINGE) IM injection 150 mg, 150 mg, Intramuscular, Q3 Months, KEYANA Zeng, 150 mg at 06/02/22 1054    No Known Allergies         Vitals:    07/27/22 1057   BP: 158/97   Pulse: 87       Objective:                    Ortho Exam   Left knee  Skin intact  No erythema   Small patch of ecchymosis over anteromedial aspect of the knee   Moderate swelling   Tenderness to palpation over lateral tibial plateau  Diagnostics, reviewed and taken today if performed as documented: The attending physician has personally reviewed the pertinent films in PACS and interpretation is as follows: CT scan L LE demonstrates lateral tibial plateau fracture  Procedures, if performed today:    Procedures    None performed      Portions of the record may have been created with voice recognition software  Occasional wrong word or "sound a like" substitutions may have occurred due to the inherent limitations of voice recognition software  Read the chart carefully and recognize, using context, where substitutions have occurred

## 2022-07-27 NOTE — LETTER
July 27, 2022     Patient: Domingo Diaz  YOB: 1980  Date of Visit: 7/27/2022      To Whom it May Concern:    Molina Perla is under my professional care  Jaceypatrizia Olivares was seen in my office on 7/27/2022  She is unable to return back to work for 6-8 weeks  Will re asset patient in 4 weeks  If you have any questions or concerns, please don't hesitate to call           Sincerely,          Mendoza Hudson MD        CC: No Recipients

## 2022-08-19 DIAGNOSIS — Z30.42 ENCOUNTER FOR SURVEILLANCE OF INJECTABLE CONTRACEPTIVE: ICD-10-CM

## 2022-08-21 DIAGNOSIS — S82.142A CLOSED FRACTURE OF LEFT TIBIAL PLATEAU, INITIAL ENCOUNTER: Primary | ICD-10-CM

## 2022-08-24 ENCOUNTER — ANNUAL EXAM (OUTPATIENT)
Dept: OBGYN CLINIC | Facility: CLINIC | Age: 42
End: 2022-08-24

## 2022-08-24 ENCOUNTER — PATIENT OUTREACH (OUTPATIENT)
Dept: OBGYN CLINIC | Facility: CLINIC | Age: 42
End: 2022-08-24

## 2022-08-24 VITALS
DIASTOLIC BLOOD PRESSURE: 88 MMHG | HEART RATE: 57 BPM | WEIGHT: 196.4 LBS | SYSTOLIC BLOOD PRESSURE: 134 MMHG | BODY MASS INDEX: 36.14 KG/M2 | HEIGHT: 62 IN

## 2022-08-24 DIAGNOSIS — Z11.3 SCREENING FOR STD (SEXUALLY TRANSMITTED DISEASE): ICD-10-CM

## 2022-08-24 DIAGNOSIS — Z13.31 POSITIVE DEPRESSION SCREENING: ICD-10-CM

## 2022-08-24 DIAGNOSIS — Z23 NEED FOR HPV VACCINE: ICD-10-CM

## 2022-08-24 DIAGNOSIS — Z30.42 ENCOUNTER FOR SURVEILLANCE OF INJECTABLE CONTRACEPTIVE: ICD-10-CM

## 2022-08-24 DIAGNOSIS — Z01.419 WOMEN'S ANNUAL ROUTINE GYNECOLOGICAL EXAMINATION: Primary | ICD-10-CM

## 2022-08-24 DIAGNOSIS — Z12.31 ENCOUNTER FOR SCREENING MAMMOGRAM FOR MALIGNANT NEOPLASM OF BREAST: ICD-10-CM

## 2022-08-24 PROCEDURE — 99396 PREV VISIT EST AGE 40-64: CPT | Performed by: NURSE PRACTITIONER

## 2022-08-24 PROCEDURE — 87591 N.GONORRHOEAE DNA AMP PROB: CPT | Performed by: NURSE PRACTITIONER

## 2022-08-24 PROCEDURE — 90471 IMMUNIZATION ADMIN: CPT | Performed by: NURSE PRACTITIONER

## 2022-08-24 PROCEDURE — 90651 9VHPV VACCINE 2/3 DOSE IM: CPT | Performed by: NURSE PRACTITIONER

## 2022-08-24 PROCEDURE — 87491 CHLMYD TRACH DNA AMP PROBE: CPT | Performed by: NURSE PRACTITIONER

## 2022-08-24 PROCEDURE — 96372 THER/PROPH/DIAG INJ SC/IM: CPT | Performed by: NURSE PRACTITIONER

## 2022-08-24 RX ORDER — MEDROXYPROGESTERONE ACETATE 150 MG/ML
150 INJECTION, SUSPENSION INTRAMUSCULAR
Qty: 1 ML | Refills: 3 | Status: SHIPPED | OUTPATIENT
Start: 2022-08-24

## 2022-08-24 RX ORDER — MEDROXYPROGESTERONE ACETATE 150 MG/ML
150 INJECTION, SUSPENSION INTRAMUSCULAR
Qty: 1 ML | Refills: 0 | OUTPATIENT
Start: 2022-08-24

## 2022-08-24 RX ORDER — MEDROXYPROGESTERONE ACETATE 150 MG/ML
150 INJECTION, SUSPENSION INTRAMUSCULAR ONCE
Status: COMPLETED | OUTPATIENT
Start: 2022-08-24 | End: 2022-08-24

## 2022-08-24 RX ORDER — MEDROXYPROGESTERONE ACETATE 150 MG/ML
INJECTION, SUSPENSION INTRAMUSCULAR
COMMUNITY
Start: 2022-08-19 | End: 2022-08-24 | Stop reason: SDUPTHER

## 2022-08-24 RX ADMIN — MEDROXYPROGESTERONE ACETATE 150 MG: 150 INJECTION, SUSPENSION INTRAMUSCULAR at 09:40

## 2022-08-24 NOTE — PROGRESS NOTES
Nichol Islas is a 40 y o  female who presents today for annual GYN exam  Her last pap smear was performed 2020 and result was NILM, HR-HPV negative  She reports no history of abnormal pap smears in her past  She has not received the HPV vaccine in the past  She has not yet had hr first mammogram   She reports menses as absent with depo  No LMP recorded  Her general medical history has been reviewed and she reports it as follows:    Past Medical History:   Diagnosis Date    Arthritis     Depression     DJD (degenerative joint disease)     Herpes     Hidradenitis     MRSA carrier      Past Surgical History:   Procedure Laterality Date    FL INJECTION RIGHT HIP (ARTHROGRAM)  2020    FL INJECTION RIGHT HIP (NON ARTHROGRAM)  2019    FL INJECTION RIGHT SHOULDER (ARTHROGRAM)  10/7/2019    NO PAST SURGERIES       OB History        3    Para   2    Term   2            AB   1    Living   2       SAB   1    IAB        Ectopic        Multiple        Live Births                   Social History     Tobacco Use    Smoking status: Current Every Day Smoker     Packs/day: 0 25     Types: Cigarettes    Smokeless tobacco: Former User   Vaping Use    Vaping Use: Never used   Substance Use Topics    Alcohol use: Yes     Comment: holidays     Drug use: Yes     Types: Marijuana     Comment: few times a day      Social History     Substance and Sexual Activity   Sexual Activity Yes    Partners: Male    Birth control/protection: Injection     Cancer-related family history is not on file  Current Outpatient Medications   Medication Instructions    cyclobenzaprine (FLEXERIL) 10 mg, Oral, 3 times daily PRN    Diclofenac Sodium (VOLTAREN) 1 % No dose, route, or frequency recorded      medroxyPROGESTERone (DEPO-PROVERA) 150 mg, Intramuscular, Every 3 months    medroxyPROGESTERone acetate (DEPO-PROVERA SYRINGE) 150 mg/mL injection No dose, route, or frequency recorded   meloxicam (MOBIC) 15 mg tablet take 1 tablet by mouth once daily    mupirocin (BACTROBAN) 2 % ointment Topical, 3 times daily    predniSONE 10 mg, Oral, Daily, 6 tab day 1, 5 tab day 2, 4 tab day 3, 3 tab day 4, 2 tab day 5, 1 tab day 6    sulfamethoxazole-trimethoprim (BACTRIM DS) 800-160 mg per tablet 1 tablet, Oral, 2 times daily    valACYclovir (VALTREX) 500 mg, Oral, Daily       Review of Systems:  Review of Systems   Constitutional: Negative  Gastrointestinal: Negative  Genitourinary: Negative  Skin: Negative  Physical Exam:  /88   Pulse 57   Ht 5' 2" (1 575 m)   Wt 89 1 kg (196 lb 6 4 oz)   BMI 35 92 kg/m²   Physical Exam  Constitutional:       General: She is not in acute distress  Appearance: Normal appearance  Genitourinary:      Vulva and bladder normal       No lesions in the vagina  No vaginal erythema or ulceration  Right Adnexa: not tender and no mass present  Left Adnexa: not tender and no mass present  No cervical motion tenderness or lesion  Uterus is not enlarged or tender  No uterine mass detected  Breasts:      Right: No mass, nipple discharge or skin change  Left: No mass, nipple discharge or skin change  Cardiovascular:      Rate and Rhythm: Normal rate and regular rhythm  Pulmonary:      Effort: Pulmonary effort is normal       Breath sounds: Normal breath sounds  Abdominal:      General: Abdomen is flat  Palpations: Abdomen is soft  Musculoskeletal:      Cervical back: Neck supple  Neurological:      Mental Status: She is alert  Skin:     General: Skin is warm and dry  Psychiatric:         Mood and Affect: Mood normal          Behavior: Behavior normal    Vitals reviewed  Assessment/Plan:   1  Normal well-woman GYN exam   2  Cervical cancer screening:  Normal cervical exam   Pap smear up to date  3  STD screening:  Orders placed for vaginal GC/CT cultures     4  Breast cancer screening:  Normal breast exam   Order placed for bilateral screening mammogram   Reviewed breast self-awareness  5  Discussed HPV vaccine  She would like to receive this  First dose given today  6  Depression Screening: Patient's depression screening was assessed with a PHQ-2 score of 2  Their PHQ-9 score was 10  Patient assessed for underlying major depression  They have no active suicidal ideations  Brief counseling provided and recommend additional follow-up/re-evaluation next office visit  She is interested in starting mental health therapy  Referral placed for  consultation  7  BMI Counseling: Body mass index is 35 92 kg/m²  Discussed the patient's BMI with her  The BMI is above normal  Nutrition recommendations include reducing portion sizes, decreasing overall calorie intake, 3-5 servings of fruits/vegetables daily, moderation in carbohydrate intake and increasing intake of lean protein  Exercise recommendations include moderate aerobic physical activity for 150 minutes/week and exercising 3-5 times per week  Patient has a knee injury and will be starting PT soon  States she has gained a few pounds in the last 6 weeks since the injury and her lack of mobility, but plans to work on weight as soon as she is more mobile  9  Tobacco Cessation Counseling: Tobacco cessation counseling and education was provided  The patient is sincerely urged to quit consumption of tobacco  She is not ready to quit tobacco  The numerous health risks of tobacco consumption were discussed  If she decides to quit, there are a number of helpful adjunctive aids, and she can see me to discuss nicotine replacement therapy, chantix, or bupropion anytime in the future  10  Contraception:  Depo-provera  Discussed potential for bone loss  Recommended health diet, smoking cessation, and weight bearing exercise  Also recommended Vit D and Calcium supplements      11  Return to office as scheduled to complete HPV vaccine series, every 3 months for depo and in one year for annual exam     Reviewed with patient that test results are available in [facility] immediately, but that they will not necessarily be reviewed by me immediately  Explained that I will review results at my earliest opportunity and contact patient appropriately

## 2022-08-25 ENCOUNTER — PATIENT OUTREACH (OUTPATIENT)
Dept: OBGYN CLINIC | Facility: CLINIC | Age: 42
End: 2022-08-25

## 2022-08-25 LAB
C TRACH DNA SPEC QL NAA+PROBE: NEGATIVE
N GONORRHOEA DNA SPEC QL NAA+PROBE: NEGATIVE

## 2022-08-25 NOTE — PROGRESS NOTES
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  Pt denies any Hx of Depression  Pt denies any usage or drug or alcohol  Pt has MA  Pt reported is dealing with a lot of stressed since she must look for her own place in the next months  Pt reported she has been feeling anxious, over eating or not eating at all, tossing and turning at night, sad feeling but denies SI  / HI  Pt claimed has good support from mom and her oldest child dad  Pt verbalized interest on MH evaluation and requested SIRISHA YUN assistance to schedule appointment  SIRISHA YUN called [facility] and left message  Will call Pt with information  Pt denies other needs at this time

## 2022-08-26 ENCOUNTER — OFFICE VISIT (OUTPATIENT)
Dept: OBGYN CLINIC | Facility: HOSPITAL | Age: 42
End: 2022-08-26
Payer: COMMERCIAL

## 2022-08-26 ENCOUNTER — HOSPITAL ENCOUNTER (OUTPATIENT)
Dept: RADIOLOGY | Facility: HOSPITAL | Age: 42
Discharge: HOME | End: 2022-08-26
Attending: ORTHOPAEDIC SURGERY
Payer: COMMERCIAL

## 2022-08-26 VITALS
BODY MASS INDEX: 35.85 KG/M2 | WEIGHT: 194.8 LBS | HEART RATE: 77 BPM | SYSTOLIC BLOOD PRESSURE: 130 MMHG | DIASTOLIC BLOOD PRESSURE: 85 MMHG | HEIGHT: 62 IN

## 2022-08-26 DIAGNOSIS — S82.142A CLOSED FRACTURE OF LEFT TIBIAL PLATEAU, INITIAL ENCOUNTER: Primary | ICD-10-CM

## 2022-08-26 DIAGNOSIS — S82.142A CLOSED FRACTURE OF LEFT TIBIAL PLATEAU, INITIAL ENCOUNTER: ICD-10-CM

## 2022-08-26 PROCEDURE — 99213 OFFICE O/P EST LOW 20 MIN: CPT | Performed by: ORTHOPAEDIC SURGERY

## 2022-08-26 PROCEDURE — 73560 X-RAY EXAM OF KNEE 1 OR 2: CPT

## 2022-08-26 RX ORDER — IBUPROFEN 600 MG/1
600 TABLET ORAL EVERY 6 HOURS PRN
Qty: 30 TABLET | Refills: 0 | Status: SHIPPED | OUTPATIENT
Start: 2022-08-26

## 2022-08-26 NOTE — PATIENT INSTRUCTIONS
- Begin weight bearing as tolerated left lower extremity   - begin physical therapy as directed   - Script provided for ibuprofen to take as needed   - Over the counter analgesics as needed / directed   - Ice / heat as directed   - Follow up 2 months with repeat XR

## 2022-08-26 NOTE — PROGRESS NOTES
Orthopaedics Office Visit - Follow up Patient Visit    ASSESSMENT/PLAN:    Assessment:   4 weeks status post injury resulting in a left lateral tibial plateau fracture  DOI 7/27/22   Improving symptoms   Non-compliant with weight bearing restrictions       Plan:   - Begin weight bearing as tolerated left lower extremity   - begin physical therapy as directed   - Script provided for ibuprofen to take as needed   - Over the counter analgesics as needed / directed   - Ice / heat as directed   - Follow up 2 months with repeat XR       To Do Next Visit:  XR left knee     _____________________________________________________  CHIEF COMPLAINT:  Chief Complaint   Patient presents with    Left Knee - Follow-up         SUBJECTIVE:  Juliano Soler is a 40 y o  female who presents To the office for follow-up evaluation of her left knee  Patient is 4 weeks status post injury resulting in a left lateral tibial plateau fracture  Patient states that her knee is doing well overall  Patient states she has resolving pain over the lateral aspect of the knee  Patient states she has been weight-bearing with the use of a cane and a neoprene knee brace  Patient denies any new worsening symptoms in her knee  Patient states she has been taking meloxicam as needed for pain but states that it is causing her to have headaches and has since transitioned to ibuprofen  Patient offers no other complaints at this time        PAST MEDICAL HISTORY:  Past Medical History:   Diagnosis Date    Arthritis     Depression     DJD (degenerative joint disease)     Herpes     Hidradenitis     MRSA carrier        PAST SURGICAL HISTORY:  Past Surgical History:   Procedure Laterality Date    FL INJECTION RIGHT HIP (ARTHROGRAM)  1/6/2020    FL INJECTION RIGHT HIP (NON ARTHROGRAM)  7/24/2019    FL INJECTION RIGHT SHOULDER (ARTHROGRAM)  10/7/2019    NO PAST SURGERIES         FAMILY HISTORY:  Family History   Problem Relation Age of Onset  Arthritis Mother     Hypertension Mother     Diabetes Father     Stroke Father     Heart attack Father     Post-traumatic stress disorder Father     Other Father         nerve gas exposure-war    Schizophrenia Sister     Bipolar disorder Brother     Schizophrenia Brother     Sleep apnea Daughter    Ardeth Needs Obesity Daughter     Hypertension Daughter     ADD / ADHD Daughter     Depression Son     Post-traumatic stress disorder Son     Arthritis Maternal Grandmother     Heart attack Maternal Grandfather     Diabetes Paternal Grandmother     Stroke Paternal Grandmother     Heart attack Paternal Grandfather     Post-traumatic stress disorder Paternal Grandfather     Ovarian cysts Sister        SOCIAL HISTORY:  Social History     Tobacco Use    Smoking status: Current Every Day Smoker     Packs/day: 0 25     Types: Cigarettes    Smokeless tobacco: Former User   Vaping Use    Vaping Use: Never used   Substance Use Topics    Alcohol use: Yes     Comment: holidays     Drug use: Yes     Types: Marijuana     Comment: few times a day        MEDICATIONS:    Current Outpatient Medications:     Diclofenac Sodium (VOLTAREN) 1 %, , Disp: , Rfl:     medroxyPROGESTERone (DEPO-PROVERA) 150 mg/mL injection, Inject 1 mL (150 mg total) into a muscle every 3 (three) months, Disp: 1 mL, Rfl: 3    medroxyPROGESTERone acetate (DEPO-PROVERA SYRINGE) 150 mg/mL injection, Inject 1 mL (150 mg total) into a muscle every 3 (three) months, Disp: 1 mL, Rfl: 3    meloxicam (MOBIC) 15 mg tablet, take 1 tablet by mouth once daily, Disp: 90 tablet, Rfl: 3    mupirocin (BACTROBAN) 2 % ointment, Apply topically 3 (three) times a day, Disp: 22 g, Rfl: 0    sulfamethoxazole-trimethoprim (BACTRIM DS) 800-160 mg per tablet, Take 1 tablet by mouth 2 (two) times a day, Disp: , Rfl:     valACYclovir (VALTREX) 500 mg tablet, Take 1 tablet (500 mg total) by mouth daily, Disp: 90 tablet, Rfl: 3    cyclobenzaprine (FLEXERIL) 10 mg tablet, Take 1 tablet (10 mg total) by mouth 3 (three) times a day as needed for muscle spasms (Patient not taking: No sig reported), Disp: 30 tablet, Rfl: 0    predniSONE 10 mg tablet, Take 1 tablet (10 mg total) by mouth daily 6 tab day 1, 5 tab day 2, 4 tab day 3, 3 tab day 4, 2 tab day 5, 1 tab day 6 (Patient not taking: No sig reported), Disp: 21 tablet, Rfl: 0    Current Facility-Administered Medications:     medroxyPROGESTERone acetate (DEPO-PROVERA SYRINGE) IM injection 150 mg, 150 mg, Intramuscular, Q3 Months, KEYANA Zeng, 150 mg at 06/02/22 1054    ALLERGIES:  No Known Allergies    REVIEW OF SYSTEMS:  MSK: Left knee pain   Neuro: WNL   Pertinent items are otherwise noted in HPI  A comprehensive review of systems was otherwise negative  LABS:  HgA1c: No results found for: HGBA1C  BMP:   Lab Results   Component Value Date    GLUCOSE 111 12/22/2014    CALCIUM 8 0 (L) 06/04/2019     (L) 12/22/2014    K 3 4 (L) 06/04/2019    CO2 24 06/04/2019     (H) 06/04/2019    BUN 4 (L) 06/04/2019    CREATININE 0 67 06/04/2019     CBC: No components found for: CBC    _____________________________________________________  PHYSICAL EXAMINATION:  Vital signs: /85   Pulse 77   Ht 5' 2" (1 575 m)   Wt 88 4 kg (194 lb 12 8 oz)   BMI 35 63 kg/m²   General: No acute distress, awake and alert  Psychiatric: Mood and affect appear appropriate  HEENT: Trachea Midline, No torticollis, no apparent facial trauma  Cardiovascular: No audible murmurs; Extremities appear perfused  Pulmonary: No audible wheezing or stridor  Skin: No open lesions; see further details (if any) below      MUSCULOSKELETAL EXAMINATION:  Left knee examination:  - Patient sitting comfortably in the office in no acute distress   - No acute visible abnormality present in the left knee  Extremity appears well-perfused overall   - Tenderness palpation noted over the lateral tibial plateau mildly    No other bony or soft tissue tenderness to palpation noted at this time  -   0-90 degrees of range of motion limited by pain   - NV intact    _____________________________________________________  STUDIES REVIEWED:  I personally reviewed the images and interpretation is as follows:  Left knee XR 3 views:    Healing lateral tibial plateau fracture in acceptable anatomic alignment        PROCEDURES PERFORMED:  No procedures were performed at this time  Fernanda Mccallum PA-C - assisting  Adelso Retana MD            Portions of the record may have been created with voice recognition software  Occasional wrong word or "sound a like" substitutions may have occurred due to the inherent limitations of voice recognition software  Read the chart carefully and recognize, using context, where substitutions have occurred

## 2022-08-26 NOTE — LETTER
August 26, 2022     Patient: Lord Stout  YOB: 1980  Date of Visit: 8/26/2022      To Whom it May Concern:    Janine Pfeiffer is under my professional care  Mukesh Mahan was seen in my office on 8/26/2022  Mukesh Negrito may return to work on 8/29/22 the the following conditions   :  Maximum 20 hours per week with seated breaks every 2 hours if needed  If you have any questions or concerns, please don't hesitate to call           Sincerely,          Ander Ferrell MD

## 2022-08-31 ENCOUNTER — EVALUATION (OUTPATIENT)
Dept: PHYSICAL THERAPY | Facility: CLINIC | Age: 42
End: 2022-08-31
Payer: COMMERCIAL

## 2022-08-31 DIAGNOSIS — S82.142D CLOSED FRACTURE OF LEFT TIBIAL PLATEAU WITH ROUTINE HEALING, SUBSEQUENT ENCOUNTER: Primary | ICD-10-CM

## 2022-08-31 PROCEDURE — 97161 PT EVAL LOW COMPLEX 20 MIN: CPT | Performed by: PHYSICAL THERAPIST

## 2022-08-31 PROCEDURE — 97110 THERAPEUTIC EXERCISES: CPT | Performed by: PHYSICAL THERAPIST

## 2022-08-31 NOTE — PROGRESS NOTES
PT Evaluation     Today's date: 2022  Patient name: Naila Banks  : 1980  MRN: [id number]  Referring provider: YVETTE [place] Che*  Dx:   Encounter Diagnosis     ICD-10-CM    1  Closed fracture of left tibial plateau with routine healing, subsequent encounter  S82 142D Ambulatory Referral to Physical Therapy       Start Time: 2932  Stop Time: 1625  Total time in clinic (min): 40 minutes    Assessment  Assessment details: Pt presents with impairments of L hip, knee, and ankle ROM, strength, edema, WB tolerance, gait, and pain  She will benefit from skilled PT services to address her impairments in order to decrease pain and restore function    Impairments: abnormal gait, abnormal muscle firing, abnormal or restricted ROM, abnormal movement, activity intolerance, impaired balance, impaired physical strength, lacks appropriate home exercise program, pain with function, weight-bearing intolerance and poor body mechanics  Understanding of Dx/Px/POC: good   Prognosis: good    Goals  STG - 4 wks  1) pt will demonstrate L knee PROM 0-130 deg  2) pt will demonstrate 7 deg DF (ke) PROM  3) pt will improve gait abnormalities 50%    LTG - 8-12 wks  1) pt will demonstrate 10 deg DF (ke) PROM  2) pt will demonstrate normalized gait  3) pt will report no difficulty with work activities    Plan  Planned modality interventions: low level laser therapy and cryotherapy  Planned therapy interventions: manual therapy, abdominal trunk stabilization, balance/weight bearing training, body mechanics training, neuromuscular re-education, patient education, strengthening, stretching, therapeutic activities, therapeutic exercise, home exercise program, gait training, functional ROM exercises and flexibility  Frequency: 2x week  Duration in weeks: 8  Treatment plan discussed with: patient        Subjective Evaluation    History of Present Illness  Mechanism of injury: Pt is a 40 y o  female with PMHx significant for depression, rheumatoid arteritis  She presents to PT s/p L lateral tibial plateau fx 3/54/22  She reports sudden onset of L knee pain, swelling, and instability after falling down the stairs  Pain  Current pain ratin  At best pain ratin  At worst pain ratin  Location: posterior, anterior, lateral L knee  Quality: pulling, tight, pressure, sharp and dull ache  Relieving factors: rest, support and ice  Aggravating factors: standing, walking and stair climbing    Social Support    Employment status: working ([employer] - 6 hr/shift with [employer])    Diagnostic Tests  X-ray: abnormal (healing L lateral tibial plateau fx)  Treatments  Previous treatment: immobilization  Patient Goals  Patient goals for therapy: decreased edema, decreased pain, improved balance, increased motion, increased strength, independence with ADLs/IADLs and return to sport/leisure activities          Objective     Observations   Left Knee   Positive for edema (1+ knee, LL, foot)  Negative for deformity       Tenderness     Additional Tenderness Details  (+) lateral tibia    Neurological Testing     Sensation     Knee   Left Knee   Intact: light touch    Active Range of Motion   Left Knee   Flexion: 110 degrees with pain  Extension: 14 degrees with pain    Passive Range of Motion   Left Knee   Flexion: 111 degrees with pain  Extension: 12 degrees with pain  Left Ankle/Foot    Dorsiflexion (ke): -3 degrees with pain  Plantar flexion: 50 degrees   Inversion: 30 degrees with pain  Eversion: 20 degrees with pain    Strength/Myotome Testing     Left Hip   Planes of Motion   Flexion: 3+  Extension: 4-  Abduction: 4-  Adduction: 4-    Left Knee   Flexion: 4-  Extension: 3+  Quadriceps contraction: fair    Left Ankle/Foot   Dorsiflexion: 4-  Plantar flexion: 2+  Inversion: 4-  Eversion: 4-    General Comments:      Knee Comments  Gait: severely antalgic LLE gait, moderate L knee dynamic valgus, severe L hip ER, severe loss of L TKE, no push-off    Gait abnormalities improved 50% with cueing following HEP             Precautions:  PMHx: depression, rheumatoid arteritis  WBAT  Dx: L lateral tibial plateau fx 4/77/59    Manuals 8/31            L GA stretch             L knee PROM                                       Neuro Re-Ed                                                                                                        Ther Ex             Heel slides 5"x10            Quad sets 5"x10            Supine GA stretch 10"x3            SLR 1x10            SL hip ABD 1x10            TB PF  Blue                                       Ther Activity                                       Gait Training             AlterG  50% BW           Step-ups  4"/           Modalities

## 2022-09-01 ENCOUNTER — OFFICE VISIT (OUTPATIENT)
Dept: PHYSICAL THERAPY | Facility: CLINIC | Age: 42
End: 2022-09-01
Payer: COMMERCIAL

## 2022-09-01 DIAGNOSIS — S82.142D CLOSED FRACTURE OF LEFT TIBIAL PLATEAU WITH ROUTINE HEALING, SUBSEQUENT ENCOUNTER: Primary | ICD-10-CM

## 2022-09-01 PROCEDURE — 97116 GAIT TRAINING THERAPY: CPT

## 2022-09-01 PROCEDURE — 97140 MANUAL THERAPY 1/> REGIONS: CPT

## 2022-09-01 PROCEDURE — 97110 THERAPEUTIC EXERCISES: CPT

## 2022-09-01 NOTE — PROGRESS NOTES
Daily Note     Today's date: 2022  Patient name: Kelly Jean  : 1980  MRN: [id number]  Referring provider: YVETTE Smith*  Dx:   Encounter Diagnosis     ICD-10-CM    1  Closed fracture of left tibial plateau with routine healing, subsequent encounter  S82 418D                   Subjective: Pt reports 4/10 left knee pain following work today  Objective: See treatment diary below; L knee PROM: 5-120 degrees      Assessment: Pt demonstrated moderate knee valgus with step ups, significantly improved gait abnormalities with cueing during Alter G ambulation  Plan: Continue poc as per PT  Precautions:  PMHx: depression, rheumatoid arteritis  WBAT  Dx: L lateral tibial plateau fx     Manuals            L GA stretch  3x15"           L knee PROM  10 min                                     Neuro Re-Ed                                                                                                        Ther Ex             Heel slides 5"x10 5"x10           Quad sets 5"x10 5"x10           Supine GA stretch 10"x3 10"x3           SLR 1x10 1x10           SL hip ABD 1x10 1x10           TB PF  Blue  2x10                                       Ther Activity                                       Gait Training             AlterG  50% BW  0 8 MPH  6 min           Step-ups  4"/ 1x10           Modalities             CP   10 min

## 2022-09-06 ENCOUNTER — APPOINTMENT (OUTPATIENT)
Dept: PHYSICAL THERAPY | Facility: CLINIC | Age: 42
End: 2022-09-06
Payer: COMMERCIAL

## 2022-09-07 ENCOUNTER — APPOINTMENT (OUTPATIENT)
Dept: PHYSICAL THERAPY | Facility: CLINIC | Age: 42
End: 2022-09-07
Payer: COMMERCIAL

## 2022-09-08 ENCOUNTER — APPOINTMENT (OUTPATIENT)
Dept: PHYSICAL THERAPY | Facility: CLINIC | Age: 42
End: 2022-09-08
Payer: COMMERCIAL

## 2022-09-12 ENCOUNTER — APPOINTMENT (OUTPATIENT)
Dept: PHYSICAL THERAPY | Facility: CLINIC | Age: 42
End: 2022-09-12
Payer: COMMERCIAL

## 2022-09-14 ENCOUNTER — OFFICE VISIT (OUTPATIENT)
Dept: PHYSICAL THERAPY | Facility: CLINIC | Age: 42
End: 2022-09-14
Payer: COMMERCIAL

## 2022-09-14 DIAGNOSIS — S82.142D CLOSED FRACTURE OF LEFT TIBIAL PLATEAU WITH ROUTINE HEALING, SUBSEQUENT ENCOUNTER: Primary | ICD-10-CM

## 2022-09-14 PROCEDURE — 97140 MANUAL THERAPY 1/> REGIONS: CPT | Performed by: PHYSICAL THERAPIST

## 2022-09-14 NOTE — PROGRESS NOTES
Daily Note     Today's date: 2022  Patient name: Jose Luis Weir  : 1980  MRN: [id number]  Referring provider: YVETTE Bay*  Dx:   Encounter Diagnosis     ICD-10-CM    1  Closed fracture of left tibial plateau with routine healing, subsequent encounter  S82 896D                   Subjective: Pt reports 5/10 generalized L knee pain with household ambulation  She reports increased pain after stepping awkwardly yesterday  She arrived 20 minutes late to appt and was accommodated  Objective: See treatment diary below; L knee PROM: 1-120 degrees    Assessment: Pt demonstrated improved PROM despite increased pain and poor WB tolerance  Plan: Cont  POC  Precautions:  PMHx: depression, rheumatoid arteritis  WBAT  Dx: L lateral tibial plateau fx     Manuals           L GA stretch  3x15" 15"x6          L knee PROM  10 min 6 min          L post knee laser   [id number]                       Neuro Re-Ed                                                                                                        Ther Ex             Heel slides 5"x10 5"x10           Quad sets 5"x10 5"x10           Supine GA stretch 10"x3 10"x3           SLR 1x10 1x10           SL hip ABD 1x10 1x10           TB PF  Blue  2x10                                       Ther Activity                                       Gait Training             AlterG  50% BW  0 8 MPH  6 min 50% BW  0 8 mph  5 min          Step-ups  4"/ 1x10 np          Modalities             CP   10 min np

## 2022-09-15 DIAGNOSIS — R52 PAIN: Primary | ICD-10-CM

## 2022-09-19 ENCOUNTER — OFFICE VISIT (OUTPATIENT)
Dept: PHYSICAL THERAPY | Facility: CLINIC | Age: 42
End: 2022-09-19
Payer: COMMERCIAL

## 2022-09-19 DIAGNOSIS — S82.142D CLOSED FRACTURE OF LEFT TIBIAL PLATEAU WITH ROUTINE HEALING, SUBSEQUENT ENCOUNTER: Primary | ICD-10-CM

## 2022-09-19 PROCEDURE — 97140 MANUAL THERAPY 1/> REGIONS: CPT

## 2022-09-19 PROCEDURE — 97110 THERAPEUTIC EXERCISES: CPT

## 2022-09-19 NOTE — PROGRESS NOTES
Daily Note     Today's date: 2022  Patient name: Aguila Cabrera  : 1980  MRN: [id number]  Referring provider: YVETTE Joshua*  Dx:   Encounter Diagnosis     ICD-10-CM    1  Closed fracture of left tibial plateau with routine healing, subsequent encounter  S82 642D                   Subjective: Pt reports 5/10 anterior and posterior left knee pain pre tx  Objective: See treatment diary below    Assessment: Pt demonstrated no increased L knee pain with WB strengthening and no loss of ROM  Plan: Continue to progress strengthening as per pt tolerance  Precautions:  PMHx: depression, rheumatoid arteritis  WBAT  Dx: L lateral tibial plateau fx 29    Manuals           L GA stretch  3x15" 15"x6          L knee PROM  10 min 6 min          L post knee laser   [id number]                       Neuro Re-Ed                                                                                                        Ther Ex             Heel slides 5"x10 5"x10 5"x10          Quad sets 5"x10 5"x10 5"x10          Supine GA stretch 10"x3 10"x3 10"x3          SLR 1x10 1x10 1x10          SL hip ABD 1x10 1x10 1x10          TB PF  Blue  2x10   Blue  3x10                                    Ther Activity                                       Gait Training             AlterG  50% BW  0 8 MPH  6 min 50% BW  0 8 mph  6 min          Step-ups  4"/ 1x10 np          Modalities             CP   10 min np

## 2022-09-21 ENCOUNTER — HOSPITAL ENCOUNTER (OUTPATIENT)
Dept: RADIOLOGY | Facility: HOSPITAL | Age: 42
Discharge: HOME | End: 2022-09-21
Attending: ORTHOPAEDIC SURGERY
Payer: COMMERCIAL

## 2022-09-21 ENCOUNTER — OFFICE VISIT (OUTPATIENT)
Dept: OBGYN CLINIC | Facility: HOSPITAL | Age: 42
End: 2022-09-21
Payer: COMMERCIAL

## 2022-09-21 ENCOUNTER — OFFICE VISIT (OUTPATIENT)
Dept: PHYSICAL THERAPY | Facility: CLINIC | Age: 42
End: 2022-09-21
Payer: COMMERCIAL

## 2022-09-21 VITALS
DIASTOLIC BLOOD PRESSURE: 87 MMHG | SYSTOLIC BLOOD PRESSURE: 132 MMHG | BODY MASS INDEX: 35.55 KG/M2 | HEART RATE: 82 BPM | HEIGHT: 62 IN | WEIGHT: 193.2 LBS

## 2022-09-21 DIAGNOSIS — M54.41 ACUTE BILATERAL LOW BACK PAIN WITH RIGHT-SIDED SCIATICA: Primary | ICD-10-CM

## 2022-09-21 DIAGNOSIS — M54.41 CHRONIC LOW BACK PAIN WITH RIGHT-SIDED SCIATICA, UNSPECIFIED BACK PAIN LATERALITY: ICD-10-CM

## 2022-09-21 DIAGNOSIS — G89.29 CHRONIC LOW BACK PAIN WITH RIGHT-SIDED SCIATICA, UNSPECIFIED BACK PAIN LATERALITY: ICD-10-CM

## 2022-09-21 DIAGNOSIS — M51.36 LUMBAR DEGENERATIVE DISC DISEASE: ICD-10-CM

## 2022-09-21 DIAGNOSIS — M54.50 LOW BACK PAIN, UNSPECIFIED BACK PAIN LATERALITY, UNSPECIFIED CHRONICITY, UNSPECIFIED WHETHER SCIATICA PRESENT: ICD-10-CM

## 2022-09-21 DIAGNOSIS — M53.3 SI (SACROILIAC) JOINT DYSFUNCTION: ICD-10-CM

## 2022-09-21 DIAGNOSIS — S82.142D CLOSED FRACTURE OF LEFT TIBIAL PLATEAU WITH ROUTINE HEALING, SUBSEQUENT ENCOUNTER: Primary | ICD-10-CM

## 2022-09-21 PROCEDURE — 72110 X-RAY EXAM L-2 SPINE 4/>VWS: CPT

## 2022-09-21 PROCEDURE — 97164 PT RE-EVAL EST PLAN CARE: CPT | Performed by: PHYSICAL THERAPIST

## 2022-09-21 PROCEDURE — 99213 OFFICE O/P EST LOW 20 MIN: CPT | Performed by: ORTHOPAEDIC SURGERY

## 2022-09-21 PROCEDURE — 97140 MANUAL THERAPY 1/> REGIONS: CPT | Performed by: PHYSICAL THERAPIST

## 2022-09-21 PROCEDURE — 97112 NEUROMUSCULAR REEDUCATION: CPT | Performed by: PHYSICAL THERAPIST

## 2022-09-21 PROCEDURE — 97110 THERAPEUTIC EXERCISES: CPT | Performed by: PHYSICAL THERAPIST

## 2022-09-21 NOTE — PROGRESS NOTES
PT Re-Evaluation     Today's date: 2022  Patient name: Alex Jason  : 1980  MRN: [id number]  Referring provider: YVETTE Woo*  Dx:   Encounter Diagnosis     ICD-10-CM    1  Closed fracture of left tibial plateau with routine healing, subsequent encounter  S82 142D    2  Chronic low back pain with right-sided sciatica, unspecified back pain laterality  M54 41     G89 29                   Assessment  Assessment details: Pt presents with s/s consistent with a CLBP with a flexion and stabilization classification  She was able to abolish her constant RLE symptoms with repeated seated lumbar flexion but overall a very limited tolerance to ROM and stabilization  She will benefit from skilled PT services to address her impairments in order to decrease pain and improve function    Impairments: abnormal gait, abnormal muscle firing, abnormal or restricted ROM, abnormal movement, activity intolerance, impaired balance, impaired physical strength, lacks appropriate home exercise program, pain with function, weight-bearing intolerance and poor body mechanics  Understanding of Dx/Px/POC: good   Prognosis: good    Goals  STG - 4 wks  1) pt will demonstrate L knee PROM 0-130 deg  2) pt will demonstrate 7 deg DF (ke) PROM  3) pt will improve gait abnormalities 50%    LTG - 8-12 wks  1) pt will demonstrate 10 deg DF (ke) PROM  2) pt will demonstrate normalized gait  3) pt will report no difficulty with work activities    Plan  Planned modality interventions: low level laser therapy and cryotherapy  Planned therapy interventions: manual therapy, abdominal trunk stabilization, balance/weight bearing training, body mechanics training, neuromuscular re-education, patient education, strengthening, stretching, therapeutic activities, therapeutic exercise, home exercise program, gait training, functional ROM exercises and flexibility  Frequency: 2x week  Duration in weeks: 8  Treatment plan discussed with: patient        Subjective Evaluation    History of Present Illness  Mechanism of injury: Pt is a 40 y o  female with PMHx significant for depression, rheumatoid arteritis  She presents with new Rx for CLBP now radiating into the posterior RLE into the medial foot x 6 wks  She reports constant RLE pain and LBP, denies red flags  Imaging: L4-S1 DDD per x-rays  Pain  Current pain ratin  At best pain ratin  At worst pain ratin  Location: posterior, anterior, lateral L knee; B/L L/S into posterior RLE into medial foot  Quality: pulling, tight, pressure, sharp and dull ache  Relieving factors: rest, support and ice  Aggravating factors: standing, walking and stair climbing    Social Support    Employment status: working ([employer] - 6 hr/shift with [employer])    Diagnostic Tests  X-ray: abnormal (healing L lateral tibial plateau fx)  Treatments  Previous treatment: immobilization  Patient Goals  Patient goals for therapy: decreased edema, decreased pain, improved balance, increased motion, increased strength, independence with ADLs/IADLs and return to sport/leisure activities          Objective     Observations   Left Knee   Positive for edema (1+ knee, LL, foot)  Negative for deformity  Palpation   Left   Hypertonic in the erector spinae and lumbar paraspinals  Tenderness of the erector spinae and lumbar paraspinals  Right   Hypertonic in the erector spinae and lumbar paraspinals  Tenderness of the erector spinae and lumbar paraspinals       Tenderness     Additional Tenderness Details  (+) lateral tibia    Neurological Testing     Sensation     Lumbar   Left   Intact: light touch    Right   Diminished: light touch    Comments   Right light touch: L2-S1    Knee   Left Knee   Intact: light touch    Reflexes   Left   Patellar (L4): normal (2+)  Achilles (S1): normal (2+)    Right   Patellar (L4): normal (2+)  Achilles (S1): normal (2+)    Active Range of Motion     Lumbar   Flexion: Active lumbar flexion: (+) aberrant motion, pain upon return  WFL and with pain  Extension:  with pain Restriction level: maximal  Left lateral flexion:  with pain Restriction level: minimal  Right lateral flexion:  with pain  Left rotation:  with pain Restriction level: minimal  Right rotation:  with pain Restriction level: minimal  Left Knee   Flexion: 110 degrees with pain  Extension: 14 degrees with pain    Passive Range of Motion   Left Knee   Flexion: 111 degrees with pain  Extension: 12 degrees with pain  Left Ankle/Foot    Dorsiflexion (ke): -3 degrees with pain  Plantar flexion: 50 degrees   Inversion: 30 degrees with pain  Eversion: 20 degrees with pain  Mechanical Assessment    Cervical      Thoracic      Lumbar    Standing flexion: repeated movements   Pain location:centralized  Pain intensity: worse  Pain level: increased  Lying flexion: repeated movements  Pain location: no change  Pain intensity: worse  Pain level: increased  Sitting flexion: repeated movements  Pain location: centralized  Pain intensity: worse  Pain level: increased    Strength/Myotome Testing     Left Hip   Planes of Motion   Flexion: 3+  Extension: 4-  Abduction: 4-  Adduction: 4-    Right Hip   Planes of Motion   Flexion: 4-    Left Knee   Flexion: 4-  Extension: 3+  Quadriceps contraction: fair    Right Knee   Flexion: 4-  Extension: 4-    Left Ankle/Foot   Dorsiflexion: 4-  Plantar flexion: 2+  Inversion: 4-  Eversion: 4-    Right Ankle/Foot   Dorsiflexion: 4  Plantar flexion: 4  Great toe extension: 4-    General Comments:      Knee Comments  Gait: severely antalgic LLE gait, moderate L knee dynamic valgus, severe L hip ER, severe loss of L TKE, no push-off    Gait abnormalities improved 50% with cueing following HEP             Precautions:  PMHx: depression, rheumatoid arteritis  WBAT  Dx: L lateral tibial plateau fx 0/58/75, CLBP and RLE radiculopathy with a flexion/stabilization classification    Manuals 9/21            L GA stretch 10"x3            L knee PROM 5 min            [place] R lumbar paraspinals 3 min                         Neuro Re-Ed             Abdominal bracing 5"x10            Supine DLS marches [id number]                                                                            Ther Ex             Heel slides 5"x15  HEP            Standing GA stretch 10"x3                         SLR 2x10            SL hip ABD 2x10            TB PF             DKC 10"x5            Seated lumbar flexion 1x10            Ther Activity                                       Gait Training             AlterG 50% BW  5 min  1 mph            Step-ups  L/  6"/           Modalities
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
## 2022-09-26 ENCOUNTER — APPOINTMENT (OUTPATIENT)
Dept: PHYSICAL THERAPY | Facility: CLINIC | Age: 42
End: 2022-09-26
Payer: COMMERCIAL

## 2022-09-28 ENCOUNTER — APPOINTMENT (OUTPATIENT)
Dept: PHYSICAL THERAPY | Facility: CLINIC | Age: 42
End: 2022-09-28
Payer: COMMERCIAL

## 2022-10-03 ENCOUNTER — OFFICE VISIT (OUTPATIENT)
Dept: PHYSICAL THERAPY | Facility: CLINIC | Age: 42
End: 2022-10-03
Payer: COMMERCIAL

## 2022-10-03 DIAGNOSIS — S82.142D CLOSED FRACTURE OF LEFT TIBIAL PLATEAU WITH ROUTINE HEALING, SUBSEQUENT ENCOUNTER: Primary | ICD-10-CM

## 2022-10-03 DIAGNOSIS — M54.41 CHRONIC LOW BACK PAIN WITH RIGHT-SIDED SCIATICA, UNSPECIFIED BACK PAIN LATERALITY: ICD-10-CM

## 2022-10-03 DIAGNOSIS — G89.29 CHRONIC LOW BACK PAIN WITH RIGHT-SIDED SCIATICA, UNSPECIFIED BACK PAIN LATERALITY: ICD-10-CM

## 2022-10-03 PROCEDURE — 97112 NEUROMUSCULAR REEDUCATION: CPT

## 2022-10-03 PROCEDURE — 97140 MANUAL THERAPY 1/> REGIONS: CPT

## 2022-10-03 PROCEDURE — 97110 THERAPEUTIC EXERCISES: CPT

## 2022-10-03 PROCEDURE — 97116 GAIT TRAINING THERAPY: CPT

## 2022-10-03 NOTE — PROGRESS NOTES
Daily Note     Today's date: 10/3/2022  Patient name: Claude Garcia  : 1980  MRN: [id number]  Referring provider: YVETTE Garrett*  Dx:   Encounter Diagnosis     ICD-10-CM    1  Closed fracture of left tibial plateau with routine healing, subsequent encounter  S82 142D    2  Chronic low back pain with right-sided sciatica, unspecified back pain laterality  M54 41     G89 29                   Subjective: Pt reports 5/10 low back pain and 5/10 L knee pain  Objective: See treatment diary below      Assessment: Pt demonstrated increased tolerance to knee PROM, ttp at R lumbar paraspinals  Pt demonstrated decreased pain levels following lumbar poc, continued moderate L knee irritability following treatment  Plan: Continue poc as per PT  Precautions:  PMHx: depression, rheumatoid arteritis  WBAT  Dx: L lateral tibial plateau fx 0/65, CLBP and RLE radiculopathy with a flexion/stabilization classification    Manuals  10/3           L GA stretch 10"x3 10"x3           L knee PROM 5 min 5 min           Graston R lumbar paraspinals 3 min 3 min                        Neuro Re-Ed             Abdominal bracing 5"x10 5"x10           Supine DLS marches [id number]                                                                            Ther Ex             Heel slides 5"x15  HEP 5"x15           Standing GA stretch 10"x3 10"x3                        SLR 2x10 2x10           SL hip ABD 2x10 2x10           TB PF             DKC 10"x5 10"x5           Seated lumbar flexion 1x10 1x10           Ther Activity                                       Gait Training             AlterG 50% BW  5 min  1 mph 50% BW  5 min  1 mph           Step-ups  L/  6"/ 2x10           Modalities

## 2022-10-05 ENCOUNTER — APPOINTMENT (OUTPATIENT)
Dept: PHYSICAL THERAPY | Facility: CLINIC | Age: 42
End: 2022-10-05

## 2022-10-10 ENCOUNTER — APPOINTMENT (OUTPATIENT)
Dept: PHYSICAL THERAPY | Facility: CLINIC | Age: 42
End: 2022-10-10

## 2022-10-17 ENCOUNTER — TELEPHONE (OUTPATIENT)
Dept: OBGYN CLINIC | Facility: CLINIC | Age: 42
End: 2022-10-17

## 2022-10-17 DIAGNOSIS — S82.142A CLOSED FRACTURE OF LEFT TIBIAL PLATEAU, INITIAL ENCOUNTER: Primary | ICD-10-CM

## 2022-10-17 NOTE — TELEPHONE ENCOUNTER
Caller: Rebecca Mccauley    Doctor: Juan Pablo Goldberg    Reason for call: New PT script needed for left knee     Call back#: [phone]

## 2022-10-17 NOTE — TELEPHONE ENCOUNTER
Caller: Krysta Christian    Doctor: Dr Alvarez    Reason for call: Patient in need of new PT script for her back       She missed two appts so they are requiring an updated script     Call back#: [phone]

## 2022-10-18 ENCOUNTER — APPOINTMENT (OUTPATIENT)
Dept: PHYSICAL THERAPY | Facility: CLINIC | Age: 42
End: 2022-10-18

## 2022-10-18 DIAGNOSIS — M54.41 ACUTE BILATERAL LOW BACK PAIN WITH RIGHT-SIDED SCIATICA: Primary | ICD-10-CM

## 2022-10-20 ENCOUNTER — OFFICE VISIT (OUTPATIENT)
Dept: PHYSICAL THERAPY | Facility: CLINIC | Age: 42
End: 2022-10-20
Payer: COMMERCIAL

## 2022-10-20 DIAGNOSIS — M54.41 CHRONIC LOW BACK PAIN WITH RIGHT-SIDED SCIATICA, UNSPECIFIED BACK PAIN LATERALITY: ICD-10-CM

## 2022-10-20 DIAGNOSIS — G89.29 CHRONIC LOW BACK PAIN WITH RIGHT-SIDED SCIATICA, UNSPECIFIED BACK PAIN LATERALITY: ICD-10-CM

## 2022-10-20 DIAGNOSIS — S82.142D CLOSED FRACTURE OF LEFT TIBIAL PLATEAU WITH ROUTINE HEALING, SUBSEQUENT ENCOUNTER: Primary | ICD-10-CM

## 2022-10-20 PROCEDURE — 97140 MANUAL THERAPY 1/> REGIONS: CPT

## 2022-10-20 PROCEDURE — 97110 THERAPEUTIC EXERCISES: CPT

## 2022-10-20 PROCEDURE — 97112 NEUROMUSCULAR REEDUCATION: CPT

## 2022-10-20 PROCEDURE — 97116 GAIT TRAINING THERAPY: CPT

## 2022-10-20 NOTE — PROGRESS NOTES
Daily Note     Today's date: 10/20/2022  Patient name: Rosemary Colbert  : 1980  MRN: [id number]  Referring provider: YVETTE Ashby*  Dx:   Encounter Diagnosis     ICD-10-CM    1  Closed fracture of left tibial plateau with routine healing, subsequent encounter  S82 142D    2  Chronic low back pain with right-sided sciatica, unspecified back pain laterality  M54 41     G89 29                   Subjective: Pt reports 8/10 low back pain and 9/10 L knee pain following work  Objective: See treatment diary below      Assessment: Pt demonstrated increased tolerance to hip strengthening, poor tolerance to work activities 2* L knee > LBP  Plan: Continue poc as per PT  Precautions:  PMHx: depression, rheumatoid arteritis  WBAT  Dx: L lateral tibial plateau fx , CLBP and RLE radiculopathy with a flexion/stabilization classification    Manuals 9/21 10/3 10/20          L GA stretch 10"x3 10"x3 10"x3          L knee PROM 5 min 5 min 5 min          Graston R lumbar paraspinals 3 min 3 min 3 min                       Neuro Re-Ed             Abdominal bracing 5"x10 5"x10 5"x10          Supine DLS marches [id number] 3x10                                                                           Ther Ex             Heel slides 5"x15  HEP 5"x15 5"x15          Standing GA stretch 10"x3 10"x3 10"x3                       SLR 2x10 2x10 3x10          SL hip ABD 2x10 2x10 3x10          TB PF             DKC 10"x5 10"x5 10"x5          Seated lumbar flexion 1x10 1x10 1x10          Ther Activity                                       Gait Training             AlterG 50% BW  5 min  1 mph 50% BW  5 min  1 mph 50% BW  6 min  1 mph          Step-ups  L/  6"/ 2x10 L/  6"/ 2x12          Modalities

## 2022-10-24 DIAGNOSIS — S82.142A CLOSED FRACTURE OF LEFT TIBIAL PLATEAU, INITIAL ENCOUNTER: Primary | ICD-10-CM

## 2022-10-24 DIAGNOSIS — M54.41 ACUTE BILATERAL LOW BACK PAIN WITH RIGHT-SIDED SCIATICA: ICD-10-CM

## 2022-10-26 ENCOUNTER — HOSPITAL ENCOUNTER (OUTPATIENT)
Dept: RADIOLOGY | Facility: HOSPITAL | Age: 42
Discharge: HOME | End: 2022-10-26
Attending: ORTHOPAEDIC SURGERY
Payer: COMMERCIAL

## 2022-10-26 ENCOUNTER — OFFICE VISIT (OUTPATIENT)
Dept: OBGYN CLINIC | Facility: HOSPITAL | Age: 42
End: 2022-10-26
Payer: COMMERCIAL

## 2022-10-26 VITALS
BODY MASS INDEX: 36.88 KG/M2 | HEART RATE: 77 BPM | HEIGHT: 62 IN | SYSTOLIC BLOOD PRESSURE: 152 MMHG | WEIGHT: 200.4 LBS | DIASTOLIC BLOOD PRESSURE: 100 MMHG

## 2022-10-26 DIAGNOSIS — S82.142A CLOSED FRACTURE OF LEFT TIBIAL PLATEAU, INITIAL ENCOUNTER: Primary | ICD-10-CM

## 2022-10-26 DIAGNOSIS — S82.142A CLOSED FRACTURE OF LEFT TIBIAL PLATEAU, INITIAL ENCOUNTER: ICD-10-CM

## 2022-10-26 PROCEDURE — 73560 X-RAY EXAM OF KNEE 1 OR 2: CPT

## 2022-10-26 PROCEDURE — 99213 OFFICE O/P EST LOW 20 MIN: CPT | Performed by: ORTHOPAEDIC SURGERY

## 2022-10-26 NOTE — LETTER
October 26, 2022     Patient: Gautam Sheppard  YOB: 1980  Date of Visit: 10/26/2022      To Whom it May Concern:    Rachele Santoro is under my professional care  Alexus Galicia was seen in my office on 10/26/2022  Please excuse the patient from work this morning as she was at the doctor's office  If you have any questions or concerns, please don't hesitate to call           Sincerely,          Kt Salazar MD        CC: No Recipients

## 2022-10-26 NOTE — PROGRESS NOTES
Assessment:  1  Closed fracture of left tibial plateau, initial encounter         Plan:  3 month s/p left lateral tibial plateau fracture, [id number]/60/16, mild residual stiffness/PFM symptoms  · Continue physical therapy as needed  · Follow up as needed    To do next visit:  Return if symptoms worsen or fail to improve  The above stated was discussed in layman's terms and the patient expressed understanding  All questions were answered to the patient's satisfaction  Scribe Attestation    I,:  Dorotagi Nancy am acting as a scribe while in the presence of the attending physician :       I,:  Kristel Yougnblood MD personally performed the services described in this documentation    as scribed in my presence :             Subjective:   Maldonadoral Patient is a 40 y o  female who presents 3 month s/p left lateral tibial plateau fracture, 6/59/68  She is overall improving  Today she complains of occasional left lateral knee pain  Weight bearing aggravates while rest alleviates  She does use Celebrex daily and tylenol for pain  She does participate in physical therapy with benefit          Review of systems negative unless otherwise specified in HPI    Past Medical History:   Diagnosis Date   • Arthritis    • Depression    • DJD (degenerative joint disease)    • Herpes    • Hidradenitis    • MRSA carrier        Past Surgical History:   Procedure Laterality Date   • FL INJECTION RIGHT HIP (ARTHROGRAM)  1/6/2020   • FL INJECTION RIGHT HIP (NON ARTHROGRAM)  7/24/2019   • FL INJECTION RIGHT SHOULDER (ARTHROGRAM)  10/7/2019   • NO PAST SURGERIES         Family History   Problem Relation Age of Onset   • Arthritis Mother    • Hypertension Mother    • Diabetes Father    • Stroke Father    • Heart attack Father    • Post-traumatic stress disorder Father    • Other Father         nerve gas exposure-war   • Schizophrenia Sister    • Bipolar disorder Brother    • Schizophrenia Brother    • Sleep apnea Daughter    • Obesity Daughter    • Hypertension Daughter    • ADD / ADHD Daughter    • Depression Son    • Post-traumatic stress disorder Son    • Arthritis Maternal Grandmother    • Heart attack Maternal Grandfather    • Diabetes Paternal Grandmother    • Stroke Paternal Grandmother    • Heart attack Paternal Grandfather    • Post-traumatic stress disorder Paternal Grandfather    • Ovarian cysts Sister        Social History     Occupational History   • Not on file   Tobacco Use   • Smoking status: Current Every Day Smoker     Packs/day: 0 25     Types: Cigarettes   • Smokeless tobacco: Former User   Vaping Use   • Vaping Use: Never used   Substance and Sexual Activity   • Alcohol use: Yes     Comment: holidays    • Drug use: Yes     Types: Marijuana     Comment: few times a day    • Sexual activity: Yes     Partners: Male     Birth control/protection: Injection         Current Outpatient Medications:   •  celecoxib (CeleBREX) 200 mg capsule, Take 1 capsule (200 mg total) by mouth 2 (two) times a day, Disp: 180 capsule, Rfl: 3  •  Diclofenac Sodium (VOLTAREN) 1 %, , Disp: , Rfl:   •  ibuprofen (MOTRIN) 600 mg tablet, Take 1 tablet (600 mg total) by mouth every 6 (six) hours as needed for mild pain, Disp: 30 tablet, Rfl: 0  •  medroxyPROGESTERone (DEPO-PROVERA) 150 mg/mL injection, Inject 1 mL (150 mg total) into a muscle every 3 (three) months, Disp: 1 mL, Rfl: 3  •  medroxyPROGESTERone acetate (DEPO-PROVERA SYRINGE) 150 mg/mL injection, Inject 1 mL (150 mg total) into a muscle every 3 (three) months, Disp: 1 mL, Rfl: 3  •  mupirocin (BACTROBAN) 2 % ointment, Apply topically 3 (three) times a day, Disp: 22 g, Rfl: 0  •  sulfamethoxazole-trimethoprim (BACTRIM DS) 800-160 mg per tablet, Take 1 tablet by mouth 2 (two) times a day, Disp: , Rfl:   •  valACYclovir (VALTREX) 500 mg tablet, Take 1 tablet (500 mg total) by mouth daily, Disp: 90 tablet, Rfl: 3  •  cyclobenzaprine (FLEXERIL) 10 mg tablet, Take 1 tablet (10 mg total) by mouth 3 (three) times a day as needed for muscle spasms (Patient not taking: No sig reported), Disp: 30 tablet, Rfl: 0  •  predniSONE 10 mg tablet, Take 1 tablet (10 mg total) by mouth daily 6 tab day 1, 5 tab day 2, 4 tab day 3, 3 tab day 4, 2 tab day 5, 1 tab day 6 (Patient not taking: No sig reported), Disp: 21 tablet, Rfl: 0    Current Facility-Administered Medications:   •  medroxyPROGESTERone acetate (DEPO-PROVERA SYRINGE) IM injection 150 mg, 150 mg, Intramuscular, Q3 Months, KEYANA Zeng, 150 mg at 06/02/22 1054    No Known Allergies         Vitals:    10/26/22 1012   BP: 152/100   Pulse: 77       Objective:  Physical exam  · General: Awake, Alert, Oriented  · Eyes: Pupils equal, round and reactive to light  · Heart: regular rate and rhythm  · Lungs: No audible wheezing  · Abdomen: soft                    Ortho Exam  Left knee:  No erythema or ecchymosis  No effusion or swelling  Normal strength  Good ROM   Calf compartments soft and supple  Sensation intact  Toes are warm sensate and mobile        Diagnostics, reviewed and taken today if performed as documented: The attending physician has personally reviewed the pertinent films in PACS and interpretation is as follows:  Left knee x-ray:  Healed fracture site  Procedures, if performed today:    Procedures    None performed      Portions of the record may have been created with voice recognition software  Occasional wrong word or "sound a like" substitutions may have occurred due to the inherent limitations of voice recognition software  Read the chart carefully and recognize, using context, where substitutions have occurred

## 2022-10-31 ENCOUNTER — HOSPITAL ENCOUNTER (EMERGENCY)
Facility: HOSPITAL | Age: 42
Discharge: HOME | End: 2022-10-31
Attending: EMERGENCY MEDICINE

## 2022-10-31 ENCOUNTER — APPOINTMENT (EMERGENCY)
Dept: RADIOLOGY | Facility: HOSPITAL | Age: 42
End: 2022-10-31

## 2022-10-31 VITALS
DIASTOLIC BLOOD PRESSURE: 85 MMHG | BODY MASS INDEX: 36.8 KG/M2 | HEIGHT: 62 IN | RESPIRATION RATE: 16 BRPM | WEIGHT: 200 LBS | TEMPERATURE: 98.6 F | SYSTOLIC BLOOD PRESSURE: 150 MMHG | OXYGEN SATURATION: 98 % | HEART RATE: 72 BPM

## 2022-10-31 DIAGNOSIS — S93.492A SPRAIN OF ANTERIOR TALOFIBULAR LIGAMENT OF LEFT ANKLE, INITIAL ENCOUNTER: ICD-10-CM

## 2022-10-31 DIAGNOSIS — M25.471 RIGHT ANKLE SWELLING: Primary | ICD-10-CM

## 2022-10-31 RX ORDER — LIDOCAINE 50 MG/G
1 PATCH TOPICAL ONCE
Status: DISCONTINUED | OUTPATIENT
Start: 2022-10-31 | End: 2022-10-31 | Stop reason: HOSPADM

## 2022-10-31 RX ORDER — KETOROLAC TROMETHAMINE 30 MG/ML
30 INJECTION, SOLUTION INTRAMUSCULAR; INTRAVENOUS ONCE
Status: CANCELLED | OUTPATIENT
Start: 2022-10-31 | End: 2022-10-31

## 2022-10-31 RX ORDER — OXYCODONE HYDROCHLORIDE 5 MG/1
5 TABLET ORAL ONCE
Status: COMPLETED | OUTPATIENT
Start: 2022-10-31 | End: 2022-10-31

## 2022-10-31 RX ORDER — ACETAMINOPHEN 325 MG/1
975 TABLET ORAL ONCE
Status: COMPLETED | OUTPATIENT
Start: 2022-10-31 | End: 2022-10-31

## 2022-10-31 RX ADMIN — LIDOCAINE 5% 1 PATCH: 700 PATCH TOPICAL at 20:56

## 2022-10-31 RX ADMIN — OXYCODONE HYDROCHLORIDE 5 MG: 5 TABLET ORAL at 21:12

## 2022-10-31 RX ADMIN — ACETAMINOPHEN 975 MG: 325 TABLET, FILM COATED ORAL at 20:56

## 2022-10-31 NOTE — ED NOTES
Pt reports previous fracture of right ankle 2 yrs, states ankle is always painful, more severe today after walking for extended period of time      Paty Bender RN  10/31/22 1952

## 2022-10-31 NOTE — Clinical Note
Sandi Harshil was seen and treated in our emergency department on 10/31/2022  No restrictions            Diagnosis:     Jia Hurtado  may return to work on return date  She may return on this date: 11/03/2022         If you have any questions or concerns, please don't hesitate to call        Roxanna Cole MD    ______________________________           _______________          _______________  [facility] Representative                              Date                                Time

## 2022-11-01 RX ORDER — OXYCODONE HYDROCHLORIDE 5 MG/1
5 TABLET ORAL EVERY 8 HOURS PRN
Qty: 5 TABLET | Refills: 0 | Status: SHIPPED | OUTPATIENT
Start: 2022-11-01 | End: 2022-11-03

## 2022-11-01 NOTE — DISCHARGE INSTRUCTIONS
Please provided orthopedic provider for continued evaluation of your symptoms  Please continue to remain nonweightbearing as possible for improvement in swelling of the right lower extremity  Please return to the emergency department if you experience worsening symptoms that include but are not limited to:  Fever, nausea, vomiting loss of consciousness, numbness, paresthesias, or increased redness/warmth over the affected extremity

## 2022-11-01 NOTE — ED ATTENDING ATTESTATION
10/31/2022  IEfrain MD, saw and evaluated the patient  I have discussed the patient with the resident/non-physician practitioner and agree with the resident's/non-physician practitioner's findings, Plan of Care, and MDM as documented in the resident's/non-physician practitioner's note, except where noted  All available labs and Radiology studies were reviewed  I was present for key portions of any procedure(s) performed by the resident/non-physician practitioner and I was immediately available to provide assistance  At this point I agree with the current assessment done in the Emergency Department  I have conducted an independent evaluation of this patient a history and physical is as follows:    79-year-old female with history of multiple prior ankle injuries including a closed left lateral tibial plateau fracture over the summer  Patient presents for evaluation of pain for the bilateral malleoli and the posterior aspect of the right ankle  Started after walking on it for long periods of time 2 days ago  Reports pain with bearing weight on the ankle but has been able to get around her home using a walker  Denies twisting the ankle or any direct trauma to the joint  Physical Exam  Vitals reviewed  Constitutional:       General: She is not in acute distress  Appearance: Normal appearance  She is not ill-appearing or toxic-appearing  HENT:      Head: Normocephalic and atraumatic  Right Ear: External ear normal       Left Ear: External ear normal       Nose: Nose normal    Eyes:      Conjunctiva/sclera: Conjunctivae normal    Cardiovascular:      Rate and Rhythm: Normal rate  Pulses: Normal pulses  Pulmonary:      Effort: Pulmonary effort is normal  No respiratory distress  Abdominal:      General: There is no distension  Musculoskeletal:         General: No deformity  Normal range of motion  Cervical back: Normal range of motion        Comments: Mild edema about the right ankle with tenderness to palpation over the bilateral malleoli and the posterior aspect of the ankle  Normal Brown test   No overlying erythema or rigidity of the joint  Patient reports severe pain with flexion and extension of the right ankle joint, however when distracted I am able to range the joint while speaking with her without significant discomfort  Full range of motion of the toes of the right foot  No bony tenderness to palpation over the foot  Skin:     General: Skin is warm and dry  Neurological:      General: No focal deficit present  Mental Status: She is alert and oriented to person, place, and time  Comments: Intact sensation to light touch in the peripheral nerve distributions of the right foot   Psychiatric:         Mood and Affect: Mood normal            ED Course  ED Course as of 11/01/22 0136   Mon Oct 31, 2022   2055 XR of the right ankle with no acute fracture or malalignment  No evidence of septic joint on exam   It is less concern for gout in the absence of erythema, warmth, or swelling periods patient's exam improves with distraction  Suspect overuse injury  Discussed supportive treatment including rest, ice, elevation  As patient cannot take additional NSAIDs as she is currently taking Celebrex recommend Tylenol as needed for pain  She has follow-up with Orthopedics in 2 days  In the meantime, will prescribe small amount of oxycodone for pain relief after standard narcotic precautions were discussed  Reasons to return to the emergency department discussed      Critical Care Time  Procedures

## 2022-11-01 NOTE — ED PROVIDER NOTES
History  Chief Complaint   Patient presents with   • Ankle Injury     Patient states she was walking around a lot on Friday, and c/o of right ankle pain and swelling  Denies injury, and states it is painful to bear weight  Nahomi Antonio comes to the ED after experiencing progressive worsening of right ankle/foot discomfort  She states that she was “walking a lot” this past Friday (10/28) while out for [employer]  She states that there was no falls, trauma, twisting, or direct injury to the foot in question  She does state that she has had multiple orthopedic fractures and pathologies over the past year with regards to both lower extremities  She feels as if she “broke her foot” secondary to how exquisite the pain is  Patient states that she has only been taking Tylenol and has been resting her foot for improvement of her symptoms  Patient states that she is not able to take NSAID medications secondary to being on Celebrex  She states that she has been attempting to take weight off of her lower extremity to help with her symptoms but to no significant improvement or control in her pain  She states that she has an upcoming appointment with her orthopedic provider on this upcoming Wednesday (11/2)  She comes to the emergency department for further evaluation and management of her pain        History provided by:  Patient   used: No    Ankle Pain  Location:  Ankle  Injury: no    Ankle location:  R ankle  Pain details:     Quality:  Aching and throbbing    Radiates to:  Does not radiate    Severity:  No pain    Onset quality:  Sudden    Timing:  Constant    Progression:  Unchanged  Chronicity:  New  Dislocation: no    Foreign body present:  No foreign bodies  Tetanus status:  Out of date  Prior injury to area:  No  Relieved by:  Nothing  Worsened by:  Nothing  Ineffective treatments:  None tried  Associated symptoms: swelling    Associated symptoms: no back pain, no decreased ROM, no fatigue, no fever, no itching, no muscle weakness, no neck pain, no numbness, no stiffness and no tingling        Prior to Admission Medications   Prescriptions Last Dose Informant Patient Reported? Taking?    Diclofenac Sodium (VOLTAREN) 1 %   Yes No   celecoxib (CeleBREX) 200 mg capsule   No No   Sig: Take 1 capsule (200 mg total) by mouth 2 (two) times a day   cyclobenzaprine (FLEXERIL) 10 mg tablet   No No   Sig: Take 1 tablet (10 mg total) by mouth 3 (three) times a day as needed for muscle spasms   Patient not taking: No sig reported   ibuprofen (MOTRIN) 600 mg tablet   No No   Sig: Take 1 tablet (600 mg total) by mouth every 6 (six) hours as needed for mild pain   medroxyPROGESTERone (DEPO-PROVERA) 150 mg/mL injection   No No   Sig: Inject 1 mL (150 mg total) into a muscle every 3 (three) months   medroxyPROGESTERone acetate (DEPO-PROVERA SYRINGE) 150 mg/mL injection   No No   Sig: Inject 1 mL (150 mg total) into a muscle every 3 (three) months   mupirocin (BACTROBAN) 2 % ointment  Self No No   Sig: Apply topically 3 (three) times a day   predniSONE 10 mg tablet   No No   Sig: Take 1 tablet (10 mg total) by mouth daily 6 tab day 1, 5 tab day 2, 4 tab day 3, 3 tab day 4, 2 tab day 5, 1 tab day 6   Patient not taking: No sig reported   sulfamethoxazole-trimethoprim (BACTRIM DS) 800-160 mg per tablet   Yes No   Sig: Take 1 tablet by mouth 2 (two) times a day   valACYclovir (VALTREX) 500 mg tablet   No No   Sig: Take 1 tablet (500 mg total) by mouth daily      Facility-Administered Medications Last Administration Doses Remaining   medroxyPROGESTERone acetate (DEPO-PROVERA SYRINGE) IM injection 150 mg 6/2/2022 10:54 AM           Past Medical History:   Diagnosis Date   • Arthritis    • Depression    • DJD (degenerative joint disease)    • Herpes    • Hidradenitis    • MRSA carrier        Past Surgical History:   Procedure Laterality Date   • FL INJECTION RIGHT HIP (ARTHROGRAM)  1/6/2020   • FL INJECTION RIGHT HIP (NON ARTHROGRAM)  7/24/2019   • FL INJECTION RIGHT SHOULDER (ARTHROGRAM)  10/7/2019   • NO PAST SURGERIES         Family History   Problem Relation Age of Onset   • Arthritis Mother    • Hypertension Mother    • Diabetes Father    • Stroke Father    • Heart attack Father    • Post-traumatic stress disorder Father    • Other Father         nerve gas exposure-war   • Schizophrenia Sister    • Bipolar disorder Brother    • Schizophrenia Brother    • Sleep apnea Daughter    • Obesity Daughter    • Hypertension Daughter    • ADD / ADHD Daughter    • Depression Son    • Post-traumatic stress disorder Son    • Arthritis Maternal Grandmother    • Heart attack Maternal Grandfather    • Diabetes Paternal Grandmother    • Stroke Paternal Grandmother    • Heart attack Paternal Grandfather    • Post-traumatic stress disorder Paternal Grandfather    • Ovarian cysts Sister      I have reviewed and agree with the history as documented  E-Cigarette/Vaping   • E-Cigarette Use Never User      E-Cigarette/Vaping Substances   • Nicotine No    • THC No    • CBD No    • Flavoring No    • Other No    • Unknown No      Social History     Tobacco Use   • Smoking status: Current Every Day Smoker     Packs/day: 0 25     Types: Cigarettes   • Smokeless tobacco: Former User   Vaping Use   • Vaping Use: Never used   Substance Use Topics   • Alcohol use: Yes     Comment: holidays    • Drug use: Yes     Types: Marijuana     Comment: few times a day         Review of Systems   Constitutional: Negative for chills, fatigue and fever  HENT: Negative for ear pain and sore throat  Eyes: Negative for pain and visual disturbance  Respiratory: Negative for cough and shortness of breath  Cardiovascular: Negative for chest pain and palpitations  Gastrointestinal: Negative for abdominal pain and vomiting  Genitourinary: Negative for dysuria and hematuria     Musculoskeletal: Negative for arthralgias, back pain, neck pain and stiffness  Skin: Negative for color change, itching and rash  Neurological: Negative for seizures and syncope  All other systems reviewed and are negative  Physical Exam  ED Triage Vitals [10/31/22 1840]   Temperature Pulse Respirations Blood Pressure SpO2   98 6 °F (37 °C) 92 18 131/78 99 %      Temp Source Heart Rate Source Patient Position - Orthostatic VS BP Location FiO2 (%)   Oral Monitor Sitting Left arm --      Pain Score       10 - Worst Possible Pain             Orthostatic Vital Signs  Vitals:    10/31/22 1840 10/31/22 1945   BP: 131/78 150/85   Pulse: 92 72   Patient Position - Orthostatic VS: Sitting Sitting       Physical Exam  Vitals and nursing note reviewed  Constitutional:       General: She is not in acute distress  Appearance: Normal appearance  She is well-developed  She is not ill-appearing  HENT:      Head: Normocephalic and atraumatic  Right Ear: External ear normal       Left Ear: External ear normal       Nose: Nose normal       Mouth/Throat:      Mouth: Mucous membranes are moist       Pharynx: No posterior oropharyngeal erythema  Eyes:      General:         Right eye: No discharge  Left eye: No discharge  Extraocular Movements: Extraocular movements intact  Conjunctiva/sclera: Conjunctivae normal    Cardiovascular:      Rate and Rhythm: Normal rate and regular rhythm  Pulses: Normal pulses  Heart sounds: No murmur heard  Pulmonary:      Effort: Pulmonary effort is normal  No respiratory distress  Breath sounds: Normal breath sounds  Abdominal:      Palpations: Abdomen is soft  Tenderness: There is no abdominal tenderness  Musculoskeletal:         General: Tenderness present  No deformity or signs of injury  Normal range of motion  Cervical back: Normal range of motion and neck supple  No tenderness  Right lower leg: No edema  Left lower leg: No edema        Comments: Exquisite tenderness on palpation of the left and medial malleoli as well as the posterior ankle (Achilles tendon insertion into the calcaneus) this pain is able to be easily abated/minimized with distraction to the patient  Patient experiences exquisite tenderness on dorsiflexion and plantar flexion of the lower extremity  No redness or warmth appreciated over the ankle joint  Skin:     General: Skin is warm and dry  Capillary Refill: Capillary refill takes less than 2 seconds  Coloration: Skin is not pale  Findings: No bruising or erythema  Neurological:      General: No focal deficit present  Mental Status: She is alert and oriented to person, place, and time  Sensory: No sensory deficit  Psychiatric:         Mood and Affect: Mood normal          ED Medications  Medications   lidocaine (LIDODERM) 5 % patch 1 patch (1 patch Topical Medication Applied 10/31/22 2056)   acetaminophen (TYLENOL) tablet 975 mg (975 mg Oral Given 10/31/22 2056)   oxyCODONE (ROXICODONE) IR tablet 5 mg (5 mg Oral Given 10/31/22 2112)       Diagnostic Studies  Results Reviewed     None                 XR ankle 3+ views RIGHT   ED Interpretation by Rob Bermeo MD (10/31 2055)   No acute fracture or malalignment  Procedures  Procedures      ED Course                                       MDM  Number of Diagnoses or Management Options  Right ankle swelling: new and does not require workup  Diagnosis management comments: Patient comes in with worsening right ankle pain and discomfort without any acute traumatic injury to the area in question  Patient has been limited in her utilization of anti-inflammatory medications secondary to utilization of Celebrex in the outpatient setting      Secondary to the patient's ability to bear ambulation prior to coming into the emergency department, x-ray imaging that was unremarkable for any acute intraosseous pathology on evaluation, an upcoming orthopedic evaluation, it was decided the patient was stable for discharge home and which should follow-up with her orthopedic providers  Based off of physical examination, vital signs, and patient's description of symptoms, low suspicion for septic arthritis or infectious joint pathology  Patient was counseled on continued conservative methods for control of symptoms in the outpatient setting  Patient and boyfriend expressed understanding with this plan  Strict return precautions were discussed at bedside that included red flag symptoms that would warrant continued evaluation in the emergency department that both patient and boyfriend expressed understanding with  Disposition:  Discharge home with close orthopedics follow-up and continued conservative management in the outpatient setting for right lower ankle pain           Amount and/or Complexity of Data Reviewed  Clinical lab tests: reviewed  Tests in the radiology section of CPT®: ordered and reviewed  Obtain history from someone other than the patient: yes  Review and summarize past medical records: yes  Discuss the patient with other providers: yes  Independent visualization of images, tracings, or specimens: yes    Risk of Complications, Morbidity, and/or Mortality  Presenting problems: moderate  Diagnostic procedures: moderate  Management options: moderate    Patient Progress  Patient progress: stable      Disposition  Final diagnoses:   Right ankle swelling     Time reflects when diagnosis was documented in both MDM as applicable and the Disposition within this note     Time User Action Codes Description Comment    10/31/2022  9:35 PM Consuella Lover Add [M25 472] Left ankle swelling     10/31/2022  9:39 PM Consuella Lover Remove [M25 472] Left ankle swelling     10/31/2022  9:40 PM Consuella Lover Add [M25 471] Right ankle swelling       ED Disposition     ED Disposition   Discharge    Condition   Stable    Date/Time   Mon Oct 31, 2022  9:35 PM    Iliana N Joann Saldaña discharge to home/self care  Follow-up Information     Follow up With Specialties Details Why Contact Info Additional Information    Gabino Quezada MD Family Medicine Schedule an appointment as soon as possible for a visit  As needed [address] [phone]       [facility] Emergency Department Emergency Medicine  As needed, If symptoms worsen [address] [facility] [address] Emergency [address], [address], [place], [place], [address] [facility] [place] [facility] Schedule an appointment as soon as possible for a visit  As soon as possible   [address] [facility] [address] [place] [address] [facility] [place], [address], [address] [place], [place], [place]          Discharge Medication List as of 10/31/2022  9:40 PM      CONTINUE these medications which have NOT CHANGED    Details   celecoxib (CeleBREX) 200 mg capsule Take 1 capsule (200 mg total) by mouth 2 (two) times a day, Starting Tue 8/30/2022, Normal      cyclobenzaprine (FLEXERIL) 10 mg tablet Take 1 tablet (10 mg total) by mouth 3 (three) times a day as needed for muscle spasms, Starting Wed 5/25/2022, Normal      Diclofenac Sodium (VOLTAREN) 1 % Historical Med      ibuprofen (MOTRIN) 600 mg tablet Take 1 tablet (600 mg total) by mouth every 6 (six) hours as needed for mild pain, Starting Fri 8/26/2022, Normal      !! medroxyPROGESTERone (DEPO-PROVERA) 150 mg/mL injection Inject 1 mL (150 mg total) into a muscle every 3 (three) months, Starting Thu 6/2/2022, Normal      !! medroxyPROGESTERone acetate (DEPO-PROVERA SYRINGE) 150 mg/mL injection Inject 1 mL (150 mg total) into a muscle every 3 (three) months, Starting Wed 8/24/2022, Normal      mupirocin (BACTROBAN) 2 % ointment Apply topically 3 (three) times a day, Starting Mon 9/13/2021, Normal      predniSONE 10 mg tablet Take 1 tablet (10 mg total) by mouth daily 6 tab day 1, 5 tab day 2, 4 tab day 3, 3 tab day 4, 2 tab day 5, 1 tab day 6, Starting Wed 5/25/2022, Normal      sulfamethoxazole-trimethoprim (BACTRIM DS) 800-160 mg per tablet Take 1 tablet by mouth 2 (two) times a day, Starting Mon 5/2/2022, Historical Med      valACYclovir (VALTREX) 500 mg tablet Take 1 tablet (500 mg total) by mouth daily, Starting Mon 6/20/2022, Until Tue 6/20/2023, Normal       !! - Potential duplicate medications found  Please discuss with provider  No discharge procedures on file  PDMP Review       Value Time User    PDMP Reviewed  Yes 6/11/2020 10:18 PM Angel Fernandez MD           ED Provider  Attending physically available and evaluated [facility] the patient along with the ED Attending      Electronically Signed by         Juancarlos Dumont MD  10/31/22 4792

## 2022-11-02 ENCOUNTER — APPOINTMENT (OUTPATIENT)
Dept: RADIOLOGY | Facility: AMBULARY SURGERY CENTER | Age: 42
End: 2022-11-02
Attending: ORTHOPAEDIC SURGERY

## 2022-11-02 ENCOUNTER — APPOINTMENT (OUTPATIENT)
Dept: PHYSICAL THERAPY | Facility: CLINIC | Age: 42
End: 2022-11-02

## 2022-11-02 ENCOUNTER — OFFICE VISIT (OUTPATIENT)
Dept: OBGYN CLINIC | Facility: CLINIC | Age: 42
End: 2022-11-02

## 2022-11-02 VITALS
SYSTOLIC BLOOD PRESSURE: 160 MMHG | BODY MASS INDEX: 36.8 KG/M2 | HEIGHT: 62 IN | DIASTOLIC BLOOD PRESSURE: 94 MMHG | HEART RATE: 83 BPM | WEIGHT: 200 LBS

## 2022-11-02 DIAGNOSIS — M76.821 POSTERIOR TIBIAL TENDONITIS OF RIGHT LEG: Primary | ICD-10-CM

## 2022-11-02 DIAGNOSIS — M25.571 PAIN, JOINT, ANKLE AND FOOT, RIGHT: ICD-10-CM

## 2022-11-02 RX ORDER — BUSPIRONE HYDROCHLORIDE 5 MG/1
5 TABLET ORAL 2 TIMES DAILY
COMMUNITY
Start: 2022-09-27

## 2022-11-02 RX ORDER — DULOXETIN HYDROCHLORIDE 30 MG/1
30 CAPSULE, DELAYED RELEASE ORAL EVERY MORNING
COMMUNITY
Start: 2022-09-27

## 2022-11-02 RX ORDER — HYDROXYZINE PAMOATE 50 MG/1
50 CAPSULE ORAL
COMMUNITY
Start: 2022-09-27

## 2022-11-02 RX ORDER — METHYLPREDNISOLONE 4 MG/1
TABLET ORAL
Qty: 1 EACH | Refills: 0 | Status: SHIPPED | OUTPATIENT
Start: 2022-11-02

## 2022-11-02 NOTE — PROGRESS NOTES
[facility] Neurosurgery follow-up        HISTORY OF PRESENT Brie Yang is a 41year old RH  female s/p C3-7 laminectomy and C5-7 PSF 12/20/2020 Dr. Elenita Buchanan.      Patient is doing very well with no neck pain no arm pain no numbness no bowel or bladder i RIP Snider  Attending, Orthopaedic Surgery  Foot and [address] [address] [facility]        ORTHOPAEDIC FOOT AND ANKLE CLINIC VISIT     Assessment:     Encounter Diagnoses   Name Primary? • Pain, joint, ankle and foot, right    • Posterior tibial tendonitis of right leg Yes              Plan:   · The patient verbalized understanding of exam findings and treatment plan  We engaged in the shared decision-making process and treatment options were discussed at length with the patient  Surgical and conservative management discussed today along with risks and benefits  · Most of her pain is over the Posterior tibial tendon suggesting she developed tendonitis from overuse  · Rest, ice, elevation and compression are all important here  · Compression stocking (20-30mm Hg pressure) to be worn at all times to help control swelling  · NSAIDs to control inflammation  · PT to begin immediately  · Medrol dose pack sent to the pharmacy  · Cam boot for two weeks  And then wean off (protocol provided)  · Work note provided  · Return in about 6 weeks (around 12/14/2022)  History of Present Illness:   Chief Complaint:   Chief Complaint   Patient presents with   • Right Ankle - Follow-up     Geraldine Mayes is a 40 y o  female who is being seen for right ankle pain and swelling  Pain has been present for some weeks, but was aggravated this last week after walking more than usual   Pain is localized mostly at the posteromedial ankle over the posterior tibialis tendon with minimal radiating and described as sharp and severe  Patient denies numbness, tingling or radicular pain  Denies history of neuropathy  Patient does smoke, does not have diabetes and does not take blood thinners  Patient denies family history of anesthesia complications and has not had any complications with anesthesia       Pain/symptom timing:  Worse during the day when active  Pain/symptom context:  Worse with activites and work  Pain/symptom modifying factors:  Rest makes better, activities make worse  Pain/symptom associated signs/symptoms: none    Prior treatment   · NSAIDsYes    · Injections No   · Bracing/Orthotics No   · Physical Therapy No     Orthopedic Surgical History:   See below    Past Medical, Surgical and Social History:  Past Medical History:  has a past medical history of Arthritis, Depression, DJD (degenerative joint disease), Herpes, Hidradenitis, and MRSA carrier  Problem List: does not have any pertinent problems on file  Past Surgical History:  has a past surgical history that includes FL injection right hip (non arthrogram) (7/24/2019); FL injection right shoulder (arthrogram) (10/7/2019); FL injection right hip (arthrogram) (1/6/2020); and No past surgeries  Family History: family history includes ADD / ADHD in her daughter; Arthritis in her maternal grandmother and mother; Bipolar disorder in her brother; Depression in her son; Diabetes in her father and paternal grandmother; Heart attack in her father, maternal grandfather, and paternal grandfather; Hypertension in her daughter and mother; Obesity in her daughter; Other in her father; Ovarian cysts in her sister; Post-traumatic stress disorder in her father, paternal grandfather, and son; Schizophrenia in her brother and sister; Sleep apnea in her daughter; Stroke in her father and paternal grandmother  Social History:  reports that she has been smoking cigarettes  She has been smoking about 0 25 packs per day  She has quit using smokeless tobacco  She reports current alcohol use  She reports current drug use  Drug: Marijuana    Current Medications: has a current medication list which includes the following prescription(s): buspirone, diclofenac sodium, duloxetine, hydroxyzine pamoate, ibuprofen, medroxyprogesterone, medroxyprogesterone acetate, methylprednisolone, mupirocin, oxycodone, sulfamethoxazole-trimethoprim, valacyclovir, celecoxib, and for PE, positive D dimer and UTI.    here in follow up after having a C3-6 laminoplasty and partial C7 and C2 laminectomy 7/6/2020 Dr. Lance Blizzard. Last hx:  C/O pain lower cervical spine. She is using soft c collar.  weaker in PT. Fine motor worse.  Stum cyclobenzaprine, and the following Facility-Administered Medications: medroxyprogesterone acetate  Allergies: has No Known Allergies  Review of Systems:  General- denies fever/chills  HEENT- denies hearing loss or sore throat  Eyes- denies eye pain or visual disturbances, denies red eyes  Respiratory- denies cough or SOB  Cardio- denies chest pain or palpitations  GI- denies abdominal pain  Endocrine- denies urinary frequency  Urinary- denies pain with urination  Musculoskeletal- Negative except noted above  Skin- denies rashes or wounds  Neurological- denies dizziness or headache  Psychiatric- denies anxiety or difficulty concentrating    Physical Exam:   /94 (BP Location: Left arm, Patient Position: Sitting, Cuff Size: Adult)   Pulse 83   Ht 5' 2" (1 575 m)   Wt 90 7 kg (200 lb)   LMP  (LMP Unknown)   BMI 36 58 kg/m²   General/Constitutional: No apparent distress: well-nourished and well developed  Eyes: normal ocular motion  Cardio: RRR, Normal S1S2, No m/r/g  Lymphatic: No appreciable lymphadenopathy  Respiratory: Non-labored breathing, CTA b/l no w/c/r  Vascular: No edema, swelling or tenderness, except as noted in detailed exam   Integumentary: No impressive skin lesions present, except as noted in detailed exam   Neuro: No ataxia or tremors noted  Psych: Normal mood and affect, oriented to person, place and time  Appropriate affect  Musculoskeletal: Normal, except as noted in detailed exam and in HPI      Examination    Right    Gait Antalgic   Musculoskeletal Tender to palpation at posterior tibialis tendon    Skin Normal       Nails Normal    Range of Motion  20 degrees dorsiflexion, 30 degrees plantarflexion  Subtalar motion: limited by pain    Stability Stable    Muscle Strength 5/5 tibialis anterior  5/5 gastrocnemius-soleus  4/5 posterior tibialis  5/5 peroneal/eversion strength  5/5 EHL  5/5 FHL    Neurologic Normal    Sensation  Intact to light touch throughout sural, saphenous, vision. She denies any changes in cognition    Last hx: 5/19/2020  Since her last visit she has had increasing symptoms. She states she stumbled and fell hit her head on the headboard but did not suffer any loss of consciousness.   Since that time she is lower extremities with the impact. But she did find later she got more unsteady with ambulation. She was unsteady in the shower she slipped and fell due to her leg numbness. She did not lose consciousness.   She is using the Vista therapy collar which is superficial peroneal, deep peroneal and medial/lateral plantar nerve distributions  Velma-Jenny 5 07 filament (10g) testing deferred  Cardiovascular Brisk capillary refill < 2 seconds,intact DP and PT pulses    Special Tests No pan with calcaneus squeeze test      Imaging Studies:   3 views of the right ankle weightbearing were taken, reviewed and interpreted independently that demonstrate no evidence of fracture, dislocation or significant degenerative changes  Reviewed by me personally  Bearl Lutes Lachman, MD  Foot & Ankle Surgery   Department of [address] [place] [address]      I personally performed the service  Bearl Lutes Lachman, MD somewhat lower than what it was prior to her surgery. It worse with increased activity. She continues to have ongoing lower back pain which is old. She feels like her legs are shaky. She is getting cramping in the legs she is tripping and falling.   She getting worse in the arms and legs the numbness seems to be getting worse as well. She having more trouble walking.   She notices her hands and arms seem to be more shaky.       REVIEW OF SYSTEMS:  Negative     PHYSICAL EXAMINATION:  GENERAL:  Patient is i been further change into complete laminectomies. Interval placement of trans facet screws at C5, C6 and C7 facets.      The right screw at C5 traverses the very lateral aspect of the facet and abuts the transverse process along the posterior wall of t dorsally which may represent a seroma, hematoma, or pseudomeningocele. This measures up to 2 mm in thickness in the AP dimension. This fluid collection is partially obscured secondary to susceptibility artifact.      There is straightening of the cervical 2/6/2020. MRI of the cervical spine from 12/30/2019 shows surgical changes from C4-C6. Minimal bulging at C3-4. Some artifact at C6-7 but she appears to not have any stenosis. X-rays of cervical spine show C4-C6 fusion which is healed.   She has spo stenosis with deformity of the cord. C6-7 stenosis. Contrary to the report which reported no stenosis        ASSESSMENT:  1.  C4-5 C5-6 ACDF 12/19/18 Dr. Ulices Buckley  2. Occipital headaches resolved  3. 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   Upper and lower extremity weakness d

## 2022-11-02 NOTE — PATIENT INSTRUCTIONS
Most of your pain is over your Posterior tibial tendon  It appears like you have developed tendonitis in this tendon with your extra activity this weekend  Rest, ice, elevation and compression are all important here  Compression stocking (20-30mm Hg pressure) to be worn at all times to help control swelling    NSAIDs to control inflammation    PT to begin immediately  Medrol dose pack sent to the pharmacy    Cam boot for two weeks  You may  begin weaning your boot and transitioning to a sneaker 11/15  It is important to do this gradually to avoid aggravating the healing process  11/15, you may come out of the boot into a sneaker for 2 hours  2  11/16, you may come out of the boot into a sneaker for 4 hours,  3  The next day, you may come out of the boot into a sneaker for 6 hours  4  Continue this (adding 2 hours per day) as you tolerate  For example, if you do 6 hours out of the boot into a sneaker and your foot swells more than usual at night and it is difficult to control the discomfort, do not advance to 8 hours the next day, stay at 6 hours until you are able to tolerate it  Elevation, Ice and tylenol and staying off of it at night will be important to aide in this transition out of the boot  Swelling and soreness are normal as you begin to do more with the injured leg

## 2022-11-02 NOTE — LETTER
November 2, 2022     Patient: Rachel Gomez  YOB: 1980  Date of Visit: 11/2/2022      To Whom it May Concern:    Karen Santana is under my professional care  Fabi Ibarra was seen in my office on 11/2/2022  Fabi Ibarra should stay out of work for 6 weeks until she is reevaluated in our office for her ankle injury  If you have any questions or concerns, please don't hesitate to call           Sincerely,          Coty Metzger MD        CC: No Recipients

## 2022-11-16 ENCOUNTER — OFFICE VISIT (OUTPATIENT)
Dept: PHYSICAL THERAPY | Facility: CLINIC | Age: 42
End: 2022-11-16

## 2022-11-16 DIAGNOSIS — S82.142D CLOSED FRACTURE OF LEFT TIBIAL PLATEAU WITH ROUTINE HEALING, SUBSEQUENT ENCOUNTER: Primary | ICD-10-CM

## 2022-11-16 DIAGNOSIS — M54.41 CHRONIC LOW BACK PAIN WITH RIGHT-SIDED SCIATICA, UNSPECIFIED BACK PAIN LATERALITY: ICD-10-CM

## 2022-11-16 DIAGNOSIS — G89.29 CHRONIC LOW BACK PAIN WITH RIGHT-SIDED SCIATICA, UNSPECIFIED BACK PAIN LATERALITY: ICD-10-CM

## 2022-11-16 NOTE — PROGRESS NOTES
Daily Note     Today's date: 2022  Patient name: Zonia Patten  : 1980  MRN: [id number]  Referring provider: YVETTE Barney*  Dx:   Encounter Diagnosis     ICD-10-CM    1  Closed fracture of left tibial plateau with routine healing, subsequent encounter  S82 142D       2  Chronic low back pain with right-sided sciatica, unspecified back pain laterality  M54 41     G89 29                      Subjective: Pt reports 5/10 low back pain and 3/10 L knee pain pre tx  Objective: See treatment diary below      Assessment: Pt demonstrated fatigue with hip strengthening, increased difficulty with WB strengthening due to R ankle pain  Plan: RE nv for R ankle script  Precautions:  PMHx: depression, rheumatoid arteritis  WBAT  Dx: L lateral tibial plateau fx , CLBP and RLE radiculopathy with a flexion/stabilization classification    Manuals 9/21 10/3 10/20 11/16         L GA stretch 10"x3 10"x3 10"x3 10"x3         L knee PROM 5 min 5 min 5 min 5 min         Graston R lumbar paraspinals 3 min 3 min 3 min 3 min                      Neuro Re-Ed             Abdominal bracing 5"x10 5"x10 5"x10 5"x10         Supine DLS marches [id number] 3x10 3x10                                                                          Ther Ex             Heel slides 5"x15  HEP 5"x15 5"x15 5"x15         Standing GA stretch 10"x3 10"x3 10"x3 10"x3                      SLR 2x10 2x10 3x10 3x10         SL hip ABD 2x10 2x10 3x10 3x10         TB PF             DKC 10"x5 10"x5 10"x5 10"x5         Seated lumbar flexion 1x10 1x10 1x10 1x10         Ther Activity                                       Gait Training             AlterG 50% BW  5 min  1 mph 50% BW  5 min  1 mph 50% BW  6 min  1 mph 40% BW  5 min  1 mph         Step-ups  L/  6"/ 2x10 L/  6"/ 2x12 L/   6"/   1x12*         Modalities

## 2022-11-22 ENCOUNTER — CLINICAL SUPPORT (OUTPATIENT)
Dept: OBGYN CLINIC | Facility: CLINIC | Age: 42
End: 2022-11-22

## 2022-11-22 DIAGNOSIS — Z30.42 ENCOUNTER FOR MANAGEMENT AND INJECTION OF DEPO-PROVERA: ICD-10-CM

## 2022-11-22 DIAGNOSIS — Z23 NEED FOR HPV VACCINATION: Primary | ICD-10-CM

## 2022-11-22 RX ADMIN — MEDROXYPROGESTERONE ACETATE 150 MG: 150 INJECTION, SUSPENSION INTRAMUSCULAR at 10:17

## 2022-11-22 NOTE — PROGRESS NOTES
Depo-Provera     • [x]   Patient provided box / Yes  o (3) Refills remain  o Refills submitted :No  • Last  Annual Due : 8/25/23  • Last Depo date: 8/24/22  • Side effects: None reported  • HCG: No  • Given by: K Reyes  • Site: LD    o Calcium supplement daily teaching  o Condoms for 2 weeks following first injection dose

## 2022-11-29 ENCOUNTER — OFFICE VISIT (OUTPATIENT)
Dept: OBGYN CLINIC | Facility: CLINIC | Age: 42
End: 2022-11-29

## 2022-11-29 VITALS
WEIGHT: 200 LBS | BODY MASS INDEX: 36.8 KG/M2 | HEIGHT: 62 IN | HEART RATE: 80 BPM | SYSTOLIC BLOOD PRESSURE: 130 MMHG | DIASTOLIC BLOOD PRESSURE: 86 MMHG

## 2022-11-29 DIAGNOSIS — M76.821 POSTERIOR TIBIAL TENDONITIS OF RIGHT LEG: Primary | ICD-10-CM

## 2022-11-29 NOTE — PROGRESS NOTES
RIP Curtis  Attending, Orthopaedic Surgery  [facility] [address] [facility]      ORTHOPAEDIC FOOT AND ANKLE CLINIC VISIT     Assessment:     Encounter Diagnosis   Name Primary? • Posterior tibial tendonitis of right leg Yes            Plan:   · The patient verbalized understanding of exam findings and treatment plan  We engaged in the shared decision-making process and treatment options were discussed at length with the patient  Surgical and conservative management discussed today along with risks and benefits  · Patient has posterior tibial tendonitis of her right ankle  She will begin formal physical therapy tomorrow and supplement with HEP  · Work note provided for the patient today at her request, returning her to modified duty  · Continue use of OTC meds and ice prn for pain relief as well as supportive shoe wear  Return in about 7 weeks (around 1/17/2023)  History of Present Illness:   Chief Complaint:   Chief Complaint   Patient presents with   • Right Ankle - Follow-up     Ember Munoz is a 40 y o  female who is being seen in follow-up for Right posterior tibial tedonitis  When we last saw she we recommended PT/HEP  Pain has improved  Residual pain is localized at posterolateral ankle with minimal radiating and described as sharp and severe  Pain/symptom timing:  Worse during the day when active  Pain/symptom context:  Worse with activites and work  Pain/symptom modifying factors:  Rest makes better, activities make worse  Pain/symptom associated signs/symptoms: none    Prior treatment   · NSAIDsYes   · Injections No   · Bracing/Orthotics Yes    · Physical Therapy Yes     Orthopedic Surgical History:   See below    Past Medical, Surgical and Social History:  Past Medical History:  has a past medical history of Arthritis, Depression, DJD (degenerative joint disease), Herpes, Hidradenitis, and MRSA carrier    Problem List: does not have any pertinent problems on file  Past Surgical History:  has a past surgical history that includes FL injection right hip (non arthrogram) (7/24/2019); FL injection right shoulder (arthrogram) (10/7/2019); FL injection right hip (arthrogram) (1/6/2020); and No past surgeries  Family History: family history includes ADD / ADHD in her daughter; Arthritis in her maternal grandmother and mother; Bipolar disorder in her brother; Depression in her son; Diabetes in her father and paternal grandmother; Heart attack in her father, maternal grandfather, and paternal grandfather; Hypertension in her daughter and mother; Obesity in her daughter; Other in her father; Ovarian cysts in her sister; Post-traumatic stress disorder in her father, paternal grandfather, and son; Schizophrenia in her brother and sister; Sleep apnea in her daughter; Stroke in her father and paternal grandmother  Social History:  reports that she has been smoking cigarettes  She has been smoking an average of  25 packs per day  She has quit using smokeless tobacco  She reports current alcohol use  She reports current drug use  Drug: Marijuana  Current Medications: has a current medication list which includes the following prescription(s): buspirone, diclofenac sodium, duloxetine, hydroxyzine pamoate, ibuprofen, medroxyprogesterone, medroxyprogesterone acetate, methylprednisolone, mupirocin, sulfamethoxazole-trimethoprim, valacyclovir, celecoxib, and cyclobenzaprine, and the following Facility-Administered Medications: medroxyprogesterone acetate  Allergies: has No Known Allergies       Review of Systems:  General- denies fever/chills  HEENT- denies hearing loss or sore throat  Eyes- denies eye pain or visual disturbances, denies red eyes  Respiratory- denies cough or SOB  Cardio- denies chest pain or palpitations  GI- denies abdominal pain  Endocrine- denies urinary frequency  Urinary- denies pain with urination  Musculoskeletal- Negative except noted above  Skin- denies rashes or wounds  Neurological- denies dizziness or headache  Psychiatric- denies anxiety or difficulty concentrating    Physical Exam:   /86 (BP Location: Right arm, Patient Position: Sitting, Cuff Size: Adult)   Pulse 80   Ht 5' 2" (1 575 m)   Wt 90 7 kg (200 lb)   LMP  (LMP Unknown)   BMI 36 58 kg/m²   General/Constitutional: No apparent distress: well-nourished and well developed  Eyes: normal ocular motion  Lymphatic: No appreciable lymphadenopathy  Respiratory: Non-labored breathing  Vascular: No edema, swelling or tenderness, except as noted in detailed exam   Integumentary: No impressive skin lesions present, except as noted in detailed exam   Neuro: No ataxia or tremors noted  Psych: Normal mood and affect, oriented to person, place and time  Appropriate affect  Musculoskeletal: Normal, except as noted in detailed exam and in HPI  Examination    Right    Gait Normal   Musculoskeletal Non tender to palpation    Skin Normal     Nails Normal    Range of Motion  20 degrees dorsiflexion, 30 degrees plantarflexion  Subtalar motion: normal    Stability Stable    Muscle Strength 5/5 tibialis anterior  5/5 gastrocnemius-soleus  5/5 posterior tibialis  4/5 peroneal/eversion strength  5/5 EHL  5/5 FHL    Neurologic Normal    Sensation Intact to light touch throughout sural, saphenous, superficial peroneal, deep peroneal and medial/lateral plantar nerve distributions  [place]-Jenny 5 07 filament (10g) testing deferred  Cardiovascular Brisk capillary refill < 2 seconds,intact DP and PT pulses    Special Tests None      Imaging Studies:   No new imaging    James R Lachman, MD  Foot & Ankle Surgery   Department of [address] [place] [address]      I personally performed the service  Dossie Chars Lachman, MD    Scribe Attestation    I,:  Phani Melendrez am acting as a scribe while in the presence of the attending physician :       I,:  Oswaldo Ma MD personally performed the services described in this documentation    as scribed in my presence :

## 2022-11-29 NOTE — LETTER
November 29, 2022     Patient: Reji Agosto  YOB: 1980  Date of Visit: 11/29/2022      To Whom it May Concern:    Nel Juárez is under my professional care  Claudell Wilton was seen in my office on 11/29/2022  Claudell Form may return to work 20 hours per week for 6-8 weeks  Please allow for frequent sitting breaks as needed for pain control  If you have any questions or concerns, please don't hesitate to call           Sincerely,          Darius Gee MD        CC: No Recipients

## 2022-11-30 ENCOUNTER — EVALUATION (OUTPATIENT)
Dept: PHYSICAL THERAPY | Facility: CLINIC | Age: 42
End: 2022-11-30

## 2022-11-30 DIAGNOSIS — M54.41 CHRONIC LOW BACK PAIN WITH RIGHT-SIDED SCIATICA, UNSPECIFIED BACK PAIN LATERALITY: ICD-10-CM

## 2022-11-30 DIAGNOSIS — G89.29 CHRONIC LOW BACK PAIN WITH RIGHT-SIDED SCIATICA, UNSPECIFIED BACK PAIN LATERALITY: ICD-10-CM

## 2022-11-30 DIAGNOSIS — M76.821 POSTERIOR TIBIAL TENDONITIS OF RIGHT LEG: ICD-10-CM

## 2022-11-30 DIAGNOSIS — S82.142D CLOSED FRACTURE OF LEFT TIBIAL PLATEAU WITH ROUTINE HEALING, SUBSEQUENT ENCOUNTER: Primary | ICD-10-CM

## 2022-11-30 NOTE — PROGRESS NOTES
PT Re-Evaluation     Today's date: 2022  Patient name: Mynor Palmer  : 1980  MRN: [id number]  Referring provider: Jami Berman MD  Dx:   Encounter Diagnosis     ICD-10-CM    1  Closed fracture of left tibial plateau with routine healing, subsequent encounter  S82 142D       2  Posterior tibial tendonitis of right leg  M76 821 Ambulatory Referral to Physical Therapy      3  Chronic low back pain with right-sided sciatica, unspecified back pain laterality  M54 41     G89 29                      Assessment  Assessment details: Pt presents with s/s consistent with R ankle impingement secondary to hypomobility and gait abnormalities  She demonstrated good INV strength and no ttp along the posterior tibialis tendon indicating her initial tendonitis has likely resolved at this point following CAM boot immobilization  She will benefit from skilled PT services to address her impairments in order to decrease pain and restore function    Impairments: abnormal gait, abnormal muscle firing, abnormal or restricted ROM, abnormal movement, activity intolerance, impaired balance, impaired physical strength, lacks appropriate home exercise program, pain with function, weight-bearing intolerance and poor body mechanics  Understanding of Dx/Px/POC: good   Prognosis: good    Goals  STG - 4 wks  1) pt will demonstrate L knee PROM 0-130 deg  2) pt will demonstrate 7 deg DF (ke) PROM  3) pt will improve gait abnormalities 50%    LTG - 8-12 wks  1) pt will demonstrate 10 deg DF (ke) PROM  2) pt will demonstrate normalized gait  3) pt will report no difficulty with work activities    Plan  Planned modality interventions: low level laser therapy and cryotherapy  Planned therapy interventions: manual therapy, abdominal trunk stabilization, balance/weight bearing training, body mechanics training, neuromuscular re-education, patient education, strengthening, stretching, therapeutic activities, therapeutic exercise, home exercise program, gait training, functional ROM exercises and flexibility  Frequency: 2x week  Duration in weeks: 8  Treatment plan discussed with: patient        Subjective Evaluation    History of Present Illness  Mechanism of injury: Pt is a 40 y o  female with PMHx significant for depression, rheumatoid arteritis  She presents with new Rx for R ankle  She reports sudden onset of medial R ankle pain 4 wks ago after going trick-or-treating with her kids  She reports a resolution of medial R ankle after CAM boot x 2 wks but now has residual lateral R ankle and midfoot pain with WB  Pain  Current pain ratin  At best pain ratin  At worst pain ratin  Location: B/L L/S into posterior R mid-thigh; lateral R ankle and midfoot; L anterior knee  Quality: pulling, tight, pressure, sharp and dull ache  Relieving factors: rest, support and ice  Aggravating factors: standing, walking and stair climbing  Progression: improved    Social Support    Employment status: working ([employer] - 6 hr/shift with [employer])    Diagnostic Tests  X-ray: abnormal (healing L lateral tibial plateau fx)  Treatments  Previous treatment: immobilization and physical therapy  Current treatment: physical therapy  Patient Goals  Patient goals for therapy: decreased edema, decreased pain, improved balance, increased motion, increased strength, independence with ADLs/IADLs and return to sport/leisure activities          Objective     Observations   Left Knee   Negative for deformity and edema  Palpation   Left   Hypertonic in the erector spinae and lumbar paraspinals  Tenderness of the erector spinae and lumbar paraspinals  Right   Hypertonic in the erector spinae and lumbar paraspinals  Tenderness of the erector spinae and lumbar paraspinals       Tenderness     Additional Tenderness Details  (+) medial > lateral tibia    Neurological Testing     Sensation     Lumbar   Left   Intact: light touch    Right Diminished: light touch    Comments   Right light touch: L2-S1    Knee   Left Knee   Intact: light touch    Reflexes   Left   Patellar (L4): normal (2+)  Achilles (S1): normal (2+)    Right   Patellar (L4): normal (2+)  Achilles (S1): normal (2+)    Active Range of Motion     Lumbar   Flexion: Active lumbar flexion: (+) aberrant motion, pain upon return  WFL  Extension:  Restriction level: moderate    Passive Range of Motion   Left Knee   Flexion: 130 degrees   Extension: -1 degrees   Left Ankle/Foot    Dorsiflexion (ke): 1 degrees   Plantar flexion: 50 degrees   Inversion: 30 degrees   Eversion: 20 degrees     Right Ankle/Foot    Dorsiflexion (ke): 0 degrees with pain  Plantar flexion: 65 degrees   Inversion: 35 degrees   Eversion: 15 degrees with pain    Additional Passive Range of Motion Details  Pt with end ROM FERNANDEZ and DF resolved following talocrural joint mobs and MWM    Joint Play     Right Ankle/Foot  Hypomobile in the distal tibiofibular joint, talocrural joint and midfoot     Mechanical Assessment    Cervical      Thoracic      Lumbar    Standing flexion: repeated movements   Pain location:no change  Pain intensity: worse  Standing extension: repeated movements  Pain location: centralized  Pain level: decreased    Strength/Myotome Testing     Left Hip   Planes of Motion   Flexion: 4-  Extension: 4  Abduction: 4  Adduction: 4    Right Hip   Planes of Motion   Flexion: 4-    Left Knee   Flexion: 4  Extension: 4-  Quadriceps contraction: good    Right Knee   Flexion: 4-  Extension: 4-    Left Ankle/Foot   Dorsiflexion: 4-  Plantar flexion: 3+  Inversion: 4-  Eversion: 4-    Right Ankle/Foot   Dorsiflexion: 4  Plantar flexion: 4  Inversion: 4  Eversion: 4- (pain > strength improved following joint mobs)  Great toe extension: 4-    General Comments:      Knee Comments  STS: R>L knee valgus    Ankle/Foot Comments   Gait: poor push-off on R, mod R hip ER, does not resupinate at push-off  Pt's gait impairments improved 50% following MT             Precautions:  PMHx: depression, rheumatoid arteritis  WBAT  Dx: L lateral tibial plateau fx 4/07/11, CLBP and RLE radiculopathy with a flexion/stabilization classification, R ankle impingement    Manuals 11/30            R ankle DF MWM NWB 1x20            R ankle DF MWM PWB 1x10            Gr IV AP talar mobs 3x50            Gr IV AP distal fib mobs 1x40                                      Neuro Re-Ed             Standing lumbar extension 2x10            bridges 1x15                                                                             Ther Ex             Standing GA stretch B/  15"x3            Standing SO stretch R/  15"x3            Seated DF self mobilization 1x10            SLR 2#  3x10            SL hip ABD 2#  3x10                                                   Ther Activity             STS 1x15            Step-ups/  downs  B/  6"/  1x10           Gait Training             AlterG  60% BW                        Modalities

## 2022-12-07 ENCOUNTER — OFFICE VISIT (OUTPATIENT)
Dept: PHYSICAL THERAPY | Facility: CLINIC | Age: 42
End: 2022-12-07

## 2022-12-07 DIAGNOSIS — G89.29 CHRONIC LOW BACK PAIN WITH RIGHT-SIDED SCIATICA, UNSPECIFIED BACK PAIN LATERALITY: ICD-10-CM

## 2022-12-07 DIAGNOSIS — S82.142D CLOSED FRACTURE OF LEFT TIBIAL PLATEAU WITH ROUTINE HEALING, SUBSEQUENT ENCOUNTER: ICD-10-CM

## 2022-12-07 DIAGNOSIS — M54.41 CHRONIC LOW BACK PAIN WITH RIGHT-SIDED SCIATICA, UNSPECIFIED BACK PAIN LATERALITY: ICD-10-CM

## 2022-12-07 DIAGNOSIS — M76.821 POSTERIOR TIBIAL TENDONITIS OF RIGHT LEG: Primary | ICD-10-CM

## 2022-12-07 NOTE — PROGRESS NOTES
Daily Note     Today's date: 2022  Patient name: Alex Jason  : 1980  MRN: [id number]  Referring provider: Rosemary Ye MD  Dx:   Encounter Diagnosis     ICD-10-CM    1  Posterior tibial tendonitis of right leg  M76 821       2  Closed fracture of left tibial plateau with routine healing, subsequent encounter  S82 142D       3  Chronic low back pain with right-sided sciatica, unspecified back pain laterality  M54 41     G89 29           Start Time: 1430  Stop Time: 1515  Total time in clinic (min): 45 minutes    Subjective: Patient reports low back pain rated at "410", L knee "210" and R ankle "10/10"  Objective: See treatment diary below    Assessment: Tolerated treatment well  Patient demonstrated fatigue post treatment, exhibited good technique with therapeutic exercises and would benefit from continued PT  Limited DF PROM noted during manual interventions  Able to normalize gait in the Alter G with less pain noted  Plan: Continue per plan of care  Precautions:  PMHx: depression, rheumatoid arteritis  WBAT  Dx: L lateral tibial plateau fx /, CLBP and RLE radiculopathy with a extension/stability DP, R ankle impingement    Manuals  12           R ankle DF MWM NWB 1x20            R ankle DF MWM PWB 1x10            Gr IV AP talar mobs 3x50            Gr IV AP distal fib mobs 1x40            R ankle PROM  5 min                        Neuro Re-Ed             Standing lumbar extension 2x10 2x10           bridges 1x15 2x10                                                                            Ther Ex             Standing GA stretch B/  15"x3 B/  15"x3           Standing SO stretch R/  15"x3 R/  15"x3           Seated DF self mobilization 1x10 1x10           SLR 2#  3x10 2#  3x10           SL hip ABD 2#  3x10 2#  3x10                                                  Ther Activity             STS 1x15 2x10           Step-ups/  downs  B/  6"/  1x15           Gait Training             AlterG  60% BW    1 2 mph    10 min                        Modalities

## 2022-12-09 ENCOUNTER — APPOINTMENT (OUTPATIENT)
Dept: PHYSICAL THERAPY | Facility: CLINIC | Age: 42
End: 2022-12-09

## 2022-12-14 ENCOUNTER — APPOINTMENT (OUTPATIENT)
Dept: PHYSICAL THERAPY | Facility: CLINIC | Age: 42
End: 2022-12-14

## 2022-12-21 ENCOUNTER — APPOINTMENT (OUTPATIENT)
Dept: PHYSICAL THERAPY | Facility: CLINIC | Age: 42
End: 2022-12-21

## 2022-12-23 ENCOUNTER — APPOINTMENT (OUTPATIENT)
Dept: PHYSICAL THERAPY | Facility: CLINIC | Age: 42
End: 2022-12-23

## 2022-12-28 ENCOUNTER — APPOINTMENT (OUTPATIENT)
Dept: PHYSICAL THERAPY | Facility: CLINIC | Age: 42
End: 2022-12-28

## 2023-01-16 ENCOUNTER — OFFICE VISIT (OUTPATIENT)
Dept: INTERNAL MEDICINE CLINIC | Facility: CLINIC | Age: 43
End: 2023-01-16

## 2023-01-16 ENCOUNTER — APPOINTMENT (OUTPATIENT)
Dept: LAB | Facility: CLINIC | Age: 43
End: 2023-01-16

## 2023-01-16 VITALS
DIASTOLIC BLOOD PRESSURE: 84 MMHG | TEMPERATURE: 98.6 F | HEART RATE: 66 BPM | OXYGEN SATURATION: 98 % | HEIGHT: 62 IN | WEIGHT: 196 LBS | SYSTOLIC BLOOD PRESSURE: 132 MMHG | BODY MASS INDEX: 36.07 KG/M2

## 2023-01-16 DIAGNOSIS — Z13.220 ENCOUNTER FOR SCREENING FOR LIPID DISORDER: ICD-10-CM

## 2023-01-16 DIAGNOSIS — Z23 NEED FOR PNEUMOCOCCAL VACCINATION: ICD-10-CM

## 2023-01-16 DIAGNOSIS — Z13.29 SCREENING FOR THYROID DISORDER: ICD-10-CM

## 2023-01-16 DIAGNOSIS — Z13.1 SCREENING FOR DIABETES MELLITUS: ICD-10-CM

## 2023-01-16 DIAGNOSIS — Z13.0 SCREENING FOR DEFICIENCY ANEMIA: ICD-10-CM

## 2023-01-16 DIAGNOSIS — Z00.00 ANNUAL PHYSICAL EXAM: Primary | ICD-10-CM

## 2023-01-16 DIAGNOSIS — Z23 NEED FOR TDAP VACCINATION: ICD-10-CM

## 2023-01-16 DIAGNOSIS — Z59.9 FINANCIAL DIFFICULTIES: ICD-10-CM

## 2023-01-16 LAB
ALBUMIN SERPL BCP-MCNC: 3.5 G/DL (ref 3.5–5)
ALP SERPL-CCNC: 69 U/L (ref 46–116)
ALT SERPL W P-5'-P-CCNC: 24 U/L (ref 12–78)
ANION GAP SERPL CALCULATED.3IONS-SCNC: 7 MMOL/L (ref 4–13)
AST SERPL W P-5'-P-CCNC: 16 U/L (ref 5–45)
BASOPHILS # BLD AUTO: 0.07 THOUSANDS/ÂΜL (ref 0–0.1)
BASOPHILS NFR BLD AUTO: 1 % (ref 0–1)
BILIRUB SERPL-MCNC: 0.26 MG/DL (ref 0.2–1)
BUN SERPL-MCNC: 4 MG/DL (ref 5–25)
CALCIUM SERPL-MCNC: 8.9 MG/DL (ref 8.3–10.1)
CHLORIDE SERPL-SCNC: 108 MMOL/L (ref 96–108)
CHOLEST SERPL-MCNC: 148 MG/DL
CO2 SERPL-SCNC: 23 MMOL/L (ref 21–32)
CREAT SERPL-MCNC: 0.64 MG/DL (ref 0.6–1.3)
EOSINOPHIL # BLD AUTO: 0.3 THOUSAND/ÂΜL (ref 0–0.61)
EOSINOPHIL NFR BLD AUTO: 2 % (ref 0–6)
ERYTHROCYTE [DISTWIDTH] IN BLOOD BY AUTOMATED COUNT: 13.9 % (ref 11.6–15.1)
EST. AVERAGE GLUCOSE BLD GHB EST-MCNC: 108 MG/DL
GFR SERPL CREATININE-BSD FRML MDRD: 111 ML/MIN/1.73SQ M
GLUCOSE P FAST SERPL-MCNC: 84 MG/DL (ref 65–99)
HBA1C MFR BLD: 5.4 %
HCT VFR BLD AUTO: 44.4 % (ref 34.8–46.1)
HDLC SERPL-MCNC: 32 MG/DL
HGB BLD-MCNC: 14.6 G/DL (ref 11.5–15.4)
IMM GRANULOCYTES # BLD AUTO: 0.04 THOUSAND/UL (ref 0–0.2)
IMM GRANULOCYTES NFR BLD AUTO: 0 % (ref 0–2)
LDLC SERPL CALC-MCNC: 101 MG/DL (ref 0–100)
LYMPHOCYTES # BLD AUTO: 3.75 THOUSANDS/ÂΜL (ref 0.6–4.47)
LYMPHOCYTES NFR BLD AUTO: 31 % (ref 14–44)
MCH RBC QN AUTO: 29.5 PG (ref 26.8–34.3)
MCHC RBC AUTO-ENTMCNC: 32.9 G/DL (ref 31.4–37.4)
MCV RBC AUTO: 90 FL (ref 82–98)
MONOCYTES # BLD AUTO: 0.87 THOUSAND/ÂΜL (ref 0.17–1.22)
MONOCYTES NFR BLD AUTO: 7 % (ref 4–12)
NEUTROPHILS # BLD AUTO: 7.24 THOUSANDS/ÂΜL (ref 1.85–7.62)
NEUTS SEG NFR BLD AUTO: 59 % (ref 43–75)
NRBC BLD AUTO-RTO: 0 /100 WBCS
PLATELET # BLD AUTO: 435 THOUSANDS/UL (ref 149–390)
PMV BLD AUTO: 9.6 FL (ref 8.9–12.7)
POTASSIUM SERPL-SCNC: 3.5 MMOL/L (ref 3.5–5.3)
PROT SERPL-MCNC: 8 G/DL (ref 6.4–8.4)
RBC # BLD AUTO: 4.95 MILLION/UL (ref 3.81–5.12)
SODIUM SERPL-SCNC: 138 MMOL/L (ref 135–147)
TRIGL SERPL-MCNC: 76 MG/DL
TSH SERPL DL<=0.05 MIU/L-ACNC: 0.62 UIU/ML (ref 0.45–4.5)
WBC # BLD AUTO: 12.27 THOUSAND/UL (ref 4.31–10.16)

## 2023-01-16 SDOH — ECONOMIC STABILITY - INCOME SECURITY: PROBLEM RELATED TO HOUSING AND ECONOMIC CIRCUMSTANCES, UNSPECIFIED: Z59.9

## 2023-01-16 NOTE — PROGRESS NOTES
ADULT ANNUAL PHYSICAL  [address] [facility]    NAME: Jose Juan Hopper  AGE: 40 y o  SEX: female  : 1980     DATE: 2023     Assessment and Plan:     Problem List Items Addressed This Visit        Other    Annual physical exam - Primary     Discussed general health recommendations and screening guidelines  Up to date on cervical cancer screening  Due for breast cancer screening, mammogram scheduled next month  Agreeable to Prevnar and Tdap today  Patient tolerated well  She will schedule her COVID booster  Recommend routine dental and eye exams  Agreeable to screening lab work  Next physical one year  BMI 35 0-35 9,adult     See counseling for nutrition and exercise recommendations  RESOLVED: Financial difficulties    Relevant Orders    Ambulatory Referral to Social Work Care Management Program    RESOLVED: Need for Tdap vaccination    Relevant Orders    TDAP VACCINE GREATER THAN OR EQUAL TO 6YO IM (Completed)   Other Visit Diagnoses     Need for pneumococcal vaccination        Relevant Orders    Pneumococcal Conjugate Vaccine 20-valent (Pcv20) (Completed)    Screening for diabetes mellitus        Relevant Orders    Comprehensive metabolic panel    HEMOGLOBIN A1C W/ EAG ESTIMATION    Encounter for screening for lipid disorder        Relevant Orders    Lipid Panel with Direct LDL reflex    Screening for thyroid disorder        Relevant Orders    TSH, 3rd generation with Free T4 reflex    Screening for deficiency anemia        Relevant Orders    CBC and differential          Immunizations and preventive care screenings were discussed with patient today  Appropriate education was printed on patient's after visit summary  Counseling:  Alcohol/drug use: discussed moderation in alcohol intake, the recommendations for healthy alcohol use, and avoidance of illicit drug use    Dental Health: discussed importance of regular tooth brushing, flossing, and dental visits  Injury prevention: discussed safety/seat belts, safety helmets, smoke detectors, carbon dioxide detectors, and smoking near bedding or upholstery  Sexual health: discussed sexually transmitted diseases, partner selection, use of condoms, avoidance of unintended pregnancy, and contraceptive alternatives  · Exercise: the importance of regular exercise/physical activity was discussed  Recommend exercise 3-5 times per week for at least 30 minutes  BMI Counseling: Body mass index is 35 85 kg/m²  The BMI is above normal  Nutrition recommendations include decreasing portion sizes, encouraging healthy choices of fruits and vegetables, decreasing fast food intake, consuming healthier snacks, limiting drinks that contain sugar, moderation in carbohydrate intake, increasing intake of lean protein, reducing intake of saturated and trans fat and reducing intake of cholesterol  Exercise recommendations include moderate physical activity 150 minutes/week, exercising 3-5 times per week and strength training exercises  No pharmacotherapy was ordered  Rationale for BMI follow-up plan is due to patient being overweight or obese  Return in about 1 year (around 1/16/2024) for Annual physical      Chief Complaint:     Chief Complaint   Patient presents with   • Establish Care      History of Present Illness:     Adult Annual Physical   Patient here for a comprehensive physical exam  The patient reports no problems  Patient does suffer from chronic pain in multiple joints  She is followed by ortho and does not want to discuss it today  Also undergoing PT  Diet and Physical Activity  · Diet/Nutrition: well balanced diet, limited junk food and limited fruits/vegetables  · Exercise: no formal exercise        Depression Screening  PHQ-2/9 Depression Screening    Little interest or pleasure in doing things: 2 - more than half the days  Feeling down, depressed, or hopeless: 1 - several days  Trouble falling or staying asleep, or sleeping too much: 1 - several days  Feeling tired or having little energy: 1 - several days  Poor appetite or overeatin - several days  Feeling bad about yourself - or that you are a failure or have let yourself or your family down: 1 - several days  Trouble concentrating on things, such as reading the newspaper or watching television: 1 - several days  Moving or speaking so slowly that other people could have noticed  Or the opposite - being so fidgety or restless that you have been moving around a lot more than usual: 1 - several days  Thoughts that you would be better off dead, or of hurting yourself in some way: 0 - not at all       General Health  · Sleep: sleeps well and gets more than 8 hours of sleep on average  · Hearing: normal - bilateral   · Vision: no vision problems, goes for regular eye exams, most recent eye exam <1 year ago and wears glasses  · Dental: regular dental visits and brushes teeth twice daily  /GYN Health  · Patient is: perimenopausal  · Last menstrual period: unknown due to [facility]  · Contraceptive method: injectable contraception  Review of Systems:     Review of Systems   Constitutional: Negative for appetite change, chills, diaphoresis, fatigue, fever and unexpected weight change  HENT: Negative for congestion, dental problem, hearing loss, sore throat, tinnitus and trouble swallowing  Eyes: Negative for visual disturbance  Respiratory: Negative for cough, chest tightness, shortness of breath and wheezing  Cardiovascular: Negative for chest pain, palpitations and leg swelling  Gastrointestinal: Negative for abdominal pain, blood in stool, constipation, diarrhea, nausea and vomiting  Endocrine: Negative for cold intolerance, heat intolerance, polydipsia, polyphagia and polyuria  Musculoskeletal: Positive for arthralgias, back pain, myalgias and neck pain   Negative for gait problem, joint swelling and neck stiffness  Skin: Negative for rash  Neurological: Negative for dizziness, tremors, weakness, light-headedness, numbness and headaches  Hematological: Negative for adenopathy  Past Medical History:     Past Medical History:   Diagnosis Date   • Arthritis    • Depression    • DJD (degenerative joint disease)    • Herpes    • Hidradenitis    • MRSA carrier       Past Surgical History:     Past Surgical History:   Procedure Laterality Date   • FL INJECTION RIGHT HIP (ARTHROGRAM)  1/6/2020   • FL INJECTION RIGHT HIP (NON ARTHROGRAM)  7/24/2019   • FL INJECTION RIGHT SHOULDER (ARTHROGRAM)  10/7/2019   • NO PAST SURGERIES        Social History:     Social History     Socioeconomic History   • Marital status: Single     Spouse name: None   • Number of children: None   • Years of education: None   • Highest education level: None   Occupational History   • None   Tobacco Use   • Smoking status: Every Day     Packs/day: 0 25     Types: Cigarettes   • Smokeless tobacco: Former   • Tobacco comments:     4-5 cigarettes a day   Vaping Use   • Vaping Use: Never used   Substance and Sexual Activity   • Alcohol use: Yes     Comment: holidays    • Drug use: Yes     Types: Marijuana     Comment: few times a day    • Sexual activity: Yes     Partners: Male     Birth control/protection: Injection   Other Topics Concern   • None   Social History Narrative   • None     Social Determinants of Health     Financial Resource Strain: Medium Risk   • Difficulty of Paying Living Expenses: Somewhat hard   Food Insecurity: No Food Insecurity   • Worried About Running Out of Food in the Last Year: Never true   • Ran Out of Food in the Last Year: Never true   Transportation Needs: No Transportation Needs   • Lack of Transportation (Medical): No   • Lack of Transportation (Non-Medical):  No   Physical Activity: Not on file   Stress: Stress Concern Present   • Feeling of Stress : Rather much   Social Connections: Not on file   Intimate Partner Violence: Not At Risk   • Fear of Current or Ex-Partner: No   • Emotionally Abused: No   • Physically Abused: No   • Sexually Abused: No   Housing Stability: Low Risk    • Unable to Pay for Housing in the Last Year: No   • Number of Places Lived in the Last Year: 1   • Unstable Housing in the Last Year: No      Family History:     Family History   Problem Relation Age of Onset   • Arthritis Mother    • Hypertension Mother    • Diabetes Father    • Stroke Father    • Heart attack Father    • Post-traumatic stress disorder Father    • Other Father         nerve gas exposure-war   • Schizophrenia Sister    • Bipolar disorder Brother    • Schizophrenia Brother    • Sleep apnea Daughter    • Obesity Daughter    • Hypertension Daughter    • ADD / ADHD Daughter    • Depression Son    • Post-traumatic stress disorder Son    • Arthritis Maternal Grandmother    • Heart attack Maternal Grandfather    • Diabetes Paternal Grandmother    • Stroke Paternal Grandmother    • Heart attack Paternal Grandfather    • Post-traumatic stress disorder Paternal Grandfather    • Ovarian cysts Sister       Current Medications:     Current Outpatient Medications   Medication Sig Dispense Refill   • celecoxib (CeleBREX) 200 mg capsule Take 1 capsule (200 mg total) by mouth 2 (two) times a day 180 capsule 3   • cyclobenzaprine (FLEXERIL) 10 mg tablet Take 1 tablet (10 mg total) by mouth 3 (three) times a day as needed for muscle spasms 30 tablet 0   • Diclofenac Sodium (VOLTAREN) 1 %      • ibuprofen (MOTRIN) 600 mg tablet Take 1 tablet (600 mg total) by mouth every 6 (six) hours as needed for mild pain 30 tablet 0   • medroxyPROGESTERone (DEPO-PROVERA) 150 mg/mL injection Inject 1 mL (150 mg total) into a muscle every 3 (three) months 1 mL 3   • mupirocin (BACTROBAN) 2 % ointment Apply topically 3 (three) times a day 22 g 0   • valACYclovir (VALTREX) 500 mg tablet Take 1 tablet (500 mg total) by mouth daily 90 tablet 3   • DULoxetine (CYMBALTA) 30 mg delayed release capsule Take 30 mg by mouth every morning     • hydrOXYzine pamoate (VISTARIL) 50 mg capsule Take 50 mg by mouth daily at bedtime (Patient not taking: Reported on 1/16/2023)       Current Facility-Administered Medications   Medication Dose Route Frequency Provider Last Rate Last Admin   • medroxyPROGESTERone acetate (DEPO-PROVERA SYRINGE) IM injection 150 mg  150 mg Intramuscular Q3 Months KEYANA Zeng   150 mg at 11/22/22 1017      Allergies:     No Known Allergies   Physical Exam:     /84   Pulse 66   Temp 98 6 °F (37 °C) (Temporal)   Ht 5' 2" (1 575 m)   Wt 88 9 kg (196 lb)   LMP  (LMP Unknown)   SpO2 98%   Breastfeeding No   BMI 35 85 kg/m²     Physical Exam  Vitals and nursing note reviewed  Constitutional:       General: She is awake  She is not in acute distress  Appearance: Normal appearance  She is well-developed and well-groomed  She is obese  She is not ill-appearing  HENT:      Head: Normocephalic and atraumatic  Right Ear: Hearing, tympanic membrane, ear canal and external ear normal       Left Ear: Hearing, tympanic membrane, ear canal and external ear normal       Nose: Nose normal       Mouth/Throat:      Lips: Pink  Mouth: Mucous membranes are moist       Pharynx: Oropharynx is clear  Uvula midline  No oropharyngeal exudate or posterior oropharyngeal erythema  Eyes:      General: Lids are normal  No scleral icterus  Extraocular Movements: Extraocular movements intact  Conjunctiva/sclera: Conjunctivae normal       Pupils: Pupils are equal, round, and reactive to light  Cardiovascular:      Rate and Rhythm: Normal rate and regular rhythm  Pulses: Normal pulses  Heart sounds: Normal heart sounds  No murmur heard  Pulmonary:      Effort: Pulmonary effort is normal  No respiratory distress  Breath sounds: Normal breath sounds and air entry  No decreased air movement   No decreased breath sounds, wheezing, rhonchi or rales  Abdominal:      General: Abdomen is flat  Bowel sounds are normal  There is no distension  Palpations: Abdomen is soft  There is no mass  Tenderness: There is no abdominal tenderness  There is no right CVA tenderness, left CVA tenderness, guarding or rebound  Hernia: No hernia is present  Musculoskeletal:         General: Normal range of motion  Cervical back: Full passive range of motion without pain, normal range of motion and neck supple  Right lower leg: No edema  Left lower leg: No edema  Lymphadenopathy:      Cervical: No cervical adenopathy  Skin:     General: Skin is warm  Capillary Refill: Capillary refill takes less than 2 seconds  Coloration: Skin is not jaundiced  Findings: No rash  Neurological:      General: No focal deficit present  Mental Status: She is alert and oriented to person, place, and time  Mental status is at baseline  Cranial Nerves: Cranial nerves 2-12 are intact  Sensory: Sensation is intact  Motor: Motor function is intact  Coordination: Coordination is intact  Gait: Gait is intact  Psychiatric:         Attention and Perception: Attention normal          Mood and Affect: Mood and affect normal          Speech: Speech normal          Behavior: Behavior normal  Behavior is cooperative  Thought Content:  Thought content normal          Cognition and Memory: Cognition normal           Jes Masters

## 2023-01-16 NOTE — ASSESSMENT & PLAN NOTE
Discussed general health recommendations and screening guidelines  Up to date on cervical cancer screening  Due for breast cancer screening, mammogram scheduled next month  Agreeable to Prevnar and Tdap today  Patient tolerated well  She will schedule her COVID booster  Recommend routine dental and eye exams  Agreeable to screening lab work  Next physical one year

## 2023-01-16 NOTE — PATIENT INSTRUCTIONS
Wellness Visit for Adults   AMBULATORY CARE:   A wellness visit  is when you see your healthcare provider to get screened for health problems  Your healthcare provider will also give you advice on how to stay healthy  Write down your questions so you remember to ask them  Ask your healthcare provider how often you should have a wellness visit  What happens at a wellness visit:  Your healthcare provider will ask about your health, and your family history of health problems  This includes high blood pressure, heart disease, and cancer  He or she will ask if you have symptoms that concern you, if you smoke, and about your mood  You may also be asked about your intake of medicines, supplements, food, and alcohol  Any of the following may be done:  · Your weight  will be checked  Your height may also be checked so your body mass index (BMI) can be calculated  Your BMI shows if you are at a healthy weight  · Your blood pressure  and heart rate will be checked  Your temperature may also be checked  · Blood and urine tests  may be done  Blood tests may be done to check your cholesterol levels  Abnormal cholesterol levels increase your risk for heart disease and stroke  You may also need a blood or urine test to check for diabetes if you are at increased risk  Urine tests may be done to look for signs of an infection or kidney disease  · A physical exam  includes checking your heartbeat and lungs with a stethoscope  Your healthcare provider may also check your skin to look for sun damage  · Screening tests  may be recommended  A screening test is done to check for diseases that may not cause symptoms  The screening tests you may need depend on your age, gender, family history, and lifestyle habits  For example, colorectal screening may be recommended if you are 48years old or older  Screening tests you need if you are a woman:   · A Pap smear  is used to screen for cervical cancer   Pap smears are usually done every 3 to 5 years depending on your age  You may need them more often if you have had abnormal Pap smear test results in the past  Ask your healthcare provider how often you should have a Pap smear  · A mammogram  is an x-ray of your breasts to screen for breast cancer  Experts recommend mammograms every 2 years starting at age 48 years  You may need a mammogram at age 52 years or younger if you have an increased risk for breast cancer  Talk to your healthcare provider about when you should start having mammograms and how often you need them  Vaccines you may need:   · Get an influenza vaccine  every year  The influenza vaccine protects you from the flu  Several types of viruses cause the flu  The viruses change over time, so new vaccines are made each year  · Get a tetanus-diphtheria (Td) booster vaccine  every 10 years  This vaccine protects you against tetanus and diphtheria  Tetanus is a severe infection that may cause painful muscle spasms and lockjaw  Diphtheria is a severe bacterial infection that causes a thick covering in the back of your mouth and throat  · Get a human papillomavirus (HPV) vaccine  if you are female and aged 23 to 32 or male 23 to 24 and never received it  This vaccine protects you from HPV infection  HPV is the most common infection spread by sexual contact  HPV may also cause vaginal, penile, and anal cancers  · Get a pneumococcal vaccine  if you are aged 72 years or older  The pneumococcal vaccine is an injection given to protect you from pneumococcal disease  Pneumococcal disease is an infection caused by pneumococcal bacteria  The infection may cause pneumonia, meningitis, or an ear infection  · Get a shingles vaccine  if you are 60 or older, even if you have had shingles before  The shingles vaccine is an injection to protect you from the varicella-zoster virus  This is the same virus that causes chickenpox   Shingles is a painful rash that develops in people who had chickenpox or have been exposed to the virus  How to eat healthy:  My Plate is a model for planning healthy meals  It shows the types and amounts of foods that should go on your plate  Fruits and vegetables make up about half of your plate, and grains and protein make up the other half  A serving of dairy is included on the side of your plate  The amount of calories and serving sizes you need depends on your age, gender, weight, and height  Examples of healthy foods are listed below:  · Eat a variety of vegetables  such as dark green, red, and orange vegetables  You can also include canned vegetables low in sodium (salt) and frozen vegetables without added butter or sauces  · Eat a variety of fresh fruits , canned fruit in 100% juice, frozen fruit, and dried fruit  · Include whole grains  At least half of the grains you eat should be whole grains  Examples include whole-wheat bread, wheat pasta, brown rice, and whole-grain cereals such as oatmeal     · Eat a variety of protein foods such as seafood (fish and shellfish), lean meat, and poultry without skin (turkey and chicken)  Examples of lean meats include pork leg, shoulder, or tenderloin, and beef round, sirloin, tenderloin, and extra lean ground beef  Other protein foods include eggs and egg substitutes, beans, peas, soy products, nuts, and seeds  · Choose low-fat dairy products such as skim or 1% milk or low-fat yogurt, cheese, and cottage cheese  · Limit unhealthy fats  such as butter, hard margarine, and shortening  Exercise:  Exercise at least 30 minutes per day on most days of the week  Some examples of exercise include walking, biking, dancing, and swimming  You can also fit in more physical activity by taking the stairs instead of the elevator or parking farther away from stores  Include muscle strengthening activities 2 days each week  Regular exercise provides many health benefits   It helps you manage your weight, and decreases your risk for type 2 diabetes, heart disease, stroke, and high blood pressure  Exercise can also help improve your mood  Ask your healthcare provider about the best exercise plan for you  General health and safety guidelines:   · Do not smoke  Nicotine and other chemicals in cigarettes and cigars can cause lung damage  Ask your healthcare provider for information if you currently smoke and need help to quit  E-cigarettes or smokeless tobacco still contain nicotine  Talk to your healthcare provider before you use these products  · Limit alcohol  A drink of alcohol is 12 ounces of beer, 5 ounces of wine, or 1½ ounces of liquor  · Lose weight, if needed  Being overweight increases your risk of certain health conditions  These include heart disease, high blood pressure, type 2 diabetes, and certain types of cancer  · Protect your skin  Do not sunbathe or use tanning beds  Use sunscreen with a SPF 15 or higher  Apply sunscreen at least 15 minutes before you go outside  Reapply sunscreen every 2 hours  Wear protective clothing, hats, and sunglasses when you are outside  · Drive safely  Always wear your seatbelt  Make sure everyone in your car wears a seatbelt  A seatbelt can save your life if you are in an accident  Do not use your cell phone when you are driving  This could distract you and cause an accident  Pull over if you need to make a call or send a text message  · Practice safe sex  Use latex condoms if are sexually active and have more than one partner  Your healthcare provider may recommend screening tests for sexually transmitted infections (STIs)  · Wear helmets, lifejackets, and protective gear  Always wear a helmet when you ride a bike or motorcycle, go skiing, or play sports that could cause a head injury  Wear protective equipment when you play sports  Wear a lifejacket when you are on a boat or doing water sports      © Copyright [employer] 2022 Information is for End User's use only and may not be sold, redistributed or otherwise used for commercial purposes  All illustrations and images included in CareNotes® are the copyrighted property of A D A M , [employer]  or [employer]  The above information is an  only  It is not intended as medical advice for individual conditions or treatments  Talk to your doctor, nurse or pharmacist before following any medical regimen to see if it is safe and effective for you  Weight Management   AMBULATORY CARE:   Why it is important to manage your weight:  Being overweight increases your risk of health conditions such as heart disease, high blood pressure, type 2 diabetes, and certain types of cancer  It can also increase your risk for osteoarthritis, sleep apnea, and other respiratory problems  Aim for a slow, steady weight loss  Even a small amount of weight loss can lower your risk of health problems  Risks of being overweight:  Extra weight can cause many health problems, including the following:  · Diabetes (high blood sugar level)    · High blood pressure or high cholesterol    · Heart disease    · Stroke    · Gallbladder or liver disease    · Cancer of the colon, breast, prostate, liver, or kidney    · Sleep apnea    · Arthritis or gout    Screening  is done to check for health conditions before you have signs or symptoms  If you are 28to 79years old, your blood sugar level may be checked every 3 years for signs of prediabetes or diabetes  Your healthcare provider will check your blood pressure at each visit  High blood pressure can lead to a stroke or other problems  Your provider may check for signs of heart disease, cancer, or other health problems  How to lose weight safely:  A safe and healthy way to lose weight is to eat fewer calories and get regular exercise  · You can lose up about 1 pound a week by decreasing the number of calories you eat by 500 calories each day   You can decrease calories by eating smaller portion sizes or by cutting out high-calorie foods  Read labels to find out how many calories are in the foods you eat  · You can also burn calories with exercise such as walking, swimming, or biking  You will be more likely to keep weight off if you make these changes part of your lifestyle  Exercise at least 30 minutes per day on most days of the week  You can also fit in more physical activity by taking the stairs instead of the elevator or parking farther away from stores  Ask your healthcare provider about the best exercise plan for you  Healthy meal plan for weight management:  A healthy meal plan includes a variety of foods, contains fewer calories, and helps you stay healthy  A healthy meal plan includes the following:     · Eat whole-grain foods more often  A healthy meal plan should contain fiber  Fiber is the part of grains, fruits, and vegetables that is not broken down by your body  Whole-grain foods are healthy and provide extra fiber in your diet  Some examples of whole-grain foods are whole-wheat breads and pastas, oatmeal, brown rice, and bulgur  · Eat a variety of vegetables every day  Include dark, leafy greens such as spinach, kale, tad greens, and mustard greens  Eat yellow and orange vegetables such as carrots, sweet potatoes, and winter squash  · Eat a variety of fruits every day  Choose fresh or canned fruit (canned in its own juice or light syrup) instead of juice  Fruit juice has very little or no fiber  · Eat low-fat dairy foods  Drink fat-free (skim) milk or 1% milk  Eat fat-free yogurt and low-fat cottage cheese  Try low-fat cheeses such as mozzarella and other reduced-fat cheeses  · Choose meat and other protein foods that are low in fat  Choose beans or other legumes such as split peas or lentils  Choose fish, skinless poultry (chicken or turkey), or lean cuts of red meat (beef or pork)   Before you cook meat or poultry, cut off any visible fat  · Use less fat and oil  Try baking foods instead of frying them  Add less fat, such as margarine, sour cream, regular salad dressing and mayonnaise to foods  Eat fewer high-fat foods  Some examples of high-fat foods include french fries, doughnuts, ice cream, and cakes  · Eat fewer sweets  Limit foods and drinks that are high in sugar  This includes candy, cookies, regular soda, and sweetened drinks  Ways to decrease calories:   · Eat smaller portions  ? Use a small plate with smaller servings  ? Do not eat second helpings  ? When you eat at a restaurant, ask for a box and place half of your meal in the box before you eat  ? Share an entrée with someone else  · Replace high-calorie snacks with healthy, low-calorie snacks  ? Choose fresh fruit, vegetables, fat-free rice cakes, or air-popped popcorn instead of potato chips, nuts, or chocolate  ? Choose water or calorie-free drinks instead of soda or sweetened drinks  · Do not shop for groceries when you are hungry  You may be more likely to make unhealthy food choices  Take a grocery list of healthy foods and shop after you have eaten  · Eat regular meals  Do not skip meals  Skipping meals can lead to overeating later in the day  This can make it harder for you to lose weight  Eat a healthy snack in place of a meal if you do not have time to eat a regular meal  Talk with a dietitian to help you create a meal plan and schedule that is right for you  Other things to consider as you try to lose weight:   · Be aware of situations that may give you the urge to overeat, such as eating while watching television  Find ways to avoid these situations  For example, read a book, go for a walk, or do crafts  · Meet with a weight loss support group or friends who are also trying to lose weight  This may help you stay motivated to continue working on your weight loss goals      © Copyright [employer] 2022 Information is for End User's use only and may not be sold, redistributed or otherwise used for commercial purposes  All illustrations and images included in CareNotes® are the copyrighted property of A D A M , Inc  or [employer]  The above information is an  only  It is not intended as medical advice for individual conditions or treatments  Talk to your doctor, nurse or pharmacist before following any medical regimen to see if it is safe and effective for you

## 2023-01-17 ENCOUNTER — OFFICE VISIT (OUTPATIENT)
Dept: OBGYN CLINIC | Facility: CLINIC | Age: 43
End: 2023-01-17

## 2023-01-17 VITALS
SYSTOLIC BLOOD PRESSURE: 130 MMHG | HEIGHT: 62 IN | BODY MASS INDEX: 36.07 KG/M2 | WEIGHT: 196 LBS | DIASTOLIC BLOOD PRESSURE: 87 MMHG | HEART RATE: 93 BPM

## 2023-01-17 DIAGNOSIS — S93.491A SPRAIN OF ANTERIOR TALOFIBULAR LIGAMENT OF RIGHT ANKLE, INITIAL ENCOUNTER: Primary | ICD-10-CM

## 2023-01-17 DIAGNOSIS — M76.821 POSTERIOR TIBIAL TENDONITIS OF RIGHT LEG: ICD-10-CM

## 2023-01-17 NOTE — LETTER
January 17, 2023     Patient: Jason Rico  YOB: 1980  Date of Visit: 1/17/2023      To Whom it May Concern:    Rebecca Guzman is under my professional care  Elda Verma was seen in my office on 1/17/2023  Elda Verma may return to work on current work restrictions of max 20 hours per week  Please allow for frequent sitting breaks as needed for pain control  If you have any questions or concerns, please don't hesitate to call           Sincerely,          Renetta Grewal MD        CC: No Recipients

## 2023-01-17 NOTE — PROGRESS NOTES
RIP Russ  Attending, Orthopaedic Surgery  [facility] [address] [facility] [address] [facility]      ORTHOPAEDIC FOOT AND ANKLE CLINIC VISIT     Assessment:     Encounter Diagnoses   Name Primary? • Sprain of anterior talofibular ligament of right ankle, initial encounter Yes   • Posterior tibial tendonitis of right leg             Plan:   · The patient verbalized understanding of exam findings and treatment plan  We engaged in the shared decision-making process and treatment options were discussed at length with the patient  Surgical and conservative management discussed today along with risks and benefits  · Patient has mainly lateral ankle pain over the ATFL of the right ankle with minimal pain about the PTT  She feels her pain has improved medially although she has only done 2 sessions of PT in the 2 months since we last saw her  · Patient was provided with another referral to formal physical therapy as this was providing her with benefit  · Work note provided for the patient today at her request   · Ice, elevation and use of Tylenol prn for pain relief  Return if symptoms worsen or fail to improve  History of Present Illness:   Chief Complaint:   Chief Complaint   Patient presents with   • Right Ankle - Follow-up     Zander Carlson is a 40 y o  female who is being seen in follow-up for Right posterior tibial tendonitis  When we last saw she we recommended PT/HEP  Pain has improved but she states that she was unable to get to many PT sessions due to her car breaking down  Residual pain is localized at lateral ankle with minimal radiating and described as sharp and severe        Pain/symptom timing:  Worse during the day when active  Pain/symptom context:  Worse with activites and work  Pain/symptom modifying factors:  Rest makes better, activities make worse  Pain/symptom associated signs/symptoms: none    Prior treatment   · NSAIDsYes   · Injections No   · Bracing/Orthotics Yes · Physical Therapy Yes     Orthopedic Surgical History:   See below    Past Medical, Surgical and Social History:  Past Medical History:  has a past medical history of Arthritis, Depression, DJD (degenerative joint disease), Herpes, Hidradenitis, and MRSA carrier  Problem List: does not have any pertinent problems on file  Past Surgical History:  has a past surgical history that includes FL injection right hip (non arthrogram) (7/24/2019); FL injection right shoulder (arthrogram) (10/7/2019); FL injection right hip (arthrogram) (1/6/2020); and No past surgeries  Family History: family history includes ADD / ADHD in her daughter; Arthritis in her maternal grandmother and mother; Bipolar disorder in her brother; Depression in her son; Diabetes in her father and paternal grandmother; Heart attack in her father, maternal grandfather, and paternal grandfather; Hypertension in her daughter and mother; Obesity in her daughter; Other in her father; Ovarian cysts in her sister; Post-traumatic stress disorder in her father, paternal grandfather, and son; Schizophrenia in her brother and sister; Sleep apnea in her daughter; Stroke in her father and paternal grandmother  Social History:  reports that she has been smoking cigarettes  She has been smoking an average of  25 packs per day  She has quit using smokeless tobacco  She reports current alcohol use  She reports current drug use  Drug: Marijuana  Current Medications: has a current medication list which includes the following prescription(s): celecoxib, cyclobenzaprine, diclofenac sodium, duloxetine, hydroxyzine pamoate, ibuprofen, medroxyprogesterone, mupirocin, and valacyclovir, and the following Facility-Administered Medications: medroxyprogesterone acetate  Allergies: has No Known Allergies       Review of Systems:  General- denies fever/chills  HEENT- denies hearing loss or sore throat  Eyes- denies eye pain or visual disturbances, denies red eyes  Respiratory- denies cough or SOB  Cardio- denies chest pain or palpitations  GI- denies abdominal pain  Endocrine- denies urinary frequency  Urinary- denies pain with urination  Musculoskeletal- Negative except noted above  Skin- denies rashes or wounds  Neurological- denies dizziness or headache  Psychiatric- denies anxiety or difficulty concentrating    Physical Exam:   /87 (BP Location: Right arm, Patient Position: Sitting, Cuff Size: Adult)   Pulse 93   Ht 5' 2" (1 575 m)   Wt 88 9 kg (196 lb)   LMP  (LMP Unknown)   BMI 35 85 kg/m²   General/Constitutional: No apparent distress: well-nourished and well developed  Eyes: normal ocular motion  Lymphatic: No appreciable lymphadenopathy  Respiratory: Non-labored breathing  Vascular: No edema, swelling or tenderness, except as noted in detailed exam   Integumentary: No impressive skin lesions present, except as noted in detailed exam   Neuro: No ataxia or tremors noted  Psych: Normal mood and affect, oriented to person, place and time  Appropriate affect  Musculoskeletal: Normal, except as noted in detailed exam and in HPI  Examination    Right    Gait Normal   Musculoskeletal Tender to palpation at ATFL    Skin Normal     Nails Normal    Range of Motion  20 degrees dorsiflexion, 30 degrees plantarflexion  Subtalar motion: normal    Stability Stable    Muscle Strength 5/5 tibialis anterior  5/5 gastrocnemius-soleus  5/5 posterior tibialis  4/5 peroneal/eversion strength  5/5 EHL  5/5 FHL    Neurologic Normal    Sensation Intact to light touch throughout sural, saphenous, superficial peroneal, deep peroneal and medial/lateral plantar nerve distributions  Dietrich-Jenny 5 07 filament (10g) testing deferred  Cardiovascular Brisk capillary refill < 2 seconds,intact DP and PT pulses    Special Tests None      Imaging Studies:   No new imaging    James R Lachman, MD  Foot & Ankle Surgery   Department of [facility]      I personally performed the service  Darcy Newness Lachman, MD    Scribe Attestation    I,:  Mandi López am acting as a scribe while in the presence of the attending physician :       I,:  Dannielle Schwab, MD personally performed the services described in this documentation    as scribed in my presence :

## 2023-01-20 ENCOUNTER — PATIENT OUTREACH (OUTPATIENT)
Dept: INTERNAL MEDICINE CLINIC | Facility: CLINIC | Age: 43
End: 2023-01-20

## 2023-01-20 DIAGNOSIS — Z78.9 NEEDS ASSISTANCE WITH COMMUNITY RESOURCES: Primary | ICD-10-CM

## 2023-01-20 NOTE — PROGRESS NOTES
Chart review indicates that a referral was placed to assist patient with financial difficulties  Chart review indicates that [facility] from [facility] worked with patient 8/22 and connected her to OP MH  Case was closed as there were no other needs  [facility] outreached patient who lives with her mother and her two children who are both in high school and aged 5 and 25  Patient is employed  However, had a medical condition, tear in the past and she missed a great deal of work  She can only work part time now due to injury  Patient has applied for SSD and was denied, she is in the process of appealing this denial  SW did give her resource of [place] and [place] to determine if they can assist      Patients mother receives SSD herself and VA benefit from spouse  Patients mother owns the home and patient helps with payments  They are able to afford all the bills and are not behind on mortgage or utilities as this time  Patient does note sometimes finances are tight as the electric bill can be high  SWCM did e-mail her information for [employer] to [email or web address] as they have PPL  Patient states that her mother plans to sell the home soon and patient would like to apply for public housing as she is unable to work full time 2nd to her injury  She is requesting assistance and would like help from [facility]  Order placed  Patient receives SNAP benefit and denies food insecurity  She has a car  Patient states that is connected with [facility] for OP MH and is happy with this agency  She denies any other needs at this time  SW will make case complex and follow to ensure patient receives assistance with public housing  [facility] to follow

## 2023-01-31 DIAGNOSIS — B00.9 HSV INFECTION: ICD-10-CM

## 2023-02-01 RX ORDER — VALACYCLOVIR HYDROCHLORIDE 500 MG/1
500 TABLET, FILM COATED ORAL DAILY
Qty: 90 TABLET | Refills: 0 | Status: SHIPPED | OUTPATIENT
Start: 2023-02-01 | End: 2024-02-01

## 2023-02-07 ENCOUNTER — CLINICAL SUPPORT (OUTPATIENT)
Dept: OBGYN CLINIC | Facility: CLINIC | Age: 43
End: 2023-02-07

## 2023-02-07 DIAGNOSIS — Z30.42 ENCOUNTER FOR MANAGEMENT AND INJECTION OF DEPO-PROVERA: ICD-10-CM

## 2023-02-07 RX ADMIN — MEDROXYPROGESTERONE ACETATE 150 MG: 150 INJECTION, SUSPENSION INTRAMUSCULAR at 10:45

## 2023-02-07 NOTE — PROGRESS NOTES
Depo-Provera     • [x]   Patient provided box yes  o 2 Refills remain  o Refills submitted no  • Last  Annual Date : 8/24/2022  • Last Depo date: 11/22/2022  • Side effects: no  • HCG: no  • Given by: Fabrice Postal  • Site: Left deltoid    o Calcium supplement daily teaching  o Condoms for 2 weeks following first injection dose

## 2023-02-17 ENCOUNTER — PATIENT OUTREACH (OUTPATIENT)
Dept: INTERNAL MEDICINE CLINIC | Facility: CLINIC | Age: 43
End: 2023-02-17

## 2023-02-17 NOTE — LETTER
February 17, 2023       No Recipients    Patient: Lluvia Russo   YOB: 1980   Date of Visit: 2/17/2023       Dear Dr Jeni Mendoza Recipients: Thank you for referring Spencer Denis to me for evaluation  Below are my notes for this consultation  If you have questions, please do not hesitate to call me  I look forward to following your patient along with you           Sincerely,        Dov Sotelo        CC:   No Recipients

## 2023-02-20 NOTE — PROGRESS NOTES
[address] did call patient to discuss referral received  [address] introduced herself and her role  [facility] assessment was completed, and patient agreed to services  Erin Villalobos will sent the application by mail for patient to start to cherelle lout the application and Erin Villalobos will go to patient [facility] on Friday 2/24/23  to verify and complete the application  Erin Villalobos will continue follow up with patient

## 2023-02-23 ENCOUNTER — PATIENT OUTREACH (OUTPATIENT)
Dept: INTERNAL MEDICINE CLINIC | Facility: CLINIC | Age: 43
End: 2023-02-23

## 2023-02-24 NOTE — PROGRESS NOTES
[facility] went to patient house to droop off the application for [place] Housing  Patient will complete the application by her self and review it with [facility] went is Completed  Patient said he would do it next week  [facility] will Continue follow up with patient

## 2023-03-28 ENCOUNTER — PATIENT OUTREACH (OUTPATIENT)
Dept: MULTI SPECIALTY CLINIC | Facility: CLINIC | Age: 43
End: 2023-03-28

## 2023-03-28 NOTE — PROGRESS NOTES
The patient contacted [facility] to excuse that she had not communicated since [place] and for that reason had not proceeded with [facility]  The patient said that they already have the housing application completed and that you would like to review it along with [facility] and provide the necessary documents for the application  [facility] will meet patient tomorrow at 9:00 am at patient house  [facility] will continue follow up with patient

## 2023-03-29 ENCOUNTER — PATIENT OUTREACH (OUTPATIENT)
Dept: MULTI SPECIALTY CLINIC | Facility: CLINIC | Age: 43
End: 2023-03-29

## 2023-03-31 NOTE — PROGRESS NOTES
[facility] went to the patient's house to collect the [address] application and copies of the social, birth certificates and pay stub to submit with application  [facility] will scanned the application and document in chart for futures needs      [facility] will mail the application to the  [address] [place] [address] Drive     At this time all Goals have been completed and [facility] will now be closing case and communicate with team

## 2023-04-03 ENCOUNTER — PATIENT OUTREACH (OUTPATIENT)
Dept: INTERNAL MEDICINE CLINIC | Facility: CLINIC | Age: 43
End: 2023-04-03

## 2023-04-03 NOTE — PROGRESS NOTES
SW has received IN Basket update from [employer] that Dana Davison has mailed pt's Housing application to   [facility] shares all their Goals have been met  Patient does not have any further questions, concerns, or other needs at this time  Patient has my contact # and PCP office # if needed  Social Work to remain available to assist as indicated  Please re-consult Social Work if needed

## 2023-04-26 ENCOUNTER — CLINICAL SUPPORT (OUTPATIENT)
Dept: OBGYN CLINIC | Facility: CLINIC | Age: 43
End: 2023-04-26

## 2023-04-26 DIAGNOSIS — Z23 NEED FOR HPV VACCINATION: Primary | ICD-10-CM

## 2023-04-26 DIAGNOSIS — Z30.42 ENCOUNTER FOR MANAGEMENT AND INJECTION OF DEPO-PROVERA: ICD-10-CM

## 2023-04-26 RX ADMIN — MEDROXYPROGESTERONE ACETATE 150 MG: 150 INJECTION, SUSPENSION INTRAMUSCULAR at 10:45

## 2023-04-26 NOTE — PROGRESS NOTES
Depo-Provera     • [x]   Patient provided box yes  o 1 Refills remain  o Refills submitted no  • Last  Annual Date / Birth control check : 8/24/2022  • Last Depo date: 02/7/2023  • Side effects: no  • HCG: no  • Given by: Byron Ohara  • Site: Left deltoid    o Calcium supplement daily teaching  o Condoms for 2 weeks following first injection dose

## 2023-05-01 ENCOUNTER — APPOINTMENT (OUTPATIENT)
Dept: RADIOLOGY | Facility: OTHER | Age: 43
End: 2023-05-01

## 2023-05-01 ENCOUNTER — OFFICE VISIT (OUTPATIENT)
Dept: OBGYN CLINIC | Facility: OTHER | Age: 43
End: 2023-05-01

## 2023-05-01 VITALS
HEART RATE: 64 BPM | HEIGHT: 62 IN | WEIGHT: 192.8 LBS | SYSTOLIC BLOOD PRESSURE: 132 MMHG | DIASTOLIC BLOOD PRESSURE: 84 MMHG | BODY MASS INDEX: 35.48 KG/M2

## 2023-05-01 DIAGNOSIS — M24.811 INTERNAL DERANGEMENT OF RIGHT SHOULDER: Primary | ICD-10-CM

## 2023-05-01 DIAGNOSIS — M25.511 ACUTE PAIN OF RIGHT SHOULDER: ICD-10-CM

## 2023-05-01 RX ORDER — MEDROXYPROGESTERONE ACETATE 150 MG/ML
INJECTION, SUSPENSION INTRAMUSCULAR
COMMUNITY
Start: 2023-04-18

## 2023-05-01 NOTE — PROGRESS NOTES
Assessment  Diagnoses and all orders for this visit:    Internal derangement of right shoulder    Discussion and Plan:    · She was last seen in 2019 and diagnosed with a full thickness tear of her supraspinatus tendon and surgery was recommended but she did not proceed due to benefit after PT/HEP and was functioning well until an acute exacerbation of her pain after her recent work injury, explained below, about 1 month ago  · At this time, I recommend an updated MRI of her right shoulder to evaluate progression of her rotator cuff tear  · In parallel, she will begin formal physical therapy  · Follow up after MRI for discussion of results and further treatment options based on these results  Subjective:   Patient ID: Abraham Kearney is a 40 y o  female    The patient presents with a chief complaint of right shoulder pain  The pain began 1 month(s) ago and is associated with an acute injury  Patient reports that she was 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 and felt the pain in the shoulder that night  The patient describes the pain as aching and dull in intensity,  intermittent, occurring with increasing frequency in timing, and localizes the pain to the  right subacromial joint, deltoid  The pain is worse with movement, overhead work and overuse and relieved by rest, ice, avoiding the painful activities  The pain is not associated with numbness and tingling  The pain is not associated with constitutional symptoms  The patient is awoken at night by the pain  The patient has had prior treatment in the form of PT in 2019 and continuation of HEP with some benefit          The following portions of the patient's history were reviewed and updated as appropriate: allergies, current medications, past family history, past medical history, past social history, past surgical history and problem list     Objective:  /84   Pulse 64   Ht "5' 2\" (1 575 m)   Wt 87 5 kg (192 lb 12 8 oz)   BMI 35 26 kg/m²       Right Shoulder Exam     Range of Motion   External rotation: 50   Forward flexion: 160   Internal rotation 0 degrees: Lumbar     Muscle Strength   Abduction: 4/5   External rotation: 4/5     Other   Erythema: absent  Sensation: normal  Pulse: present            Physical Exam  Constitutional:       Appearance: She is well-developed  Eyes:      Pupils: Pupils are equal, round, and reactive to light  Pulmonary:      Effort: Pulmonary effort is normal       Breath sounds: Normal breath sounds  Skin:     General: Skin is warm and dry  Neurological:      Mental Status: She is alert and oriented to person, place, and time  Psychiatric:         Behavior: Behavior normal          Thought Content: Thought content normal          Judgment: Judgment normal        I have personally reviewed pertinent films in PACS and my interpretation is as follows  X Ray Right Shoulder 5/1/23: No acute osseous abnormalities or degenerative changes  Scribe Attestation    I,:  Shama Sterling am acting as a scribe while in the presence of the attending physician :       I,:  Kristan Cruz MD personally performed the services described in this documentation    as scribed in my presence  :           "

## 2023-05-09 ENCOUNTER — EVALUATION (OUTPATIENT)
Dept: PHYSICAL THERAPY | Facility: CLINIC | Age: 43
End: 2023-05-09

## 2023-05-09 DIAGNOSIS — M24.811 INTERNAL DERANGEMENT OF RIGHT SHOULDER: ICD-10-CM

## 2023-05-09 NOTE — PROGRESS NOTES
PT Evaluation     Today's date: 2023  Patient name: Krysta Lane  : 1980  MRN: [id number]  Referring provider: Ruben Munoz  Dx:   Encounter Diagnosis     ICD-10-CM    1  Internal derangement of right shoulder  M24 811 Ambulatory Referral to Physical Therapy                     Assessment  Assessment details: Pt presents with s/s consistent with a R shoulder derangement with an internal rotation directional preference  Pt will benefit from PT to address impairments and restore function  Impairments: abnormal or restricted ROM, activity intolerance, impaired physical strength, lacks appropriate home exercise program, pain with function, poor posture  and poor body mechanics    Goals  ST-4 weeks  1   Pt will decrease pain > 50%  2   Pt will normalize R shoulder AROM  LT-6 weeks  1   Pt will decrease pain > 75%  2   Pt will return to work without pain  Plan  Planned modality interventions: low level laser therapy  Planned therapy interventions: manual therapy, neuromuscular re-education, patient education, postural training, self care, strengthening, stretching, therapeutic activities, therapeutic exercise, flexibility, functional ROM exercises, graded activity, graded exercise and home exercise program  Frequency: 2x week  Duration in weeks: 6        Subjective Evaluation    History of Present Illness  Mechanism of injury: Pt is a 40 yof c/o a sudden onset of R sided neck, R UT, R anterior shoulder, R upper arm and forearm pain after lifting many cases of soda at work approximately 1 month ago    Pain  Current pain rating: 3  At best pain ratin  At worst pain rating: 10  Quality: dull ache and sharp  Relieving factors: rest  Aggravating factors: overhead activity and lifting  Progression: worsening      Diagnostic Tests  X-ray: abnormal (23 mild AC joint OA )  Patient Goals  Patient goals for therapy: decreased edema, decreased pain, improved balance, increased motion, increased strength, independence with ADLs/IADLs, return to sport/leisure activities and return to work          Objective     Postural Observations  Seated posture: poor  Standing posture: poor  Correction of posture: makes symptoms better        Active Range of Motion   Cervical/Thoracic Spine       Cervical  Subcranial protraction:  WFL   Subcranial retraction:   Restriction level: moderate  Flexion:  with pain Restriction level: minimal  Extension:  with pain Restriction level: moderate  Left lateral flexion:  Restriction level: minimal  Right lateral flexion:  Restriction level minimal  Left rotation:  with pain Restriction level: minimal  Right rotation:  Restriction level: minimal    Right Shoulder   Flexion: 90 degrees with pain  Extension: 25 degrees   Abduction: 120 degrees with pain  External rotation BTH: C5 with pain  Internal rotation BTB: L4 with pain    Additional Active Range of Motion Details  Mechanical assessment:    Repeated shoulder extension with IR: 30 reps: improved ABD AROM to 140, no change with flex, no change in pain  Repeated shoulder IR: 30 reps: full AROM, 20% reduction in pain  Passive Range of Motion     Right Shoulder   Flexion: 140 degrees   Abduction: 130 degrees with pain  External rotation 90°: 75 degrees with pain  Internal rotation 90°: 30 degrees with pain  Mechanical Assessment    Cervical    Seated retraction: repeated movements   Pain location: no change  Pain intensity: better    Thoracic      Lumbar      Strength/Myotome Testing     Right Shoulder     Planes of Motion   Flexion: 3+   Abduction: 3+   External rotation at 0°: 3+   Internal rotation at 0°: 3+     Tests     Right Shoulder   Positive Neer's  Precautions: none  Dx: R shoulder derangement with a IR DP        Manuals 5/9            R shoulder PROM             R shoulder laser                                       Neuro Re-Ed             Posture correction/ ed 5 min C/s retraction 2x10                         R shoulder active functional IR 3x10            R shoulder active functional IR with pt OP                                                                 Ther Ex             TB ER             TB Rows             Table slides                                                                              Ther Activity                                       Gait Training                                       Modalities

## 2023-05-11 ENCOUNTER — APPOINTMENT (OUTPATIENT)
Dept: PHYSICAL THERAPY | Facility: CLINIC | Age: 43
End: 2023-05-11
Payer: COMMERCIAL

## 2023-05-16 ENCOUNTER — OFFICE VISIT (OUTPATIENT)
Dept: PHYSICAL THERAPY | Facility: CLINIC | Age: 43
End: 2023-05-16

## 2023-05-16 DIAGNOSIS — M24.811 INTERNAL DERANGEMENT OF RIGHT SHOULDER: Primary | ICD-10-CM

## 2023-05-16 NOTE — PROGRESS NOTES
Daily Note     Today's date: 2023  Patient name: Alida Perez  : 1980  MRN: [id number]  Referring provider: Thanh Garvey*  Dx:   Encounter Diagnosis     ICD-10-CM    1  Internal derangement of right shoulder  M24 811                      Subjective: Pt reports R ant shoulder soreness, good compliance with HEP  Objective: See treatment diary below    Assessment: Pt demonstrated improved AROM with repeated IR for HEP  Today repeated ext also improved AROM to near full  Added strengthening with no adverse effect on ROM  Plan: Cont POC  Precautions: none  Dx: R shoulder derangement with a IR DP        Manuals            R shoulder PROM  5 min           R shoulder laser  4000J                                     Neuro Re-Ed             Posture correction/ ed 5 min            C/s retraction 2x10 3x10                        R shoulder active functional IR 3x10 3x10           R shoulder active functional IR with pt OP             R shoulder ext AROM  3x10                                                  Ther Ex             TB ER  Y/ 3x15           TB Rows  R/ 3x10           Table slides  3x15                                                                            Ther Activity                                       Gait Training                                       Modalities

## 2023-05-23 ENCOUNTER — APPOINTMENT (OUTPATIENT)
Dept: PHYSICAL THERAPY | Facility: CLINIC | Age: 43
End: 2023-05-23
Payer: COMMERCIAL

## 2023-05-23 ENCOUNTER — TELEPHONE (OUTPATIENT)
Dept: OBGYN CLINIC | Facility: OTHER | Age: 43
End: 2023-05-23

## 2023-05-23 NOTE — TELEPHONE ENCOUNTER
I called patient to let her know that her mri has not been authorized as of now, she has not complete 6 weeks of PT which is a requirement for her insurance  I provided patient with the number to procedure scheduling as well as my number so we can reschedule the follow up for review of the mri

## 2023-05-26 ENCOUNTER — OFFICE VISIT (OUTPATIENT)
Dept: PHYSICAL THERAPY | Facility: CLINIC | Age: 43
End: 2023-05-26

## 2023-05-26 DIAGNOSIS — M24.811 INTERNAL DERANGEMENT OF RIGHT SHOULDER: Primary | ICD-10-CM

## 2023-05-26 NOTE — PROGRESS NOTES
"Daily Note     Today's date: 2023  Patient name: Don Hansen  : 1980  MRN: [id number]  Referring provider: Rebekah Huerta  Dx:   Encounter Diagnosis     ICD-10-CM    1  Internal derangement of right shoulder  M24 811           Start Time: 935  Stop Time: 1005  Total time in clinic (min): 30 minutes    Subjective: Patient reports, \"4 5/10\" R shoulder pain prior to therapy  She has been compliant with her HEP  Objective: See treatment diary below    Assessment: Patient is responding well to the current PT POC noting improvements in pain and AROM  Plan: Cont POC  Precautions: none  Dx: R shoulder derangement with a IR DP      Manuals           R shoulder PROM  5 min 5 min          R shoulder laser  4000J 4000J                                    Neuro Re-Ed             Posture correction/ ed 5 min            C/s retraction 2x10 3x10 3x10                       R shoulder active functional IR 3x10 3x10 3x10          R shoulder active functional IR with pt OP             R shoulder ext AROM  3x10 3x10                                                 Ther Ex             TB ER  Y/ 3x15 Y/ 3x15          TB Rows  R/ 3x10 RTB  3x15          Table slides  3x15 3x15                                                                           Ther Activity                                       Gait Training                                       Modalities                                          "

## 2023-05-31 ENCOUNTER — OFFICE VISIT (OUTPATIENT)
Dept: PHYSICAL THERAPY | Facility: CLINIC | Age: 43
End: 2023-05-31

## 2023-05-31 DIAGNOSIS — M24.811 INTERNAL DERANGEMENT OF RIGHT SHOULDER: Primary | ICD-10-CM

## 2023-05-31 NOTE — PROGRESS NOTES
"Daily Note     Today's date: 2023  Patient name: Stevan Glover  : 1980  MRN: [id number]  Referring provider: Jose Luis Chilel  Dx:   Encounter Diagnosis     ICD-10-CM    1  Internal derangement of right shoulder  M24 811           Start Time: 1037  Stop Time: 1107  Total time in clinic (min): 30 minutes    Subjective: Patient reports, \"3/10\" R shoulder pain prior to therapy  Objective: See treatment diary below    Assessment: Patient is responding well to the current PT POC noting improvements in pain and AROM  Minor correction required to better engage scapular retraction during rows  Plan: Cont POC  Precautions: none  Dx: R shoulder derangement with a IR DP      Manuals          R shoulder PROM  5 min 5 min 5 min         R shoulder laser  4000J 4000J 4000J                                   Neuro Re-Ed             Posture correction/ ed 5 min            C/s retraction 2x10 3x10 3x10 3x10                      R shoulder active functional IR 3x10 3x10 3x10 3x10         R shoulder active functional IR with pt OP             R shoulder ext AROM  3x10 3x10 3x10                                                Ther Ex             TB ER  Y/ 3x15 Y/ 3x15 Y/ 3x15         TB Rows  R/ 3x10 RTB  3x15 RTB  3x15         Table slides  3x15 3x15 3x15                                                                          Ther Activity                                       Gait Training                                       Modalities                                          "

## 2023-06-15 ENCOUNTER — TELEPHONE (OUTPATIENT)
Dept: OBGYN CLINIC | Facility: OTHER | Age: 43
End: 2023-06-15

## 2023-06-15 NOTE — TELEPHONE ENCOUNTER
Patient has appointment scheduled with Dr Brown for Monday for an MRI review  Called patient to reschedule appointment  Patient did not have MRI done  If patient did have MRI done outside of [facility] I will keep patient on schedule but as of now patient does not have MRI done  Patient does have an appointment for MRI on 7/3       Meghann Rodriguez MA

## 2023-07-05 DIAGNOSIS — Z30.42 ENCOUNTER FOR SURVEILLANCE OF INJECTABLE CONTRACEPTIVE: ICD-10-CM

## 2023-07-05 RX ORDER — MEDROXYPROGESTERONE ACETATE 150 MG/ML
INJECTION, SUSPENSION INTRAMUSCULAR
Qty: 1 ML | Refills: 0 | Status: SHIPPED | OUTPATIENT
Start: 2023-07-05 | End: 2023-07-12 | Stop reason: SDUPTHER

## 2023-07-12 ENCOUNTER — CLINICAL SUPPORT (OUTPATIENT)
Dept: OBGYN CLINIC | Facility: CLINIC | Age: 43
End: 2023-07-12

## 2023-07-12 DIAGNOSIS — Z30.42 ENCOUNTER FOR SURVEILLANCE OF INJECTABLE CONTRACEPTIVE: ICD-10-CM

## 2023-07-12 DIAGNOSIS — Z30.42 ENCOUNTER FOR MANAGEMENT AND INJECTION OF DEPO-PROVERA: ICD-10-CM

## 2023-07-12 PROCEDURE — 96372 THER/PROPH/DIAG INJ SC/IM: CPT

## 2023-07-12 RX ORDER — MEDROXYPROGESTERONE ACETATE 150 MG/ML
150 INJECTION, SUSPENSION INTRAMUSCULAR
Qty: 1 ML | Refills: 0 | Status: SHIPPED | OUTPATIENT
Start: 2023-07-12

## 2023-07-12 RX ADMIN — MEDROXYPROGESTERONE ACETATE 150 MG: 150 INJECTION, SUSPENSION INTRAMUSCULAR at 10:49

## 2023-07-12 NOTE — PROGRESS NOTES
Depo-Provera     • [x]   Patient provided box yes   o 0 Refills remain  o Refills submitted yes  • Last  Annual Date / Birth control check : 8/24/2022  • Last Depo date: 04/26/2023  • Side effects: no  • HCG: no  • Given by: Morena Palacios  • Site: LD    o Calcium supplement daily teaching  o Condoms for 2 weeks following first injection dose.

## 2023-09-28 ENCOUNTER — CLINICAL SUPPORT (OUTPATIENT)
Dept: OBGYN CLINIC | Facility: CLINIC | Age: 43
End: 2023-09-28

## 2023-09-28 DIAGNOSIS — Z30.42 ENCOUNTER FOR MANAGEMENT AND INJECTION OF DEPO-PROVERA: Primary | ICD-10-CM

## 2023-09-28 PROCEDURE — 96372 THER/PROPH/DIAG INJ SC/IM: CPT

## 2023-09-28 RX ORDER — MEDROXYPROGESTERONE ACETATE 150 MG/ML
150 INJECTION, SUSPENSION INTRAMUSCULAR ONCE
Status: COMPLETED | OUTPATIENT
Start: 2023-09-28 | End: 2023-09-28

## 2023-09-28 RX ADMIN — MEDROXYPROGESTERONE ACETATE 150 MG: 150 INJECTION, SUSPENSION INTRAMUSCULAR at 11:46

## 2023-12-06 ENCOUNTER — APPOINTMENT (EMERGENCY)
Dept: RADIOLOGY | Facility: HOSPITAL | Age: 43
End: 2023-12-06
Payer: COMMERCIAL

## 2023-12-06 ENCOUNTER — HOSPITAL ENCOUNTER (EMERGENCY)
Facility: HOSPITAL | Age: 43
Discharge: HOME | End: 2023-12-07
Attending: EMERGENCY MEDICINE
Payer: COMMERCIAL

## 2023-12-06 ENCOUNTER — APPOINTMENT (EMERGENCY)
Dept: CT IMAGING | Facility: HOSPITAL | Age: 43
End: 2023-12-06
Payer: COMMERCIAL

## 2023-12-06 VITALS
HEART RATE: 88 BPM | TEMPERATURE: 99.5 F | OXYGEN SATURATION: 96 % | SYSTOLIC BLOOD PRESSURE: 142 MMHG | DIASTOLIC BLOOD PRESSURE: 95 MMHG | RESPIRATION RATE: 18 BRPM

## 2023-12-06 DIAGNOSIS — M79.645 FINGER PAIN, LEFT: ICD-10-CM

## 2023-12-06 DIAGNOSIS — M50.30 BULGING OF CERVICAL INTERVERTEBRAL DISC: ICD-10-CM

## 2023-12-06 DIAGNOSIS — M54.2 NECK PAIN: Primary | ICD-10-CM

## 2023-12-06 PROCEDURE — G1004 CDSM NDSC: HCPCS

## 2023-12-06 PROCEDURE — 72125 CT NECK SPINE W/O DYE: CPT

## 2023-12-06 PROCEDURE — 99285 EMERGENCY DEPT VISIT HI MDM: CPT | Performed by: EMERGENCY MEDICINE

## 2023-12-06 PROCEDURE — 73130 X-RAY EXAM OF HAND: CPT

## 2023-12-06 PROCEDURE — 99284 EMERGENCY DEPT VISIT MOD MDM: CPT

## 2023-12-06 RX ORDER — ACETAMINOPHEN 325 MG/1
975 TABLET ORAL ONCE
Status: COMPLETED | OUTPATIENT
Start: 2023-12-06 | End: 2023-12-06

## 2023-12-06 RX ORDER — LIDOCAINE 50 MG/G
1 PATCH TOPICAL ONCE
Status: DISCONTINUED | OUTPATIENT
Start: 2023-12-06 | End: 2023-12-07 | Stop reason: HOSPADM

## 2023-12-06 RX ORDER — ACETAMINOPHEN 325 MG/1
650 TABLET ORAL ONCE
Status: DISCONTINUED | OUTPATIENT
Start: 2023-12-06 | End: 2023-12-06

## 2023-12-06 RX ORDER — DIAZEPAM 5 MG/1
5 TABLET ORAL ONCE
Status: COMPLETED | OUTPATIENT
Start: 2023-12-06 | End: 2023-12-06

## 2023-12-06 RX ADMIN — LIDOCAINE 5% 1 PATCH: 700 PATCH TOPICAL at 23:10

## 2023-12-06 RX ADMIN — DIAZEPAM 5 MG: 5 TABLET ORAL at 23:10

## 2023-12-06 RX ADMIN — ACETAMINOPHEN 975 MG: 325 TABLET, FILM COATED ORAL at 23:10

## 2023-12-06 NOTE — Clinical Note
Zuly Deutsch was seen and treated in our emergency department on 12/6/2023. Diagnosis:     Jayro Gonsales  may return to work on return date. She may return on this date: 12/08/2023         If you have any questions or concerns, please don't hesitate to call.       Prieto Buitrago MD    ______________________________           _______________          _______________  Hospital Representative                              Date                                Time

## 2023-12-06 NOTE — Clinical Note
Ronald Cho was seen and treated in our emergency department on 12/6/2023. Diagnosis:     Dalila Boggs  may return to work on return date. She may return on this date: 12/07/2023         If you have any questions or concerns, please don't hesitate to call.       Shade Underwood MD    ______________________________           _______________          _______________  Hospital Representative                              Date                                Time
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
## 2023-12-07 NOTE — ED NOTES
Patient transported to [facility] Miguel Best RN  12/06/23 9717

## 2023-12-18 ENCOUNTER — ANNUAL EXAM (OUTPATIENT)
Dept: OBGYN CLINIC | Facility: CLINIC | Age: 43
End: 2023-12-18

## 2023-12-18 ENCOUNTER — OFFICE VISIT (OUTPATIENT)
Dept: OBGYN CLINIC | Facility: CLINIC | Age: 43
End: 2023-12-18
Payer: COMMERCIAL

## 2023-12-18 VITALS
DIASTOLIC BLOOD PRESSURE: 91 MMHG | SYSTOLIC BLOOD PRESSURE: 134 MMHG | HEIGHT: 62 IN | BODY MASS INDEX: 33.38 KG/M2 | HEART RATE: 65 BPM | WEIGHT: 181.4 LBS

## 2023-12-18 VITALS — BODY MASS INDEX: 33.31 KG/M2 | HEIGHT: 62 IN | WEIGHT: 181 LBS

## 2023-12-18 DIAGNOSIS — L68.0 HIRSUTISM: ICD-10-CM

## 2023-12-18 DIAGNOSIS — Z30.09 BIRTH CONTROL COUNSELING: ICD-10-CM

## 2023-12-18 DIAGNOSIS — M79.645 FINGER PAIN, LEFT: ICD-10-CM

## 2023-12-18 DIAGNOSIS — Z00.00 ANNUAL PHYSICAL EXAM: ICD-10-CM

## 2023-12-18 DIAGNOSIS — Z12.31 ENCOUNTER FOR SCREENING MAMMOGRAM FOR MALIGNANT NEOPLASM OF BREAST: Primary | ICD-10-CM

## 2023-12-18 DIAGNOSIS — S63.639A VOLAR PLATE INJURY OF FINGER, INITIAL ENCOUNTER: Primary | ICD-10-CM

## 2023-12-18 DIAGNOSIS — Z30.42 ENCOUNTER FOR SURVEILLANCE OF INJECTABLE CONTRACEPTIVE: ICD-10-CM

## 2023-12-18 PROCEDURE — 99396 PREV VISIT EST AGE 40-64: CPT | Performed by: OBSTETRICS & GYNECOLOGY

## 2023-12-18 PROCEDURE — 99214 OFFICE O/P EST MOD 30 MIN: CPT | Performed by: STUDENT IN AN ORGANIZED HEALTH CARE EDUCATION/TRAINING PROGRAM

## 2023-12-18 PROCEDURE — 96372 THER/PROPH/DIAG INJ SC/IM: CPT | Performed by: OBSTETRICS & GYNECOLOGY

## 2023-12-18 RX ORDER — MEDROXYPROGESTERONE ACETATE 150 MG/ML
150 INJECTION, SUSPENSION INTRAMUSCULAR
Qty: 1 ML | Refills: 0 | Status: SHIPPED | OUTPATIENT
Start: 2023-12-18 | End: 2023-12-18

## 2023-12-18 RX ORDER — MEDROXYPROGESTERONE ACETATE 150 MG/ML
150 INJECTION, SUSPENSION INTRAMUSCULAR
Qty: 1 ML | Refills: 4 | Status: SHIPPED | OUTPATIENT
Start: 2023-12-18

## 2023-12-18 RX ORDER — SPIRONOLACTONE 50 MG/1
50 TABLET, FILM COATED ORAL 2 TIMES DAILY
Qty: 60 TABLET | Refills: 2 | Status: SHIPPED | OUTPATIENT
Start: 2023-12-18 | End: 2024-03-17

## 2023-12-18 RX ADMIN — MEDROXYPROGESTERONE ACETATE 150 MG: 150 INJECTION, SUSPENSION INTRAMUSCULAR at 10:50

## 2023-12-18 NOTE — PROGRESS NOTES
Depo-Provera                                [x]   Patient provided box yes   0 Refills remain  Refills submitted yes  Last  Annual Date / Birth control check : 12/18/2023  Last Depo date: 09/28/2023  Side effects: no  HCG: no  Given by: Marcella Adam  Site: Left Deltoid     Calcium supplement daily teaching  Condoms for 2 weeks following first injection dose.

## 2023-12-18 NOTE — PROGRESS NOTES
"Yovanny Montes is a 43 y.o. female who presents for annual well woman exam. No menses while on Depo Provera.    GYN:  Denies vaginal discharge, labial erythema or lesions, dyspareunia.  No menses while on Depo Provera.  Contraception: Depo Provera (last 9 years).  Patient is sexually active with the same partner for 15 years.  Gynecologic surgery: none    Hirsutism  Previously worked up. Used to take a medication and would like to restart. Does not recall the name.    OB:   female.     :  Denies dysuria, urinary frequency or urgency.  Denies hematuria, flank pain, incontinence.    Breast:  Denies breast mass, skin changes, dimpling, reddening, nipple retraction.  Denies breast discharge.  Has yet to have a mammogram, ordered.  Patient does does not have a family history of breast, endometrial, or ovarian ca.   General:  Diet: fruits and vegetables daily  Exercise: encouraged to do more outside of work   Work: manager in a store  ETOH use: occasionally  Tobacco use: 5 cigarettes/day; not interested in quitting at this time  Recreational drug use: marijuana nightly; not interested in an alternative    Screening:  Cervical cancer: last pap smear 8/3/2020. NILM and HPV neg.  Breast cancer: no prior mammogram.  STD screening: declines.    Review of Systems  Pertinent items are noted in HPI.      Objective      /91 (BP Location: Right arm, Patient Position: Sitting, Cuff Size: Large)   Pulse 65   Ht 5' 2\" (1.575 m)   Wt 82.3 kg (181 lb 6.4 oz)   BMI 33.18 kg/m²     Physical Exam  Exam conducted with a chaperone present.   Constitutional:       Appearance: Normal appearance.   HENT:      Head: Normocephalic and atraumatic.   Eyes:      Extraocular Movements: Extraocular movements intact.   Cardiovascular:      Rate and Rhythm: Normal rate.   Pulmonary:      Effort: Pulmonary effort is normal.   Chest:   Breasts:     Right: Normal. No swelling, bleeding, inverted nipple or mass.      " Left: Normal. No swelling, bleeding, inverted nipple or mass.   Abdominal:      General: There is no distension.      Palpations: Abdomen is soft.      Tenderness: There is no abdominal tenderness.   Genitourinary:     Vagina: Normal.      Cervix: Normal.      Uterus: Normal.       Adnexa: Right adnexa normal and left adnexa normal.   Musculoskeletal:         General: Normal range of motion.   Skin:     General: Skin is warm and dry.   Neurological:      Mental Status: She is alert. Mental status is at baseline.   Psychiatric:         Mood and Affect: Mood normal.         Behavior: Behavior normal.                 Assessment     Lorri Montes is a 43 y.o.  with normal gynecologic exam.     Plan   1.  Routine well woman exam done today.  2.  Pap and HPV:Pap with HPV was not done today.  Current ASCCP Guidelines reviewed.  3.  Mammogram ordered. Recommend yearly mammography.   4. Encouraged to follow up with PCP due to elevated BP today.   5. Spironolactone 50mg BID prescribed for hirsutism. Can increase dose if needed. Will check K level at follow up visit.   5.  The patient declined STD testing. Safe sex practices have been discussed.  6. The patient is sexually active. She continues Depo Provera.  7. The following were reviewed in today's visit: mammography screening ordered, osteoporosis, exercise, healthy diet, and tobacco cessation.  8. Patient to return to office in 1 month for follow up on spironolactone and K check.

## 2023-12-18 NOTE — LETTER
December 18, 2023     Patient: Lorri Montes   YOB: 1980   Date of Visit: 12/18/2023       To Whom It May Concern:    Lorri Montes was seen in my clinic on 12/18/2023. Please excuse Lorri for her absence from work on this day to make the appointment.    If you have any questions or concerns, please don't hesitate to call.         Sincerely,         Laurel Burden MD
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
## 2023-12-18 NOTE — LETTER
December 18, 2023     Patient: Lorri Montes  YOB: 1980  Date of Visit: 12/18/2023      To Whom it May Concern:    Lrori Montes is under my professional care. Lorri was seen in my office on 12/18/2023. Lorri may return to work on 12/19/23 full duty with no restrictions.     If you have any questions or concerns, please don't hesitate to call.         Sincerely,          Kirean Lew MD

## 2023-12-19 ENCOUNTER — TELEPHONE (OUTPATIENT)
Dept: ENDOCRINOLOGY | Facility: CLINIC | Age: 43
End: 2023-12-19

## 2024-01-05 ENCOUNTER — OFFICE VISIT (OUTPATIENT)
Dept: INTERNAL MEDICINE CLINIC | Facility: CLINIC | Age: 44
End: 2024-01-05

## 2024-01-05 DIAGNOSIS — J06.9 VIRAL UPPER RESPIRATORY INFECTION: ICD-10-CM

## 2024-01-05 DIAGNOSIS — E04.1 THYROID NODULE: ICD-10-CM
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
## 2024-01-05 PROBLEM — Z00.00 ANNUAL PHYSICAL EXAM: Status: RESOLVED | Noted: 2023-01-16 | Resolved: 2024-01-05

## 2024-01-05 PROCEDURE — 87811 SARS-COV-2 COVID19 W/OPTIC: CPT | Performed by: PHYSICIAN ASSISTANT

## 2024-01-05 PROCEDURE — 99214 OFFICE O/P EST MOD 30 MIN: CPT | Performed by: PHYSICIAN ASSISTANT

## 2024-01-05 RX ORDER — CYCLOBENZAPRINE HCL 10 MG
10 TABLET ORAL 3 TIMES DAILY PRN
Qty: 60 TABLET | Refills: 0 | Status: SHIPPED | OUTPATIENT
Start: 2024-01-05

## 2024-01-05 NOTE — LETTER
January 5, 2024     Patient: Lorri Montes  YOB: 1980  Date of Visit: 1/5/2024      To Whom it May Concern:    Lorri Montes is under my professional care. Lorri was seen in my office on 1/5/2024. Lorri may return to work on 1/6/24 .    If you have any questions or concerns, please don't hesitate to call.         Sincerely,          Linda Godwin PA-C        CC: No Recipients

## 2024-01-07 VITALS
HEART RATE: 60 BPM | BODY MASS INDEX: 33.13 KG/M2 | SYSTOLIC BLOOD PRESSURE: 142 MMHG | HEIGHT: 62 IN | TEMPERATURE: 97.7 F | WEIGHT: 180 LBS | DIASTOLIC BLOOD PRESSURE: 84 MMHG
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
## 2024-01-07 PROBLEM — S82.142A CLOSED FRACTURE OF LEFT TIBIAL PLATEAU: Status: RESOLVED | Noted: 2022-07-27 | Resolved: 2024-01-07

## 2024-01-07 PROBLEM — M77.12 LATERAL EPICONDYLITIS OF LEFT ELBOW: Status: RESOLVED | Noted: 2019-11-15 | Resolved: 2024-01-07

## 2024-01-07 PROBLEM — M24.811 INTERNAL DERANGEMENT OF RIGHT SHOULDER: Status: RESOLVED | Noted: 2023-05-01 | Resolved: 2024-01-07

## 2024-01-07 PROBLEM — E04.1 THYROID NODULE: Status: ACTIVE | Noted: 2024-01-07

## 2024-01-07 PROBLEM — M77.02 MEDIAL EPICONDYLITIS OF LEFT ELBOW: Status: RESOLVED | Noted: 2019-07-26 | Resolved: 2024-01-07

## 2024-01-07 PROBLEM — J06.9 VIRAL UPPER RESPIRATORY INFECTION: Status: ACTIVE | Noted: 2024-01-07

## 2024-01-07 PROBLEM — S93.492A SPRAIN OF ANTERIOR TALOFIBULAR LIGAMENT OF LEFT ANKLE: Status: RESOLVED | Noted: 2021-06-08 | Resolved: 2024-01-07

## 2024-01-07 LAB
SARS-COV-2 AG UPPER RESP QL IA: NEGATIVE
VALID CONTROL: NORMAL

## 2024-01-07 NOTE — ASSESSMENT & PLAN NOTE
POC COVID antigen testing negative. Likely viral illness. Recommend supportive treatment. Increase hydration. Adequate rest. OTC cold/flu medications as needed. Return to care if symptoms worsen or fail to improve.

## 2024-01-07 NOTE — PROGRESS NOTES
"Name: Lorri Montes      : 1980      MRN: [id number]  Encounter Provider: Linda Godwin PA-C  Encounter Date: 2024   Encounter department: [facility]    Assessment & Plan     1. Fall, initial encounter  Assessment & Plan:  S/p fall 5 days ago. Potential head trauma, but neuro exam was normal, no focal deficits and fall occurred 5 days prior. Acute on chronic pain. Exam is unremarkable. No areas of altered sensation, ecchymosis, or edema. Likely sprains and contusions worsening chronic pain. Continue celecoxib 200 mg BID. Do not take with any other NSAIDs. Start cyclobenzaprine 10 mg TID prn. Discussed possible side effects. Do not drive if taking. Warm/cool compresses. She declines referral to pain management and/or physical therapy at this time. Return to care if symptoms worsen or fail to improve. ER precautions given.     Orders:  -     cyclobenzaprine (FLEXERIL) 10 mg tablet; Take 1 tablet (10 mg total) by mouth 3 (three) times a day as needed for muscle spasms    2. Viral upper respiratory infection  Assessment & Plan:  POC COVID antigen testing negative. Likely viral illness. Recommend supportive treatment. Increase hydration. Adequate rest. OTC cold/flu medications as needed. Return to care if symptoms worsen or fail to improve.     Orders:  -     POCT Rapid Covid Ag    3. Thyroid nodule  Assessment & Plan:  Reviewed ER imaging from 23. CT cervical spine incidental finding: Large ill-defined left thyroid lobe nodule measuring up to approximately 3.3 cm. Thyroid sonography is recommended to further evaluate.   Will evaluate further with US.     Orders:  -     US thyroid; Future; Expected date: 2024           Subjective      Patient is a 43 year old female presenting for a same day appointment. She states 5 days ago she slipped and fell down her stairs at home. She thinks she hit her head. She thinks she lost consciousness for \"maybe 10 " "seconds\". This was a witnessed fall and the observers did not mention LOC or AMS. Denies headache. Denies visual changes. Denies feeling dizzy or lightheaded. She states she jaciel suffer from chronic pain, but it has been worse than normal. She states she is having pain in her bilateral upper back and neck. Pain in her left elbow. Pain in her right ankle. She states she broke this ankle before and she has had pain and decreased ROM in it since. She also mentions pain in her right mid to low back. Denies radiation. Denies numbness, tingling, or weakness in her arms or legs. She normally takes Celebrex daily which does help. She has not tried anything else for this.   She also mentions she has had a cold for 2-3 days. Denies fever or chills. Denies fatigue or myalgia aside from above. Denies sore throat. Admits to congestion, rhinorrhea, and PND. Admits to cough. Denies wheezing or SOB. Denies nausea or vomiting. She has not tried anything for this yet. Denies ill contacts. Denies travel history.       Review of Systems   Constitutional:  Negative for appetite change, chills, diaphoresis, fatigue, fever and unexpected weight change.   HENT:  Positive for congestion, postnasal drip and rhinorrhea. Negative for ear discharge, ear pain, hearing loss, sinus pressure, sinus pain, sneezing, sore throat, tinnitus and trouble swallowing.    Eyes:  Negative for visual disturbance.   Respiratory:  Positive for cough. Negative for chest tightness, shortness of breath and wheezing.    Cardiovascular:  Negative for chest pain, palpitations and leg swelling.   Gastrointestinal:  Negative for abdominal pain, diarrhea, nausea and vomiting.   Endocrine: Negative for cold intolerance, heat intolerance, polydipsia, polyphagia and polyuria.   Musculoskeletal:  Positive for arthralgias, back pain, myalgias and neck pain. Negative for gait problem, joint swelling and neck stiffness.   Neurological:  Negative for dizziness, weakness, " "light-headedness, numbness and headaches.   Hematological:  Negative for adenopathy.       Current Outpatient Medications on File Prior to Visit   Medication Sig    celecoxib (CeleBREX) 200 mg capsule Take 1 capsule (200 mg total) by mouth 2 (two) times a day    Diclofenac Sodium (VOLTAREN) 1 % Apply 2 g topically 4 (four) times a day    hydrOXYzine pamoate (VISTARIL) 50 mg capsule Take 50 mg by mouth daily at bedtime    medroxyPROGESTERone (DEPO-PROVERA) 150 mg/mL injection Inject 1 mL (150 mg total) into a muscle every 3 (three) months    spironolactone (ALDACTONE) 50 mg tablet Take 1 tablet (50 mg total) by mouth 2 (two) times a day       Objective     /84 (BP Location: Left arm, Patient Position: Sitting, Cuff Size: Large)   Pulse 60   Temp 97.7 °F (36.5 °C) (Temporal)   Ht 5' 2\" (1.575 m)   Wt 81.6 kg (180 lb)   BMI 32.92 kg/m²     Physical Exam  Vitals and nursing note reviewed.   Constitutional:       General: She is awake. She is not in acute distress.     Appearance: Normal appearance. She is well-developed and well-groomed. She is obese. She is not ill-appearing.   HENT:      Head: Normocephalic and atraumatic.      Right Ear: Tympanic membrane, ear canal and external ear normal.      Left Ear: Tympanic membrane, ear canal and external ear normal.      Nose: Congestion and rhinorrhea present. Rhinorrhea is clear.      Right Sinus: No maxillary sinus tenderness or frontal sinus tenderness.      Left Sinus: No maxillary sinus tenderness or frontal sinus tenderness.      Mouth/Throat:      Lips: Pink.      Mouth: Mucous membranes are moist.      Pharynx: Oropharynx is clear. Uvula midline. No oropharyngeal exudate or posterior oropharyngeal erythema.   Eyes:      General: No scleral icterus.     Extraocular Movements: Extraocular movements intact.      Conjunctiva/sclera: Conjunctivae normal.      Pupils: Pupils are equal, round, and reactive to light.   Cardiovascular:      Rate and Rhythm: Normal " rate and regular rhythm.      Pulses: Normal pulses.           Radial pulses are 2+ on the right side and 2+ on the left side.      Heart sounds: Normal heart sounds. No murmur heard.  Pulmonary:      Effort: Pulmonary effort is normal. No respiratory distress.      Breath sounds: Normal breath sounds and air entry. No decreased air movement. No decreased breath sounds, wheezing, rhonchi or rales.   Abdominal:      General: Abdomen is flat. Bowel sounds are normal. There is no distension.      Palpations: Abdomen is soft. There is no mass.      Tenderness: There is no abdominal tenderness. There is no right CVA tenderness, left CVA tenderness, guarding or rebound.      Hernia: No hernia is present.   Musculoskeletal:         General: Normal range of motion.      Right elbow: Normal.      Left elbow: No swelling, deformity, effusion or lacerations. Normal range of motion. Tenderness (generalized) present.      Cervical back: Neck supple. Tenderness (paraspinals) present. No swelling, edema, deformity, erythema, signs of trauma, lacerations, rigidity, spasms, torticollis, bony tenderness or crepitus. Pain with movement and muscular tenderness present. No spinous process tenderness. Normal range of motion.      Thoracic back: Tenderness (right trapezius and paraspinals) present. No swelling, edema, deformity, signs of trauma, lacerations, spasms or bony tenderness. Normal range of motion. No scoliosis.      Lumbar back: Tenderness (right parapsinals) present. No swelling, edema, deformity, signs of trauma, lacerations, spasms or bony tenderness. Normal range of motion. Negative right straight leg raise test and negative left straight leg raise test. No scoliosis.      Right lower leg: Normal. No edema.      Left lower leg: Normal. No edema.      Right ankle: No swelling, deformity, ecchymosis or lacerations. Tenderness (generalized anterior ankle) present. No lateral malleolus, medial malleolus or base of 5th  metatarsal tenderness. Normal range of motion. Anterior drawer test negative. Normal pulse.      Right Achilles Tendon: Normal.      Left ankle: Normal.      Left Achilles Tendon: Normal.      Right foot: Normal.      Left foot: Normal.   Lymphadenopathy:      Cervical: No cervical adenopathy.   Skin:     General: Skin is warm.      Coloration: Skin is not jaundiced.      Findings: No rash.   Neurological:      General: No focal deficit present.      Mental Status: She is alert and oriented to person, place, and time. Mental status is at baseline.      Cranial Nerves: Cranial nerves 2-12 are intact.      Sensory: Sensation is intact.      Motor: Motor function is intact.      Coordination: Coordination is intact.      Gait: Gait is intact.      Comments: Upper and lower extremity strength 5/5 equal and symmetric. Normal tone.    Psychiatric:         Attention and Perception: Attention normal.         Mood and Affect: Mood and affect normal.         Speech: Speech normal.         Behavior: Behavior normal. Behavior is cooperative.       Linda Godwin PA-C

## 2024-01-07 NOTE — ASSESSMENT & PLAN NOTE
Reviewed ER imaging from 12/6/23. CT cervical spine incidental finding: Large ill-defined left thyroid lobe nodule measuring up to approximately 3.3 cm. Thyroid sonography is recommended to further evaluate.   Will evaluate further with US.

## 2024-01-07 NOTE — ASSESSMENT & PLAN NOTE
S/p fall 5 days ago. Potential head trauma, but neuro exam was normal, no focal deficits and fall occurred 5 days prior. Acute on chronic pain. Exam is unremarkable. No areas of altered sensation, ecchymosis, or edema. Likely sprains and contusions worsening chronic pain. Continue celecoxib 200 mg BID. Do not take with any other NSAIDs. Start cyclobenzaprine 10 mg TID prn. Discussed possible side effects. Do not drive if taking. Warm/cool compresses. She declines referral to pain management and/or physical therapy at this time. Return to care if symptoms worsen or fail to improve. ER precautions given.

## 2024-01-08 ENCOUNTER — HOSPITAL ENCOUNTER (OUTPATIENT)
Dept: ULTRASOUND IMAGING | Facility: HOSPITAL | Age: 44
Discharge: HOME | End: 2024-01-08
Payer: COMMERCIAL

## 2024-01-08 DIAGNOSIS — E04.1 THYROID NODULE: ICD-10-CM

## 2024-01-08 PROCEDURE — 76536 US EXAM OF HEAD AND NECK: CPT

## 2024-01-09 DIAGNOSIS — E04.1 THYROID NODULE: Primary | ICD-10-CM

## 2024-01-15 ENCOUNTER — TELEPHONE (OUTPATIENT)
Dept: INTERNAL MEDICINE CLINIC | Facility: CLINIC | Age: 44
End: 2024-01-15

## 2024-01-15 NOTE — TELEPHONE ENCOUNTER
Received consultative medical exam from the [place] Department of [employer].    Scanned to chart    Please review

## 2024-01-16 NOTE — TELEPHONE ENCOUNTER
This is a copy of a medical exam done to assess her claim for disability. This is just for her medical record. No further action is needed.

## 2024-02-12 ENCOUNTER — TELEPHONE (OUTPATIENT)
Dept: INTERNAL MEDICINE CLINIC | Facility: CLINIC | Age: 44
End: 2024-02-12

## 2024-02-12 ENCOUNTER — OFFICE VISIT (OUTPATIENT)
Dept: INTERNAL MEDICINE CLINIC | Facility: CLINIC | Age: 44
End: 2024-02-12

## 2024-02-12 VITALS
DIASTOLIC BLOOD PRESSURE: 80 MMHG | HEART RATE: 79 BPM | HEIGHT: 62 IN | TEMPERATURE: 98 F | SYSTOLIC BLOOD PRESSURE: 125 MMHG | WEIGHT: 177 LBS | BODY MASS INDEX: 32.57 KG/M2

## 2024-02-12 DIAGNOSIS — F32.A DEPRESSIVE DISORDER: ICD-10-CM

## 2024-02-12 DIAGNOSIS — Z13.220 ENCOUNTER FOR SCREENING FOR LIPID DISORDER: ICD-10-CM

## 2024-02-12 DIAGNOSIS — E66.1 CLASS 1 DRUG-INDUCED OBESITY WITHOUT SERIOUS COMORBIDITY WITH BODY MASS INDEX (BMI) OF 32.0 TO 32.9 IN ADULT: ICD-10-CM

## 2024-02-12 DIAGNOSIS — M76.821 POSTERIOR TIBIAL TENDINITIS OF RIGHT LOWER EXTREMITY: ICD-10-CM

## 2024-02-12 DIAGNOSIS — S93.491A SPRAIN OF ANTERIOR TALOFIBULAR LIGAMENT OF RIGHT ANKLE, INITIAL ENCOUNTER: ICD-10-CM

## 2024-02-12 DIAGNOSIS — Z13.1 SCREENING FOR DIABETES MELLITUS: ICD-10-CM

## 2024-02-12 DIAGNOSIS — Z00.00 ANNUAL PHYSICAL EXAM: Primary | ICD-10-CM

## 2024-02-12 DIAGNOSIS — Z23 ENCOUNTER FOR IMMUNIZATION: ICD-10-CM

## 2024-02-12 DIAGNOSIS — M75.101 TEAR OF RIGHT SUPRASPINATUS TENDON: ICD-10-CM

## 2024-02-12 DIAGNOSIS — E04.1 THYROID NODULE: ICD-10-CM

## 2024-02-12 DIAGNOSIS — F17.200 TOBACCO USE DISORDER: ICD-10-CM

## 2024-02-12 DIAGNOSIS — Z13.0 SCREENING FOR DEFICIENCY ANEMIA: ICD-10-CM

## 2024-02-12 PROBLEM — J06.9 VIRAL UPPER RESPIRATORY INFECTION: Status: RESOLVED | Noted: 2024-01-07 | Resolved: 2024-02-12

## 2024-02-12 PROCEDURE — 90471 IMMUNIZATION ADMIN: CPT | Performed by: PHYSICIAN ASSISTANT

## 2024-02-12 PROCEDURE — 99214 OFFICE O/P EST MOD 30 MIN: CPT | Performed by: PHYSICIAN ASSISTANT

## 2024-02-12 PROCEDURE — 99396 PREV VISIT EST AGE 40-64: CPT | Performed by: PHYSICIAN ASSISTANT

## 2024-02-12 PROCEDURE — 90686 IIV4 VACC NO PRSV 0.5 ML IM: CPT | Performed by: PHYSICIAN ASSISTANT

## 2024-02-12 NOTE — PROGRESS NOTES
ADULT ANNUAL PHYSICAL  [facility] [place]    NAME: Lorri Montes  AGE: 43 y.o. SEX: female  : 1980     DATE: 2024     Assessment and Plan:     Problem List Items Addressed This Visit          Endocrine    Thyroid nodule     ER imaging from 23 - CT cervical spine incidental finding: Large ill-defined left thyroid lobe nodule measuring up to approximately 3.3 cm. Thyroid sonography is recommended to further evaluate.   Follow up thyroid US 24: The 3.6 x 2.2 x 3.0 cm left lower pole nodule. CRITERIA: TR 3, Mildly suspicious. FNA if >2.5 cm.     She has a consult with endo for possible FNA on 3/28.         Relevant Orders    TSH, 3rd generation with Free T4 reflex       Musculoskeletal and Integument    Tear of right supraspinatus tendon     Previously tried PT and saw ortho. She would like to see ortho prior to trying PT. Referral placed. Recommend supportive treatment. Warm/cool compresses. Stretch throughout the day. Continue celecoxib 200 mg BID.          Relevant Orders    Ambulatory Referral to Orthopedic Surgery    Posterior tibial tendinitis of right lower extremity     Chronic right ankle pain. Previously followed by orthopedic - reviewed consult note. She was being treated for an ankle sprain and tibial tendinitis. States PT previously helped. She is agreeable to restart. Recommend supportive treatment. Warm/cool compresses. Stretch throughout the day. Continue celecoxib 200 mg BID.          Relevant Orders    Ambulatory Referral to Physical Therapy       Other    Obesity    Tobacco use disorder     Encouraged smoking cessation.         Annual physical exam - Primary     Discussed general health recommendations and screening guidelines. Up to date on cervical cancer screening. Due for breast cancer screening, mammogram previously ordered. She will call to schedule. Agreeable to influenza immunization. Tolerated well.   Patient tolerated well. Recommend routine dental and eye exams. Agreeable to screening lab work. Next physical one year.             Depressive disorder     Goes to [facility] for therapy and psychiatric services. No current medications as she states they all have side effects. Feels she is doing well.           Other Visit Diagnoses       Sprain of anterior talofibular ligament of right ankle, initial encounter        Relevant Orders    Ambulatory Referral to Physical Therapy    Screening for deficiency anemia        Relevant Orders    CBC and differential    Screening for diabetes mellitus        Relevant Orders    Comprehensive metabolic panel    Hemoglobin A1C    Encounter for screening for lipid disorder        Relevant Orders    Lipid panel    Encounter for immunization        Relevant Orders    influenza vaccine, quadrivalent, 0.5 mL, preservative-free, for adult and pediatric patients 6 mos+ (AFLURIA, FLUARIX, FLULAVAL, FLUZONE) (Completed)            Immunizations and preventive care screenings were discussed with patient today. Appropriate education was printed on patient's after visit summary.    Counseling:  Alcohol/drug use: discussed moderation in alcohol intake, the recommendations for healthy alcohol use, and avoidance of illicit drug use.  Dental Health: discussed importance of regular tooth brushing, flossing, and dental visits.  Injury prevention: discussed safety/seat belts, safety helmets, smoke detectors, carbon dioxide detectors, and smoking near bedding or upholstery.  Sexual health: discussed sexually transmitted diseases, partner selection, use of condoms, avoidance of unintended pregnancy, and contraceptive alternatives.  Exercise: the importance of regular exercise/physical activity was discussed. Recommend exercise 3-5 times per week for at least 30 minutes.     BMI Counseling: Body mass index is 32.37 kg/m². The BMI is above normal. Nutrition recommendations include decreasing  portion sizes, encouraging healthy choices of fruits and vegetables, decreasing fast food intake, consuming healthier snacks, limiting drinks that contain sugar, moderation in carbohydrate intake, increasing intake of lean protein, reducing intake of saturated and trans fat and reducing intake of cholesterol. Exercise recommendations include moderate physical activity 150 minutes/week, exercising 3-5 times per week and strength training exercises. No pharmacotherapy was ordered. Rationale for BMI follow-up plan is due to patient being overweight or obese.     Tobacco Cessation Counseling: Tobacco cessation counseling was provided. The patient is sincerely urged to quit consumption of tobacco. She is not ready to quit tobacco. Medication options and side effects of medication discussed. Patient refused medication.         Return in about 1 year (around 2/12/2025) for Annual physical.     Chief Complaint:     Chief Complaint   Patient presents with    Physical Exam     Right shoulder pain- tear- acting up 2 weeks ago      History of Present Illness:     Adult Annual Physical   Patient here for a comprehensive physical exam. The patient reports problems - right shoulder pain .  States she has chronic right shoulder pain for several years.  States she was told there is a tear in it.  It has been more painful over the last few months.  States she has difficulty moving it due to the pain.  States she cannot elevate it.  Pain is in her shoulder sometimes radiating down her right arm into her upper arm.  Denies erythema, edema, or warmth.  Denies ecchymosis.  Denies numbness, tingling, or weakness.  She takes Celebrex, otherwise she has not tried anything for it yet.  In the past she has completed physical therapy and seeing orthopedics.  She also suffers from  Chronic right ankle pain.  She states this is also become more painful over the past few months.  Denies redness, swelling, or ecchymosis.  Denies warmth.  Denies  numbness, tingling, or weakness.  She previously completed physical therapy and saw an orthopedic for this as well.  She did feel physical therapy previously helped and is interested in trying it again.    Diet and Physical Activity  Diet/Nutrition: poor diet, limited junk food, and limited fruits/vegetables.   Exercise: no formal exercise.      Depression Screening  PHQ-2/9 Depression Screening    Little interest or pleasure in doing things: 1 - several days  Feeling down, depressed, or hopeless: 1 - several days  PHQ-2 Score: 2  PHQ-2 Interpretation: Negative depression screen       General Health  Sleep: sleeps well and gets 7-8 hours of sleep on average.   Hearing: normal - bilateral.  Vision: no vision problems, goes for regular eye exams, most recent eye exam <1 year ago, and wears glasses.   Dental: regular dental visits and brushes teeth twice daily.       /GYN Health  Follows with gynecology? yes   Patient is: perimenopausal  Last menstrual period: unknown  Contraceptive method: injectable contraception.    Advanced Care Planning  Do you have an advanced directive? no  Do you have a durable medical power of ? no     Review of Systems:     Review of Systems   Constitutional:  Negative for appetite change, chills, diaphoresis, fatigue, fever and unexpected weight change.   HENT:  Negative for congestion, hearing loss, sore throat, tinnitus and trouble swallowing.    Eyes:  Negative for visual disturbance.   Respiratory:  Negative for cough, chest tightness, shortness of breath and wheezing.    Cardiovascular:  Negative for chest pain, palpitations and leg swelling.   Gastrointestinal:  Negative for abdominal pain, diarrhea, nausea and vomiting.   Endocrine: Negative for cold intolerance, heat intolerance, polydipsia, polyphagia and polyuria.   Musculoskeletal:  Positive for arthralgias, back pain, myalgias and neck pain. Negative for gait problem, joint swelling and neck stiffness.   Skin:  Negative  for rash.   Neurological:  Negative for dizziness, weakness, light-headedness, numbness and headaches.   Hematological:  Negative for adenopathy.   Psychiatric/Behavioral:  Negative for dysphoric mood, self-injury, sleep disturbance and suicidal ideas. The patient is not nervous/anxious.       Past Medical History:     Past Medical History:   Diagnosis Date    Arthritis     Closed fracture of left tibial plateau     Depression     DJD (degenerative joint disease)     Herpes     Hidradenitis     MRSA carrier       Past Surgical History:     Past Surgical History:   Procedure Laterality Date    FL INJECTION RIGHT HIP (ARTHROGRAM)  1/6/2020    FL INJECTION RIGHT HIP (NON ARTHROGRAM)  7/24/2019    FL INJECTION RIGHT SHOULDER (ARTHROGRAM)  10/7/2019    NO PAST SURGERIES        Social History:     Social History     Socioeconomic History    Marital status: Single     Spouse name: None    Number of children: None    Years of education: None    Highest education level: None   Occupational History    None   Tobacco Use    Smoking status: Every Day     Current packs/day: 0.25     Types: Cigarettes    Smokeless tobacco: Former    Tobacco comments:     4-5 cigarettes a day   Vaping Use    Vaping status: Never Used   Substance and Sexual Activity    Alcohol use: Yes     Comment: holidays     Drug use: Yes     Types: Marijuana     Comment: few times a day     Sexual activity: Yes     Partners: Male     Birth control/protection: Injection   Other Topics Concern    None   Social History Narrative    None     Social Determinants of Health     Financial Resource Strain: Low Risk  (1/5/2024)    Overall Financial Resource Strain (CARDIA)     Difficulty of Paying Living Expenses: Not hard at all   Food Insecurity: No Food Insecurity (1/5/2024)    Hunger Vital Sign     Worried About Running Out of Food in the Last Year: Never true     Ran Out of Food in the Last Year: Never true   Transportation Needs: No Transportation Needs (1/5/2024)     PRAPARE - Transportation     Lack of Transportation (Medical): No     Lack of Transportation (Non-Medical): No   Physical Activity: Not on file   Stress: Stress Concern Present (8/25/2022)    [employer] - Occupational Stress Questionnaire     Feeling of Stress : Rather much   Social Connections: Not on file   Intimate Partner Violence: Not At Risk (8/25/2022)    Humiliation, Afraid, Rape, and Kick questionnaire     Fear of Current or Ex-Partner: No     Emotionally Abused: No     Physically Abused: No     Sexually Abused: No   Housing Stability: Low Risk  (8/25/2022)    Housing Stability Vital Sign     Unable to Pay for Housing in the Last Year: No     Number of Places Lived in the Last Year: 1     Unstable Housing in the Last Year: No      Family History:     Family History   Problem Relation Age of Onset    Arthritis Mother     Hypertension Mother     Diabetes Father     Stroke Father     Heart attack Father     Post-traumatic stress disorder Father     Other Father         nerve gas exposure-war    Schizophrenia Sister     Bipolar disorder Brother     Schizophrenia Brother     Sleep apnea Daughter     Obesity Daughter     Hypertension Daughter     ADD / ADHD Daughter     Depression Son     Post-traumatic stress disorder Son     Arthritis Maternal Grandmother     Heart attack Maternal Grandfather     Diabetes Paternal Grandmother     Stroke Paternal Grandmother     Heart attack Paternal Grandfather     Post-traumatic stress disorder Paternal Grandfather     Ovarian cysts Sister       Current Medications:     Current Outpatient Medications   Medication Sig Dispense Refill    celecoxib (CeleBREX) 200 mg capsule Take 1 capsule (200 mg total) by mouth 2 (two) times a day 180 capsule 0    cyclobenzaprine (FLEXERIL) 10 mg tablet Take 1 tablet (10 mg total) by mouth 3 (three) times a day as needed for muscle spasms 60 tablet 0    Diclofenac Sodium (VOLTAREN) 1 % Apply 2 g topically 4  "(four) times a day 100 g 1    medroxyPROGESTERone (DEPO-PROVERA) 150 mg/mL injection Inject 1 mL (150 mg total) into a muscle every 3 (three) months 1 mL 4    spironolactone (ALDACTONE) 50 mg tablet Take 1 tablet (50 mg total) by mouth 2 (two) times a day 60 tablet 2     Current Facility-Administered Medications   Medication Dose Route Frequency Provider Last Rate Last Admin    medroxyPROGESTERone acetate (DEPO-PROVERA SYRINGE) IM injection 150 mg  150 mg Intramuscular Q3 Months Evelyn MANUELA NicoKEYANA   150 mg at 12/18/23 1050      Allergies:     No Known Allergies   Physical Exam:     /80 (BP Location: Left arm, Patient Position: Sitting, Cuff Size: Large)   Pulse 79   Temp 98 °F (36.7 °C) (Temporal)   Ht 5' 2\" (1.575 m)   Wt 80.3 kg (177 lb)   LMP  (LMP Unknown)   Breastfeeding No   BMI 32.37 kg/m²     Physical Exam  Vitals and nursing note reviewed.   Constitutional:       General: She is awake. She is not in acute distress.     Appearance: Normal appearance. She is well-developed and well-groomed. She is obese. She is not ill-appearing.   HENT:      Head: Normocephalic and atraumatic.      Right Ear: Hearing, tympanic membrane, ear canal and external ear normal.      Left Ear: Hearing, tympanic membrane, ear canal and external ear normal.      Nose: Nose normal.      Mouth/Throat:      Lips: Pink.      Mouth: Mucous membranes are moist.      Pharynx: Oropharynx is clear. Uvula midline. No oropharyngeal exudate or posterior oropharyngeal erythema.   Eyes:      General: Lids are normal. No scleral icterus.     Extraocular Movements: Extraocular movements intact.      Conjunctiva/sclera: Conjunctivae normal.      Pupils: Pupils are equal, round, and reactive to light.   Neck:      Vascular: No carotid bruit, hepatojugular reflux or JVD.   Cardiovascular:      Rate and Rhythm: Normal rate and regular rhythm.      Pulses: Normal pulses.           Radial pulses are 2+ on the right side and 2+ on the left " side.      Heart sounds: Normal heart sounds. No murmur heard.  Pulmonary:      Effort: Pulmonary effort is normal. No respiratory distress.      Breath sounds: Normal breath sounds and air entry. No decreased air movement. No decreased breath sounds, wheezing, rhonchi or rales.   Abdominal:      General: Abdomen is flat. Bowel sounds are normal. There is no distension.      Palpations: Abdomen is soft. There is no mass.      Tenderness: There is no abdominal tenderness. There is no right CVA tenderness, left CVA tenderness, guarding or rebound.      Hernia: No hernia is present.   Musculoskeletal:      Right shoulder: Tenderness (generalized posterior) present. No swelling, deformity, effusion, laceration, bony tenderness or crepitus. Decreased range of motion. Normal strength. Normal pulse.      Left shoulder: Normal.      Cervical back: Full passive range of motion without pain, normal range of motion and neck supple.      Right lower leg: No edema.      Left lower leg: No edema.      Right ankle: Normal.      Right Achilles Tendon: Normal.      Left ankle: Normal.      Left Achilles Tendon: Normal.   Lymphadenopathy:      Cervical: No cervical adenopathy.   Skin:     General: Skin is warm.      Capillary Refill: Capillary refill takes less than 2 seconds.      Coloration: Skin is not jaundiced.      Findings: No rash.   Neurological:      General: No focal deficit present.      Mental Status: She is alert and oriented to person, place, and time. Mental status is at baseline.      Cranial Nerves: Cranial nerves 2-12 are intact.      Sensory: Sensation is intact.      Motor: Motor function is intact.      Coordination: Coordination is intact.      Gait: Gait is intact.   Psychiatric:         Attention and Perception: Attention normal.         Mood and Affect: Mood and affect normal.         Speech: Speech normal.         Behavior: Behavior normal. Behavior is cooperative.          HAFSA Vargas  [facility]

## 2024-02-12 NOTE — TELEPHONE ENCOUNTER
Folder Color: pt has form     Name of Form: placard santos      Form to be filled out by: Tato     Form to be Faxed: given to pt     Patient made aware of 10 day business policy.

## 2024-02-12 NOTE — PATIENT INSTRUCTIONS

## 2024-02-14 PROBLEM — F32.A DEPRESSIVE DISORDER: Status: ACTIVE | Noted: 2024-02-14
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
## 2024-02-14 PROBLEM — M76.821 POSTERIOR TIBIAL TENDINITIS OF RIGHT LOWER EXTREMITY: Status: ACTIVE | Noted: 2024-02-14

## 2024-02-14 NOTE — ASSESSMENT & PLAN NOTE
Chronic right ankle pain. Previously followed by orthopedic - reviewed consult note. She was being treated for an ankle sprain and tibial tendinitis. States PT previously helped. She is agreeable to restart. Recommend supportive treatment. Warm/cool compresses. Stretch throughout the day. Continue celecoxib 200 mg BID.

## 2024-02-14 NOTE — ASSESSMENT & PLAN NOTE
ER imaging from 12/6/23 - CT cervical spine incidental finding: Large ill-defined left thyroid lobe nodule measuring up to approximately 3.3 cm. Thyroid sonography is recommended to further evaluate.   Follow up thyroid US 1/8/24: The 3.6 x 2.2 x 3.0 cm left lower pole nodule. CRITERIA: TR 3, Mildly suspicious. FNA if >2.5 cm.     She has a consult with endo for possible FNA on 3/28.

## 2024-02-14 NOTE — ASSESSMENT & PLAN NOTE
Goes to [facility] for therapy and psychiatric services. No current medications as she states they all have side effects. Feels she is doing well.

## 2024-02-14 NOTE — ASSESSMENT & PLAN NOTE
Previously tried PT and saw ortho. She would like to see ortho prior to trying PT. Referral placed. Recommend supportive treatment. Warm/cool compresses. Stretch throughout the day. Continue celecoxib 200 mg BID.

## 2024-02-14 NOTE — ASSESSMENT & PLAN NOTE
Discussed general health recommendations and screening guidelines. Up to date on cervical cancer screening. Due for breast cancer screening, mammogram previously ordered. She will call to schedule. Agreeable to influenza immunization. Tolerated well.  Patient tolerated well. Recommend routine dental and eye exams. Agreeable to screening lab work. Next physical one year.

## 2024-02-23 ENCOUNTER — OFFICE VISIT (OUTPATIENT)
Dept: OBGYN CLINIC | Facility: CLINIC | Age: 44
End: 2024-02-23
Payer: COMMERCIAL

## 2024-02-23 ENCOUNTER — APPOINTMENT (OUTPATIENT)
Dept: RADIOLOGY | Facility: AMBULARY SURGERY CENTER | Age: 44
End: 2024-02-23
Attending: STUDENT IN AN ORGANIZED HEALTH CARE EDUCATION/TRAINING PROGRAM
Payer: COMMERCIAL

## 2024-02-23 VITALS
BODY MASS INDEX: 32.57 KG/M2 | DIASTOLIC BLOOD PRESSURE: 89 MMHG | RESPIRATION RATE: 17 BRPM | WEIGHT: 177 LBS | HEART RATE: 75 BPM | SYSTOLIC BLOOD PRESSURE: 141 MMHG | HEIGHT: 62 IN

## 2024-02-23 DIAGNOSIS — S46.011A TRAUMATIC COMPLETE TEAR OF RIGHT ROTATOR CUFF, INITIAL ENCOUNTER: Primary | ICD-10-CM

## 2024-02-23 DIAGNOSIS — M75.101 TEAR OF RIGHT SUPRASPINATUS TENDON: ICD-10-CM

## 2024-02-23 PROCEDURE — 99214 OFFICE O/P EST MOD 30 MIN: CPT | Performed by: STUDENT IN AN ORGANIZED HEALTH CARE EDUCATION/TRAINING PROGRAM

## 2024-02-23 PROCEDURE — 73030 X-RAY EXAM OF SHOULDER: CPT

## 2024-02-23 PROCEDURE — 20610 DRAIN/INJ JOINT/BURSA W/O US: CPT | Performed by: STUDENT IN AN ORGANIZED HEALTH CARE EDUCATION/TRAINING PROGRAM

## 2024-02-23 RX ORDER — TRIAMCINOLONE ACETONIDE 40 MG/ML
40 INJECTION, SUSPENSION INTRA-ARTICULAR; INTRAMUSCULAR
Status: COMPLETED | OUTPATIENT
Start: 2024-02-23 | End: 2024-02-23

## 2024-02-23 RX ORDER — BUPIVACAINE HYDROCHLORIDE 2.5 MG/ML
4 INJECTION, SOLUTION INFILTRATION; PERINEURAL
Status: COMPLETED | OUTPATIENT
Start: 2024-02-23 | End: 2024-02-23

## 2024-02-23 RX ADMIN — TRIAMCINOLONE ACETONIDE 40 MG: 40 INJECTION, SUSPENSION INTRA-ARTICULAR; INTRAMUSCULAR at 11:30

## 2024-02-23 RX ADMIN — BUPIVACAINE HYDROCHLORIDE 4 ML: 2.5 INJECTION, SOLUTION INFILTRATION; PERINEURAL at 11:30

## 2024-02-23 NOTE — PROGRESS NOTES
"Ortho Sports Medicine Shoulder New Patient Visit     Assesment:   43 y.o. female old right shoulder rotator cuff tear diagnosed in 2019 with reaggravation following fall in January 2024    Plan:  The patient's diagnosis and treatment were discussed at length today. We discussed no treatment, non-operative treatment, and operative treatment.    Lorri presents today for initial evaluation right shoulder rotator cuff tear previously diagnosed with reaggravation following fall in January 2024.  Given that she has had improvement of her symptoms in the past from cortisone injection and outpatient physical therapy I discussed with her that we can reattempt injection and physical therapy prior to obtaining a new MRI.  She was in agreement this plan as she does feel like she would benefit from an injection and physical therapy.  She can continue for activity as tolerated using pain as a guide.  She continues ice and over-the-counter medications knee for pain relief.  She is to follow-up in approximately 6 to 8 weeks for reevaluation.    Large joint arthrocentesis: R subacromial bursa  Universal Protocol:  Consent: Verbal consent obtained.  Risks and benefits: risks, benefits and alternatives were discussed  Consent given by: patient  Time out: Immediately prior to procedure a \"time out\" was called to verify the correct patient, procedure, equipment, support staff and site/side marked as required.  Timeout called at: 2/23/2024 12:51 PM.  Patient understanding: patient states understanding of the procedure being performed  Patient consent: the patient's understanding of the procedure matches consent given  Site marked: the operative site was marked  Patient identity confirmed: verbally with patient  Supporting Documentation  Indications: pain and diagnostic evaluation   Procedure Details  Location: shoulder - R subacromial bursa  Preparation: Patient was prepped and draped in the usual sterile fashion  Needle size: 22 " G  Ultrasound guidance: no  Approach: posterior  Medications administered: 4 mL bupivacaine 0.25 %; 40 mg triamcinolone acetonide 40 mg/mL    Patient tolerance: patient tolerated the procedure well with no immediate complications  Dressing:  Sterile dressing applied            Conservative treatment:    Ice to shoulder 1-2 times daily, for 20 minutes at a time.  PT for ROM and strengthening to shoulder, rotator cuff, scapular stabilizers.  Let pain guide return to activities.      Imaging:    All imaging from today was reviewed by myself and explained to the patient.       Injection:    The risks and benefits of the injection (which include but are not limited to: infection, bleeding,damage to nerve/artery, need for further intervention), as well as the risks and benefits of all alternative treatments were explained and understood.  The patient elected to proceed with injection. The procedure was done with aseptic technique, and the patient tolerated the procedure well with no complications.  A corticosteroid injection of the subacromial space was performed.  The patient should take 1-2 days off of activity, with gradual return to activity as tolerated.  Ice to the shoulder 1-2 times daily for 20 minutes, for next 24-48 hrs.      Surgery:     No surgery is recommended at this point, continue with conservative treatment plan as noted.      Follow up:    Return in about 2 months (around 4/23/2024) for Recheck.        Chief Complaint   Patient presents with    Right Shoulder - Pain       History of Present Illness:    The patient is a 43 y.o., right hand dominant female whose occupation  at [employer], referred to me by their primary care physician, seen in clinic for consultation of right shoulder pain.  She states that she does have a prior history of rotator cuff tear which she treated nonoperatively with outpatient physical therapy and did respond well until recent fall in the beginning of January.  She  states since her fall she has been having anterior and lateral shoulder pain which she describes as sharp and severe and rated a 5 out of 10.  She states she does a lot of repetitive overhead lifting at work.  She states that her pain does cause some difficulty performing these tasks.  She denies any numbness or tingling.    Shoulder Surgical History:  None    Past Medical, Social and Family History:  Past Medical History:   Diagnosis Date    Arthritis     Closed fracture of left tibial plateau     Depression     DJD (degenerative joint disease)     Herpes     Hidradenitis     MRSA carrier      Past Surgical History:   Procedure Laterality Date    FL INJECTION RIGHT HIP (ARTHROGRAM)  1/6/2020    FL INJECTION RIGHT HIP (NON ARTHROGRAM)  7/24/2019    FL INJECTION RIGHT SHOULDER (ARTHROGRAM)  10/7/2019    NO PAST SURGERIES       No Known Allergies  Current Outpatient Medications on File Prior to Visit   Medication Sig Dispense Refill    celecoxib (CeleBREX) 200 mg capsule Take 1 capsule (200 mg total) by mouth 2 (two) times a day 180 capsule 0    cyclobenzaprine (FLEXERIL) 10 mg tablet Take 1 tablet (10 mg total) by mouth 3 (three) times a day as needed for muscle spasms 60 tablet 0    Diclofenac Sodium (VOLTAREN) 1 % Apply 2 g topically 4 (four) times a day 100 g 1    medroxyPROGESTERone (DEPO-PROVERA) 150 mg/mL injection Inject 1 mL (150 mg total) into a muscle every 3 (three) months 1 mL 4    spironolactone (ALDACTONE) 50 mg tablet Take 1 tablet (50 mg total) by mouth 2 (two) times a day 60 tablet 2     Current Facility-Administered Medications on File Prior to Visit   Medication Dose Route Frequency Provider Last Rate Last Admin    medroxyPROGESTERone acetate (DEPO-PROVERA SYRINGE) IM injection 150 mg  150 mg Intramuscular Q3 Months KEYANA Zeng   150 mg at 12/18/23 1050     Social History     Socioeconomic History    Marital status: Single     Spouse name: Not on file    Number of children: Not on file     Years of education: Not on file    Highest education level: Not on file   Occupational History    Not on file   Tobacco Use    Smoking status: Every Day     Current packs/day: 0.25     Types: Cigarettes    Smokeless tobacco: Former    Tobacco comments:     4-5 cigarettes a day   Vaping Use    Vaping status: Never Used   Substance and Sexual Activity    Alcohol use: Yes     Comment: holidays     Drug use: Yes     Types: Marijuana     Comment: few times a day     Sexual activity: Yes     Partners: Male     Birth control/protection: Injection   Other Topics Concern    Not on file   Social History Narrative    Not on file     Social Determinants of Health     Financial Resource Strain: Low Risk  (1/5/2024)    Overall Financial Resource Strain (CARDIA)     Difficulty of Paying Living Expenses: Not hard at all   Food Insecurity: No Food Insecurity (1/5/2024)    Hunger Vital Sign     Worried About Running Out of Food in the Last Year: Never true     Ran Out of Food in the Last Year: Never true   Transportation Needs: No Transportation Needs (1/5/2024)    PRAPARE - Transportation     Lack of Transportation (Medical): No     Lack of Transportation (Non-Medical): No   Physical Activity: Not on file   Stress: Stress Concern Present (8/25/2022)    [employer] [place] [employer] - Occupational Stress Questionnaire     Feeling of Stress : Rather much   Social Connections: Not on file   Intimate Partner Violence: Not At Risk (8/25/2022)    Humiliation, Afraid, Rape, and Kick questionnaire     Fear of Current or Ex-Partner: No     Emotionally Abused: No     Physically Abused: No     Sexually Abused: No   Housing Stability: Low Risk  (8/25/2022)    Housing Stability Vital Sign     Unable to Pay for Housing in the Last Year: No     Number of Places Lived in the Last Year: 1     Unstable Housing in the Last Year: No         I have reviewed the past medical, surgical, social and family history, medications and allergies as  "documented in the EMR.    Review of systems: ROS is negative other than that noted in the HPI.  Constitutional: Negative for fatigue and fever.   HENT: Negative for sore throat.    Respiratory: Negative for shortness of breath.    Cardiovascular: Negative for chest pain.   Gastrointestinal: Negative for abdominal pain.   Endocrine: Negative for cold intolerance and heat intolerance.   Genitourinary: Negative for flank pain.   Musculoskeletal: Negative for back pain.   Skin: Negative for rash.   Allergic/Immunologic: Negative for immunocompromised state.   Neurological: Negative for dizziness.   Psychiatric/Behavioral: Negative for agitation.      Physical Exam:    Blood pressure 141/89, pulse 75, resp. rate 17, height 5' 2\" (1.575 m), weight 80.3 kg (177 lb), not currently breastfeeding.    General/Constitutional: NAD, well developed, well nourished  HENT: Normocephalic, atraumatic  CV: Intact distal pulses, regular rate  Resp: No respiratory distress or labored breathing  Lymphatic: No lymphadenopathy palpated  Neuro: Alert and Oriented x 3, no focal deficits  Psych: Normal mood, normal affect, normal judgement, normal behavior  Skin: Warm, dry, no rashes, no erythema      Shoulder focused exam:     Visual inspection of the shoulder demonstrates normal contour without atrophy  No evidence of scapular dyskinesia or atrophy.  No scapular winging  Active and passive range of motion demonstrates forward flexion to 160, abduction to 90,  external rotation is 60 with the arm the side, internal rotation to L1   Rotator cuff strength is 4/5 to resisted forward flexion, 4/5 resisted abduction, 4/5 resisted external rotation, 5/5 resisted internal rotation  No tenderness to palpation at the distal clavicle, AC joint, acromion, or scapular spine  No pain with cross-body adduction  + Neer's test, + modified Moses impingement test  Negative external rotation lag sign  Negative belly press, negative lift-off  - speed's and " Yergason's test  - tenderness to palpation at the bicipital groove  - [place]'s test        UE NV Exam: +2 Radial pulses bilaterally  Sensation intact to light touch C5-T1 bilaterally, Radial/median/ulnar nerve motor intact      Bilateral elbow, wrist, and and forearm ROM full, painless with passive ROM, no ttp or crepitance throughout extremities below shoulder joint    Cervical ROM is full without pain, numbness or tingling      Shoulder Imaging    X-rays of the right shoulder were reviewed, which demonstrate no acute osseous abnormalities or significant degenerative changes.  I have reviewed the radiology report and agree with their impression.    MRI arthrogram performed 10/7/2019 of the right shoulder were reviewed, which demonstrate full-thickness insertional tear of anterior leading edge of the supraspinatus measuring 9 mm transverse and 10 mm anterior posterior.  I have reviewed the radiology report and agree with their impression.      Scribe Attestation      I,:  Braeden Bowser am acting as a scribe while in the presence of the attending physician.:       I,:  Edwin Epperson DO personally performed the services described in this documentation    as scribed in my presence.:

## 2024-03-11 ENCOUNTER — CLINICAL SUPPORT (OUTPATIENT)
Dept: OBGYN CLINIC | Facility: CLINIC | Age: 44
End: 2024-03-11

## 2024-03-11 DIAGNOSIS — Z30.42 ENCOUNTER FOR MANAGEMENT AND INJECTION OF DEPO-PROVERA: Primary | ICD-10-CM

## 2024-03-11 PROCEDURE — 96372 THER/PROPH/DIAG INJ SC/IM: CPT

## 2024-03-11 RX ADMIN — MEDROXYPROGESTERONE ACETATE 150 MG: 150 INJECTION, SUSPENSION INTRAMUSCULAR at 14:38

## 2024-03-11 NOTE — PROGRESS NOTES
Depo-Provera     [x]   Patient provided box yes   4 Refills remain  Refills submitted no  Last  Annual Date / Birth control check : 12/18/2023  Last Depo date: 12/18/2023  Side effects: no  HCG: no  Given by: Agapito Matta  Site: Left deltoid    Calcium supplement daily teaching  Condoms for 2 weeks following first injection dose.

## 2024-03-26 ENCOUNTER — EVALUATION (OUTPATIENT)
Dept: PHYSICAL THERAPY | Facility: CLINIC | Age: 44
End: 2024-03-26
Payer: COMMERCIAL

## 2024-03-26 DIAGNOSIS — S46.011A TRAUMATIC COMPLETE TEAR OF RIGHT ROTATOR CUFF, INITIAL ENCOUNTER: Primary | ICD-10-CM

## 2024-03-26 PROCEDURE — 97161 PT EVAL LOW COMPLEX 20 MIN: CPT

## 2024-03-26 PROCEDURE — 97110 THERAPEUTIC EXERCISES: CPT

## 2024-03-26 NOTE — PROGRESS NOTES
PT Evaluation     Today's date: 3/26/2024  Patient name: Lorri Montes  : 1980  MRN: [id number]  Referring provider: Edwin Epperson DO  Dx:   Encounter Diagnosis     ICD-10-CM    1. Traumatic complete tear of right rotator cuff, initial encounter  S46.011A Ambulatory referral to Physical Therapy                     Assessment  Assessment details:     Impairment/Problem List:  1. Poor posture with forward head position and rounded shoulders  2. Decreased RUE strength in all planes  3. Poor RUE activity tolerance     Lorri Montes is a 43 y.o. female who presents with recurrence of R shoulder pain, which has improved some after steroid injection. She did have some improvement in intensity of pain, and may have cervical involvement and directional preference. Gave retraction extension as HEP to assess response nv. Lorri Montes is limited with all ADLs and work activities secondary to the impairments stated above. No further referral appears necessary at this time based upon examination results. Prognosis is good given POC/HEP compliance.     Comparable signs:  1. R upper arm pain    Understanding of Dx/Px/POC: good   Prognosis: good    Goals  Short Term Goals:  Pt will report decreased levels of pain by at least 2 subjective ratings in 4 weeks  Pt will demonstrate improved ROM by at least 10 degrees in 4 weeks  Pt will demonstrate improved strength by ½ grade in 4 weeks  Pt will be able to reach overhead without pain in 4 weeks    Long Term Goals:  Pt will be independent with HEP in 8 weeks  Pt will be functional with all ADL's in 8 weeks  Pt will achieve their predicted FOTO score in 8 weeks  Pt will be able to lift 10lbs for work activities in 8 weeks      Plan  Patient would benefit from: skilled physical therapy  Referral necessary: No  Planned modality interventions: low level laser therapy  Planned therapy interventions: abdominal trunk stabilization, IASTM, joint  mobilization, manual therapy, nerve gliding, neuromuscular re-education, balance, balance/weight bearing training, body mechanics training, breathing training, patient education, postural training, strengthening, stretching, therapeutic activities, therapeutic exercise, functional ROM exercises, gait training, graded activity, graded exercise and home exercise program  Frequency: 2x week  Duration in weeks: 12  Plan of Care beginning date: 3/26/2024  Plan of Care expiration date: 2024  Treatment plan discussed with: patient        Subjective Evaluation    History of Present Illness  Mechanism of injury: Recurrence of previous R shoulder pain insidiously  Onset Gradual/Immediate: gradual, initial onset was immediate at work years ago  Timeframe: 2 months ago pain worsened again  Pain Location/Descriptors: R shoulder and upper arm, aching pain  Constant/Intermittent: constant  Multiple pain locations related Y/N: n/a  Symptoms changed since onset Y/N: N  Symptoms unchanged/improved/worsening since onset: worsening  Aggravating: activity  Easing: nothing  AM/PM Pattern: N/A     Denies 5d's, 3n's. Pt denies unrelenting night pain, unexplained weight changes, bowel/bladder symptoms, saddle region numbness, ataxia.            Not a recurrent problem   Patient Goals  Patient goals for therapy: decreased pain, increased motion, increased strength, independence with ADLs/IADLs and return to sport/leisure activities    Pain  Current pain ratin  At best pain rating: 3  At worst pain ratin          Objective           Precautions: None      Manuals 3/26                                                                Neuro Re-Ed                                                                                                        Ther Ex             C/s retraction extension 1x10 3x/day and with pain                                                                                                       Ther Activity                                        Gait Training                                       Modalities

## 2024-04-01 ENCOUNTER — OFFICE VISIT (OUTPATIENT)
Dept: OBGYN CLINIC | Facility: CLINIC | Age: 44
End: 2024-04-01
Payer: COMMERCIAL

## 2024-04-01 VITALS
HEIGHT: 64 IN | HEART RATE: 70 BPM | BODY MASS INDEX: 30.22 KG/M2 | SYSTOLIC BLOOD PRESSURE: 140 MMHG | DIASTOLIC BLOOD PRESSURE: 80 MMHG | WEIGHT: 177 LBS

## 2024-04-01 DIAGNOSIS — S46.011A TRAUMATIC COMPLETE TEAR OF RIGHT ROTATOR CUFF, INITIAL ENCOUNTER: Primary | ICD-10-CM

## 2024-04-01 PROCEDURE — 99213 OFFICE O/P EST LOW 20 MIN: CPT | Performed by: STUDENT IN AN ORGANIZED HEALTH CARE EDUCATION/TRAINING PROGRAM

## 2024-04-01 NOTE — LETTER
April 1, 2024     Patient: Lorri Montes  YOB: 1980  Date of Visit: 4/1/2024      To Whom it May Concern:    Lorri Montes is under my professional care. Lorri was seen in my office on 4/1/2024. Lorri should be excused from work today 4/1/24.    If you have any questions or concerns, please don't hesitate to call.         Sincerely,          Edwin Epperson, DO

## 2024-04-01 NOTE — PROGRESS NOTES
Ortho Sports Medicine Shoulder Follow Up Visit     Assesment:   43 y.o. female old right shoulder rotator cuff tear diagnosed in 2019 with reaggravation following fall in January 2024    Plan:  The patient's diagnosis and treatment were discussed at length today. We discussed no treatment, non-operative treatment, and operative treatment.    Lorri is present in office for follow up right shoulder. At this time patient is continuing to have pain with no improvement.  However she is only attended 1 physical therapy session.  Recommend compliance with physical therapy as well as a home directed exercise program on her days off.  She requested another work note to continue with work restrictions, which was placed for her.  She is to follow-up in approximately 6 weeks time.    Conservative treatment:    Ice to shoulder 1-2 times daily, for 20 minutes at a time.  PT for ROM and strengthening to shoulder, rotator cuff, scapular stabilizers.  Let pain guide return to activities.      Imaging:    No imaging for review today      Injection:    No injection at this time      Surgery:     No surgery is recommended at this point, continue with conservative treatment plan as noted.      Follow up:    No follow-ups on file.        Chief Complaint   Patient presents with    Follow-up     Follow up of the right shoulder. Still in pain.        History of Present Illness:    The patient is a 43 y.o. patient is present for follow up right shoulder. At this time she reports that the corticosteroid injection that was given at her last appointment only allowed 2 weeks of relief. The patient is continuing to have pain with movement over her head and with abduction, she reports that the clicking and popping in her shoulder is continuing. Patient states that she has only participated in physical therapy once thus far. She denies doing any home exercises. She denies any new injuries or trauma and denies any numbness or tingling.      Shoulder Surgical History:  None    Past Medical, Social and Family History:  Past Medical History:   Diagnosis Date    Arthritis     Closed fracture of left tibial plateau     Depression     DJD (degenerative joint disease)     Herpes     Hidradenitis     MRSA carrier      Past Surgical History:   Procedure Laterality Date    FL INJECTION RIGHT HIP (ARTHROGRAM)  1/6/2020    FL INJECTION RIGHT HIP (NON ARTHROGRAM)  7/24/2019    FL INJECTION RIGHT SHOULDER (ARTHROGRAM)  10/7/2019    NO PAST SURGERIES       No Known Allergies  Current Outpatient Medications on File Prior to Visit   Medication Sig Dispense Refill    celecoxib (CeleBREX) 200 mg capsule Take 1 capsule (200 mg total) by mouth 2 (two) times a day 180 capsule 0    cyclobenzaprine (FLEXERIL) 10 mg tablet Take 1 tablet (10 mg total) by mouth 3 (three) times a day as needed for muscle spasms 60 tablet 0    Diclofenac Sodium (VOLTAREN) 1 % Apply 2 g topically 4 (four) times a day 100 g 1    medroxyPROGESTERone (DEPO-PROVERA) 150 mg/mL injection Inject 1 mL (150 mg total) into a muscle every 3 (three) months 1 mL 4    spironolactone (ALDACTONE) 50 mg tablet Take 1 tablet (50 mg total) by mouth 2 (two) times a day 60 tablet 2     Current Facility-Administered Medications on File Prior to Visit   Medication Dose Route Frequency Provider Last Rate Last Admin    medroxyPROGESTERone acetate (DEPO-PROVERA SYRINGE) IM injection 150 mg  150 mg Intramuscular Q3 Months KEYANA Zeng   150 mg at 03/11/24 1438     Social History     Socioeconomic History    Marital status: Single     Spouse name: Not on file    Number of children: Not on file    Years of education: Not on file    Highest education level: Not on file   Occupational History    Not on file   Tobacco Use    Smoking status: Every Day     Current packs/day: 0.25     Types: Cigarettes    Smokeless tobacco: Former    Tobacco comments:     4-5 cigarettes a day   Vaping Use    Vaping status: Never Used    Substance and Sexual Activity    Alcohol use: Yes     Comment: holidays     Drug use: Yes     Types: Marijuana     Comment: few times a day     Sexual activity: Yes     Partners: Male     Birth control/protection: Injection   Other Topics Concern    Not on file   Social History Narrative    Not on file     Social Determinants of Health     Financial Resource Strain: Low Risk  (1/5/2024)    Overall Financial Resource Strain (CARDIA)     Difficulty of Paying Living Expenses: Not hard at all   Food Insecurity: No Food Insecurity (1/5/2024)    Hunger Vital Sign     Worried About Running Out of Food in the Last Year: Never true     Ran Out of Food in the Last Year: Never true   Transportation Needs: No Transportation Needs (1/5/2024)    PRAPARE - Transportation     Lack of Transportation (Medical): No     Lack of Transportation (Non-Medical): No   Physical Activity: Not on file   Stress: Stress Concern Present (8/25/2022)    [employer] [place] [employer] - Occupational Stress Questionnaire     Feeling of Stress : Rather much   Social Connections: Not on file   Intimate Partner Violence: Not At Risk (8/25/2022)    Humiliation, Afraid, Rape, and Kick questionnaire     Fear of Current or Ex-Partner: No     Emotionally Abused: No     Physically Abused: No     Sexually Abused: No   Housing Stability: Low Risk  (8/25/2022)    Housing Stability Vital Sign     Unable to Pay for Housing in the Last Year: No     Number of Places Lived in the Last Year: 1     Unstable Housing in the Last Year: No         I have reviewed the past medical, surgical, social and family history, medications and allergies as documented in the EMR.    Review of systems: ROS is negative other than that noted in the HPI.  Constitutional: Negative for fatigue and fever.   HENT: Negative for sore throat.    Respiratory: Negative for shortness of breath.    Cardiovascular: Negative for chest pain.   Gastrointestinal: Negative for abdominal pain.  "  Endocrine: Negative for cold intolerance and heat intolerance.   Genitourinary: Negative for flank pain.   Musculoskeletal: Negative for back pain.   Skin: Negative for rash.   Allergic/Immunologic: Negative for immunocompromised state.   Neurological: Negative for dizziness.   Psychiatric/Behavioral: Negative for agitation.      Physical Exam:    Blood pressure 140/80, pulse 70, height 5' 4\" (1.626 m), weight 80.3 kg (177 lb), not currently breastfeeding.    General/Constitutional: NAD, well developed, well nourished  HENT: Normocephalic, atraumatic  CV: Intact distal pulses, regular rate  Resp: No respiratory distress or labored breathing  Lymphatic: No lymphadenopathy palpated  Neuro: Alert and Oriented x 3, no focal deficits  Psych: Normal mood, normal affect, normal judgement, normal behavior  Skin: Warm, dry, no rashes, no erythema      Shoulder focused exam:     Visual inspection of the shoulder demonstrates normal contour without atrophy  No evidence of scapular dyskinesia or atrophy.  No scapular winging  Active and passive range of motion demonstrates forward flexion to 160, abduction to 90,  external rotation is 60 with the arm the side, internal rotation to L1   Rotator cuff strength is 4/5 to resisted forward flexion, 4/5 resisted abduction, 4/5 resisted external rotation, 5/5 resisted internal rotation  No tenderness to palpation at the distal clavicle, AC joint, acromion, or scapular spine  No pain with cross-body adduction  + Neer's test, + modified Moses impingement test  Negative external rotation lag sign  Negative belly press, negative lift-off  - speed's and Yergason's test  - tenderness to palpation at the bicipital groove  - [place]'s test        UE NV Exam: +2 Radial pulses bilaterally  Sensation intact to light touch C5-T1 bilaterally, Radial/median/ulnar nerve motor intact      Bilateral elbow, wrist, and and forearm ROM full, painless with passive ROM, no ttp or crepitance throughout " extremities below shoulder joint    Cervical ROM is full without pain, numbness or tingling            Scribe Attestation      I,:  Lucy Fine PA-C am acting as a scribe while in the presence of the attending physician.:       I,:  Edwin Epperson, DO personally performed the services described in this documentation    as scribed in my presence.:

## 2024-06-07 ENCOUNTER — CLINICAL SUPPORT (OUTPATIENT)
Dept: OBGYN CLINIC | Facility: CLINIC | Age: 44
End: 2024-06-07

## 2024-06-07 DIAGNOSIS — Z30.42 ENCOUNTER FOR MANAGEMENT AND INJECTION OF DEPO-PROVERA: Primary | ICD-10-CM

## 2024-06-07 PROCEDURE — 96372 THER/PROPH/DIAG INJ SC/IM: CPT

## 2024-06-07 RX ADMIN — MEDROXYPROGESTERONE ACETATE 150 MG: 150 INJECTION, SUSPENSION INTRAMUSCULAR at 11:14

## 2024-06-07 NOTE — PROGRESS NOTES
Depo-Provera     [x]   Patient provided box yes   3 Refills remain  Refills submitted no  Last  Annual Date / Birth control check : 12/18/23  Last Depo date: 3/11/24  Side effects: no  HCG: no  Given by: Agapito Matta  Site: Left deltoid    Calcium supplement daily teaching  Condoms for 2 weeks following first injection dose.

## 2024-08-26 ENCOUNTER — CLINICAL SUPPORT (OUTPATIENT)
Dept: OBGYN CLINIC | Facility: CLINIC | Age: 44
End: 2024-08-26

## 2024-08-26 DIAGNOSIS — Z30.42 ENCOUNTER FOR MANAGEMENT AND INJECTION OF DEPO-PROVERA: Primary | ICD-10-CM

## 2024-08-26 PROCEDURE — 96372 THER/PROPH/DIAG INJ SC/IM: CPT

## 2024-08-26 RX ADMIN — MEDROXYPROGESTERONE ACETATE 150 MG: 150 INJECTION, SUSPENSION INTRAMUSCULAR at 14:06

## 2024-08-26 NOTE — PROGRESS NOTES
Depo-Provera     [x]   Patient provided box yes   2 Refills remain  Refills submitted no  Last  Annual Date / Birth control check : 12/18/23  Last Depo date: 06/7/24  Side effects: no  HCG: no  Given by: Laurymar Reyes  Site: right deltoid    Calcium supplement daily teaching  Condoms for 2 weeks following first injection dose.
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
## 2024-10-17 RX ORDER — CYCLOBENZAPRINE HCL 10 MG
10 TABLET ORAL 3 TIMES DAILY PRN
Qty: 60 TABLET | Refills: 0 | Status: SHIPPED | OUTPATIENT
Start: 2024-10-17

## 2024-11-11 ENCOUNTER — OFFICE VISIT (OUTPATIENT)
Dept: OBGYN CLINIC | Facility: CLINIC | Age: 44
End: 2024-11-11

## 2024-11-11 ENCOUNTER — CLINICAL SUPPORT (OUTPATIENT)
Dept: OBGYN CLINIC | Facility: CLINIC | Age: 44
End: 2024-11-11

## 2024-11-11 VITALS
DIASTOLIC BLOOD PRESSURE: 99 MMHG | BODY MASS INDEX: 28.82 KG/M2 | WEIGHT: 168.8 LBS | HEIGHT: 64 IN | HEART RATE: 97 BPM | SYSTOLIC BLOOD PRESSURE: 139 MMHG

## 2024-11-11 DIAGNOSIS — N39.3 STRESS INCONTINENCE IN FEMALE: ICD-10-CM

## 2024-11-11 DIAGNOSIS — B00.9 HSV INFECTION: Primary | ICD-10-CM

## 2024-11-11 DIAGNOSIS — B37.31 VULVOVAGINAL CANDIDIASIS: ICD-10-CM

## 2024-11-11 DIAGNOSIS — Z30.42 ENCOUNTER FOR MANAGEMENT AND INJECTION OF DEPO-PROVERA: Primary | ICD-10-CM

## 2024-11-11 PROCEDURE — 96372 THER/PROPH/DIAG INJ SC/IM: CPT

## 2024-11-11 PROCEDURE — 99213 OFFICE O/P EST LOW 20 MIN: CPT | Performed by: NURSE PRACTITIONER

## 2024-11-11 RX ORDER — LIDOCAINE 50 MG/G
OINTMENT TOPICAL AS NEEDED
Qty: 30 G | Refills: 1 | Status: SHIPPED | OUTPATIENT
Start: 2024-11-11

## 2024-11-11 RX ORDER — FLUCONAZOLE 150 MG/1
150 TABLET ORAL
Qty: 2 TABLET | Refills: 0 | Status: SHIPPED | OUTPATIENT
Start: 2024-11-11 | End: 2024-11-15

## 2024-11-11 RX ORDER — VALACYCLOVIR HYDROCHLORIDE 1 G/1
1000 TABLET, FILM COATED ORAL DAILY
Qty: 5 TABLET | Refills: 2 | Status: SHIPPED | OUTPATIENT
Start: 2024-11-11 | End: 2024-11-16

## 2024-11-11 RX ADMIN — MEDROXYPROGESTERONE ACETATE 150 MG: 150 INJECTION, SUSPENSION INTRAMUSCULAR at 13:24

## 2024-11-11 NOTE — PROGRESS NOTES
PROBLEM GYNECOLOGICAL VISIT    Lorri Montes is a 44 y.o. female who presents today with complaint of vulvar irritation.  Her general medical history has been reviewed and she reports it as follows:    Past Medical History:   Diagnosis Date    Arthritis     Closed fracture of left tibial plateau     Depression     DJD (degenerative joint disease)     Herpes     Hidradenitis     MRSA carrier      Past Surgical History:   Procedure Laterality Date    FL INJECTION RIGHT HIP (ARTHROGRAM)  2020    FL INJECTION RIGHT HIP (NON ARTHROGRAM)  2019    FL INJECTION RIGHT SHOULDER (ARTHROGRAM)  10/7/2019    NO PAST SURGERIES       OB History          3    Para   2    Term   2            AB   1    Living   2         SAB   1    IAB        Ectopic        Multiple        Live Births                   Social History     Tobacco Use    Smoking status: Every Day     Current packs/day: 0.25     Types: Cigarettes    Smokeless tobacco: Former    Tobacco comments:     4-5 cigarettes a day   Vaping Use    Vaping status: Never Used   Substance Use Topics    Alcohol use: Yes     Comment: holidays     Drug use: Yes     Types: Marijuana     Comment: few times a day      Social History     Substance and Sexual Activity   Sexual Activity Yes    Partners: Male    Birth control/protection: Injection       Current Outpatient Medications   Medication Instructions    celecoxib (CELEBREX) 200 mg, Oral, 2 times daily    cyclobenzaprine (FLEXERIL) 10 mg, Oral, 3 times daily PRN    Diclofenac Sodium (VOLTAREN) 2 g, Topical, 4 times daily    medroxyPROGESTERone (DEPO-PROVERA) 150 mg, Intramuscular, Every 3 months    spironolactone (ALDACTONE) 50 mg, Oral, 2 times daily    valACYclovir (VALTREX) 1,000 mg, Oral, Daily, For HSV outbreak       History of Present Illness:   Lorri presents today reporting extreme vulvar irritation. This initially started about two weeks ago and has become progressively worse since then. She  "reports the tissue is red and raw and it is extremely sore. She has a history of HSV in the past. She also reports that she has had significant issue with stress incontinence, which she thinks is contributing to the problem. She constantly wears a pad or panty liner due to leaking of urine multiple times per day, she feels like she is very frequently damp in that area, which she feels is contributing to the issue. She denies any abnormal discharge.     Review of Systems:  Review of Systems   Constitutional: Negative.    Gastrointestinal: Negative.    Genitourinary:         Vulvar sores and vulvar irritation        Physical Exam:  /99 (BP Location: Right arm, Patient Position: Sitting)   Pulse 97   Ht 5' 4\" (1.626 m)   Wt 76.6 kg (168 lb 12.8 oz)   LMP  (LMP Unknown)   BMI 28.97 kg/m²   Physical Exam  Constitutional:       General: She is not in acute distress.     Appearance: Normal appearance.   Genitourinary:         Neurological:      Mental Status: She is alert.   Skin:     General: Skin is warm and dry.   Psychiatric:         Mood and Affect: Mood normal.         Behavior: Behavior normal.   Vitals reviewed.       Assessment:   1. HSV   2. Stress incontinence    3. Vulvovaginal candidiasis     Plan:   1. Refill of episodic valtrex   2. Rx for topical lidocaine for painful sores.    3. Will treat with Diflucan for suspected yeast.    4. Discussed vulvar skin care measures. Encouraged to keep the area as dry as possible, change damp pads as quickly as possible.    5. Recommended pelvic floor PT due to stress incontinence. Discussed referral to UroGyn if unsuccessful with PT.    6. Patient's depression screening was assessed with a PHQ-2 score of 0. Clinically patient does not have depression. No treatment is required.     7. Return for annual exam     Reviewed with patient that test results are available in [facility] immediately, but that they will not necessarily be reviewed by me immediately.  " Explained that I will review results at my earliest opportunity and contact patient appropriately.

## 2024-11-11 NOTE — PROGRESS NOTES
Depo-Provera     [x]   Patient provided box  Yes  1 Refills remain  Refills submitted no  Annual Due: 12/19/24  Last Depo date: 8/26/24  Side effects: no  HCG: no  if applicable:   Given by: K.Reyes  Site: LD    Calcium supplement daily teaching  Condoms for 2 weeks following first injection dose.

## 2024-12-01 DIAGNOSIS — B00.9 HSV INFECTION: ICD-10-CM

## 2024-12-02 RX ORDER — VALACYCLOVIR HYDROCHLORIDE 1 G/1
1000 TABLET, FILM COATED ORAL DAILY
Qty: 30 TABLET | Refills: 11 | Status: SHIPPED | OUTPATIENT
Start: 2024-12-02 | End: 2025-12-02

## 2024-12-06 ENCOUNTER — TELEPHONE (OUTPATIENT)
Dept: INTERNAL MEDICINE CLINIC | Facility: CLINIC | Age: 44
End: 2024-12-06

## 2024-12-06 NOTE — TELEPHONE ENCOUNTER
----- Message from Linda Diane PA-C sent at 12/6/2024  2:12 PM EST -----  Please schedule patient for annual physical 2/12 or after. Thank you

## 2025-01-26 PROBLEM — Z00.00 ANNUAL PHYSICAL EXAM: Status: RESOLVED | Noted: 2023-01-16 | Resolved: 2025-01-26

## 2025-01-27 ENCOUNTER — ANNUAL EXAM (OUTPATIENT)
Dept: OBGYN CLINIC | Facility: CLINIC | Age: 45
End: 2025-01-27

## 2025-01-27 VITALS
DIASTOLIC BLOOD PRESSURE: 116 MMHG | RESPIRATION RATE: 18 BRPM | HEIGHT: 64 IN | BODY MASS INDEX: 27.79 KG/M2 | SYSTOLIC BLOOD PRESSURE: 161 MMHG | HEART RATE: 101 BPM | WEIGHT: 162.8 LBS

## 2025-01-27 DIAGNOSIS — B00.9 HSV INFECTION: ICD-10-CM

## 2025-01-27 DIAGNOSIS — Z59.9 FINANCIAL DIFFICULTIES: ICD-10-CM

## 2025-01-27 DIAGNOSIS — Z30.09 BIRTH CONTROL COUNSELING: ICD-10-CM

## 2025-01-27 DIAGNOSIS — Z30.42 ENCOUNTER FOR SURVEILLANCE OF INJECTABLE CONTRACEPTIVE: ICD-10-CM

## 2025-01-27 DIAGNOSIS — N39.46 URINARY INCONTINENCE, MIXED: ICD-10-CM

## 2025-01-27 DIAGNOSIS — Z59.819 HOUSING INSTABILITY: ICD-10-CM

## 2025-01-27 DIAGNOSIS — Z01.419 ENCOUNTER FOR ANNUAL ROUTINE GYNECOLOGICAL EXAMINATION: Primary | ICD-10-CM

## 2025-01-27 PROCEDURE — 87591 N.GONORRHOEAE DNA AMP PROB: CPT

## 2025-01-27 PROCEDURE — 99396 PREV VISIT EST AGE 40-64: CPT | Performed by: OBSTETRICS & GYNECOLOGY

## 2025-01-27 PROCEDURE — 87491 CHLMYD TRACH DNA AMP PROBE: CPT

## 2025-01-27 RX ORDER — LIDOCAINE 50 MG/G
OINTMENT TOPICAL 2 TIMES DAILY PRN
Qty: 35.44 G | Refills: 2 | Status: SHIPPED | OUTPATIENT
Start: 2025-01-27

## 2025-01-27 RX ORDER — LIDOCAINE 50 MG/G
OINTMENT TOPICAL
Qty: 35.44 G | Refills: 0 | Status: SHIPPED | OUTPATIENT
Start: 2025-01-27 | End: 2025-01-27 | Stop reason: SDUPTHER

## 2025-01-27 RX ORDER — VALACYCLOVIR HYDROCHLORIDE 1 G/1
1000 TABLET, FILM COATED ORAL DAILY
Qty: 360 TABLET | Refills: 0 | Status: SHIPPED | OUTPATIENT
Start: 2025-01-27 | End: 2026-01-27

## 2025-01-27 RX ORDER — MEDROXYPROGESTERONE ACETATE 150 MG/ML
150 INJECTION, SUSPENSION INTRAMUSCULAR
Qty: 1 ML | Refills: 4 | Status: SHIPPED | OUTPATIENT
Start: 2025-01-27

## 2025-01-27 SDOH — ECONOMIC STABILITY - INCOME SECURITY: PROBLEM RELATED TO HOUSING AND ECONOMIC CIRCUMSTANCES, UNSPECIFIED: Z59.9

## 2025-01-27 SDOH — ECONOMIC STABILITY - HOUSING INSECURITY: HOUSING INSTABILITY UNSPECIFIED: Z59.819

## 2025-01-27 NOTE — PROGRESS NOTES
OB/GYN ANNUAL H&P    SUBJECTIVE:    Lorri Montes is a 44 y.o.  female who presents for annual well woman H&P.  She was encouraged to follow up with her PCP regarding her elevated BP today.  She declines HA, vision changes, chest pain, SOB, palpitations, or increased swelling.    GYN:  Only irregular spotting on Depo Provera  Abnormal vaginal discharge: no  Genital lesions: no  Genital irritation or itching: no  Sexually active: yes with single male partner of 15 years  Lives with partner in her mother's house  Not happy with her partner and considering splitting up  Doesn't feel unsafe  Referred to social work  Dyspareunia: no  Contraception: Depo Provera (refilled today)  STI history: HSV (takes Valtrex daily and Lidocaine prn, last outbreak 3 months ago)  Gynecologic surgical history: none    OB:   female ( x2)  Prior pregnancies uncomplicated    :  Dysuria: no  Hematuria: no  Urgency: yes  Frequency: no  Urinary incontinence: yes  Happening since giving birth to her daughter in  and getting worse since  Leaking with coughing, sneezing, and sometimes standing  Wears pads daily  Interested in interventions including medication or surgery if indicated  Referred to pelvic floor PT but didn't have time to do it  Referred to urogyn    Breast:  Mass: no  Skin changes: no  Reddening: no  Dimpling: no  Pain: no  Nipple discharge: yes  Will have small volume white discharge with squeezing only bilaterally  Encouraged to stop squeezing them  Encouraged to follow up in 3 months if ongoing s/p discontinuation of squeezing    General:  Diet: no dietary restrictions or allergies, regular fruits and vegetables  Exercise: walks, recommended 150 min of moderate exercise weekly  Work: manager at the [employer]  Alcohol use: several times a year will have 4-5 standard drinks  Tobacco use: 2-3 cigarettes a day (not interested in quitting currently)  Recreational drug use: smokes marijuana twice a  "day    Family history:  Breast cancer: no  Endometrial cancer: no  Ovarian cancer: questionable h/o ovarian cancer in maternal aunt    Screening:  Cervical cancer  Last pap smear on 8/3/2020 showed NILM, HPV neg  Patient encouraged to return in 8/2025 for her next routine screening pap smear  Breast cancer:  No mammograms on file  Per patient, hasn't completed any mammograms  Patient encouraged to schedule previously ordered routine screening mammogram  Colon cancer:  Due for first routine screening colonoscopy at 45 yo  STI screening: GC/CT, RPR, HIV, Hep B, and Hep C ordered    Immunizations:  COVID: due, counseled  Flu: due, counseled  Gardasil: series complete    Review of Systems:  Pertinent items are noted in HPI.    OBJECTIVE:    Vitals:   BP (!) 161/116 (BP Location: Right arm, Patient Position: Sitting, Cuff Size: Large)   Pulse 101   Resp 18   Ht 5' 4\" (1.626 m)   Wt 73.8 kg (162 lb 12.8 oz)   LMP  (LMP Unknown)   BMI 27.94 kg/m²   Repeat /95 mmHg    Physical Exam  Constitutional:       General: She is not in acute distress.     Appearance: Normal appearance. She is not ill-appearing.   Genitourinary:      Urethral meatus normal.      No lesions in the vagina.      Genitourinary Comments: Grade 1 cystocele and apical prolapse      Right Labia: No rash, tenderness, lesions, skin changes or Bartholin's cyst.     Left Labia: No tenderness, lesions, skin changes, Bartholin's cyst or rash.     No labial fusion noted.      Pelvic Medhat Score: 5/5.     No vaginal discharge, erythema, tenderness, bleeding, ulceration or granulation tissue.      Anterior and apical vaginal prolapse present.     No vaginal atrophy present.       Right Adnexa: not tender, not full, not palpable and no mass present.     Left Adnexa: not tender, not full, not palpable and no mass present.     Cervix is not absent.      No cervical motion tenderness, discharge, friability, lesion, polyp or nabothian cyst.      Uterus is not " enlarged, fixed, tender, irregular or prolapsed.      No uterine mass detected.     Uterus is anteverted.   Breasts:     Medhat Score is 5.      Breasts are symmetrical.      Breasts are soft.     Right: Present. No swelling, bleeding, inverted nipple, mass, nipple discharge, skin change, tenderness or breast implant.      Left: Present. No swelling, bleeding, inverted nipple, mass, nipple discharge, skin change, tenderness or breast implant.   HENT:      Mouth/Throat:      Mouth: Mucous membranes are moist.   Eyes:      Extraocular Movements: Extraocular movements intact.   Cardiovascular:      Rate and Rhythm: Normal rate and regular rhythm.      Heart sounds: Normal heart sounds.   Pulmonary:      Effort: Pulmonary effort is normal.      Breath sounds: Normal breath sounds.   Abdominal:      General: There is no distension.      Palpations: Abdomen is soft.      Tenderness: There is no abdominal tenderness. There is no guarding.   Musculoskeletal:         General: No swelling.      Right lower leg: No edema.      Left lower leg: No edema.   Lymphadenopathy:      Upper Body:      Right upper body: No supraclavicular or axillary adenopathy.      Left upper body: No supraclavicular or axillary adenopathy.   Neurological:      General: No focal deficit present.      Mental Status: She is alert.   Skin:     General: Skin is warm and dry.      Capillary Refill: Capillary refill takes less than 2 seconds.      Coloration: Skin is not jaundiced or pale.   Psychiatric:         Mood and Affect: Mood normal.         Behavior: Behavior normal.         Thought Content: Thought content normal.         Judgment: Judgment normal.         ASSESSMENT:    Lorri Montes is a 44 y.o.  with normal gynecologic exam aside from grade 1 prolapse.    PLAN:    1. Encounter for annual routine gynecological examination (Primary)  - medroxyPROGESTERone (DEPO-PROVERA) 150 mg/mL injection; Inject 1 mL (150 mg total) into a muscle  every 3 (three) months  Dispense: 1 mL; Refill: 4  - Chlamydia/GC amplified DNA by PCR  - HIV 1/2 AG/AB w Reflex [facility] for 2 yr old and above; Future  - RPR-Syphilis Screening (Total Syphilis IGG/IGM); Future  - Hepatitis B surface antigen; Future  - Hepatitis C antibody; Future  - Follow up in 1 year for annual gyn visit    2. Encounter for surveillance of injectable contraceptive  - Depo Provera refilled    3. Financial difficulties  - Ambulatory referral to social work care management program; Future    4. Housing instability  - Ambulatory referral to social work care management program; Future    5. Birth control counseling  - medroxyPROGESTERone (DEPO-PROVERA) 150 mg/mL injection; Inject 1 mL (150 mg total) into a muscle every 3 (three) months  Dispense: 1 mL; Refill: 4    6. HSV infection  - lidocaine (XYLOCAINE) 5 % ointment; Apply topically 2 (two) times a day as needed for mild pain  Dispense: 35.44 g; Refill: 2  - valACYclovir (VALTREX) 1,000 mg tablet; Take 1 tablet (1,000 mg total) by mouth daily  Dispense: 360 tablet; Refill: 0    7. Urinary incontinence, mixed  - Ambulatory Referral to Urogynecology; Future          Skye Yancey MD  01/27/25  1:13 PM

## 2025-01-28 ENCOUNTER — PATIENT OUTREACH (OUTPATIENT)
Dept: OBGYN CLINIC | Facility: CLINIC | Age: 45
End: 2025-01-28

## 2025-01-28 ENCOUNTER — CLINICAL SUPPORT (OUTPATIENT)
Dept: OBGYN CLINIC | Facility: CLINIC | Age: 45
End: 2025-01-28

## 2025-01-28 DIAGNOSIS — Z30.42 ENCOUNTER FOR MANAGEMENT AND INJECTION OF DEPO-PROVERA: Primary | ICD-10-CM

## 2025-01-28 LAB
C TRACH DNA SPEC QL NAA+PROBE: NEGATIVE
N GONORRHOEA DNA SPEC QL NAA+PROBE: NEGATIVE

## 2025-01-28 PROCEDURE — 96372 THER/PROPH/DIAG INJ SC/IM: CPT

## 2025-01-28 RX ADMIN — MEDROXYPROGESTERONE ACETATE 150 MG: 150 INJECTION, SUSPENSION INTRAMUSCULAR at 11:26

## 2025-01-28 NOTE — PROGRESS NOTES
Depo-Provera     [x]   Patient provided box yes   4 Refills remain  Refills submitted no  Last  Annual Date / Birth control check : 1/27/25  Last Depo date: 11/11/24  Side effects: no  HCG: no  Given by: Agapito Matta  Site: Left deltoid    Calcium supplement daily teaching  Condoms for 2 weeks following first injection dose.

## 2025-02-05 ENCOUNTER — PATIENT OUTREACH (OUTPATIENT)
Dept: OBGYN CLINIC | Facility: CLINIC | Age: 45
End: 2025-02-05

## 2025-04-15 DIAGNOSIS — S63.639A VOLAR PLATE INJURY OF FINGER, INITIAL ENCOUNTER: ICD-10-CM

## 2025-04-22 ENCOUNTER — HOSPITAL ENCOUNTER (EMERGENCY)
Facility: HOSPITAL | Age: 45
Discharge: HOME | End: 2025-04-23
Attending: EMERGENCY MEDICINE
Payer: COMMERCIAL

## 2025-04-22 ENCOUNTER — APPOINTMENT (EMERGENCY)
Dept: RADIOLOGY | Facility: HOSPITAL | Age: 45
End: 2025-04-22
Payer: COMMERCIAL

## 2025-04-22 VITALS
DIASTOLIC BLOOD PRESSURE: 112 MMHG | RESPIRATION RATE: 20 BRPM | HEART RATE: 104 BPM | OXYGEN SATURATION: 100 % | SYSTOLIC BLOOD PRESSURE: 184 MMHG | TEMPERATURE: 99 F
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
## 2025-04-22 DIAGNOSIS — S49.91XA SHOULDER INJURY, RIGHT, INITIAL ENCOUNTER: ICD-10-CM

## 2025-04-22 DIAGNOSIS — M25.561 ACUTE PAIN OF RIGHT KNEE: ICD-10-CM

## 2025-04-22 DIAGNOSIS — E04.1 THYROID NODULE: ICD-10-CM

## 2025-04-22 DIAGNOSIS — E87.6 HYPOKALEMIA: ICD-10-CM

## 2025-04-22 DIAGNOSIS — S02.5XXA BROKEN TOOTH: ICD-10-CM

## 2025-04-22 LAB
ALBUMIN SERPL BCG-MCNC: 4.1 G/DL (ref 3.5–5)
ALP SERPL-CCNC: 58 U/L (ref 34–104)
ALT SERPL W P-5'-P-CCNC: 7 U/L (ref 7–52)
ANION GAP SERPL CALCULATED.3IONS-SCNC: 10 MMOL/L (ref 4–13)
AST SERPL W P-5'-P-CCNC: 10 U/L (ref 13–39)
BASOPHILS # BLD AUTO: 0.07 THOUSANDS/ÂΜL (ref 0–0.1)
BASOPHILS NFR BLD AUTO: 0 % (ref 0–1)
BILIRUB SERPL-MCNC: 0.52 MG/DL (ref 0.2–1)
BUN SERPL-MCNC: 4 MG/DL (ref 5–25)
CALCIUM SERPL-MCNC: 8.9 MG/DL (ref 8.4–10.2)
CHLORIDE SERPL-SCNC: 100 MMOL/L (ref 96–108)
CO2 SERPL-SCNC: 27 MMOL/L (ref 21–32)
CREAT SERPL-MCNC: 0.53 MG/DL (ref 0.6–1.3)
EOSINOPHIL # BLD AUTO: 0.44 THOUSAND/ÂΜL (ref 0–0.61)
EOSINOPHIL NFR BLD AUTO: 3 % (ref 0–6)
ERYTHROCYTE [DISTWIDTH] IN BLOOD BY AUTOMATED COUNT: 14.8 % (ref 11.6–15.1)
GFR SERPL CREATININE-BSD FRML MDRD: 116 ML/MIN/1.73SQ M
GLUCOSE SERPL-MCNC: 88 MG/DL (ref 65–140)
HCG SERPL QL: NEGATIVE
HCT VFR BLD AUTO: 44.6 % (ref 34.8–46.1)
HGB BLD-MCNC: 15.6 G/DL (ref 11.5–15.4)
IMM GRANULOCYTES # BLD AUTO: 0.05 THOUSAND/UL (ref 0–0.2)
IMM GRANULOCYTES NFR BLD AUTO: 0 % (ref 0–2)
LYMPHOCYTES # BLD AUTO: 5.22 THOUSANDS/ÂΜL (ref 0.6–4.47)
LYMPHOCYTES NFR BLD AUTO: 31 % (ref 14–44)
MCH RBC QN AUTO: 34.1 PG (ref 26.8–34.3)
MCHC RBC AUTO-ENTMCNC: 35 G/DL (ref 31.4–37.4)
MCV RBC AUTO: 98 FL (ref 82–98)
MONOCYTES # BLD AUTO: 1.26 THOUSAND/ÂΜL (ref 0.17–1.22)
MONOCYTES NFR BLD AUTO: 7 % (ref 4–12)
NEUTROPHILS # BLD AUTO: 9.94 THOUSANDS/ÂΜL (ref 1.85–7.62)
NEUTS SEG NFR BLD AUTO: 59 % (ref 43–75)
NRBC BLD AUTO-RTO: 0 /100 WBCS
PLATELET # BLD AUTO: 362 THOUSANDS/UL (ref 149–390)
PMV BLD AUTO: 8.6 FL (ref 8.9–12.7)
POTASSIUM SERPL-SCNC: 2.9 MMOL/L (ref 3.5–5.3)
PROT SERPL-MCNC: 7.5 G/DL (ref 6.4–8.4)
RBC # BLD AUTO: 4.57 MILLION/UL (ref 3.81–5.12)
SODIUM SERPL-SCNC: 137 MMOL/L (ref 135–147)
WBC # BLD AUTO: 16.98 THOUSAND/UL (ref 4.31–10.16)

## 2025-04-22 PROCEDURE — 99284 EMERGENCY DEPT VISIT MOD MDM: CPT

## 2025-04-22 PROCEDURE — 90471 IMMUNIZATION ADMIN: CPT

## 2025-04-22 PROCEDURE — 80053 COMPREHEN METABOLIC PANEL: CPT

## 2025-04-22 PROCEDURE — 73030 X-RAY EXAM OF SHOULDER: CPT

## 2025-04-22 PROCEDURE — 84703 CHORIONIC GONADOTROPIN ASSAY: CPT

## 2025-04-22 PROCEDURE — 36415 COLL VENOUS BLD VENIPUNCTURE: CPT

## 2025-04-22 PROCEDURE — 73080 X-RAY EXAM OF ELBOW: CPT

## 2025-04-22 PROCEDURE — 73564 X-RAY EXAM KNEE 4 OR MORE: CPT

## 2025-04-22 PROCEDURE — 99285 EMERGENCY DEPT VISIT HI MDM: CPT | Performed by: EMERGENCY MEDICINE

## 2025-04-22 PROCEDURE — 85025 COMPLETE CBC W/AUTO DIFF WBC: CPT

## 2025-04-22 RX ORDER — ACETAMINOPHEN 325 MG/1
975 TABLET ORAL ONCE
Status: COMPLETED | OUTPATIENT
Start: 2025-04-22 | End: 2025-04-22

## 2025-04-22 RX ORDER — POTASSIUM CHLORIDE 1500 MG/1
40 TABLET, EXTENDED RELEASE ORAL ONCE
Status: COMPLETED | OUTPATIENT
Start: 2025-04-23 | End: 2025-04-23

## 2025-04-22 RX ORDER — KETOROLAC TROMETHAMINE 30 MG/ML
15 INJECTION, SOLUTION INTRAMUSCULAR; INTRAVENOUS ONCE
Status: COMPLETED | OUTPATIENT
Start: 2025-04-22 | End: 2025-04-23

## 2025-04-22 RX ADMIN — ACETAMINOPHEN 975 MG: 325 TABLET, FILM COATED ORAL at 23:23

## 2025-04-22 NOTE — Clinical Note
Lorri Montes was seen and treated in our emergency department on 4/22/2025.                Diagnosis:     Lorri  is off the rest of the shift today, may return to work on return date.    She may return on this date: 04/24/2025    Limit use of the right arm until cleared by orthopedics.     If you have any questions or concerns, please don't hesitate to call.      Nimo Bañuelos MD    ______________________________           _______________          _______________  Hospital Representative                              Date                                Time

## 2025-04-23 ENCOUNTER — APPOINTMENT (EMERGENCY)
Dept: CT IMAGING | Facility: HOSPITAL | Age: 45
End: 2025-04-23
Payer: COMMERCIAL

## 2025-04-23 PROCEDURE — 72125 CT NECK SPINE W/O DYE: CPT

## 2025-04-23 PROCEDURE — 70486 CT MAXILLOFACIAL W/O DYE: CPT

## 2025-04-23 PROCEDURE — 74177 CT ABD & PELVIS W/CONTRAST: CPT

## 2025-04-23 PROCEDURE — 90715 TDAP VACCINE 7 YRS/> IM: CPT

## 2025-04-23 PROCEDURE — 96374 THER/PROPH/DIAG INJ IV PUSH: CPT

## 2025-04-23 PROCEDURE — 71260 CT THORAX DX C+: CPT

## 2025-04-23 PROCEDURE — 70450 CT HEAD/BRAIN W/O DYE: CPT

## 2025-04-23 RX ORDER — AMOXICILLIN 500 MG/1
500 CAPSULE ORAL EVERY 12 HOURS SCHEDULED
Qty: 14 CAPSULE | Refills: 0 | Status: SHIPPED | OUTPATIENT
Start: 2025-04-23 | End: 2025-04-30

## 2025-04-23 RX ORDER — LIDOCAINE 50 MG/G
1 PATCH TOPICAL ONCE
Status: DISCONTINUED | OUTPATIENT
Start: 2025-04-23 | End: 2025-04-23 | Stop reason: HOSPADM

## 2025-04-23 RX ORDER — AMOXICILLIN 250 MG/1
500 CAPSULE ORAL ONCE
Status: COMPLETED | OUTPATIENT
Start: 2025-04-23 | End: 2025-04-23

## 2025-04-23 RX ADMIN — TETANUS TOXOID, REDUCED DIPHTHERIA TOXOID AND ACELLULAR PERTUSSIS VACCINE, ADSORBED 0.5 ML: 5; 2.5; 8; 8; 2.5 SUSPENSION INTRAMUSCULAR at 02:37

## 2025-04-23 RX ADMIN — IOHEXOL 100 ML: 350 INJECTION, SOLUTION INTRAVENOUS at 00:17

## 2025-04-23 RX ADMIN — LIDOCAINE 5% 1 PATCH: 700 PATCH TOPICAL at 02:38

## 2025-04-23 RX ADMIN — AMOXICILLIN 500 MG: 250 CAPSULE ORAL at 02:36

## 2025-04-23 RX ADMIN — POTASSIUM CHLORIDE 40 MEQ: 1500 TABLET, EXTENDED RELEASE ORAL at 00:07

## 2025-04-23 RX ADMIN — KETOROLAC TROMETHAMINE 15 MG: 30 INJECTION, SOLUTION INTRAMUSCULAR; INTRAVENOUS at 00:05

## 2025-04-23 NOTE — INCIDENTAL FINDINGS
The following findings require follow up:  Radiographic finding   Finding: Left thyroid 2.7 cm nodule. Recommend nonemergent thyroid ultrasound.   Follow up required: thyroid ultra sound   Follow up should be done within 1 month(s)    Please notify the following clinician to assist with the follow up:   Dr. Lennon, family medicine/primary care provider    Incidental finding results were discussed with the Patient by Nimo Bañuelos MD on 04/23/25.   They expressed understanding and all questions answered.
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
## 2025-04-23 NOTE — DISCHARGE INSTRUCTIONS
Follow up with your dentist in 1 week.  Follow up with orthopedics as soon as possible.   You may take 650mg Tylenol (acetaminophen) every 6 hours as needed for pain.    You may use over the counter lidocaine patches on the painful area for up to 12 hours at a time. After 12 hours, remove and discard the patch and leave off for 10-12 hours before applying a new patch to the same area.   You may use heat or ice to the area when not wearing a lidocaine patch. Do not use heat or ice to the affected area while wearing a patch as it can affect medication absorption.
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
## 2025-04-25 ENCOUNTER — OFFICE VISIT (OUTPATIENT)
Dept: OBGYN CLINIC | Facility: CLINIC | Age: 45
End: 2025-04-25
Payer: COMMERCIAL

## 2025-04-25 DIAGNOSIS — M54.2 NECK PAIN: ICD-10-CM

## 2025-04-25 DIAGNOSIS — S46.011A TRAUMATIC COMPLETE TEAR OF RIGHT ROTATOR CUFF, INITIAL ENCOUNTER: Primary | ICD-10-CM

## 2025-04-25 DIAGNOSIS — S49.91XA SHOULDER INJURY, RIGHT, INITIAL ENCOUNTER: ICD-10-CM

## 2025-04-25 PROCEDURE — 99214 OFFICE O/P EST MOD 30 MIN: CPT | Performed by: ORTHOPAEDIC SURGERY

## 2025-04-25 RX ORDER — ACETAMINOPHEN 500 MG
1000 TABLET ORAL EVERY 6 HOURS PRN
COMMUNITY
Start: 2025-04-25

## 2025-04-25 NOTE — ASSESSMENT & PLAN NOTE
Orders:    MRI shoulder right wo contrast; Future    acetaminophen (TYLENOL) 500 mg tablet; Take 2 tablets (1,000 mg total) by mouth every 6 (six) hours as needed for mild pain

## 2025-04-25 NOTE — LETTER
April 25, 2025     Patient: Lorri Montes  YOB: 1980  Date of Visit: 4/25/2025      To Whom it May Concern:    Lorri Montes is under my professional care. Lorri was seen in my office on 4/25/2025. Lorri may return to work 4/30/2025 with limitations no use of right upper extremity..    If you have any questions or concerns, please don't hesitate to call.         Sincerely,          Sam Pierson DO        CC: No Recipients
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
## 2025-04-28 ENCOUNTER — CLINICAL SUPPORT (OUTPATIENT)
Dept: OBGYN CLINIC | Facility: CLINIC | Age: 45
End: 2025-04-28

## 2025-04-28 DIAGNOSIS — Z30.42 ENCOUNTER FOR MANAGEMENT AND INJECTION OF DEPO-PROVERA: Primary | ICD-10-CM

## 2025-04-28 PROCEDURE — 96372 THER/PROPH/DIAG INJ SC/IM: CPT

## 2025-04-28 RX ADMIN — MEDROXYPROGESTERONE ACETATE 150 MG: 150 INJECTION, SUSPENSION INTRAMUSCULAR at 13:10

## 2025-04-28 NOTE — PROGRESS NOTES
Depo-Provera     [x]   Patient provided box yes   3 Refills remain  Refills submitted no  Last  Annual Date / Birth control check : 1/27/2025  Last Depo date: 1/28/2025  Side effects: no  HCG: no  Given by: xin mckay RN   Site: left deltoid    Calcium supplement daily teaching  Condoms for 2 weeks following first injection dose.

## 2025-05-10 ENCOUNTER — HOSPITAL ENCOUNTER (OUTPATIENT)
Dept: MRI IMAGING | Facility: HOSPITAL | Age: 45
Discharge: HOME | End: 2025-05-10
Attending: PHYSICIAN ASSISTANT
Payer: COMMERCIAL

## 2025-05-10 DIAGNOSIS — S46.011A TRAUMATIC COMPLETE TEAR OF RIGHT ROTATOR CUFF, INITIAL ENCOUNTER: ICD-10-CM

## 2025-05-10 PROCEDURE — 73221 MRI JOINT UPR EXTREM W/O DYE: CPT

## 2025-05-19 ENCOUNTER — TELEPHONE (OUTPATIENT)
Dept: OBGYN CLINIC | Facility: CLINIC | Age: 45
End: 2025-05-19

## 2025-05-19 NOTE — TELEPHONE ENCOUNTER
No Show message sent per [facility] No Show Policy for patient's appointment with Dr Pierson on 5/19/25 at 3:45pm.

## 2025-05-21 ENCOUNTER — HOSPITAL ENCOUNTER (INPATIENT)
Facility: HOSPITAL | Age: 45
LOS: 5 days | Discharge: HOME | DRG: 045 | End: 2025-05-28
Attending: STUDENT IN AN ORGANIZED HEALTH CARE EDUCATION/TRAINING PROGRAM | Admitting: FAMILY MEDICINE
Payer: COMMERCIAL

## 2025-05-21 ENCOUNTER — APPOINTMENT (EMERGENCY)
Dept: RADIOLOGY | Facility: HOSPITAL | Age: 45
DRG: 045 | End: 2025-05-21
Payer: COMMERCIAL

## 2025-05-21 ENCOUNTER — APPOINTMENT (EMERGENCY)
Dept: CT IMAGING | Facility: HOSPITAL | Age: 45
DRG: 045 | End: 2025-05-21
Payer: COMMERCIAL

## 2025-05-21 DIAGNOSIS — I10 HYPERTENSION, UNSPECIFIED TYPE: ICD-10-CM

## 2025-05-21 DIAGNOSIS — I42.9 CARDIOMYOPATHY, UNSPECIFIED TYPE (HCC): ICD-10-CM

## 2025-05-21 DIAGNOSIS — E53.8 FOLATE DEFICIENCY: ICD-10-CM

## 2025-05-21 DIAGNOSIS — D72.829 LEUKOCYTOSIS, UNSPECIFIED TYPE: ICD-10-CM

## 2025-05-21 DIAGNOSIS — F32.A DEPRESSIVE DISORDER: ICD-10-CM

## 2025-05-21 DIAGNOSIS — A41.9 SEPSIS (HCC): ICD-10-CM

## 2025-05-21 DIAGNOSIS — E87.6 HYPOKALEMIA: ICD-10-CM

## 2025-05-21 DIAGNOSIS — F43.9 STRESS: ICD-10-CM

## 2025-05-21 DIAGNOSIS — R29.90 STROKE-LIKE SYMPTOMS: Primary | ICD-10-CM

## 2025-05-21 DIAGNOSIS — E53.8 VITAMIN B12 DEFICIENCY: ICD-10-CM

## 2025-05-21 PROBLEM — M05.20 RHEUMATOID ARTERITIS (HCC): Status: RESOLVED | Noted: 2019-07-16 | Resolved: 2025-05-21

## 2025-05-21 PROBLEM — B00.9 HSV (HERPES SIMPLEX VIRUS) INFECTION: Status: ACTIVE | Noted: 2025-05-21

## 2025-05-21 LAB
2HR DELTA HS TROPONIN: 1 NG/L
4HR DELTA HS TROPONIN: 2 NG/L
ALBUMIN SERPL BCG-MCNC: 4.3 G/DL (ref 3.5–5)
ALP SERPL-CCNC: 55 U/L (ref 34–104)
ALT SERPL W P-5'-P-CCNC: 8 U/L (ref 7–52)
ANION GAP SERPL CALCULATED.3IONS-SCNC: 9 MMOL/L (ref 4–13)
AST SERPL W P-5'-P-CCNC: 12 U/L (ref 13–39)
BASOPHILS # BLD AUTO: 0.09 THOUSANDS/ÂΜL (ref 0–0.1)
BASOPHILS NFR BLD AUTO: 1 % (ref 0–1)
BILIRUB SERPL-MCNC: 0.42 MG/DL (ref 0.2–1)
BUN SERPL-MCNC: 4 MG/DL (ref 5–25)
CALCIUM SERPL-MCNC: 8.7 MG/DL (ref 8.4–10.2)
CARDIAC TROPONIN I PNL SERPL HS: 4 NG/L (ref ?–50)
CARDIAC TROPONIN I PNL SERPL HS: 5 NG/L (ref ?–50)
CARDIAC TROPONIN I PNL SERPL HS: 6 NG/L (ref ?–50)
CHLORIDE SERPL-SCNC: 97 MMOL/L (ref 96–108)
CHOLEST SERPL-MCNC: 155 MG/DL (ref ?–200)
CO2 SERPL-SCNC: 30 MMOL/L (ref 21–32)
CREAT SERPL-MCNC: 0.52 MG/DL (ref 0.6–1.3)
EOSINOPHIL # BLD AUTO: 0.24 THOUSAND/ÂΜL (ref 0–0.61)
EOSINOPHIL NFR BLD AUTO: 2 % (ref 0–6)
ERYTHROCYTE [DISTWIDTH] IN BLOOD BY AUTOMATED COUNT: 14.6 % (ref 11.6–15.1)
EST. AVERAGE GLUCOSE BLD GHB EST-MCNC: 111 MG/DL
EXT PREGNANCY TEST URINE: NEGATIVE
EXT. CONTROL: NORMAL
GFR SERPL CREATININE-BSD FRML MDRD: 116 ML/MIN/1.73SQ M
GLUCOSE SERPL-MCNC: 140 MG/DL (ref 65–140)
GLUCOSE SERPL-MCNC: 66 MG/DL (ref 65–140)
GLUCOSE SERPL-MCNC: 87 MG/DL (ref 65–140)
HBA1C MFR BLD: 5.5 %
HCT VFR BLD AUTO: 43.1 % (ref 34.8–46.1)
HDLC SERPL-MCNC: 37 MG/DL
HGB BLD-MCNC: 14.9 G/DL (ref 11.5–15.4)
IMM GRANULOCYTES # BLD AUTO: 0.05 THOUSAND/UL (ref 0–0.2)
IMM GRANULOCYTES NFR BLD AUTO: 0 % (ref 0–2)
LDLC SERPL CALC-MCNC: 97 MG/DL (ref 0–100)
LYMPHOCYTES # BLD AUTO: 3.31 THOUSANDS/ÂΜL (ref 0.6–4.47)
LYMPHOCYTES NFR BLD AUTO: 23 % (ref 14–44)
MCH RBC QN AUTO: 34 PG (ref 26.8–34.3)
MCHC RBC AUTO-ENTMCNC: 34.6 G/DL (ref 31.4–37.4)
MCV RBC AUTO: 98 FL (ref 82–98)
MONOCYTES # BLD AUTO: 1.09 THOUSAND/ÂΜL (ref 0.17–1.22)
MONOCYTES NFR BLD AUTO: 8 % (ref 4–12)
NEUTROPHILS # BLD AUTO: 9.66 THOUSANDS/ÂΜL (ref 1.85–7.62)
NEUTS SEG NFR BLD AUTO: 66 % (ref 43–75)
NRBC BLD AUTO-RTO: 0 /100 WBCS
PLATELET # BLD AUTO: 380 THOUSANDS/UL (ref 149–390)
PMV BLD AUTO: 8.9 FL (ref 8.9–12.7)
POTASSIUM SERPL-SCNC: 2.7 MMOL/L (ref 3.5–5.3)
POTASSIUM SERPL-SCNC: 3.2 MMOL/L (ref 3.5–5.3)
PROT SERPL-MCNC: 7.6 G/DL (ref 6.4–8.4)
RBC # BLD AUTO: 4.38 MILLION/UL (ref 3.81–5.12)
SODIUM SERPL-SCNC: 136 MMOL/L (ref 135–147)
TRIGL SERPL-MCNC: 103 MG/DL (ref ?–150)
TSH SERPL DL<=0.05 MIU/L-ACNC: 1.11 UIU/ML (ref 0.45–4.5)
WBC # BLD AUTO: 14.44 THOUSAND/UL (ref 4.31–10.16)

## 2025-05-21 PROCEDURE — 36415 COLL VENOUS BLD VENIPUNCTURE: CPT | Performed by: STUDENT IN AN ORGANIZED HEALTH CARE EDUCATION/TRAINING PROGRAM

## 2025-05-21 PROCEDURE — 85025 COMPLETE CBC W/AUTO DIFF WBC: CPT | Performed by: STUDENT IN AN ORGANIZED HEALTH CARE EDUCATION/TRAINING PROGRAM

## 2025-05-21 PROCEDURE — 84132 ASSAY OF SERUM POTASSIUM: CPT

## 2025-05-21 PROCEDURE — 80053 COMPREHEN METABOLIC PANEL: CPT | Performed by: STUDENT IN AN ORGANIZED HEALTH CARE EDUCATION/TRAINING PROGRAM

## 2025-05-21 PROCEDURE — 96365 THER/PROPH/DIAG IV INF INIT: CPT

## 2025-05-21 PROCEDURE — 99285 EMERGENCY DEPT VISIT HI MDM: CPT | Performed by: STUDENT IN AN ORGANIZED HEALTH CARE EDUCATION/TRAINING PROGRAM

## 2025-05-21 PROCEDURE — 84484 ASSAY OF TROPONIN QUANT: CPT | Performed by: STUDENT IN AN ORGANIZED HEALTH CARE EDUCATION/TRAINING PROGRAM

## 2025-05-21 PROCEDURE — 82746 ASSAY OF FOLIC ACID SERUM: CPT | Performed by: INTERNAL MEDICINE

## 2025-05-21 PROCEDURE — 82948 REAGENT STRIP/BLOOD GLUCOSE: CPT

## 2025-05-21 PROCEDURE — 80061 LIPID PANEL: CPT

## 2025-05-21 PROCEDURE — 93005 ELECTROCARDIOGRAM TRACING: CPT

## 2025-05-21 PROCEDURE — 99284 EMERGENCY DEPT VISIT MOD MDM: CPT

## 2025-05-21 PROCEDURE — 84443 ASSAY THYROID STIM HORMONE: CPT | Performed by: INTERNAL MEDICINE

## 2025-05-21 PROCEDURE — 71045 X-RAY EXAM CHEST 1 VIEW: CPT

## 2025-05-21 PROCEDURE — 83036 HEMOGLOBIN GLYCOSYLATED A1C: CPT

## 2025-05-21 PROCEDURE — 99222 1ST HOSP IP/OBS MODERATE 55: CPT | Performed by: INTERNAL MEDICINE

## 2025-05-21 PROCEDURE — 70498 CT ANGIOGRAPHY NECK: CPT

## 2025-05-21 PROCEDURE — 81025 URINE PREGNANCY TEST: CPT | Performed by: STUDENT IN AN ORGANIZED HEALTH CARE EDUCATION/TRAINING PROGRAM

## 2025-05-21 PROCEDURE — 82607 VITAMIN B-12: CPT | Performed by: INTERNAL MEDICINE

## 2025-05-21 PROCEDURE — 70496 CT ANGIOGRAPHY HEAD: CPT

## 2025-05-21 RX ORDER — ENOXAPARIN SODIUM 100 MG/ML
40 INJECTION SUBCUTANEOUS
Status: DISCONTINUED | OUTPATIENT
Start: 2025-05-22 | End: 2025-05-28 | Stop reason: HOSPADM

## 2025-05-21 RX ORDER — LORAZEPAM 2 MG/ML
0.5 INJECTION INTRAMUSCULAR ONCE
Status: COMPLETED | OUTPATIENT
Start: 2025-05-21 | End: 2025-05-22

## 2025-05-21 RX ORDER — LIDOCAINE 40 MG/G
CREAM TOPICAL DAILY PRN
Status: DISCONTINUED | OUTPATIENT
Start: 2025-05-21 | End: 2025-05-28 | Stop reason: HOSPADM

## 2025-05-21 RX ORDER — LORAZEPAM 0.5 MG/1
0.5 TABLET ORAL ONCE
Status: COMPLETED | OUTPATIENT
Start: 2025-05-21 | End: 2025-05-21

## 2025-05-21 RX ORDER — ATORVASTATIN CALCIUM 40 MG/1
40 TABLET, FILM COATED ORAL EVERY EVENING
Status: DISCONTINUED | OUTPATIENT
Start: 2025-05-21 | End: 2025-05-28 | Stop reason: HOSPADM

## 2025-05-21 RX ORDER — POTASSIUM CHLORIDE 1500 MG/1
40 TABLET, EXTENDED RELEASE ORAL ONCE
Status: COMPLETED | OUTPATIENT
Start: 2025-05-21 | End: 2025-05-21

## 2025-05-21 RX ORDER — CLOPIDOGREL BISULFATE 75 MG/1
300 TABLET ORAL ONCE
Status: COMPLETED | OUTPATIENT
Start: 2025-05-21 | End: 2025-05-21

## 2025-05-21 RX ORDER — VALACYCLOVIR HYDROCHLORIDE 500 MG/1
1000 TABLET, FILM COATED ORAL DAILY
Status: DISCONTINUED | OUTPATIENT
Start: 2025-05-22 | End: 2025-05-28 | Stop reason: HOSPADM

## 2025-05-21 RX ORDER — ACETAMINOPHEN 325 MG/1
650 TABLET ORAL EVERY 6 HOURS PRN
Status: DISCONTINUED | OUTPATIENT
Start: 2025-05-21 | End: 2025-05-28 | Stop reason: HOSPADM

## 2025-05-21 RX ORDER — ACETAMINOPHEN 325 MG/1
975 TABLET ORAL ONCE
Status: COMPLETED | OUTPATIENT
Start: 2025-05-21 | End: 2025-05-21

## 2025-05-21 RX ORDER — CELECOXIB 100 MG/1
200 CAPSULE ORAL 2 TIMES DAILY
Status: DISCONTINUED | OUTPATIENT
Start: 2025-05-21 | End: 2025-05-28 | Stop reason: HOSPADM

## 2025-05-21 RX ORDER — ASPIRIN 81 MG/1
81 TABLET, CHEWABLE ORAL DAILY
Status: DISCONTINUED | OUTPATIENT
Start: 2025-05-22 | End: 2025-05-28 | Stop reason: HOSPADM

## 2025-05-21 RX ORDER — POTASSIUM CHLORIDE 14.9 MG/ML
20 INJECTION INTRAVENOUS
Status: COMPLETED | OUTPATIENT
Start: 2025-05-21 | End: 2025-05-21

## 2025-05-21 RX ADMIN — ATORVASTATIN CALCIUM 40 MG: 40 TABLET, FILM COATED ORAL at 17:26

## 2025-05-21 RX ADMIN — ACETAMINOPHEN 650 MG: 325 TABLET, FILM COATED ORAL at 20:17

## 2025-05-21 RX ADMIN — POTASSIUM CHLORIDE 40 MEQ: 1500 TABLET, EXTENDED RELEASE ORAL at 20:12

## 2025-05-21 RX ADMIN — POTASSIUM CHLORIDE 20 MEQ: 14.9 INJECTION, SOLUTION INTRAVENOUS at 17:27

## 2025-05-21 RX ADMIN — ACETAMINOPHEN 975 MG: 325 TABLET ORAL at 14:45

## 2025-05-21 RX ADMIN — LIDOCAINE: 40 CREAM TOPICAL at 20:12

## 2025-05-21 RX ADMIN — POTASSIUM CHLORIDE 20 MEQ: 14.9 INJECTION, SOLUTION INTRAVENOUS at 15:26

## 2025-05-21 RX ADMIN — CLOPIDOGREL 300 MG: 75 TABLET ORAL at 17:26

## 2025-05-21 RX ADMIN — NICOTINE 7 MG: 7 PATCH, EXTENDED RELEASE TRANSDERMAL at 17:27

## 2025-05-21 RX ADMIN — POTASSIUM CHLORIDE 40 MEQ: 1500 TABLET, EXTENDED RELEASE ORAL at 15:26

## 2025-05-21 RX ADMIN — CELECOXIB 200 MG: 100 CAPSULE ORAL at 17:27

## 2025-05-21 RX ADMIN — LORAZEPAM 0.5 MG: 0.5 TABLET ORAL at 14:45

## 2025-05-21 RX ADMIN — IOHEXOL 85 ML: 350 INJECTION, SOLUTION INTRAVENOUS at 15:15
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
## 2025-05-21 NOTE — ASSESSMENT & PLAN NOTE
Patient reports 2 episodes of diarrhea this morning.  She has history of lactose intolerance but continues to eat lactose.  In the ED, received potassium 80 Meq    Plan:  Give another potassium 40 Meq orally   Recheck potassium at 11 PM  AM BMP

## 2025-05-21 NOTE — ED PROVIDER NOTES
Time reflects when diagnosis was documented in both MDM as applicable and the Disposition within this note       Time User Action Codes Description Comment    2025  3:46 PM Veronica Arita [R29.90] Stroke-like symptoms     2025  3:46 PM Veronica Arita [E87.6] Hypokalemia           ED Disposition       ED Disposition   Admit    Condition   Stable    Date/Time   Wed May 21, 2025  3:49 PM    Comment   Case was discussed with JA and the patient's admission status was agreed to be Admission Status: observation status to the service of Dr. Hoffman .               Assessment & Plan       Medical Decision Making  45-year-old female with history of anxiety, right frozen shoulder, right rotator cuff injury who presents with gradual onset left-sided numbness/tingling of face, left upper extremity, left lower extremity without associated weakness, headache, or any other complaints.   states that he thinks her left face has a slight droop to it compared to normal.  Here vitals are stable, glucose 66, CTA unremarkable and exam notable for subjective sensory disturbance on the left side with mild nasolabial fold flattening but otherwise cranial nerves II through XII intact, PERRLA, EOMI, no field cuts, 5 out of 5 strength and sensation intact to light touch throughout with no dysmetria and with normal gait. No dysarthria or aphasia with intact naming and repetition.  Patient took two 81 mg aspirin earlier today.  She is reporting anxiety without SI/HI or AVH.    Last known well: Around 3 or 4 PM yesterday  NIH SS: 2 for left facial droop and left-sided subjective sensory disturbance  AC use: N/A  Initial B      Differential diagnosis includes but is not limited to: CVA/ICH, TIA, complex migraine, seizure, less likely anxiety, functional disorder    Workup and treatment as below:    Imaging: See ED Course  EKG: See ED Course  Labs: See ED Course  Meds: Analgesia, anxiolysis, potassium repletion, Plavix  load  Consults: Neuro, discussed patient and recommends Plavix load and admission via stroke pathway  Reassessment: Patient's clinical status unchanged on reassessment.    Dispo: Patient admitted to [facility]    Amount and/or Complexity of Data Reviewed  Labs: ordered. Decision-making details documented in ED Course.  Radiology: ordered. Decision-making details documented in ED Course.    Risk  OTC drugs.  Prescription drug management.  Decision regarding hospitalization.        ED Course as of 05/21/25 1559   Wed May 21, 2025   1501 WBC(!): 14.44  Nonspecific leukocytosis   1501 ECG rate 82, normal sinus rhythm, normal axis, prolonged QTc with incomplete right bundle branch block and slight ST depressions in V3 through V6 as well as 2, 3, and aVF without reciprocal changes.  These ST changes are new compared with November 17, 2014.   1503 Potassium(!): 2.7   1504 XR chest 1 view portable  Chest x-ray with no acute intrathoracic abnormality   1536 POC Glucose: 66   1538 PREGNANCY TEST URINE: Negative   1540 CTA head and neck with and without contrast  IMPRESSION:     CT Brain:  No acute intracranial abnormality.     CT Angiography: Normal CTA neck and brain.         Medications   potassium chloride 20 mEq IVPB (premix) (20 mEq Intravenous New Bag 5/21/25 1526)   LORazepam (ATIVAN) tablet 0.5 mg (0.5 mg Oral Given 5/21/25 1445)   acetaminophen (TYLENOL) tablet 975 mg (975 mg Oral Given 5/21/25 1445)   potassium chloride (Klor-Con M20) CR tablet 40 mEq (40 mEq Oral Given 5/21/25 1526)   iohexol (OMNIPAQUE) 350 MG/ML injection (MULTI-DOSE) 85 mL (85 mL Intravenous Given 5/21/25 1515)       ED Risk Strat Scores                    No data recorded        SBIRT 20yo+      Flowsheet Row Most Recent Value   Initial Alcohol Screen: US AUDIT-C     1. How often do you have a drink containing alcohol? 0 Filed at: 05/21/2025 1535   2. How many drinks containing alcohol do you have on a typical day you are drinking?  0 Filed at:  05/21/2025 1535   3b. FEMALE Any Age, or MALE 65+: How often do you have 4 or more drinks on one occassion? 0 Filed at: 05/21/2025 1532   Audit-C Score 0 Filed at: 05/21/2025 1531   JAYSON: How many times in the past year have you...    Used an illegal drug or used a prescription medication for non-medical reasons? Never Filed at: 05/21/2025 153                            History of Present Illness       Chief Complaint   Patient presents with    Arm Pain     Pt has bilateral torn rotator cuffs with a herniated disc is now c/o 10/10 pain with left arm numbness. Pt is tearful.        Past Medical History:   Diagnosis Date    Arthritis     Closed fracture of left tibial plateau 7/27/2022    Depression     DJD (degenerative joint disease)     Herpes     Hidradenitis     MRSA carrier       Past Surgical History:   Procedure Laterality Date    FL INJECTION RIGHT HIP (ARTHROGRAM)  1/6/2020    FL INJECTION RIGHT HIP (NON ARTHROGRAM)  7/24/2019    FL INJECTION RIGHT SHOULDER (ARTHROGRAM)  10/7/2019    NO PAST SURGERIES        Family History   Problem Relation Name Age of Onset    Arthritis Mother      Hypertension Mother      Diabetes Father      Stroke Father      Heart attack Father      Post-traumatic stress disorder Father      Other Father          nerve gas exposure-war    Schizophrenia Sister      Bipolar disorder Brother      Schizophrenia Brother      Sleep apnea Daughter      Obesity Daughter      Hypertension Daughter      ADD / ADHD Daughter      Depression Son      Post-traumatic stress disorder Son      Arthritis Maternal Grandmother      Heart attack Maternal Grandfather      Diabetes Paternal Grandmother      Stroke Paternal Grandmother      Heart attack Paternal Grandfather      Post-traumatic stress disorder Paternal Grandfather      Ovarian cysts Sister        Social History[1]   E-Cigarette/Vaping    E-Cigarette Use Never User       E-Cigarette/Vaping Substances    Nicotine No     THC No     CBD No      Flavoring No     Other No     Unknown No       I have reviewed and agree with the history as documented.     45-year-old female with history of anxiety, right frozen shoulder, right rotator cuff injury who presents with gradual onset left-sided numbness/tingling of face, left upper extremity, left lower extremity without associated weakness, headache, or any other complaints.  She is reporting anxiety without SI/HI or AVH.      Arm Pain  Associated symptoms: no abdominal pain, no chest pain, no cough, no diarrhea, no ear pain, no fever, no headaches, no nausea, no rash, no shortness of breath, no sore throat and no vomiting        Review of Systems   Constitutional:  Negative for chills and fever.   HENT:  Negative for ear pain and sore throat.    Eyes:  Negative for pain and visual disturbance.   Respiratory:  Negative for cough and shortness of breath.    Cardiovascular:  Negative for chest pain and palpitations.   Gastrointestinal:  Negative for abdominal pain, blood in stool, constipation, diarrhea, nausea and vomiting.   Genitourinary:  Negative for dysuria and hematuria.   Musculoskeletal:  Negative for arthralgias and back pain.   Skin:  Negative for color change and rash.   Neurological:  Positive for facial asymmetry and numbness. Negative for dizziness, seizures, syncope, speech difficulty, weakness, light-headedness and headaches.   All other systems reviewed and are negative.          Objective       ED Triage Vitals   Temperature Pulse Blood Pressure Respirations SpO2 Patient Position - Orthostatic VS   05/21/25 1409 05/21/25 1409 05/21/25 1411 05/21/25 1409 05/21/25 1409 05/21/25 1409   98.7 °F (37.1 °C) 105 (!) 174/112 20 98 % Sitting      Temp Source Heart Rate Source BP Location FiO2 (%) Pain Score    05/21/25 1409 05/21/25 1409 05/21/25 1409 -- 05/21/25 1409    Oral Monitor Right arm  10 - Worst Possible Pain      Vitals      Date and Time Temp Pulse SpO2 Resp BP Pain Score FACES Pain Rating User    05/21/25 1515 -- -- -- -- -- 7 --    05/21/25 1445 -- -- -- -- -- 10 - Worst Possible Pain -- CS   05/21/25 1411 -- -- -- -- 174/112 -- --    05/21/25 1409 98.7 °F (37.1 °C) 105 98 % 20 -- 10 - Worst Possible Pain --             Physical Exam  Vitals and nursing note reviewed.   Constitutional:       General: She is not in acute distress.     Appearance: Normal appearance. She is normal weight. She is not ill-appearing or toxic-appearing.   HENT:      Head: Normocephalic.      Nose: Nose normal.      Mouth/Throat:      Mouth: Mucous membranes are moist.      Pharynx: Oropharynx is clear.     Eyes:      Conjunctiva/sclera: Conjunctivae normal.       Cardiovascular:      Rate and Rhythm: Normal rate and regular rhythm.      Heart sounds: Normal heart sounds.   Pulmonary:      Effort: Pulmonary effort is normal.      Breath sounds: Normal breath sounds.   Abdominal:      General: Abdomen is flat.      Palpations: Abdomen is soft.      Tenderness: There is no abdominal tenderness.     Skin:     General: Skin is warm and dry.      Capillary Refill: Capillary refill takes less than 2 seconds.     Neurological:      Mental Status: She is alert and oriented to person, place, and time.      Comments: subjective sensory disturbance on the left side with mild nasolabial fold flattening but otherwise cranial nerves II through XII intact, PERRLA, EOMI, no field cuts, 5 out of 5 strength and sensation intact to light touch throughout with no dysmetria and with normal gait. No dysarthria or aphasia with intact naming and repetition.    Psychiatric:         Mood and Affect: Mood normal.         Results Reviewed       Procedure Component Value Units Date/Time    POCT pregnancy, urine [[id number]]  (Normal) Collected: 05/21/25 1537    Lab Status: Final result Updated: 05/21/25 1537     EXT Preg Test, Ur Negative     Control Valid    Fingerstick Glucose (POCT) [[id number]]  (Normal) Collected: 05/21/25 1533    Lab Status: Final  result Specimen: Blood Updated: 05/21/25 1534     POC Glucose 66 mg/dl     HS Troponin 0hr (reflex protocol) [[id number]]  (Normal) Collected: 05/21/25 1440    Lab Status: Final result Specimen: Blood from Arm, Right Updated: 05/21/25 1510     hs TnI 0hr 4 ng/L     HS Troponin I 2hr [[id number]]     Lab Status: No result Specimen: Blood     Comprehensive metabolic panel [[id number]]  (Abnormal) Collected: 05/21/25 1440    Lab Status: Final result Specimen: Blood from Arm, Right Updated: 05/21/25 1503     Sodium 136 mmol/L      Potassium 2.7 mmol/L      Chloride 97 mmol/L      CO2 30 mmol/L      ANION GAP 9 mmol/L      BUN 4 mg/dL      Creatinine 0.52 mg/dL      Glucose 87 mg/dL      Calcium 8.7 mg/dL      AST 12 U/L      ALT 8 U/L      Alkaline Phosphatase 55 U/L      Total Protein 7.6 g/dL      Albumin 4.3 g/dL      Total Bilirubin 0.42 mg/dL      eGFR 116 ml/min/1.73sq m     Narrative:      [employer] guidelines for Chronic Kidney Disease (CKD):     Stage 1 with normal or high GFR (GFR > 90 mL/min/1.73 square meters)    Stage 2 Mild CKD (GFR = 60-89 mL/min/1.73 square meters)    Stage 3A Moderate CKD (GFR = 45-59 mL/min/1.73 square meters)    Stage 3B Moderate CKD (GFR = 30-44 mL/min/1.73 square meters)    Stage 4 Severe CKD (GFR = 15-29 mL/min/1.73 square meters)    Stage 5 End Stage CKD (GFR <15 mL/min/1.73 square meters)  Note: GFR calculation is accurate only with a steady state creatinine    CBC and differential [[id number]]  (Abnormal) Collected: 05/21/25 1440    Lab Status: Final result Specimen: Blood from Arm, Right Updated: 05/21/25 1457     WBC 14.44 Thousand/uL      RBC 4.38 Million/uL      Hemoglobin 14.9 g/dL      Hematocrit 43.1 %      MCV 98 fL      MCH 34.0 pg      MCHC 34.6 g/dL      RDW 14.6 %      MPV 8.9 fL      Platelets 380 Thousands/uL      nRBC 0 /100 WBCs      Segmented % 66 %      Immature Grans % 0 %      Lymphocytes % 23 %      Monocytes % 8 %      Eosinophils  Relative 2 %      Basophils Relative 1 %      Absolute Neutrophils 9.66 Thousands/µL      Absolute Immature Grans 0.05 Thousand/uL      Absolute Lymphocytes 3.31 Thousands/µL      Absolute Monocytes 1.09 Thousand/µL      Eosinophils Absolute 0.24 Thousand/µL      Basophils Absolute 0.09 Thousands/µL             CTA head and neck with and without contrast   Final Interpretation by Gregoria Burt MD (05/21 1128)      CT Brain:  No acute intracranial abnormality.      CT Angiography: Normal CTA neck and brain.               Workstation performed: [id number]         XR chest 1 view portable    (Results Pending)       Procedures    ED Medication and Procedure Management   Prior to Admission Medications   Prescriptions Last Dose Informant Patient Reported? Taking?   Diclofenac Sodium (VOLTAREN) 1 %   No No   Sig: Apply 2 g topically 4 (four) times a day   acetaminophen (TYLENOL) 500 mg tablet   No No   Sig: Take 2 tablets (1,000 mg total) by mouth every 6 (six) hours as needed for mild pain   celecoxib (CeleBREX) 200 mg capsule   No No   Sig: take 1 capsule by mouth twice a day   cyclobenzaprine (FLEXERIL) 10 mg tablet   No No   Sig: Take 1 tablet (10 mg total) by mouth 3 (three) times a day as needed for muscle spasms   lidocaine (XYLOCAINE) 5 % ointment   No No   Sig: Apply topically 2 (two) times a day as needed for mild pain   medroxyPROGESTERone (DEPO-PROVERA) 150 mg/mL injection   No No   Sig: Inject 1 mL (150 mg total) into a muscle every 3 (three) months   spironolactone (ALDACTONE) 50 mg tablet  Self No No   Sig: Take 1 tablet (50 mg total) by mouth 2 (two) times a day   valACYclovir (VALTREX) 1,000 mg tablet   No No   Sig: Take 1 tablet (1,000 mg total) by mouth daily      Facility-Administered Medications Last Administration Doses Remaining   medroxyPROGESTERone acetate (DEPO-PROVERA SYRINGE) IM injection 150 mg 4/28/2025  1:10 PM         Patient's Medications   Discharge Prescriptions    No medications on file      No discharge procedures on file.  ED SEPSIS DOCUMENTATION   Time reflects when diagnosis was documented in both MDM as applicable and the Disposition within this note       Time User Action Codes Description Comment    5/21/2025  3:46 PM Veronica Arita [R29.90] Stroke-like symptoms     5/21/2025  3:46 PM Veronica Arita [E87.6] Hypokalemia                      [1]   Social History  Tobacco Use    Smoking status: Every Day     Current packs/day: 0.25     Types: Cigarettes    Smokeless tobacco: Former    Tobacco comments:     4-5 cigarettes a day   Vaping Use    Vaping status: Never Used   Substance Use Topics    Alcohol use: Yes     Comment: holidays     Drug use: Yes     Types: Marijuana     Comment: few times a day         Veronica Arita MD  05/21/25 1600

## 2025-05-21 NOTE — ASSESSMENT & PLAN NOTE
45-year-old female presents with left-sided numbness including left face, lip, throat, left arm, left side of her trunk, and left leg.  Last known normal at 8 PM 5/20/2025.  She took aspirin tablet at home.  Her  reports noticing slurred speech and left facial droop at home.  Upon my evaluation at 4 PM 5/21/2025 NIHSS 1 for mild sensation loss over left side of the body.  CTA head/neck is negative.  EKG NSR   Troponins negative X 2  Last LDL in 2023 101  Last A1c in 2023 5.4  Based on her symptoms, could be a lacunar infarct of contralateral thalamus.    Plan:  Admit under stroke pathway.  Neurology consult appreciated.  Loaded with Plavix 300 mg  Start Plavix 75 mg and aspirin 81 mg maintenance dose tomorrow.  MRI brain  Echocardiogram with bubble study  Permissive hypertension <220/120 x 24 hours  LDL and A1c  Telemetry  PT/OT  Speech

## 2025-05-21 NOTE — Clinical Note
Case was discussed with JA and the patient's admission status was agreed to be Admission Status: observation status to the service of Dr. Nevarez .
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
## 2025-05-22 ENCOUNTER — APPOINTMENT (OUTPATIENT)
Dept: NON INVASIVE DIAGNOSTICS | Facility: HOSPITAL | Age: 45
DRG: 045 | End: 2025-05-22
Payer: COMMERCIAL

## 2025-05-22 ENCOUNTER — APPOINTMENT (OUTPATIENT)
Dept: MRI IMAGING | Facility: HOSPITAL | Age: 45
DRG: 045 | End: 2025-05-22
Payer: COMMERCIAL

## 2025-05-22 PROBLEM — I10 HYPERTENSION: Status: ACTIVE | Noted: 2025-05-22

## 2025-05-22 PROBLEM — I63.9 STROKE (CEREBRUM) (HCC): Status: ACTIVE | Noted: 2025-05-21

## 2025-05-22 PROBLEM — E53.8 VITAMIN B12 DEFICIENCY: Status: ACTIVE | Noted: 2025-05-22

## 2025-05-22 PROBLEM — I42.9 CARDIOMYOPATHY (HCC): Status: ACTIVE | Noted: 2025-05-22

## 2025-05-22 LAB
ANION GAP SERPL CALCULATED.3IONS-SCNC: 8 MMOL/L (ref 4–13)
AORTIC ROOT: 2.9 CM
ASCENDING AORTA: 2.9 CM
ATRIAL RATE: 82 BPM
BASOPHILS # BLD AUTO: 0.05 THOUSANDS/ÂΜL (ref 0–0.1)
BASOPHILS NFR BLD AUTO: 1 % (ref 0–1)
BSA FOR ECHO PROCEDURE: 1.79 M2
BUN SERPL-MCNC: 9 MG/DL (ref 5–25)
CALCIUM SERPL-MCNC: 8.5 MG/DL (ref 8.4–10.2)
CHLORIDE SERPL-SCNC: 101 MMOL/L (ref 96–108)
CO2 SERPL-SCNC: 27 MMOL/L (ref 21–32)
CREAT SERPL-MCNC: 0.58 MG/DL (ref 0.6–1.3)
E WAVE DECELERATION TIME: 146 MS
E/A RATIO: 0.81
EOSINOPHIL # BLD AUTO: 0.32 THOUSAND/ÂΜL (ref 0–0.61)
EOSINOPHIL NFR BLD AUTO: 3 % (ref 0–6)
ERYTHROCYTE [DISTWIDTH] IN BLOOD BY AUTOMATED COUNT: 14.8 % (ref 11.6–15.1)
FOLATE SERPL-MCNC: 4.4 NG/ML
FRACTIONAL SHORTENING: 20 (ref 28–44)
GFR SERPL CREATININE-BSD FRML MDRD: 112 ML/MIN/1.73SQ M
GLUCOSE SERPL-MCNC: 86 MG/DL (ref 65–140)
HCT VFR BLD AUTO: 38.9 % (ref 34.8–46.1)
HGB BLD-MCNC: 13.6 G/DL (ref 11.5–15.4)
IMM GRANULOCYTES # BLD AUTO: 0.04 THOUSAND/UL (ref 0–0.2)
IMM GRANULOCYTES NFR BLD AUTO: 0 % (ref 0–2)
INTERVENTRICULAR SEPTUM IN DIASTOLE (PARASTERNAL SHORT AXIS VIEW): 0.8 CM
INTERVENTRICULAR SEPTUM: 0.8 CM (ref 0.6–1.1)
LAAS-AP2: 11.1 CM2
LAAS-AP4: 9.1 CM2
LEFT ATRIUM SIZE: 3 CM
LEFT ATRIUM VOLUME (MOD BIPLANE): 23 ML
LEFT ATRIUM VOLUME INDEX (MOD BIPLANE): 12.8 ML/M2
LEFT INTERNAL DIMENSION IN SYSTOLE: 4 CM (ref 2.1–4)
LEFT VENTRICULAR INTERNAL DIMENSION IN DIASTOLE: 5 CM (ref 3.5–6)
LEFT VENTRICULAR POSTERIOR WALL IN END DIASTOLE: 0.8 CM
LEFT VENTRICULAR STROKE VOLUME: 49 ML
LV EF US.2D.A4C+ESTIMATED: 43 %
LVSV (TEICH): 49 ML
LYMPHOCYTES # BLD AUTO: 3.86 THOUSANDS/ÂΜL (ref 0.6–4.47)
LYMPHOCYTES NFR BLD AUTO: 36 % (ref 14–44)
MCH RBC QN AUTO: 34.3 PG (ref 26.8–34.3)
MCHC RBC AUTO-ENTMCNC: 35 G/DL (ref 31.4–37.4)
MCV RBC AUTO: 98 FL (ref 82–98)
MONOCYTES # BLD AUTO: 0.65 THOUSAND/ÂΜL (ref 0.17–1.22)
MONOCYTES NFR BLD AUTO: 6 % (ref 4–12)
MV E'TISSUE VEL-SEP: 9 CM/S
MV PEAK A VEL: 0.79 M/S
MV PEAK E VEL: 64 CM/S
MV STENOSIS PRESSURE HALF TIME: 42 MS
MV VALVE AREA P 1/2 METHOD: 5.24
NEUTROPHILS # BLD AUTO: 5.81 THOUSANDS/ÂΜL (ref 1.85–7.62)
NEUTS SEG NFR BLD AUTO: 54 % (ref 43–75)
NRBC BLD AUTO-RTO: 0 /100 WBCS
P AXIS: 74 DEGREES
PLATELET # BLD AUTO: 378 THOUSANDS/UL (ref 149–390)
PMV BLD AUTO: 9 FL (ref 8.9–12.7)
POTASSIUM SERPL-SCNC: 3.3 MMOL/L (ref 3.5–5.3)
PR INTERVAL: 120 MS
QRS AXIS: 59 DEGREES
QRSD INTERVAL: 100 MS
QT INTERVAL: 450 MS
QTC INTERVAL: 525 MS
RBC # BLD AUTO: 3.97 MILLION/UL (ref 3.81–5.12)
RIGHT ATRIUM AREA SYSTOLE A4C: 10 CM2
RIGHT VENTRICLE ID DIMENSION: 2.6 CM
SL CV LEFT ATRIUM LENGTH A2C: 3.8 CM
SL CV PED ECHO LEFT VENTRICLE DIASTOLIC VOLUME (MOD BIPLANE) 2D: 120 ML
SL CV PED ECHO LEFT VENTRICLE SYSTOLIC VOLUME (MOD BIPLANE) 2D: 71 ML
SODIUM SERPL-SCNC: 136 MMOL/L (ref 135–147)
T WAVE AXIS: 22 DEGREES
VENTRICULAR RATE: 82 BPM
VIT B12 SERPL-MCNC: 81 PG/ML (ref 180–914)
WBC # BLD AUTO: 10.73 THOUSAND/UL (ref 4.31–10.16)

## 2025-05-22 PROCEDURE — 93010 ELECTROCARDIOGRAM REPORT: CPT | Performed by: INTERNAL MEDICINE

## 2025-05-22 PROCEDURE — 97162 PT EVAL MOD COMPLEX 30 MIN: CPT

## 2025-05-22 PROCEDURE — 93306 TTE W/DOPPLER COMPLETE: CPT | Performed by: INTERNAL MEDICINE

## 2025-05-22 PROCEDURE — 70551 MRI BRAIN STEM W/O DYE: CPT

## 2025-05-22 PROCEDURE — 99232 SBSQ HOSP IP/OBS MODERATE 35: CPT | Performed by: FAMILY MEDICINE

## 2025-05-22 PROCEDURE — 99245 OFF/OP CONSLTJ NEW/EST HI 55: CPT | Performed by: PSYCHIATRY & NEUROLOGY

## 2025-05-22 PROCEDURE — 99244 OFF/OP CNSLTJ NEW/EST MOD 40: CPT | Performed by: INTERNAL MEDICINE

## 2025-05-22 PROCEDURE — 85025 COMPLETE CBC W/AUTO DIFF WBC: CPT

## 2025-05-22 PROCEDURE — 93306 TTE W/DOPPLER COMPLETE: CPT

## 2025-05-22 PROCEDURE — 97166 OT EVAL MOD COMPLEX 45 MIN: CPT

## 2025-05-22 PROCEDURE — 92610 EVALUATE SWALLOWING FUNCTION: CPT

## 2025-05-22 PROCEDURE — 80048 BASIC METABOLIC PNL TOTAL CA: CPT

## 2025-05-22 RX ORDER — FOLIC ACID 1 MG/1
1 TABLET ORAL DAILY
Status: DISCONTINUED | OUTPATIENT
Start: 2025-05-22 | End: 2025-05-28 | Stop reason: HOSPADM

## 2025-05-22 RX ORDER — CYANOCOBALAMIN 1000 UG/ML
1000 INJECTION, SOLUTION INTRAMUSCULAR; SUBCUTANEOUS WEEKLY
Status: DISCONTINUED | OUTPATIENT
Start: 2025-05-22 | End: 2025-05-28 | Stop reason: HOSPADM

## 2025-05-22 RX ORDER — CLOPIDOGREL BISULFATE 75 MG/1
75 TABLET ORAL DAILY
Status: DISCONTINUED | OUTPATIENT
Start: 2025-05-22 | End: 2025-05-28 | Stop reason: HOSPADM

## 2025-05-22 RX ORDER — CYANOCOBALAMIN 1000 UG/ML
1000 INJECTION, SOLUTION INTRAMUSCULAR; SUBCUTANEOUS DAILY
Status: CANCELLED | OUTPATIENT
Start: 2025-05-22

## 2025-05-22 RX ORDER — LOSARTAN POTASSIUM 25 MG/1
25 TABLET ORAL DAILY
Status: DISCONTINUED | OUTPATIENT
Start: 2025-05-22 | End: 2025-05-25

## 2025-05-22 RX ORDER — POTASSIUM CHLORIDE 1500 MG/1
40 TABLET, EXTENDED RELEASE ORAL ONCE
Status: COMPLETED | OUTPATIENT
Start: 2025-05-22 | End: 2025-05-22

## 2025-05-22 RX ORDER — METOPROLOL SUCCINATE 25 MG/1
25 TABLET, EXTENDED RELEASE ORAL DAILY
Status: DISCONTINUED | OUTPATIENT
Start: 2025-05-22 | End: 2025-05-28 | Stop reason: HOSPADM

## 2025-05-22 RX ORDER — NICOTINE 21 MG/24HR
21 PATCH, TRANSDERMAL 24 HOURS TRANSDERMAL DAILY
Status: DISCONTINUED | OUTPATIENT
Start: 2025-05-23 | End: 2025-05-22

## 2025-05-22 RX ADMIN — LORAZEPAM 0.5 MG: 2 INJECTION INTRAMUSCULAR; INTRAVENOUS at 06:08

## 2025-05-22 RX ADMIN — LOSARTAN POTASSIUM 25 MG: 25 TABLET, FILM COATED ORAL at 14:41

## 2025-05-22 RX ADMIN — VALACYCLOVIR HYDROCHLORIDE 1000 MG: 500 TABLET, FILM COATED ORAL at 09:32

## 2025-05-22 RX ADMIN — FOLIC ACID 1 MG: 1 TABLET ORAL at 14:41

## 2025-05-22 RX ADMIN — CELECOXIB 200 MG: 100 CAPSULE ORAL at 17:24

## 2025-05-22 RX ADMIN — CELECOXIB 200 MG: 100 CAPSULE ORAL at 09:32

## 2025-05-22 RX ADMIN — CYANOCOBALAMIN 1000 MCG: 1000 INJECTION INTRAMUSCULAR; SUBCUTANEOUS at 14:41

## 2025-05-22 RX ADMIN — CLOPIDOGREL BISULFATE 75 MG: 75 TABLET, FILM COATED ORAL at 09:32

## 2025-05-22 RX ADMIN — ACETAMINOPHEN 650 MG: 325 TABLET, FILM COATED ORAL at 12:15

## 2025-05-22 RX ADMIN — NICOTINE 7 MG: 7 PATCH, EXTENDED RELEASE TRANSDERMAL at 17:24

## 2025-05-22 RX ADMIN — ASPIRIN 81 MG: 81 TABLET, CHEWABLE ORAL at 09:34

## 2025-05-22 RX ADMIN — ENOXAPARIN SODIUM 40 MG: 40 INJECTION SUBCUTANEOUS at 09:34

## 2025-05-22 RX ADMIN — METOPROLOL SUCCINATE ER TABLETS 25 MG: 25 TABLET, FILM COATED, EXTENDED RELEASE ORAL at 14:41

## 2025-05-22 RX ADMIN — POTASSIUM CHLORIDE 40 MEQ: 1500 TABLET, EXTENDED RELEASE ORAL at 06:08

## 2025-05-22 RX ADMIN — ATORVASTATIN CALCIUM 40 MG: 40 TABLET, FILM COATED ORAL at 17:24

## 2025-05-22 RX ADMIN — LIDOCAINE: 40 CREAM TOPICAL at 04:34

## 2025-05-22 NOTE — ASSESSMENT & PLAN NOTE
CTA neck: Chronic thyroid nodules. Last thyroid ultrasound 1/8/2024.   Recommend follow-up per ultrasound on discharge

## 2025-05-22 NOTE — UTILIZATION REVIEW
Initial Clinical Review    Observation 5/21 1550 changed to inpatient 5/23 1542. Pt requiring continued stayf or management of stroke.     Admission: Date/Time/Statement:   Admission Orders (From admission, onward)       Ordered        05/23/25 1542  INPATIENT ADMISSION  Once            05/21/25 1550  Place in Observation  Once                          Orders Placed This Encounter   Procedures    INPATIENT ADMISSION     Standing Status:   Standing     Number of Occurrences:   1     Level of Care:   Med Surg [16]     Estimated length of stay:   More than 2 Midnights     Certification:   I certify that inpatient services are medically necessary for this patient for a duration of greater than two midnights. See H&P and MD Progress Notes for additional information about the patient's course of treatment.     ED Arrival Information       Expected   -    Arrival   5/21/2025 14:00    Acuity   Urgent              Means of arrival   Walk-In    Escorted by   Self    Service   Hospitalist    Admission type   Emergency              Arrival complaint   left side body pain             Chief Complaint   Patient presents with    Arm Pain     Pt has bilateral torn rotator cuffs with a herniated disc is now c/o 10/10 pain with left arm numbness. Pt is tearful.        Initial Presentation: 45 y.o. female  with a PMH of anxiety, polyarthralgia, right rotator cuff tear, smoking who presents with left-sided numbness. Patient reports that her symptoms started last night around 8 PM when she was going to bed which interrupted her sleep and kept her awake until 4 AM. However, they got worse this morning. Symptoms include feeling numbness over her left face, left leg, left throat, left arm, left trunk, and left leg. Upon my evaluation, she stated that the numbness is still there. Denied weakness, blurry vision, or headache. Smokes 5 cigarettes/day since the age of 16. Plan: Observation for stroke like symptoms, leukocytosis, hypokalemia,  "tobacco abuse, right rotator cuff tear, HSV: stroke pathway, Neurology consult, Plavix load and then Plavix 75 mg and ASA 81 mg, MRI brain, Echo, permissive HTN, lipid panel and HgbA1c, telemetry, PT/OT/ST evals, CBC in am, replete potassium then check at 11 pm, BMP in am, NRT, continue home Celebrex and Valtrex.     5/22:    Neurology consult: MRI brain \"Acute lacunar infarcts in the right thalamus and posterior right putamen. No acute hemorrhage.\" Echo: LV dilated, EF 35-40%, systolic function moderately reduced, moderate global hypokinesis with regional variation, L atrium dilated, no PFO. Recommend Cardiology consult. Continue ASA, Plavix, atorvastatin. Telemetry. PT/OT/ST evals. Neuro checks.     Cardiology consult: normotension OK- add Toprol XL 25 mg daily and losartan 25 mg daily for GDMT. Titrate as able. Nuclear stress test tomorrow. Telemetry. Continue ASA, statin, Plavix.     Internal medicine: MRI: Acute lacunar infarcts in the right thalamus and posterior right putamen. No acute hemorrhage. Continue stroke pathway. Continue ASA and Plavix. Telemetry. PT/OT eval. CBC and BMP in am.     5/23 Observation changed to inpatient.     Neurology: continue stroke pathway. MRI and Echo completed. Recommend RUCHI. Continue ASA, Plavix, atorvastatin. Telemetry. PT/OT/ST evals. Neuro checks.     Cardiology: Nuclear stress test done today without evidence of ischemic or scarring. Neurology requesting RUCHI; would not be done inpatient until 5/27 due to weekend and holiday. Could arrange on 6/3 or 6/5 at OP; will defer to neurology wether this needs to be done prior to discharge. Started on Metoprolol succinate 25mg daily and losartan 25mg daily yesterday; BPs still elevated; add spironolactone 25mg daily today.     ED Treatment-Medication Administration from 05/21/2025 1400 to 05/21/2025 1655         Date/Time Order Dose Route Action     05/21/2025 1445 LORazepam (ATIVAN) tablet 0.5 mg 0.5 mg Oral Given     05/21/2025 " 1445 acetaminophen (TYLENOL) tablet 975 mg 975 mg Oral Given     05/21/2025 1526 potassium chloride (Klor-Con M20) CR tablet 40 mEq 40 mEq Oral Given     05/21/2025 1526 potassium chloride 20 mEq IVPB (premix) 20 mEq Intravenous New Bag     05/21/2025 1515 iohexol (OMNIPAQUE) 350 MG/ML injection (MULTI-DOSE) 85 mL 85 mL Intravenous Given            Scheduled Medications:  aspirin, 81 mg, Oral, Daily  atorvastatin, 40 mg, Oral, QPM  celecoxib, 200 mg, Oral, BID  clopidogrel, 75 mg, Oral, Daily  cyanocobalamin, 1,000 mcg, Intramuscular, Weekly  enoxaparin, 40 mg, Subcutaneous, Q24H ANDREW  folic acid, 1 mg, Oral, Daily  losartan, 25 mg, Oral, Daily  magnesium sulfate, 4 g, Intravenous, Once  metoprolol succinate, 25 mg, Oral, Daily  nicotine, 7 mg, Transdermal, Daily  spironolactone, 25 mg, Oral, Daily  valACYclovir, 1,000 mg, Oral, Daily      Continuous IV Infusions:     PRN Meds:  acetaminophen, 650 mg, Oral, Q6H PRN  lidocaine, , Topical, Daily PRN      ED Triage Vitals   Temperature Pulse Respirations Blood Pressure SpO2 Pain Score   05/21/25 1409 05/21/25 1409 05/21/25 1409 05/21/25 1411 05/21/25 1409 05/21/25 1409   98.7 °F (37.1 °C) 105 20 (!) 174/112 98 % 10 - Worst Possible Pain     Weight (last 2 days)       Date/Time Weight    05/23/25 0900 73.5 (162)    05/22/25 0822 73.5 (162)            Vital Signs (last 3 days)       Date/Time Temp Pulse Resp BP MAP (mmHg) SpO2 O2 Device Patient Position - Orthostatic VS Brianne Coma Scale Score Pain    05/23/25 15:14:08 98.8 °F (37.1 °C) 81 -- 129/89 102 98 % -- -- -- --    05/23/25 1000 -- -- -- -- -- -- -- -- -- 5    05/23/25 0900 -- 70 -- 152/90 -- 97 % -- -- -- --    05/23/25 0800 -- -- -- -- -- -- -- -- 15 --    05/23/25 06:54:36 98.3 °F (36.8 °C) 81 -- 150/106 121 98 % -- -- -- --    05/23/25 0400 98.2 °F (36.8 °C) 79 18 151/93 112 97 % None (Room air) Lying 15 --    05/23/25 0000 98.2 °F (36.8 °C) 80 18 149/108 122 98 % None (Room air) Lying 15 No Pain    05/22/25  2000 97.7 °F (36.5 °C) 85 19 165/119 134 98 % None (Room air) Lying 15 No Pain    05/22/25 1724 -- -- -- -- -- -- -- -- -- 4    05/22/25 1600 -- 94 18 164/115 -- -- -- -- 15 --    05/22/25 15:12:41 -- -- -- -- -- 95 % -- -- -- --    05/22/25 1441 -- 119 -- 156/107 -- -- -- -- -- --    05/22/25 1215 -- -- -- -- -- -- -- -- -- 6    05/22/25 1200 -- 120 20 143/110 -- 97 % None (Room air) Lying 15 --    05/22/25 11:02:13 98.6 °F (37 °C) 93 -- 160/107 125 97 % -- -- -- --    05/22/25 0932 -- -- -- -- -- -- -- -- -- 8 05/22/25 0859 -- -- -- -- -- -- -- -- -- 8    05/22/25 0858 -- -- -- -- -- -- -- -- -- 8    05/22/25 0822 -- 98 -- 148/108 -- -- -- -- -- --    05/22/25 0800 98.3 °F (36.8 °C) -- 16 150/102 118 97 % -- -- 15 --    05/22/25 0708 -- -- -- 148/108 -- -- -- -- -- --    05/22/25 07:01:10 98.3 °F (36.8 °C) 98 -- 158/106 123 97 % -- -- -- --    05/22/25 0400 98.3 °F (36.8 °C) 83 18 164/105 -- 98 % None (Room air) Lying 15 --    05/22/25 01:55:22 97.7 °F (36.5 °C) 95 -- 153/112 126 96 % -- -- 15 --    05/22/25 00:53:38 97.7 °F (36.5 °C) 94 -- 168/106 127 97 % -- -- -- --    05/21/25 22:01:23 99 °F (37.2 °C) 85 -- 145/100 115 98 % -- -- -- --    05/21/25 2200 -- -- -- -- -- -- -- -- 15 --    05/21/25 2017 -- -- -- -- -- -- -- -- -- 8    05/21/25 2000 98.7 °F (37.1 °C) -- -- -- -- -- None (Room air) -- -- --    05/21/25 19:55:23 -- -- -- -- -- -- -- -- 15 --    05/21/25 19:55:05 98.7 °F (37.1 °C) 78 -- 139/96 110 -- -- -- -- --    05/21/25 18:59:47 98.6 °F (37 °C) 89 -- 177/108 131 96 % -- -- -- --    05/21/25 17:00:12 98.2 °F (36.8 °C) 84 -- 165/109 128 97 % -- -- -- --    05/21/25 1631 98.2 °F (36.8 °C) 84 20 165/109 -- 97 % -- -- -- --    05/21/25 1610 -- 80 20 181/94 -- 98 % None (Room air) -- -- --    05/21/25 1535 -- -- -- -- -- -- None (Room air) -- -- --    05/21/25 1515 -- -- -- -- -- -- -- -- -- 7    05/21/25 1500 -- -- -- -- -- -- -- -- 15 --    05/21/25 1445 -- -- -- -- -- -- -- -- -- 10 - Worst  Possible Pain    05/21/25 1411 -- -- -- 174/112 -- -- -- -- -- --    05/21/25 1409 98.7 °F (37.1 °C) 105 20 -- -- 98 % None (Room air) Sitting -- 10 - Worst Possible Pain              Pertinent Labs/Diagnostic Test Results:   Radiology:  MRI brain wo contrast   Final Interpretation by E. Alec Schoenberger, MD (05/22 0808)   Acute lacunar infarcts in the right thalamus and posterior right putamen. No acute hemorrhage.      The study was marked in EPIC for immediate notification.      Workstation performed: [id number]         CTA head and neck with and without contrast   Final Interpretation by Gregoria Burt MD (05/21 1538)      CT Brain:  No acute intracranial abnormality.      CT Angiography: Normal CTA neck and brain.               Workstation performed: [id number]         XR chest 1 view portable   Final Interpretation by Matt Venegas DO (05/21 1600)      No acute cardiopulmonary disease.            Workstation performed: [id number]           Cardiology:  NM myocardial perfusion spect (rx stress and/or rest)   Final Result by Stuart Stallings DO (05/23 1053)        Stress ECG: The patient and had a maximal HR of 117 bpm (66% of MPHR)    and 1.0 METS. The patient experienced no angina during the test. The    patient reached the end of the protocol. The patient reported mild    shortness of breath during the stress test. Symptoms began during stress    and ended during recovery. The patient reported no other cardiac symptoms    during the stress test.     Stress ECG: No ST deviation is noted. The ECG was not diagnostic due to    pharmacological (vasodilator) stress.     Perfusion: There are no perfusion defects.     Stress Function: Left ventricular function post-stress is abnormal.    Stress ejection fraction is 37%.     Stress Combined Conclusion: Left ventricular perfusion is normal.      Ejection fraction 37%.  No ischemia or scar.         Echo complete w/ contrast if indicated   Final Result by  Radha Mae MD (05/22 1125)        Left Ventricle: Left ventricular cavity size is dilated. Wall thickness    is mildly increased. The left ventricular ejection fraction is 35-40%.    Systolic function is moderately reduced. There is moderate global    hypokinesis with regional variation. Diastolic function is mildly    abnormal, consistent with grade I (abnormal) relaxation.     IVS: There is abnormal septal motion of unclear etiology.     Right Ventricle: Right ventricular cavity size is normal. Systolic    function is mildly reduced.     Left Atrium: The atrium is dilated.     Atrial Septum: There is lipomatous hypertrophy of the interatrial    septum. No patent foramen ovale detected, confirmed by provocation with    cough, using agitated saline contrast and with valsalva, using agitated    saline contrast.         ECG 12 lead   Final Result by Kylah Mujica MD (05/22 0810)   Normal sinus rhythm   Nonspecific ST abnormality   Prolonged QT   Abnormal ECG   When compared with ECG of 17-Nov-2014 12:02,   QT has lengthened   Confirmed by Kylah Mujica ([id number]) on 5/22/2025 8:10:52 AM        GI:  No orders to display           Results from last 7 days   Lab Units 05/23/25  0429 05/22/25  0426 05/21/25  1440   WBC Thousand/uL 13.21* 10.73* 14.44*   HEMOGLOBIN g/dL 13.5 13.6 14.9   HEMATOCRIT % 39.5 38.9 43.1   PLATELETS Thousands/uL 369 378 380   TOTAL NEUT ABS Thousands/µL 8.18* 5.81 9.66*         Results from last 7 days   Lab Units 05/23/25  0429 05/22/25  0426 05/21/25  2235 05/21/25  1440   SODIUM mmol/L 138 136  --  136   POTASSIUM mmol/L 3.3* 3.3* 3.2* 2.7*   CHLORIDE mmol/L 103 101  --  97   CO2 mmol/L 26 27  --  30   ANION GAP mmol/L 9 8  --  9   BUN mg/dL 5 9  --  4*   CREATININE mg/dL 0.52* 0.58*  --  0.52*   EGFR ml/min/1.73sq m 116 112  --  116   CALCIUM mg/dL 8.5 8.5  --  8.7   MAGNESIUM mg/dL 1.7*  --   --   --      Results from last 7 days   Lab Units 05/21/25  1440   AST U/L 12*   ALT U/L 8    ALK PHOS U/L 55   TOTAL PROTEIN g/dL 7.6   ALBUMIN g/dL 4.3   TOTAL BILIRUBIN mg/dL 0.42     Results from last 7 days   Lab Units 05/21/25  1634 05/21/25  1533   POC GLUCOSE mg/dl 140 66     Results from last 7 days   Lab Units 05/23/25  0429 05/22/25  0426 05/21/25  1440   GLUCOSE RANDOM mg/dL 85 86 87         Results from last 7 days   Lab Units 05/21/25  1440   HEMOGLOBIN A1C % 5.5   EAG mg/dl 111           Results from last 7 days   Lab Units 05/21/25  1842 05/21/25  1641 05/21/25  1440   HS TNI 0HR ng/L  --   --  4   HS TNI 2HR ng/L  --  5  --    HSTNI D2 ng/L  --  1  --    HS TNI 4HR ng/L 6  --   --    HSTNI D4 ng/L 2  --   --          Results from last 7 days   Lab Units 05/23/25  0429   PROTIME seconds 16.1*   INR  1.21*     Results from last 7 days   Lab Units 05/21/25  1842   TSH 3RD GENERATON uIU/mL 1.106         Past Medical History[1]  Present on Admission:   Tobacco use disorder   Traumatic complete tear of right rotator cuff   Thyroid nodule   Hypertension   Cardiomyopathy (HCC)      Admitting Diagnosis: Hypokalemia [E87.6]  Arm pain [M79.603]  Stroke-like symptoms [R29.90]  Age/Sex: 45 y.o. female    Network Utilization Review Department  ATTENTION: Please call with any questions or concerns to [phone] and carefully listen to the prompts so that you are directed to the right person. All voicemails are confidential.   For Discharge needs, contact Care Management DC Support Team at [phone] opt. [phone]  Send all requests for admission clinical reviews, approved or denied determinations and any other requests to dedicated fax number below belonging to the campus where the patient is receiving treatment. List of dedicated fax numbers for the Facilities:  FACILITY NAME UR FAX NUMBER   ADMISSION DENIALS (Administrative/Medical Necessity) [phone]   DISCHARGE SUPPORT TEAM (NETWORK) [phone]   PARENT CHILD HEALTH (Maternity/NICU/Pediatrics) [phone]   [facility]  [phone]   [facility] [phone]   [facility] [phone]   [facility] [phone]   [facility] [phone]   [facility] [phone]   [facility] [phone]   [facility] [phone]   [facility] [phone]   [facility] [phone]   [facility] [phone]   [facility] [phone]              [1]   Past Medical History:  Diagnosis Date    Arthritis     Closed fracture of left tibial plateau 7/27/2022    Depression     DJD (degenerative joint disease)     Herpes     Hidradenitis     MRSA carrier
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
## 2025-05-22 NOTE — CASE MANAGEMENT
Case Management Assessment    Patient name Lorri Reinoso S /S -01 MRN [id number]  : 1980 Date 2025       Current Admission Date: 2025  Current Admission Diagnosis:Stroke (cerebrum) (HCC)   Patient Active Problem List    Diagnosis Date Noted    Stroke (cerebrum) (HCC) 2025    HSV (herpes simplex virus) infection 2025    Leukocytosis 2025    Hypokalemia 2025    Traumatic complete tear of right rotator cuff 2025    Neck pain 2025    Urinary incontinence, mixed 2025    Posterior tibial tendinitis of right lower extremity 2024    Depressive disorder 2024    Thyroid nodule 2024    DJD (degenerative joint disease) 12/10/2019    Tear of right supraspinatus tendon 2019    Tobacco use disorder 06/10/2019    Impingement syndrome of right shoulder 2019    Encounter for surveillance of injectable contraceptive 2018    Hidradenitis suppurativa 2014    Obesity 10/07/2013      LOS (days): 0  Geometric Mean LOS (GMLOS) (days):   Days to GMLOS:     OBJECTIVE:              Current admission status: Observation       Preferred Pharmacy:   [id number] - [place], [place] - [address]  [address]  [place] [address]  Phone: [phone] Fax: [phone]    [facility] [place] - [place] - [address]  [address]  [place] [address]  Phone: [phone] Fax: [phone]    Primary Care Provider: Linda Diane PA-C    Primary Insurance: [employer]  Secondary Insurance:     ASSESSMENT:  Active Health Care Proxies    There are no active Health Care Proxies on file.                      Patient Information  Admitted from:: Home  Mental Status: Alert  During Assessment patient was accompanied by: Spouse, Daughter  Assessment information provided by:: Patient  Primary Caregiver: Self  Support Systems: Family members  [facility]  Residence: [place]  What city do you live in?: [place]  Home entry access options. Select all that apply.: No steps to enter home  Type of Current Residence: [place] (bedroom in basement. full bath on main level)  Living Arrangements: Lives w/ Parent(s), Lives w/ Spouse/significant other, Lives w/ Son, Lives w/ Daughter    Activities of Daily Living Prior to Admission  Functional Status: Independent  Completes ADLs independently?: Yes  Ambulates independently?: Yes  Does patient use assisted devices?: No  Does patient currently own DME?: Yes  What DME does the patient currently own?: Bedside Commode, Shower Chair, Straight Cane, Walker, Wheelchair  Does patient have a history of Outpatient Therapy (PT/OT)?: Yes  Does the patient have a history of Short-Term Rehab?: No  Does patient have a history of HHC?: No  Does patient currently have HHC?: No         Patient Information Continued  Does patient have prescription coverage?: Yes  Can the patient afford their medications and any related supplies (such as glucometers or test strips)?: Yes  Does patient receive dialysis treatments?: No         Means of Transportation  Means of Transport to Appts:: Drives Self    CM met with pt, pt's spouse Willa and dtr Kortney at bedside re: assessment. Patient relayed she lives w/ her spouse, son, dtr, and mother in ranch home, no [place], bedroom in basement, full bath on main level. Pt relayed she is independent w/ ADLS and does not use any DME at baseline (has shower chair, BSC, RW, cane, and WC if needed). Pt reported hx of OPPT. Pt confirmed PCP Benedicto and preferred pharmacy [facility]. Per PT/OT - rcmd for OPPT/OT and OP locations list given to pt. No CM d/c needs currently identified, CM remains available.     Social Determinants of Health (SDOH)      Flowsheet Row Most Recent Value   Housing Stability    In the last 12 months, was there a time when you were not able to pay the mortgage or rent on time? N   At any time in  the past 12 months, were you homeless or living in a shelter (including now)? N   Transportation Needs    In the past 12 months, has lack of transportation kept you from medical appointments or from getting medications? no   In the past 12 months, has lack of transportation kept you from meetings, work, or from getting things needed for daily living? No   Food Insecurity    Within the past 12 months, you worried that your food would run out before you got the money to buy more. Never true   Within the past 12 months, the food you bought just didn't last and you didn't have money to get more. Never true   Utilities    In the past 12 months has the electric, gas, oil, or water company threatened to shut off services in your home? No

## 2025-05-22 NOTE — ASSESSMENT & PLAN NOTE
Presented with left sided numbness  MRI brain positive for right sided lacunar infarcts  ASA, Plavix, and statin per neurology
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
## 2025-05-22 NOTE — ASSESSMENT & PLAN NOTE
Patient currently not on any antihypertensive medication.  Systolic (24hrs), Av , Min:139 , Max:181     Diastolic (24hrs), Av, Min:94, Max:112     Plan  Toprol XL 25 mg daily and losartan 25 mg daily per cardiology

## 2025-05-22 NOTE — SPEECH THERAPY NOTE
Speech-Language Pathology Bedside Swallow Evaluation        Patient Name: Lorri Montes    Today's Date: 5/22/2025     Problem List  Principal Problem:    Stroke (cerebrum) (HCC)  Active Problems:    Tobacco use disorder    Traumatic complete tear of right rotator cuff    HSV (herpes simplex virus) infection    Leukocytosis    Hypokalemia       Summary    Pt presents with normal oral and pharyngeal swallowing stages. No signs/symptoms aspiration present.      Recommendations:   Diet: regular diet and thin liquids   Meds: whole with liquid   Frequent Oral care: 2x/day  Compensatory swallowing strategies: alternating bites and sips with dryer foods if needed  Other Recommendations/ considerations: No further follow up from  needed     Current Medical Status  Pt is a 45 y.o. female who presented to [facility]  with a PMH of anxiety, polyarthralgia, right rotator cuff tear, smoking who presents with left-sided numbness.  Patient reports that her symptoms started last night around 8 PM when she was going to bed which interrupted her sleep and kept her awake until 4 AM.  However, they got worse this morning.  Symptoms include feeling numbness over her left face, left leg, left throat, left arm, left trunk, and left leg. Upon my evaluation, she stated that the numbness is still there.  Denied weakness, blurry vision, or headache.  She never had similar symptoms.  Complained of a chronic dry cough.  No chest pain, difficulty breathing, or palpitations.  No sick contacts.  No fever or chills.  No recent travel.  Her father had a stroke in his 50s.     Past medical history:   Please see H&P for details    Special Studies:  Brain MRI 5/22/25: Acute lacunar infarcts in the right thalamus and posterior right putamen. No acute hemorrhage.    Chest XR 5/21/25: Clear lungs. No pneumothorax or pleural effusion.    Social/Education/Vocational Hx:  Pt lives at home with family    Swallow Information   Prior  speech/swallowing tx: none  Current Risks for Dysphagia & Aspiration: CVA and left facial/oral/throat numbness  Current Symptoms/Concerns: pt concern of choking due to numbness  Current Diet: regular diet and thin liquids   Baseline Diet: regular diet and thin liquids  Takes pills- whole with water    Baseline Assessment   Behavior/Cognition: alert - able to answer all orientation questions  Speech/Language Status: able to participate in conversation and able to follow commands  Patient Positioning: upright in chair     Swallow Mechanism Exam   Facial: symmetrical  Labial: WFL  Lingual: WFL  Velum: unable to visualize  Mandible: adequate ROM  Dentition: adequate  Vocal quality:clear/adequate   Volitional Cough: strong/productive   Respiratory: RA    Consistencies Assessed and Performance   Consistencies Administered: thin liquids and solids : seen at breakfast with scrambled eggs, sausage pastora, grilled blueberry muffin, water/juice by straw, and multiple pills by thin liquid.     Oral Stage:     Adequate bite size of muffin. Able to suck thin liquids through straw. Adequate oral control with thin liquid by straw. Timely/effective mastication of sausage, muffin, eggs. Pt reports feeling easily fatigued with mastication process. Adequate bolus manipulation of solids. Timely AP transfer with all trials. No oral residue. Nursing provided medications, pt took multiple pills without difficulty with thin liquid by straw.     Per pt - during dinner last night (chicken breast, rice, zucchini) she had no difficulties swallowing/eating, however she did bite her lip/tongue due to numbness.     Pharyngeal Stage:     Timely swallow initiation with all consistencies. Adequate hyolaryngeal excursion during palpation with consistencies. No coughing/throat clearing with any consistency.     Esophageal Concerns: none reported      Results Reviewed with: patient, RN, and family   Discharge recommendation: no follow up needed

## 2025-05-22 NOTE — CONSULTS
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 the [facility], was performed utilizing techniques to minimize radiation dose exposure, including the use of iterative reconstruction and automated exposure control. IMAGE QUALITY:  Diagnostic. FINDINGS: FACIAL BONES:   Intact nasal bones. Chronic left nasal septal deviation. No orbital, zygomatic or maxillary fracture. Normal alignment of the temporomandibular joints. No mandible fracture ORBITS: Intact globes. No retrobulbar hematoma. SINUSES: Clear. SOFT TISSUES: No hematoma.     Impression: No evidence of acute maxillofacial fracture. Workstation performed: [id number]     Counseling / Coordination of Care  Total floor / unit time spent today 45 minutes.  Greater than 50% of total time was spent with the patient and / or family counseling and / or coordination of care.  A description of the counseling / coordination of care: Review of history, current assessment, development of a plan.    Code Status: Level 1 - Full Code    ** Please Note: Dragon 360 Dictation voice to text software may have been used in the creation of this document. **         [1]   Past Medical History:  Diagnosis Date    Arthritis     Closed  fracture of left tibial plateau 7/27/2022    Depression     DJD (degenerative joint disease)     Herpes     Hidradenitis     MRSA carrier    [2]   Past Surgical History:  Procedure Laterality Date    FL INJECTION RIGHT HIP (ARTHROGRAM)  1/6/2020    FL INJECTION RIGHT HIP (NON ARTHROGRAM)  7/24/2019    FL INJECTION RIGHT SHOULDER (ARTHROGRAM)  10/7/2019    NO PAST SURGERIES     [3]   Social History  Tobacco Use   Smoking Status Every Day    Current packs/day: 0.25    Types: Cigarettes   Smokeless Tobacco Former   Tobacco Comments    4-5 cigarettes a day   [4] No Known Allergies
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
## 2025-05-22 NOTE — ASSESSMENT & PLAN NOTE
Lab Results   Component Value Date/Time    [id number] 81 (L) 05/21/2025 06:42 PM      Plan  Cyanocobalamin inj 1000mg weekly
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
## 2025-05-22 NOTE — ASSESSMENT & PLAN NOTE
45-year-old female presents with left-sided numbness including left face, lip, throat, left arm, left side of her trunk, and left leg.  Last known normal at 8 PM 5/20/2025.  She took aspirin tablet at home.  Her  reports noticing slurred speech and left facial droop at home.  Upon my evaluation at 4 PM 5/21/2025 NIHSS 1 for mild sensation loss over left side of the body.  CTA head/neck is negative.  EKG NSR   Troponins negative X 2  Last LDL in 2023 101  Last A1c in 2023 5.4  Based on her symptoms, could be a lacunar infarct of contralateral thalamus.  MRI: Acute lacunar infarcts in the right thalamus and posterior right putamen. No acute hemorrhage.     Plan:  Admit under stroke pathway.  Neurology consult appreciated.  Loaded with Plavix 300 mg  Start Plavix 75 mg and aspirin 81 mg maintenance dose tomorrow.  LDL and A1c  Telemetry  PT/OT

## 2025-05-22 NOTE — ASSESSMENT & PLAN NOTE
Echo on 5/22:   Left ventricular ejection fraction is 35-40%. Systolic function is moderately reduced. There is moderate global hypokinesis with regional variation. Diastolic function is mildly abnormal, consistent with grade I (abnormal) relaxation.    IVS: There is abnormal septal motion of unclear etiology.    Right Ventricle: Right ventricular cavity size is normal. Systolic function is mildly reduced.    Left Atrium: The atrium is dilated
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
## 2025-05-22 NOTE — ASSESSMENT & PLAN NOTE
"45 y.o. RHD  female with depression, tobacco use, who presents to [facility] on 5/22/25 with gradual onset of L sided numbness/tingling of face/LUE/LLE. Per ED notes, LKW between 1500/1600 5/20. BP on arrival 174/112. Per ED notes NIHSS 2 (L facial weakness, L sensory deficit). Pt AP/AC naive PTA.       Workup  - CTA H/N wwo contrast 5/21/25:   \"CT Brain:  No acute intracranial abnormality.   CT Angiography: Normal CTA neck and brain.\"  - MRI brain wo contrast 5/22/25:   \"Acute lacunar infarcts in the right thalamus and posterior right putamen. No acute hemorrhage. \"  - Labs  - total cholesterol 155, LDL 97   - hemoglobin A1c 5.5   - Echo: LV dilated, EF 35-40%, systolic function moderately reduced, moderate global hypokinesis with regional variation, L atrium dilated, no PFO     Plan  - Stroke pathway  MRI brain completed, see above   Echo completed, see above  Recommend cardiology evaluation but defer to primary team   Consider RUCHI  Recommend CT C/A/P   S/p Plavix 300 mg x1 on 5/21 (Pt took ASA 81 mg prior to coming to the ED 5/21)  Start DAPT with ASA 81 mg daily and Plavix 75 mg daily on 5/22/25. Likely will continue DAPT x21 days, then stop Plavix and continue with ASA monotherapy.   Consider hematology evaluation if cardiac workup unremarkable   Atorvastatin 40 mg  May normalize BP as pt's symptoms >24 hours from onset. BP management per primary team   Euglycemic, normothermic goal  Continue telemetry  PT/OT/ST  Stroke education  Frequent neuro checks. Continue to monitor and notify neurology with any changes.  STAT CT head for any acute change in neuro exam  - Medical management and supportive care per primary team. Correction of any metabolic or infectious disturbances.         "

## 2025-05-23 ENCOUNTER — APPOINTMENT (OUTPATIENT)
Dept: NUCLEAR MEDICINE | Facility: HOSPITAL | Age: 45
DRG: 045 | End: 2025-05-23
Payer: COMMERCIAL

## 2025-05-23 ENCOUNTER — APPOINTMENT (OUTPATIENT)
Dept: NON INVASIVE DIAGNOSTICS | Facility: HOSPITAL | Age: 45
DRG: 045 | End: 2025-05-23
Payer: COMMERCIAL

## 2025-05-23 LAB
ANION GAP SERPL CALCULATED.3IONS-SCNC: 9 MMOL/L (ref 4–13)
BASOPHILS # BLD AUTO: 0.07 THOUSANDS/ÂΜL (ref 0–0.1)
BASOPHILS NFR BLD AUTO: 1 % (ref 0–1)
BUN SERPL-MCNC: 5 MG/DL (ref 5–25)
CALCIUM SERPL-MCNC: 8.5 MG/DL (ref 8.4–10.2)
CHEST PAIN STATEMENT: NORMAL
CHEST PAIN STATEMENT: NORMAL
CHLORIDE SERPL-SCNC: 103 MMOL/L (ref 96–108)
CO2 SERPL-SCNC: 26 MMOL/L (ref 21–32)
CREAT SERPL-MCNC: 0.52 MG/DL (ref 0.6–1.3)
EOSINOPHIL # BLD AUTO: 0.32 THOUSAND/ÂΜL (ref 0–0.61)
EOSINOPHIL NFR BLD AUTO: 2 % (ref 0–6)
ERYTHROCYTE [DISTWIDTH] IN BLOOD BY AUTOMATED COUNT: 15.2 % (ref 11.6–15.1)
GFR SERPL CREATININE-BSD FRML MDRD: 116 ML/MIN/1.73SQ M
GLUCOSE SERPL-MCNC: 85 MG/DL (ref 65–140)
HCT VFR BLD AUTO: 39.5 % (ref 34.8–46.1)
HGB BLD-MCNC: 13.5 G/DL (ref 11.5–15.4)
IMM GRANULOCYTES # BLD AUTO: 0.05 THOUSAND/UL (ref 0–0.2)
IMM GRANULOCYTES NFR BLD AUTO: 0 % (ref 0–2)
INR PPP: 1.21 (ref 0.85–1.19)
LYMPHOCYTES # BLD AUTO: 3.84 THOUSANDS/ÂΜL (ref 0.6–4.47)
LYMPHOCYTES NFR BLD AUTO: 29 % (ref 14–44)
MAGNESIUM SERPL-MCNC: 1.7 MG/DL (ref 1.9–2.7)
MAX DIASTOLIC BP: 90 MMHG
MAX DIASTOLIC BP: 90 MMHG
MAX HR PERCENT: 66 %
MAX HR: 117 BPM
MAX PREDICTED HEART RATE: 176 BPM
MAX PREDICTED HEART RATE: 176 BPM
MCH RBC QN AUTO: 33.9 PG (ref 26.8–34.3)
MCHC RBC AUTO-ENTMCNC: 34.2 G/DL (ref 31.4–37.4)
MCV RBC AUTO: 99 FL (ref 82–98)
MONOCYTES # BLD AUTO: 0.75 THOUSAND/ÂΜL (ref 0.17–1.22)
MONOCYTES NFR BLD AUTO: 6 % (ref 4–12)
NEUTROPHILS # BLD AUTO: 8.18 THOUSANDS/ÂΜL (ref 1.85–7.62)
NEUTS SEG NFR BLD AUTO: 62 % (ref 43–75)
NRBC BLD AUTO-RTO: 0 /100 WBCS
PLATELET # BLD AUTO: 369 THOUSANDS/UL (ref 149–390)
PMV BLD AUTO: 9 FL (ref 8.9–12.7)
POTASSIUM SERPL-SCNC: 3.3 MMOL/L (ref 3.5–5.3)
PROTHROMBIN TIME: 16.1 SECONDS (ref 12.3–15)
PROTOCOL NAME: NORMAL
PROTOCOL NAME: NORMAL
RATE PRESSURE PRODUCT: NORMAL
RBC # BLD AUTO: 3.98 MILLION/UL (ref 3.81–5.12)
REASON FOR TERMINATION: NORMAL
REASON FOR TERMINATION: NORMAL
SL CV REST NUCLEAR ISOTOPE DOSE: 10.8 MCI
SL CV STRESS NUCLEAR ISOTOPE DOSE: 31.7 MCI
SL CV STRESS RECOVERY BP: NORMAL MMHG
SL CV STRESS RECOVERY HR: 86 BPM
SL CV STRESS RECOVERY O2 SAT: 98 %
SODIUM SERPL-SCNC: 138 MMOL/L (ref 135–147)
SPECT HRT GATED+EF W RNC IV: 37 %
STRESS ANGINA INDEX: 0
STRESS BASELINE BP: NORMAL MMHG
STRESS BASELINE HR: 70 BPM
STRESS O2 SAT REST: 97 %
STRESS PEAK HR: 114 BPM
STRESS POST ESTIMATED WORKLOAD: 1 METS
STRESS POST EXERCISE DUR MIN: 3 MIN
STRESS POST EXERCISE DUR MIN: 3 MIN
STRESS POST EXERCISE DUR SEC: 0 SEC
STRESS POST EXERCISE DUR SEC: 0 SEC
STRESS POST O2 SAT PEAK: 99 %
STRESS POST PEAK BP: 124 MMHG
STRESS POST PEAK HR: 117 BPM
STRESS POST PEAK HR: 117 BPM
STRESS POST PEAK SYSTOLIC BP: 152 MMHG
STRESS POST PEAK SYSTOLIC BP: 152 MMHG
STRESS/REST PERFUSION RATIO: 1.15
TARGET HR FORMULA: NORMAL
TARGET HR FORMULA: NORMAL
TEST INDICATION: NORMAL
TEST INDICATION: NORMAL
WBC # BLD AUTO: 13.21 THOUSAND/UL (ref 4.31–10.16)

## 2025-05-23 PROCEDURE — 99232 SBSQ HOSP IP/OBS MODERATE 35: CPT | Performed by: FAMILY MEDICINE

## 2025-05-23 PROCEDURE — 80048 BASIC METABOLIC PNL TOTAL CA: CPT | Performed by: NURSE PRACTITIONER

## 2025-05-23 PROCEDURE — 99232 SBSQ HOSP IP/OBS MODERATE 35: CPT | Performed by: PHYSICIAN ASSISTANT

## 2025-05-23 PROCEDURE — 85610 PROTHROMBIN TIME: CPT

## 2025-05-23 PROCEDURE — 99214 OFFICE O/P EST MOD 30 MIN: CPT | Performed by: INTERNAL MEDICINE

## 2025-05-23 PROCEDURE — 85025 COMPLETE CBC W/AUTO DIFF WBC: CPT

## 2025-05-23 PROCEDURE — 93016 CV STRESS TEST SUPVJ ONLY: CPT | Performed by: INTERNAL MEDICINE

## 2025-05-23 PROCEDURE — 83735 ASSAY OF MAGNESIUM: CPT | Performed by: NURSE PRACTITIONER

## 2025-05-23 PROCEDURE — A9502 TC99M TETROFOSMIN: HCPCS

## 2025-05-23 PROCEDURE — 84244 ASSAY OF RENIN: CPT

## 2025-05-23 PROCEDURE — 78452 HT MUSCLE IMAGE SPECT MULT: CPT | Performed by: INTERNAL MEDICINE

## 2025-05-23 PROCEDURE — 78452 HT MUSCLE IMAGE SPECT MULT: CPT

## 2025-05-23 PROCEDURE — 93018 CV STRESS TEST I&R ONLY: CPT | Performed by: INTERNAL MEDICINE

## 2025-05-23 PROCEDURE — 93017 CV STRESS TEST TRACING ONLY: CPT

## 2025-05-23 PROCEDURE — 82088 ASSAY OF ALDOSTERONE: CPT

## 2025-05-23 RX ORDER — REGADENOSON 0.08 MG/ML
0.4 INJECTION, SOLUTION INTRAVENOUS ONCE
Status: COMPLETED | OUTPATIENT
Start: 2025-05-23 | End: 2025-05-23

## 2025-05-23 RX ORDER — SPIRONOLACTONE 25 MG/1
25 TABLET ORAL DAILY
Status: DISCONTINUED | OUTPATIENT
Start: 2025-05-23 | End: 2025-05-28 | Stop reason: HOSPADM

## 2025-05-23 RX ORDER — POTASSIUM CHLORIDE 1500 MG/1
40 TABLET, EXTENDED RELEASE ORAL
Status: COMPLETED | OUTPATIENT
Start: 2025-05-23 | End: 2025-05-23

## 2025-05-23 RX ORDER — MAGNESIUM SULFATE HEPTAHYDRATE 40 MG/ML
4 INJECTION, SOLUTION INTRAVENOUS ONCE
Status: COMPLETED | OUTPATIENT
Start: 2025-05-23 | End: 2025-05-23

## 2025-05-23 RX ADMIN — CELECOXIB 200 MG: 100 CAPSULE ORAL at 17:40

## 2025-05-23 RX ADMIN — NICOTINE 7 MG: 7 PATCH, EXTENDED RELEASE TRANSDERMAL at 09:02

## 2025-05-23 RX ADMIN — LOSARTAN POTASSIUM 25 MG: 25 TABLET, FILM COATED ORAL at 10:00

## 2025-05-23 RX ADMIN — SPIRONOLACTONE 25 MG: 25 TABLET ORAL at 12:00

## 2025-05-23 RX ADMIN — METOPROLOL SUCCINATE ER TABLETS 25 MG: 25 TABLET, FILM COATED, EXTENDED RELEASE ORAL at 10:00

## 2025-05-23 RX ADMIN — VALACYCLOVIR HYDROCHLORIDE 1000 MG: 500 TABLET, FILM COATED ORAL at 10:00

## 2025-05-23 RX ADMIN — ATORVASTATIN CALCIUM 40 MG: 40 TABLET, FILM COATED ORAL at 17:40

## 2025-05-23 RX ADMIN — MAGNESIUM SULFATE HEPTAHYDRATE 4 G: 40 INJECTION, SOLUTION INTRAVENOUS at 14:00

## 2025-05-23 RX ADMIN — CLOPIDOGREL BISULFATE 75 MG: 75 TABLET, FILM COATED ORAL at 10:00

## 2025-05-23 RX ADMIN — ENOXAPARIN SODIUM 40 MG: 40 INJECTION SUBCUTANEOUS at 10:00

## 2025-05-23 RX ADMIN — CELECOXIB 200 MG: 100 CAPSULE ORAL at 10:00

## 2025-05-23 RX ADMIN — POTASSIUM CHLORIDE 40 MEQ: 1500 TABLET, EXTENDED RELEASE ORAL at 12:00

## 2025-05-23 RX ADMIN — ASPIRIN 81 MG: 81 TABLET, CHEWABLE ORAL at 10:00

## 2025-05-23 RX ADMIN — FOLIC ACID 1 MG: 1 TABLET ORAL at 10:00

## 2025-05-23 RX ADMIN — POTASSIUM CHLORIDE 40 MEQ: 1500 TABLET, EXTENDED RELEASE ORAL at 08:00

## 2025-05-23 RX ADMIN — REGADENOSON 0.4 MG: 0.08 INJECTION, SOLUTION INTRAVENOUS at 09:09

## 2025-05-23 NOTE — PROGRESS NOTES
"Progress Note - Neurology   Name: Lorri Montes 45 y.o. female I MRN: [id number]  Unit/Bed#: S -01 I Date of Admission: 5/21/2025   Date of Service: 5/23/2025 I Hospital Day: 0     Assessment & Plan  Stroke (cerebrum) (HCC)  45 y.o. RHD  female with depression, tobacco use, who presents to [facility] on 5/22/25 with gradual onset of L sided numbness/tingling of face/LUE/LLE. Per ED notes, LKW between 1500/1600 5/20. BP on arrival 174/112. Per ED notes NIHSS 2 (L facial weakness, L sensory deficit). Pt AP/AC naive PTA.       Workup  - CTA H/N wwo contrast 5/21/25:   \"CT Brain:  No acute intracranial abnormality.   CT Angiography: Normal CTA neck and brain.\"  - MRI brain wo contrast 5/22/25:   \"Acute lacunar infarcts in the right thalamus and posterior right putamen. No acute hemorrhage. \"  - Labs  - total cholesterol 155, LDL 97   - hemoglobin A1c 5.5   - Echo: LV dilated, EF 35-40%, systolic function moderately reduced, moderate global hypokinesis with regional variation, L atrium dilated, no PFO   - CT chest abdomen pelvis 4/23 - \"No evidence of acute trauma in the chest, abdomen or pelvis.\"    Plan  - Stroke pathway  MRI brain completed, see above   Echo completed, see above  Appreciate cardiology evaluation, for nuclear stress test.   Recommend RUCHI, reviewed with cardiology   CT C/A/P - completed 4/23 please see above.   S/p Plavix 300 mg x1 on 5/21 (Pt took ASA 81 mg prior to coming to the ED 5/21)  Start DAPT with ASA 81 mg daily and Plavix 75 mg daily on 5/22/25. Likely will continue DAPT x21 days, then stop Plavix and continue with ASA monotherapy.   Consider hematology evaluation/thrombosis panel as out patient.   Atorvastatin 40 mg  Smoking cessation  May normalize BP as pt's symptoms >24 hours from onset. BP management per primary team   Euglycemic, normothermic goal  Continue telemetry  PT/OT/ST  Stroke education  Frequent neuro checks. Continue to monitor and notify neurology with any " changes.  STAT CT head for any acute change in neuro exam  On B12 and Folate supplementation  - Medical management and supportive care per primary team. Correction of any metabolic or infectious disturbances.   - Reviewed with attending, plan of care per attending physician.     Tobacco use disorder  - encourage tobacco cessation     Lorri Montes will need follow-up in in 6 weeks with neurovascular team for Small vessel lacunar infarct in 60 minute appointment. They will not require outpatient neurological testing.     Subjective   Neurology follow up.   The patient denies any new neurological symptoms, she continues to have left face and arm numbness not so much in the leg, she has bilateral shoulder rotator cuff issues weaker on the right.    No new neurological symptoms no headache, problems with vision, new one-sided weakness with new numb sided one-sided numbness or ataxia, no new dysarthria.    She reports that she is hungry this morning as she had her stress test.      No events.  Reviewed with cardiology AP, need for possible RUCHI.     Objective :  Temp:  [97.7 °F (36.5 °C)-98.6 °F (37 °C)] 98.3 °F (36.8 °C)  HR:  [] 81  BP: (143-165)/() 150/106  Resp:  [16-20] 18  SpO2:  [95 %-98 %] 98 %  O2 Device: None (Room air)    Current Facility-Administered Medications   Medication Dose Route Frequency Provider Last Rate    acetaminophen  650 mg Oral Q6H PRN Brigitte Patel MD      aspirin  81 mg Oral Daily Brigitte Patel MD      atorvastatin  40 mg Oral QPM Brigitte Patel MD      celecoxib  200 mg Oral BID Brigitte Patel MD      clopidogrel  75 mg Oral Daily Kathya Bowen PA-C      cyanocobalamin  1,000 mcg Intramuscular Weekly Stailn Schmid MD MPH      enoxaparin  40 mg Subcutaneous Q24H [facility] Brigitte Patel MD      folic acid  1 mg Oral Daily Stalin Schmid MD MPH      lidocaine   Topical Daily PRN Brigitte Patel MD      losartan  25 mg Oral Daily KEYANA Davidson      metoprolol succinate  25 mg Oral  Daily KEYANA Davidson      nicotine  7 mg Transdermal Daily Stalin Schmid MD MPH      potassium chloride  40 mEq Oral BID before breakfast/lunch Stalin Schmid MD MPH      valACYclovir  1,000 mg Oral Daily Brigitte Patel MD        Neurological Exam  Mental Status  Alert and oriented x 3 with no evidence of dysarthria or aphasia, she is able to read and repeat and recognize objects, with normal attention and concentration     Cranial Nerves  CN II: Right visual acuity: Counts fingers. Left visual acuity: Counts fingers. Right normal visual field. Left normal visual field.  CN III, IV, VI: Extraocular movements intact bilaterally.  CN V:  Right: Facial sensation is normal.  Left: Diminished sensation of the entire left side of the face.   CN VII:  Right: There is no facial weakness.  Left: There is central facial weakness.  CN XII: Tongue midline without atrophy or fasciculations.  - hearing grossly intact .     Motor     5/5 uppers and lowers except, except at the shoulders she has a right greater than left shoulder arthropathy.     Sensory Right extinction absent: Left extinction absent:  - decreased sensation to LT on LUE compared to RUE, Lowers intact.      Coordination  FTN no ataxia, limited rom at shoulders secondary to shoulder arthropathy.       Lab Results: I have reviewed the following results:    Recent Results (from the past 24 hours)   Echo complete w/ contrast if indicated    Collection Time: 05/22/25  8:23 AM   Result Value Ref Range    BSA 1.79 m2    A4C EF 43 %    LVIDd 5.00 cm    LVIDS 4.00 cm    IVSd 0.80 cm    LVPWd 0.80 cm    FS 20 28 - 44    MV E' Tissue Velocity Septal 9 cm/s    LA Volume Index (BP) 12.8 mL/m2    E/A ratio 0.81     E wave deceleration time 146 ms    MV Peak E Spencer 64 cm/s    MV Peak A Spencer 0.79 m/s    RVID d 2.6 cm    LA size 3 cm    LA length (A2C) 3.80 cm    LA volume (BP) 23 mL    RAA A4C 10 cm2    MV stenosis pressure 1/2 time 42 ms    MV valve area p 1/2 method 5.24   "   Ao root 2.90 cm    Asc Ao 2.9 cm    Left ventricular stroke volume (2D) 49.00 mL    IVS 0.8 cm    LEFT VENTRICLE SYSTOLIC VOLUME (MOD BIPLANE) 2D 71 mL    LV DIASTOLIC VOLUME (MOD BIPLANE) 2D 120 mL    Left Atrium Area-systolic Four Chamber 9.1 cm2    Left Atrium Area-systolic Apical Two Chamber 11.1 cm2    LVSV, 2D 49 mL   Basic metabolic panel    Collection Time: 05/23/25  4:29 AM   Result Value Ref Range    Sodium 138 135 - 147 mmol/L    Potassium 3.3 (L) 3.5 - 5.3 mmol/L    Chloride 103 96 - 108 mmol/L    CO2 26 21 - 32 mmol/L    ANION GAP 9 4 - 13 mmol/L    BUN 5 5 - 25 mg/dL    Creatinine 0.52 (L) 0.60 - 1.30 mg/dL    Glucose 85 65 - 140 mg/dL    Calcium 8.5 8.4 - 10.2 mg/dL    eGFR 116 ml/min/1.73sq m   Protime-INR    Collection Time: 05/23/25  4:29 AM   Result Value Ref Range    Protime 16.1 (H) 12.3 - 15.0 seconds    INR 1.21 (H) 0.85 - 1.19   CBC and differential    Collection Time: 05/23/25  4:29 AM   Result Value Ref Range    WBC 13.21 (H) 4.31 - 10.16 Thousand/uL    RBC 3.98 3.81 - 5.12 Million/uL    Hemoglobin 13.5 11.5 - 15.4 g/dL    Hematocrit 39.5 34.8 - 46.1 %    MCV 99 (H) 82 - 98 fL    MCH 33.9 26.8 - 34.3 pg    MCHC 34.2 31.4 - 37.4 g/dL    RDW 15.2 (H) 11.6 - 15.1 %    MPV 9.0 8.9 - 12.7 fL    Platelets 369 149 - 390 Thousands/uL    nRBC 0 /100 WBCs    Segmented % 62 43 - 75 %    Immature Grans % 0 0 - 2 %    Lymphocytes % 29 14 - 44 %    Monocytes % 6 4 - 12 %    Eosinophils Relative 2 0 - 6 %    Basophils Relative 1 0 - 1 %    Absolute Neutrophils 8.18 (H) 1.85 - 7.62 Thousands/µL    Absolute Immature Grans 0.05 0.00 - 0.20 Thousand/uL    Absolute Lymphocytes 3.84 0.60 - 4.47 Thousands/µL    Absolute Monocytes 0.75 0.17 - 1.22 Thousand/µL    Eosinophils Absolute 0.32 0.00 - 0.61 Thousand/µL    Basophils Absolute 0.07 0.00 - 0.10 Thousands/µL     Per radiology interpretation -    \"  Procedure: Echo complete w/ contrast if indicated  Result Date: 5/22/2025  Narrative:   Left Ventricle: Left " ventricular cavity size is dilated. Wall thickness is mildly increased. The left ventricular ejection fraction is 35-40%. Systolic function is moderately reduced. There is moderate global hypokinesis with regional variation. Diastolic function is mildly abnormal, consistent with grade I (abnormal) relaxation.   IVS: There is abnormal septal motion of unclear etiology.   Right Ventricle: Right ventricular cavity size is normal. Systolic function is mildly reduced.   Left Atrium: The atrium is dilated.   Atrial Septum: There is lipomatous hypertrophy of the interatrial septum. No patent foramen ovale detected, confirmed by provocation with cough, using agitated saline contrast and with valsalva, using agitated saline contrast.     Procedure: MRI brain wo contrast  Result Date: 5/22/2025  Narrative: MRI BRAIN WITHOUT CONTRAST INDICATION: numbness tingling- r/o stroke. COMPARISON:   CT/CTA from yesterday. TECHNIQUE:  Multiplanar, multisequence imaging of the brain was performed. IMAGE QUALITY:  Diagnostic. FINDINGS: BRAIN PARENCHYMA: There are foci of restricted diffusion in the right thalamus and posterior right putamen consistent with acute lacunar infarcts. No acute hemorrhage. Stable chronic lacunar infarct in the left caudate. Focal hemosiderin deposition in the left thalamus. VENTRICLES:  Normal for the patient's age. SELLA AND PITUITARY GLAND:  Normal. ORBITS:  Normal. PARANASAL SINUSES:  Normal. VASCULATURE:  Evaluation of the major intracranial vasculature demonstrates appropriate flow voids. CALVARIUM AND SKULL BASE:  Normal. EXTRACRANIAL SOFT TISSUES:  Normal.     Impression: Acute lacunar infarcts in the right thalamus and posterior right putamen. No acute hemorrhage. The study was marked in EPIC for immediate notification. Workstation performed: [id number]     Procedure: XR chest 1 view portable  Result Date: 5/21/2025  Narrative: XR CHEST PORTABLE INDICATION: Left facial droop/sensory disturbance > 24 hrs.  COMPARISON: CT chest, abdomen pelvis 4/23/2025 FINDINGS: Clear lungs. No pneumothorax or pleural effusion. Normal cardiomediastinal silhouette. Bones are unremarkable for age. Normal upper abdomen.     Impression: No acute cardiopulmonary disease. Workstation performed: [id number]     Procedure: CTA head and neck with and without contrast  Result Date: 5/21/2025  Narrative: CTA NECK AND BRAIN WITH AND WITHOUT CONTRAST INDICATION: L sensory disturbance and L facial droop >24 hrs patient complains of 10/10 pain with left arm numbness. COMPARISON:   4/23/2025 cervical spine and head CT's TECHNIQUE:  Routine CT imaging of the Brain without contrast.Post contrast imaging was performed after administration of iodinated contrast through the neck and brain. Post contrast axial 0.625 mm images timed to opacify the arterial system.  3D rendering was performed on an independent workstation.   MIP reconstructions performed. Coronal and sagittal reconstructions were performed of the non contrast portion of the brain. Radiation dose length product (DLP) for this visit:  2215 mGy-cm. .  This examination, like all CT scans performed in the [facility], was performed utilizing techniques to minimize radiation dose exposure, including the use of iterative reconstruction and automated exposure control. IV Contrast:  85 mL of iohexol (OMNIPAQUE) IMAGE QUALITY:   Diagnostic FINDINGS: NONCONTRAST BRAIN PARENCHYMA AND EXTRA-AXIAL SPACES: No hemorrhage, extra-axial fluid collection, mass effect or midline shift. Similar, mild nonspecific periventricular white matter lucencies are typically related to changes of microangiopathy and small chronic left caudate lacunar infarct. Preserved grey-white matter differentiation. VENTRICLES:  Within normal limits for age. VISUALIZED ORBITS AND PARANASAL SINUSES:  Globes and orbits are within normal limits. Sinuses are well aerated. CTA NECK ARCH AND GREAT VESSELS: Normal aorta  "caliber and enhancement. Patent subclavian arteries. VERTEBRAL ARTERIES: Normal caliber and enhancement bilaterally. RIGHT CAROTID:  Normal caliber and enhancement. LEFT CAROTID: Normal caliber and enhancement. NASCET criteria was used to determine the degree of internal carotid artery diameter stenosis. CTA BRAIN: INTERNAL CAROTID ARTERIES:  Normal caliber and enhancement. ANTERIOR CEREBRAL ARTERY CIRCULATION:  Normal caliber and enhancement. MIDDLE CEREBRAL ARTERY CIRCULATION:  Normal bilateral M1 segment caliber and enhancement. Patent distal branches. DISTAL VERTEBRAL ARTERIES:  Patent bilaterally. Patent posterior inferior cerebellar arteries. BASILAR ARTERY:  Normal caliber and enhancement. Patent superior cerebellar artery origins. POSTERIOR CEREBRAL ARTERIES: Bilateral fetal origins. Both are patent and normal in caliber. VENOUS STRUCTURES:  Patent. NON VASCULAR ANATOMY BONY STRUCTURES: Degenerative changes. Clear mastoid air cells. SOFT TISSUES OF THE NECK: Chronic thyroid nodules. Last thyroid ultrasound 1/8/2024. Recommend follow-up per ultrasound recommendations. THORACIC INLET:  Within normal limits.     Impression: CT Brain:  No acute intracranial abnormality. CT Angiography: Normal CTA neck and brain. Workstation performed: [id number]   \"    Reviewed case with neurology attending, history and physical examination, labs and imaging completed, plan of care as per attending physician.  Please see attestation for further details.  Examined alongside attending physician.  Acted as scribe in this case.   "

## 2025-05-23 NOTE — ASSESSMENT & PLAN NOTE
45-year-old female presents with left-sided numbness including left face, lip, throat, left arm, left side of her trunk, and left leg.  Last known normal at 8 PM 5/20/2025.  She took aspirin tablet at home.  Her  reports noticing slurred speech and left facial droop at home.  Upon my evaluation at 4 PM 5/21/2025 NIHSS 1 for mild sensation loss over left side of the body.  CTA head/neck is negative.  EKG NSR   Troponins negative X 2  Last LDL in 2023 101  Last A1c in 2023 5.4  Based on her symptoms, could be a lacunar infarct of contralateral thalamus.  MRI: Acute lacunar infarcts in the right thalamus and posterior right putamen. No acute hemorrhage.     Plan:  Admit under stroke pathway.  Neurology consult appreciated.  Loaded with Plavix 300 mg  Start Plavix 75 mg and aspirin 81 mg maintenance dose tomorrow.  LDL and A1c  Telemetry  PT/OT  Transesophageal echo planned for 5/26
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
## 2025-05-23 NOTE — HOSPITAL COURSE
Lorri Montes is a 45 y.o. female with a PMH of anxiety, polyarthralgia, smoking who presents with left-sided numbness.  Patient reports that her symptoms started last night around 8 PM when she was going to bed which interrupted her sleep and kept her awake until 4 AM.  However, they got worse this morning.  Symptoms include feeling numbness over her left face, left leg, left throat, left arm, left trunk, and left leg. Upon my evaluation, she stated that the numbness is still there.  Denied weakness, blurry vision, or headache.  She never had similar symptoms.      Complained of a chronic dry cough.  No chest pain, difficulty breathing, or palpitations.  No sick contacts.  No fever or chills.  No recent travel.      Social: Her father had a stroke in his 50s.  States that she is having a lot of financial and social stressors. Smokes 5 cigarettes/day since the age of 16.      In the ED, patient was hypertensive (174/112), tachycardiac, CBC shows leukocytosis, hypokalemia.   X-ray and CTA shows no acute cardiopulmonary and intracranial abnormality, respectively.     EKG : normal sinus, Nonspecific ST abnormality, Prolonged QT
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
## 2025-05-23 NOTE — ASSESSMENT & PLAN NOTE
Hypertensive urgency on admission, /112  Not on medication prior to admission  Started on Metoprolol succinate 25mg daily and losartan 25mg daily yesterday; BPs still elevated; add spironolactone 25mg daily today.

## 2025-05-23 NOTE — ASSESSMENT & PLAN NOTE
Lab Results   Component Value Date/Time    [id number] 81 (L) 05/21/2025 06:42 PM      Plan  Cyanocobalamin inj 1000mg weekly, follow up with PCP

## 2025-05-23 NOTE — ASSESSMENT & PLAN NOTE
Patient reports 2 episodes of diarrhea this morning.  She has history of lactose intolerance but continues to eat lactose.  In the ED, received potassium 80 Meq    Plan:  Recommend follow up BMP 1-2 weeks
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
## 2025-05-23 NOTE — PLAN OF CARE
Problem: Potential for Falls  Goal: Patient will remain free of falls  Description: INTERVENTIONS:  - Educate patient/family on patient safety including physical limitations  - Instruct patient to call for assistance with activity   - Consider consulting OT/PT to assist with strengthening/mobility based on AM PAC & JH-HLM score  - Consult OT/PT to assist with strengthening/mobility   - Keep Call bell within reach  - Keep bed low and locked with side rails adjusted as appropriate  - Keep care items and personal belongings within reach  - Initiate and maintain comfort rounds  - Make Fall Risk Sign visible to staff  - Offer Toileting every 2   Hours, in advance of need  - Obtain necessary fall risk management equipment  - Apply yellow socks and bracelet for high fall risk patients  - Consider moving patient to room near nurses station  Outcome: Progressing     Problem: NEUROSENSORY - ADULT  Goal: Achieves stable or improved neurological status  Description: INTERVENTIONS  - Monitor and report changes in neurological status  - Monitor vital signs such as temperature, blood pressure, glucose, and any other labs ordered   - Initiate measures to prevent increased intracranial pressure  - Monitor for seizure activity and implement precautions if appropriate      Outcome: Progressing

## 2025-05-23 NOTE — ASSESSMENT & PLAN NOTE
Patient currently not on any antihypertensive medication.  Systolic (24hrs), Av , Min:129 , Max:165     Diastolic (24hrs), Av, Min:89, Max:119     Plan  Toprol XL 25 mg daily and losartan 25 mg daily per cardiology

## 2025-05-23 NOTE — ASSESSMENT & PLAN NOTE
Echo on 5/22:   Left ventricular ejection fraction is 35-40%. Systolic function is moderately reduced. There is moderate global hypokinesis with regional variation. Diastolic function is mildly abnormal, consistent with grade I (abnormal) relaxation.    IVS: There is abnormal septal motion of unclear etiology.    Right Ventricle: Right ventricular cavity size is normal. Systolic function is mildly reduced.    Left Atrium: The atrium is dilated  Cardiac stress test results pending..

## 2025-05-23 NOTE — ASSESSMENT & PLAN NOTE
Lab Results   Component Value Date/Time    [id number] 81 (L) 05/21/2025 06:42 PM      Plan  Cyanocobalamin inj 1000mg weekly

## 2025-05-23 NOTE — PLAN OF CARE
Problem: Potential for Falls  Goal: Patient will remain free of falls  Description: INTERVENTIONS:  - Educate patient/family on patient safety including physical limitations  - Instruct patient to call for assistance with activity   - Consider consulting OT/PT to assist with strengthening/mobility based on AM PAC & JH-HLM score  - Consult OT/PT to assist with strengthening/mobility   - Keep Call bell within reach  - Keep bed low and locked with side rails adjusted as appropriate  - Keep care items and personal belongings within reach  - Initiate and maintain comfort rounds  - Make Fall Risk Sign visible to staff  - Offer Toileting every  Hours, in advance of need  - Initiate/Maintain alarm  - Obtain necessary fall risk management equipment:   - Apply yellow socks and bracelet for high fall risk patients  - Consider moving patient to room near nurses station  Outcome: Progressing     Problem: NEUROSENSORY - ADULT  Goal: Achieves stable or improved neurological status  Description: INTERVENTIONS  - Monitor and report changes in neurological status  - Monitor vital signs such as temperature, blood pressure, glucose, and any other labs ordered   - Initiate measures to prevent increased intracranial pressure  - Monitor for seizure activity and implement precautions if appropriate      Outcome: Progressing
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
## 2025-05-23 NOTE — ASSESSMENT & PLAN NOTE
"45 y.o. RHD  female with depression, tobacco use, who presents to [facility] on 5/22/25 with gradual onset of L sided numbness/tingling of face/LUE/LLE. Per ED notes, LKW between 1500/1600 5/20. BP on arrival 174/112. Per ED notes NIHSS 2 (L facial weakness, L sensory deficit). Pt AP/AC naive PTA.       Workup  - CTA H/N wwo contrast 5/21/25:   \"CT Brain:  No acute intracranial abnormality.   CT Angiography: Normal CTA neck and brain.\"  - MRI brain wo contrast 5/22/25:   \"Acute lacunar infarcts in the right thalamus and posterior right putamen. No acute hemorrhage. \"  - Labs  - total cholesterol 155, LDL 97   - hemoglobin A1c 5.5   - Echo: LV dilated, EF 35-40%, systolic function moderately reduced, moderate global hypokinesis with regional variation, L atrium dilated, no PFO   - CT chest abdomen pelvis 4/23 - \"No evidence of acute trauma in the chest, abdomen or pelvis.\"    Plan  - Stroke pathway  MRI brain completed, see above   Echo completed, see above  Appreciate cardiology evaluation, for nuclear stress test.   Recommend RUCHI, reviewed with cardiology   CT C/A/P - completed 4/23 please see above.   S/p Plavix 300 mg x1 on 5/21 (Pt took ASA 81 mg prior to coming to the ED 5/21)  Start DAPT with ASA 81 mg daily and Plavix 75 mg daily on 5/22/25. Likely will continue DAPT x21 days, then stop Plavix and continue with ASA monotherapy.   Consider hematology evaluation/thrombosis panel as out patient.   Atorvastatin 40 mg  Smoking cessation  May normalize BP as pt's symptoms >24 hours from onset. BP management per primary team   Euglycemic, normothermic goal  Continue telemetry  PT/OT/ST  Stroke education  Frequent neuro checks. Continue to monitor and notify neurology with any changes.  STAT CT head for any acute change in neuro exam  On B12 and Folate supplementation  - Medical management and supportive care per primary team. Correction of any metabolic or infectious disturbances.   - Reviewed with attending, plan of care " per attending physician.

## 2025-05-23 NOTE — ASSESSMENT & PLAN NOTE
Echo on 5/22:   Left ventricular ejection fraction is 35-40%. Systolic function is moderately reduced. There is moderate global hypokinesis with regional variation. Diastolic function is mildly abnormal, consistent with grade I (abnormal) relaxation.    IVS: There is abnormal septal motion of unclear etiology.    Right Ventricle: Right ventricular cavity size is normal. Systolic function is mildly reduced.    Left Atrium: The atrium is dilated  Discharge on Toprol 25 daily, Aldactone 25 daily, losartan 75 nightly

## 2025-05-23 NOTE — ASSESSMENT & PLAN NOTE
45-year-old female presents with left-sided numbness including left face, lip, throat, left arm, left side of her trunk, and left leg.  Last known normal at 8 PM 5/20/2025.  She took aspirin tablet at home.  Her  reports noticing slurred speech and left facial droop at home.  Upon my evaluation at 4 PM 5/21/2025 NIHSS 1 for mild sensation loss over left side of the body.  CTA head/neck is negative.  EKG NSR   Troponins negative X 2  Last LDL in 2023 101  Last A1c in 2023 5.4  Based on her symptoms, could be a lacunar infarct of contralateral thalamus.  MRI: Acute lacunar infarcts in the right thalamus and posterior right putamen. No acute hemorrhage.     Plan:  Stable for discharge   Continue Plavix for total course of 21 days  Continue ASA and statin indefinitely  Recommend follow up with Neurology   Follow up with cardiology for Zio monitor  Recommend outpatient PT/OT

## 2025-05-23 NOTE — ASSESSMENT & PLAN NOTE
Discharge on Toprol 25 dialy, Aldactone 25 daily, Losartan 75 nightly. Recommend close follow up with PCP  Systolic (24hrs), Av , Min:133 , Max:152     Diastolic (24hrs), Av, Min:84, Max:108     Plan  Toprol XL 25 mg daily and losartan 25 mg daily per cardiology

## 2025-05-23 NOTE — ASSESSMENT & PLAN NOTE
Presented with left sided numbness  MRI brain positive for right sided lacunar infarcts  ASA, Plavix, and statin per neurology  Neurology requesting RUCHI; would not be done inpatient until 5/27 due to weekend and holiday. Could arrange on 6/3 or 6/5 at OP; will defer to neurology wether this needs to be done prior to discharge.

## 2025-05-23 NOTE — ASSESSMENT & PLAN NOTE
K 3.3; persistently low K levels; check Mag today; starting on spironolactone; not on loop diuretic        Addendum to note: Neurology would like patient to have RUCHI prior to discharge; this cannot be done until 5/30 due to weekend and holiday. RUCHI ordered; NPO after MN Monday 5/29. Neurology to discuss with pt.   Otherwise pt is stable from cardiac standpoint on appropriate GDMT; she has close follow up scheduled on Friday 6/6 at our [place] office.     Cardiology will sign off rounding; she will get RUCHI with our department on Tuesday. Please call with questions or concerns in the meantime.

## 2025-05-24 LAB
ANION GAP SERPL CALCULATED.3IONS-SCNC: 8 MMOL/L (ref 4–13)
BASOPHILS # BLD AUTO: 0.06 THOUSANDS/ÂΜL (ref 0–0.1)
BASOPHILS NFR BLD AUTO: 0 % (ref 0–1)
BUN SERPL-MCNC: 7 MG/DL (ref 5–25)
CALCIUM SERPL-MCNC: 8.6 MG/DL (ref 8.4–10.2)
CHLORIDE SERPL-SCNC: 106 MMOL/L (ref 96–108)
CO2 SERPL-SCNC: 24 MMOL/L (ref 21–32)
CREAT SERPL-MCNC: 0.5 MG/DL (ref 0.6–1.3)
EOSINOPHIL # BLD AUTO: 0.4 THOUSAND/ÂΜL (ref 0–0.61)
EOSINOPHIL NFR BLD AUTO: 3 % (ref 0–6)
ERYTHROCYTE [DISTWIDTH] IN BLOOD BY AUTOMATED COUNT: 15.4 % (ref 11.6–15.1)
GFR SERPL CREATININE-BSD FRML MDRD: 118 ML/MIN/1.73SQ M
GLUCOSE SERPL-MCNC: 86 MG/DL (ref 65–140)
HCT VFR BLD AUTO: 41.4 % (ref 34.8–46.1)
HGB BLD-MCNC: 13.9 G/DL (ref 11.5–15.4)
IMM GRANULOCYTES # BLD AUTO: 0.07 THOUSAND/UL (ref 0–0.2)
IMM GRANULOCYTES NFR BLD AUTO: 1 % (ref 0–2)
LYMPHOCYTES # BLD AUTO: 4.3 THOUSANDS/ÂΜL (ref 0.6–4.47)
LYMPHOCYTES NFR BLD AUTO: 31 % (ref 14–44)
MAGNESIUM SERPL-MCNC: 1.6 MG/DL (ref 1.9–2.7)
MCH RBC QN AUTO: 34.2 PG (ref 26.8–34.3)
MCHC RBC AUTO-ENTMCNC: 33.6 G/DL (ref 31.4–37.4)
MCV RBC AUTO: 102 FL (ref 82–98)
MONOCYTES # BLD AUTO: 1.05 THOUSAND/ÂΜL (ref 0.17–1.22)
MONOCYTES NFR BLD AUTO: 8 % (ref 4–12)
NEUTROPHILS # BLD AUTO: 8.2 THOUSANDS/ÂΜL (ref 1.85–7.62)
NEUTS SEG NFR BLD AUTO: 57 % (ref 43–75)
NRBC BLD AUTO-RTO: 0 /100 WBCS
PLATELET # BLD AUTO: 350 THOUSANDS/UL (ref 149–390)
PMV BLD AUTO: 9 FL (ref 8.9–12.7)
POTASSIUM SERPL-SCNC: 4.1 MMOL/L (ref 3.5–5.3)
RBC # BLD AUTO: 4.06 MILLION/UL (ref 3.81–5.12)
SODIUM SERPL-SCNC: 138 MMOL/L (ref 135–147)
WBC # BLD AUTO: 14.08 THOUSAND/UL (ref 4.31–10.16)

## 2025-05-24 PROCEDURE — 80048 BASIC METABOLIC PNL TOTAL CA: CPT

## 2025-05-24 PROCEDURE — 99232 SBSQ HOSP IP/OBS MODERATE 35: CPT | Performed by: FAMILY MEDICINE

## 2025-05-24 PROCEDURE — 83735 ASSAY OF MAGNESIUM: CPT

## 2025-05-24 PROCEDURE — 85025 COMPLETE CBC W/AUTO DIFF WBC: CPT

## 2025-05-24 RX ORDER — MAGNESIUM SULFATE HEPTAHYDRATE 40 MG/ML
4 INJECTION, SOLUTION INTRAVENOUS ONCE
Status: COMPLETED | OUTPATIENT
Start: 2025-05-24 | End: 2025-05-24

## 2025-05-24 RX ADMIN — CELECOXIB 200 MG: 100 CAPSULE ORAL at 09:04

## 2025-05-24 RX ADMIN — CELECOXIB 200 MG: 100 CAPSULE ORAL at 17:34

## 2025-05-24 RX ADMIN — ATORVASTATIN CALCIUM 40 MG: 40 TABLET, FILM COATED ORAL at 17:34

## 2025-05-24 RX ADMIN — ACETAMINOPHEN 650 MG: 325 TABLET, FILM COATED ORAL at 23:31

## 2025-05-24 RX ADMIN — VALACYCLOVIR HYDROCHLORIDE 1000 MG: 500 TABLET, FILM COATED ORAL at 09:05

## 2025-05-24 RX ADMIN — MAGNESIUM SULFATE HEPTAHYDRATE 4 G: 40 INJECTION, SOLUTION INTRAVENOUS at 09:07

## 2025-05-24 RX ADMIN — FOLIC ACID 1 MG: 1 TABLET ORAL at 09:05

## 2025-05-24 RX ADMIN — CLOPIDOGREL BISULFATE 75 MG: 75 TABLET, FILM COATED ORAL at 09:05

## 2025-05-24 RX ADMIN — ACETAMINOPHEN 650 MG: 325 TABLET, FILM COATED ORAL at 11:27

## 2025-05-24 RX ADMIN — METOPROLOL SUCCINATE ER TABLETS 25 MG: 25 TABLET, FILM COATED, EXTENDED RELEASE ORAL at 09:05

## 2025-05-24 RX ADMIN — ACETAMINOPHEN 650 MG: 325 TABLET, FILM COATED ORAL at 04:34

## 2025-05-24 RX ADMIN — ASPIRIN 81 MG: 81 TABLET, CHEWABLE ORAL at 09:06

## 2025-05-24 RX ADMIN — ENOXAPARIN SODIUM 40 MG: 40 INJECTION SUBCUTANEOUS at 09:07

## 2025-05-24 RX ADMIN — SPIRONOLACTONE 25 MG: 25 TABLET ORAL at 09:04

## 2025-05-24 RX ADMIN — LIDOCAINE: 40 CREAM TOPICAL at 15:25

## 2025-05-24 RX ADMIN — LOSARTAN POTASSIUM 25 MG: 25 TABLET, FILM COATED ORAL at 09:04

## 2025-05-24 RX ADMIN — NICOTINE 7 MG: 7 PATCH, EXTENDED RELEASE TRANSDERMAL at 09:05

## 2025-05-24 NOTE — ASSESSMENT & PLAN NOTE
Lab Results   Component Value Date/Time    [id number] 81 (L) 05/21/2025 06:42 PM      Plan  Cyanocobalamin inj 1000mg weekly
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
## 2025-05-24 NOTE — ASSESSMENT & PLAN NOTE
45-year-old female presents with left-sided numbness including left face, lip, throat, left arm, left side of her trunk, and left leg.  Last known normal at 8 PM 5/20/2025.  She took aspirin tablet at home.  Her  reports noticing slurred speech and left facial droop at home.  CTA head/neck is negative.  MRI: Acute lacunar infarcts in the right thalamus and posterior right putamen. No acute hemorrhage.     Plan:  Continue telemetry  Continue DAPT for 21 days, followed by only ASA monotherapy  Neurology following  RUCHI scheduled on Tuesday  Smoking cessation  PT/OT

## 2025-05-24 NOTE — ASSESSMENT & PLAN NOTE
New cardiomyopathy with LVEF 35-40%, moderate global hypokinesis with regional variation, mildly reduced RV systolic function, grade 1 DD, mild MR   Nuclear stress test done today without evidence of ischemic or scarring.   Etiology unclear    Plan:  Cardiology following  Continue on GDMT Metoprolol succ 25 mg qd, Spironolactone 25 mg qd, Losartan  Needs follow up OP with cardiology

## 2025-05-24 NOTE — UTILIZATION REVIEW
Continued Stay Review    SEE INITIAL REVIEW AT BOTTOM   Date: 5/24/25                          Current Patient Class: Inpatient  Current Level of Care: MS    HPI:45 y.o. female initially admitted on 5/21/25 to observation and converted 5/23/25 to inpatient   Current Diagnosis:Stroke/New Cardiomyopathy/hypokalemia    Assessment/Plan:     5/24/2025 .  Patient presents with  no overnight events  On exam non focal  Abnormal labs or imaging:  wbc 14.08.       MRI: Acute lacunar infarcts in the right thalamus and posterior right putamen. No acute hemorrhage.      Echo LVEF 35-40%, moderate global hypokinesis with regional variation, mildly reduced RV systolic function, grade 1 DD, mild MR   Plan    Continue telemetry.  DAPT.  RUCHI on 5/27.  Smoking cessation.  Monitor neuro status.   Continue GDMT:  metoprolol succ, spironolactone, Losartan.  Replete electrolytes as needed.      Medications:   Scheduled Medications:  aspirin, 81 mg, Oral, Daily  atorvastatin, 40 mg, Oral, QPM  celecoxib, 200 mg, Oral, BID  clopidogrel, 75 mg, Oral, Daily  cyanocobalamin, 1,000 mcg, Intramuscular, Weekly  enoxaparin, 40 mg, Subcutaneous, Q24H ANDREW  folic acid, 1 mg, Oral, Daily  losartan, 25 mg, Oral, Daily  magnesium sulfate, 4 g, Intravenous, Once - 0907 5/24  metoprolol succinate, 25 mg, Oral, Daily  nicotine, 7 mg, Transdermal, Daily  spironolactone, 25 mg, Oral, Daily  valACYclovir, 1,000 mg, Oral, Daily    Continuous IV Infusions:     PRN Meds:  acetaminophen, 650 mg, Oral, Q6H PRN x 2 5/24/25  lidocaine, , Topical, Daily PRN    Discharge Plan: to be determined.     Vital Signs (last 3 days)       Date/Time Temp Pulse Resp BP MAP (mmHg) SpO2 O2 Device Patient Position - Orthostatic VS Brianne Coma Scale Score Pain    05/24/25 1127 -- -- -- -- -- -- -- -- -- 8    05/24/25 1100 98.3 °F (36.8 °C) 79 -- 146/101 116 98 % -- -- -- --    05/24/25 0713 97.8 °F (36.6 °C) 84 -- 145/104 118 99 % -- -- -- --    05/24/25 07:12:51 97.8 °F (36.6 °C)  83 -- 145/104 118 98 % -- -- -- --    05/24/25 0535 -- 89 -- -- -- 97 % -- -- -- --    05/24/25 0530 -- 66 -- -- -- 100 % -- -- -- --    05/24/25 0525 -- 71 -- -- -- 99 % -- -- -- --    05/24/25 0520 -- 73 -- -- -- 97 % -- -- -- --    05/24/25 0515 -- 65 -- -- -- 98 % -- -- -- --    05/24/25 0510 -- 74 -- -- -- 97 % -- -- -- --    05/24/25 0505 -- 65 -- -- -- 96 % -- -- -- --    05/24/25 0500 -- 64 -- -- -- 98 % -- -- -- --    05/24/25 0455 -- 76 -- -- -- 97 % -- -- -- --    05/24/25 0450 -- 72 -- -- -- 96 % -- -- -- --    05/24/25 0445 -- 76 -- -- -- 96 % -- -- -- --    05/24/25 0440 -- 78 -- -- -- 96 % -- -- -- --    05/24/25 0435 -- 73 -- -- -- 97 % -- -- -- --    05/24/25 0434 -- -- -- -- -- -- -- -- -- 7    05/24/25 0430 -- 72 -- 140/95 110 97 % -- Sitting -- --    05/24/25 0425 -- 74 -- -- -- 97 % -- -- -- --    05/24/25 0420 -- 72 -- -- -- 97 % -- -- -- --    05/24/25 0415 -- 75 -- -- -- 98 % -- -- -- --    05/24/25 0410 -- 71 -- -- -- 98 % -- -- -- --    05/24/25 0405 -- 78 -- -- -- 97 % -- -- -- --    05/24/25 0400 -- 69 16 140/95 110 98 % None (Room air) Sitting 15 --    05/24/25 0355 -- 70 -- -- -- 97 % -- -- -- --    05/24/25 0350 -- 70 -- -- -- 97 % -- -- -- --    05/24/25 0345 -- 75 -- -- -- 97 % -- -- -- --    05/24/25 0340 -- 75 -- -- -- 97 % -- -- -- --    05/24/25 0335 -- 74 -- -- -- 96 % -- -- -- --    05/24/25 0330 -- 65 -- -- -- 99 % -- -- -- --    05/24/25 0325 -- 80 -- -- -- 97 % -- -- -- --    05/24/25 0320 -- 73 -- -- -- 97 % -- -- -- --    05/24/25 0315 -- 80 -- -- -- 97 % -- -- -- --    05/24/25 0310 -- 72 -- -- -- 97 % -- -- -- --    05/24/25 0305 -- 74 -- -- -- 97 % -- -- -- --    05/24/25 0300 -- 75 -- -- -- 96 % -- -- -- --    05/24/25 0255 -- 70 -- -- -- 97 % -- -- -- --    05/24/25 0250 -- 75 -- -- -- 97 % -- -- -- --    05/24/25 0245 -- 81 -- -- -- 97 % -- -- -- --    05/24/25 0240 -- 77 -- -- -- 98 % -- -- -- --    05/24/25 0235 -- 83 -- -- -- 97 % -- -- -- --    05/24/25 0230 --  86 -- -- -- 96 % -- -- -- --    05/24/25 0225 -- 77 -- -- -- 97 % -- -- -- --    05/24/25 0220 -- 68 -- -- -- 97 % -- -- -- --    05/24/25 0215 -- 74 -- -- -- 97 % -- -- -- --    05/24/25 0210 -- 79 -- -- -- 96 % -- -- -- --    05/24/25 0205 -- 78 -- -- -- 96 % -- -- -- --    05/24/25 0200 -- 71 -- -- -- 98 % -- -- -- --    05/24/25 0155 -- 68 -- -- -- 98 % -- -- -- --    05/24/25 0150 -- 74 -- -- -- 98 % -- -- -- --    05/24/25 0145 -- 79 -- -- -- 98 % -- -- -- --    05/24/25 0140 -- 77 -- -- -- 98 % -- -- -- --    05/24/25 0135 -- 65 -- -- -- 99 % -- -- -- --    05/24/25 0130 -- 67 -- -- -- 97 % -- -- -- --    05/24/25 0125 -- 69 -- -- -- 98 % -- -- -- --    05/24/25 0120 -- 69 -- -- -- 98 % -- -- -- --    05/24/25 0115 -- 72 -- -- -- 98 % -- -- -- --    05/24/25 0110 -- 76 -- -- -- 97 % -- -- -- --    05/24/25 0105 -- 91 -- -- -- 97 % -- -- -- --    05/24/25 0100 -- 72 -- -- -- 96 % -- -- -- --    05/24/25 0055 -- 94 -- -- -- 98 % -- -- -- --    05/24/25 0050 -- 71 -- -- -- 97 % -- -- -- --    05/24/25 0045 -- 70 -- -- -- 97 % -- -- -- --    05/24/25 0040 -- 76 -- -- -- 97 % -- -- -- --    05/24/25 0035 -- 83 -- -- -- 96 % -- -- -- --    05/24/25 0030 -- 76 -- -- -- 97 % -- -- -- --    05/24/25 0025 -- 77 -- -- -- 98 % -- -- -- --    05/24/25 0020 -- 78 -- -- -- 98 % -- -- -- --    05/24/25 0015 -- 76 -- -- -- 98 % -- -- -- --    05/24/25 0010 -- 79 -- -- -- 98 % -- -- -- --    05/24/25 0005 -- 76 -- -- -- 98 % -- -- -- --    05/24/25 0000 -- 68 18 130/82 -- 97 % None (Room air) Sitting 15 --    05/23/25 2235 -- -- -- 130/82 -- -- -- Sitting -- --    05/23/25 22:29:13 98.6 °F (37 °C) 87 -- 154/106 122 100 % -- -- -- --    05/23/25 2000 -- -- -- -- -- 99 % -- -- 15 No Pain    05/23/25 1900 98.5 °F (36.9 °C) 75 18 154/94 114 -- None (Room air) Sitting -- --    05/23/25 1740 -- -- -- -- -- -- -- -- -- No Pain    05/23/25 1600 -- -- -- -- -- -- -- -- 15 --    05/23/25 15:14:08 98.8 °F (37.1 °C) 81 -- 129/89 102 98 %  -- -- -- --    05/23/25 1200 -- -- -- -- -- -- -- -- 15 --    05/23/25 1000 -- -- -- -- -- -- -- -- -- 5    05/23/25 0900 -- 70 -- 152/90 -- 97 % -- -- -- --    05/23/25 0800 -- -- -- -- -- -- -- -- 15 3    05/23/25 06:54:36 98.3 °F (36.8 °C) 81 -- 150/106 121 98 % -- -- -- --    05/23/25 0400 98.2 °F (36.8 °C) 79 18 151/93 112 97 % None (Room air) Lying 15 --    05/23/25 0000 98.2 °F (36.8 °C) 80 18 149/108 122 98 % None (Room air) Lying 15 No Pain    05/22/25 2000 97.7 °F (36.5 °C) 85 19 165/119 134 98 % None (Room air) Lying 15 No Pain    05/22/25 1724 -- -- -- -- -- -- -- -- -- 4    05/22/25 1600 -- 94 18 164/115 -- -- -- -- 15 --    05/22/25 15:12:41 -- -- -- -- -- 95 % -- -- -- --    05/22/25 1441 -- 119 -- 156/107 -- -- -- -- -- --    05/22/25 1215 -- -- -- -- -- -- -- -- -- 6    05/22/25 1200 -- 120 20 143/110 -- 97 % None (Room air) Lying 15 --    05/22/25 11:02:13 98.6 °F (37 °C) 93 -- 160/107 125 97 % -- -- -- --    05/22/25 0932 -- -- -- -- -- -- -- -- -- 8 05/22/25 0859 -- -- -- -- -- -- -- -- -- 8 05/22/25 0858 -- -- -- -- -- -- -- -- -- 8    05/22/25 0822 -- 98 -- 148/108 -- -- -- -- -- --    05/22/25 0800 98.3 °F (36.8 °C) -- 16 150/102 118 97 % -- -- 15 --    05/22/25 0708 -- -- -- 148/108 -- -- -- -- -- --    05/22/25 07:01:10 98.3 °F (36.8 °C) 98 -- 158/106 123 97 % -- -- -- --    05/22/25 0400 98.3 °F (36.8 °C) 83 18 164/105 -- 98 % None (Room air) Lying 15 --    05/22/25 01:55:22 97.7 °F (36.5 °C) 95 -- 153/112 126 96 % -- -- 15 --    05/22/25 00:53:38 97.7 °F (36.5 °C) 94 -- 168/106 127 97 % -- -- -- --    05/21/25 22:01:23 99 °F (37.2 °C) 85 -- 145/100 115 98 % -- -- -- --    05/21/25 2200 -- -- -- -- -- -- -- -- 15 --    05/21/25 2017 -- -- -- -- -- -- -- -- -- 8 05/21/25 2000 98.7 °F (37.1 °C) -- -- -- -- -- None (Room air) -- -- --    05/21/25 19:55:23 -- -- -- -- -- -- -- -- 15 --    05/21/25 19:55:05 98.7 °F (37.1 °C) 78 -- 139/96 110 -- -- -- -- --    05/21/25 18:59:47 98.6 °F  (37 °C) 89 -- 177/108 131 96 % -- -- -- --    05/21/25 17:00:12 98.2 °F (36.8 °C) 84 -- 165/109 128 97 % -- -- -- --    05/21/25 1631 98.2 °F (36.8 °C) 84 20 165/109 -- 97 % -- -- -- --    05/21/25 1610 -- 80 20 181/94 -- 98 % None (Room air) -- -- --    05/21/25 1535 -- -- -- -- -- -- None (Room air) -- -- --    05/21/25 1515 -- -- -- -- -- -- -- -- -- 7    05/21/25 1500 -- -- -- -- -- -- -- -- 15 --    05/21/25 1445 -- -- -- -- -- -- -- -- -- 10 - Worst Possible Pain    05/21/25 1411 -- -- -- 174/112 -- -- -- -- -- --    05/21/25 1409 98.7 °F (37.1 °C) 105 20 -- -- 98 % None (Room air) Sitting -- 10 - Worst Possible Pain          Weight (last 2 days)       Date/Time Weight    05/24/25 0029 74.3 (163.8)    05/23/25 0900 73.5 (162)    05/22/25 0822 73.5 (162)            Date and Time Trauma Responsiveness Trauma Length of LOC (Seconds) R Pupil Size (mm) L Pupil Size (mm) R Pupil Reaction L Pupil Reaction   05/24/25 0400 -- -- 3 3 Brisk Brisk   05/24/25 0000 -- -- 3 3 Brisk Brisk   05/23/25 2000 -- -- 3 3 Brisk Brisk   05/23/25 1600 -- -- 3 3 Brisk Brisk   05/23/25 1200 -- -- 3 3 Brisk Brisk   05/23/25 0800 -- -- 3 3 Brisk Brisk   05/23/25 0400 -- -- 3 3 Brisk Brisk   05/23/25 0000 -- -- 3 3 Brisk Brisk   05/22/25 2000 -- -- 3 3 Brisk Brisk   05/22/25 1600 -- -- 3 3 Brisk Brisk   05/22/25 1200 -- -- 3 3 Brisk Brisk   05/22/25 0800 -- -- 3 3 Brisk Brisk   05/22/25 0400 -- -- 3 3 Brisk Brisk   05/22/25 0155 -- -- 3 3 Brisk Brisk   05/22/25 0000 -- -- 3 3 Brisk Brisk   05/21/25 2200 -- -- 3 3 Brisk Brisk   05/21/25 1955 -- -- 3 3 Brisk Brisk   05/21/25 1900 -- -- 3 3 Brisk Brisk   05/21/25 1800 -- -- 3 3 Brisk Brisk   05/21/25 1700 -- -- 3 3 Brisk Brisk   05/21/25 1500 -- -- 3 3 Brisk Brisk     Date and Time Eye Opening Best Verbal Response Best Motor Response [place] Coma Scale Score   05/24/25 0400 4 5 6 15   05/24/25 0000 4 5 6 15   05/23/25 2000 4 5 6 15   05/23/25 1600 4 5 6 15   05/23/25 1200 4 5 6 15   05/23/25  0800 4 5 6 15   05/23/25 0400 4 5 6 15   05/23/25 0000 4 5 6 15   05/22/25 2000 4 5 6 15   05/22/25 1600 4 5 6 15   05/22/25 1200 4 5 6 15   05/22/25 0800 4 5 6 15   05/22/25 0400 4 5 6 15   05/22/25 0155 4 5 6 15   05/21/25 2200 4 5 6 15   05/21/25 1955 4 5 6 15   05/21/25 1500 4 5 6 15     Pertinent Labs/Diagnostic Results:   Radiology:  MRI brain wo contrast   Final Interpretation by E. Alec Schoenberger, MD (05/22 0808)   Acute lacunar infarcts in the right thalamus and posterior right putamen. No acute hemorrhage.      The study was marked in EPIC for immediate notification.      Workstation performed: [id number]         CTA head and neck with and without contrast   Final Interpretation by Gregoria Burt MD (05/21 1538)      CT Brain:  No acute intracranial abnormality.      CT Angiography: Normal CTA neck and brain.               Workstation performed: [id number]         XR chest 1 view portable   Final Interpretation by Matt Venegas DO (05/21 1600)      No acute cardiopulmonary disease.            Workstation performed: [id number]           Cardiology:  NM myocardial perfusion spect (rx stress and/or rest)   Final Result by Stuart Stallings DO (05/23 1053)        Stress ECG: The patient and had a maximal HR of 117 bpm (66% of MPHR)    and 1.0 METS. The patient experienced no angina during the test. The    patient reached the end of the protocol. The patient reported mild    shortness of breath during the stress test. Symptoms began during stress    and ended during recovery. The patient reported no other cardiac symptoms    during the stress test.     Stress ECG: No ST deviation is noted. The ECG was not diagnostic due to    pharmacological (vasodilator) stress.     Perfusion: There are no perfusion defects.     Stress Function: Left ventricular function post-stress is abnormal.    Stress ejection fraction is 37%.     Stress Combined Conclusion: Left ventricular perfusion is normal.       Ejection fraction 37%.  No ischemia or scar.         Echo complete w/ contrast if indicated   Final Result by Radha Mae MD (05/22 1125)        Left Ventricle: Left ventricular cavity size is dilated. Wall thickness    is mildly increased. The left ventricular ejection fraction is 35-40%.    Systolic function is moderately reduced. There is moderate global    hypokinesis with regional variation. Diastolic function is mildly    abnormal, consistent with grade I (abnormal) relaxation.     IVS: There is abnormal septal motion of unclear etiology.     Right Ventricle: Right ventricular cavity size is normal. Systolic    function is mildly reduced.     Left Atrium: The atrium is dilated.     Atrial Septum: There is lipomatous hypertrophy of the interatrial    septum. No patent foramen ovale detected, confirmed by provocation with    cough, using agitated saline contrast and with valsalva, using agitated    saline contrast.         ECG 12 lead   Final Result by Kylah Mujica MD (05/22 0810)   Normal sinus rhythm   Nonspecific ST abnormality   Prolonged QT   Abnormal ECG   When compared with ECG of 17-Nov-2014 12:02,   QT has lengthened   Confirmed by Kylah Mujica ([id number]) on 5/22/2025 8:10:52 AM        GI:  No orders to display       Results from last 7 days   Lab Units 05/24/25  0428 05/23/25  0429 05/22/25  0426 05/21/25  1440   WBC Thousand/uL 14.08* 13.21* 10.73* 14.44*   HEMOGLOBIN g/dL 13.9 13.5 13.6 14.9   HEMATOCRIT % 41.4 39.5 38.9 43.1   PLATELETS Thousands/uL 350 369 378 380   TOTAL NEUT ABS Thousands/µL 8.20* 8.18* 5.81 9.66*     Results from last 7 days   Lab Units 05/24/25  0428 05/23/25  0429 05/22/25  0426 05/21/25 2235 05/21/25  1440   SODIUM mmol/L 138 138 136  --  136   POTASSIUM mmol/L 4.1 3.3* 3.3* 3.2* 2.7*   CHLORIDE mmol/L 106 103 101  --  97   CO2 mmol/L 24 26 27  --  30   ANION GAP mmol/L 8 9 8  --  9   BUN mg/dL 7 5 9  --  4*   CREATININE mg/dL 0.50* 0.52* 0.58*  --  0.52*   EGFR  ml/min/1.73sq m 118 116 112  --  116   CALCIUM mg/dL 8.6 8.5 8.5  --  8.7   MAGNESIUM mg/dL 1.6* 1.7*  --   --   --      Results from last 7 days   Lab Units 05/21/25  1440   AST U/L 12*   ALT U/L 8   ALK PHOS U/L 55   TOTAL PROTEIN g/dL 7.6   ALBUMIN g/dL 4.3   TOTAL BILIRUBIN mg/dL 0.42     Results from last 7 days   Lab Units 05/21/25  1634 05/21/25  1533   POC GLUCOSE mg/dl 140 66     Results from last 7 days   Lab Units 05/24/25  0428 05/23/25  0429 05/22/25  0426 05/21/25  1440   GLUCOSE RANDOM mg/dL 86 85 86 87     Results from last 7 days   Lab Units 05/21/25  1440   HEMOGLOBIN A1C % 5.5   EAG mg/dl 111     Results from last 7 days   Lab Units 05/21/25  1842 05/21/25  1641 05/21/25  1440   HS TNI 0HR ng/L  --   --  4   HS TNI 2HR ng/L  --  5  --    HSTNI D2 ng/L  --  1  --    HS TNI 4HR ng/L 6  --   --    HSTNI D4 ng/L 2  --   --      Results from last 7 days   Lab Units 05/23/25  0429   PROTIME seconds 16.1*   INR  1.21*     Results from last 7 days   Lab Units 05/21/25  1842   TSH 3RD GENERATON uIU/mL 1.106       Network Utilization Review Department  ATTENTION: Please call with any questions or concerns to [phone] and carefully listen to the prompts so that you are directed to the right person. All voicemails are confidential.   For Discharge needs, contact Care Management DC Support Team at [phone] opt. [phone]  Send all requests for admission clinical reviews, approved or denied determinations and any other requests to dedicated fax number below belonging to the campus where the patient is receiving treatment. List of dedicated fax numbers for the Facilities:  FACILITY NAME UR FAX NUMBER   ADMISSION DENIALS (Administrative/Medical Necessity) [phone]   DISCHARGE SUPPORT TEAM (NETWORK) [phone]   PARENT CHILD HEALTH (Maternity/NICU/Pediatrics) [phone]   [facility] [phone]   [facility] [phone]   [facility]  [facility] [phone]   [facility] [phone]   [facility] [phone]   [facility] [phone]   [facility] [phone]   [facility] [phone]   [facility] [phone]   Critical access hospital [phone]   [facility] [phone]   [facility] [phone]

## 2025-05-24 NOTE — ASSESSMENT & PLAN NOTE
Patient reports 2 episodes of diarrhea this morning.  She has history of lactose intolerance but continues to eat lactose.  In the ED, received potassium 80 Meq    Plan:  Monitor and replete

## 2025-05-24 NOTE — PLAN OF CARE
Problem: Potential for Falls  Goal: Patient will remain free of falls  Description: INTERVENTIONS:  - Educate patient/family on patient safety including physical limitations  - Instruct patient to call for assistance with activity   - Consider consulting OT/PT to assist with strengthening/mobility based on AM PAC & JH-HLM score  - Consult OT/PT to assist with strengthening/mobility   - Keep Call bell within reach  - Keep bed low and locked with side rails adjusted as appropriate  - Keep care items and personal belongings within reach  - Initiate and maintain comfort rounds  - Make Fall Risk Sign visible to staff  - Offer Toileting every 2 Hours, in advance of need  - Initiate/Maintain alarm  - Obtain necessary fall risk management equipment:   - Apply yellow socks and bracelet for high fall risk patients  - Consider moving patient to room near nurses station  Outcome: Progressing     Problem: NEUROSENSORY - ADULT  Goal: Achieves stable or improved neurological status  Description: INTERVENTIONS  - Monitor and report changes in neurological status  - Monitor vital signs such as temperature, blood pressure, glucose, and any other labs ordered   - Initiate measures to prevent increased intracranial pressure  - Monitor for seizure activity and implement precautions if appropriate      Outcome: Progressing

## 2025-05-24 NOTE — ASSESSMENT & PLAN NOTE
Previously not on medication    Continue recently started medications: toprol XL, losartan, aldactone

## 2025-05-24 NOTE — PLAN OF CARE
Problem: Potential for Falls  Goal: Patient will remain free of falls  Description: INTERVENTIONS:  - Educate patient/family on patient safety including physical limitations  - Instruct patient to call for assistance with activity   - Consider consulting OT/PT to assist with strengthening/mobility based on AM PAC & JH-HLM score  - Consult OT/PT to assist with strengthening/mobility   - Keep Call bell within reach  - Keep bed low and locked with side rails adjusted as appropriate  - Keep care items and personal belongings within reach  - Initiate and maintain comfort rounds  - Make Fall Risk Sign visible to staff  - Offer Toileting every 2 Hours, in advance of need  - Apply yellow socks and bracelet for high fall risk patients  - Consider moving patient to room near nurses station  Outcome: Progressing     Problem: NEUROSENSORY - ADULT  Goal: Achieves stable or improved neurological status  Description: INTERVENTIONS  - Monitor and report changes in neurological status  - Monitor vital signs such as temperature, blood pressure, glucose, and any other labs ordered   - Initiate measures to prevent increased intracranial pressure  - Monitor for seizure activity and implement precautions if appropriate      Outcome: Progressing

## 2025-05-25 LAB
ANION GAP SERPL CALCULATED.3IONS-SCNC: 8 MMOL/L (ref 4–13)
BUN SERPL-MCNC: 9 MG/DL (ref 5–25)
CALCIUM SERPL-MCNC: 8.6 MG/DL (ref 8.4–10.2)
CHLORIDE SERPL-SCNC: 104 MMOL/L (ref 96–108)
CO2 SERPL-SCNC: 25 MMOL/L (ref 21–32)
CREAT SERPL-MCNC: 0.51 MG/DL (ref 0.6–1.3)
ERYTHROCYTE [DISTWIDTH] IN BLOOD BY AUTOMATED COUNT: 15.3 % (ref 11.6–15.1)
GFR SERPL CREATININE-BSD FRML MDRD: 117 ML/MIN/1.73SQ M
GLUCOSE SERPL-MCNC: 85 MG/DL (ref 65–140)
HCT VFR BLD AUTO: 40.7 % (ref 34.8–46.1)
HGB BLD-MCNC: 13.7 G/DL (ref 11.5–15.4)
MAGNESIUM SERPL-MCNC: 2 MG/DL (ref 1.9–2.7)
MCH RBC QN AUTO: 34.4 PG (ref 26.8–34.3)
MCHC RBC AUTO-ENTMCNC: 33.7 G/DL (ref 31.4–37.4)
MCV RBC AUTO: 102 FL (ref 82–98)
PLATELET # BLD AUTO: 359 THOUSANDS/UL (ref 149–390)
PMV BLD AUTO: 9.1 FL (ref 8.9–12.7)
POTASSIUM SERPL-SCNC: 4.2 MMOL/L (ref 3.5–5.3)
RBC # BLD AUTO: 3.98 MILLION/UL (ref 3.81–5.12)
SODIUM SERPL-SCNC: 137 MMOL/L (ref 135–147)
WBC # BLD AUTO: 16.52 THOUSAND/UL (ref 4.31–10.16)

## 2025-05-25 PROCEDURE — 83735 ASSAY OF MAGNESIUM: CPT

## 2025-05-25 PROCEDURE — 99232 SBSQ HOSP IP/OBS MODERATE 35: CPT | Performed by: FAMILY MEDICINE

## 2025-05-25 PROCEDURE — 85027 COMPLETE CBC AUTOMATED: CPT

## 2025-05-25 PROCEDURE — 80048 BASIC METABOLIC PNL TOTAL CA: CPT

## 2025-05-25 RX ORDER — LOSARTAN POTASSIUM 50 MG/1
50 TABLET ORAL DAILY
Status: DISCONTINUED | OUTPATIENT
Start: 2025-05-26 | End: 2025-05-27

## 2025-05-25 RX ADMIN — CLOPIDOGREL BISULFATE 75 MG: 75 TABLET, FILM COATED ORAL at 08:29

## 2025-05-25 RX ADMIN — LOSARTAN POTASSIUM 25 MG: 25 TABLET, FILM COATED ORAL at 08:29

## 2025-05-25 RX ADMIN — CELECOXIB 200 MG: 100 CAPSULE ORAL at 08:29

## 2025-05-25 RX ADMIN — FOLIC ACID 1 MG: 1 TABLET ORAL at 08:29

## 2025-05-25 RX ADMIN — Medication 3 MG: at 23:10

## 2025-05-25 RX ADMIN — METOPROLOL SUCCINATE ER TABLETS 25 MG: 25 TABLET, FILM COATED, EXTENDED RELEASE ORAL at 08:29

## 2025-05-25 RX ADMIN — ACETAMINOPHEN 650 MG: 325 TABLET, FILM COATED ORAL at 07:37

## 2025-05-25 RX ADMIN — CELECOXIB 200 MG: 100 CAPSULE ORAL at 17:16

## 2025-05-25 RX ADMIN — ASPIRIN 81 MG: 81 TABLET, CHEWABLE ORAL at 08:29

## 2025-05-25 RX ADMIN — ACETAMINOPHEN 650 MG: 325 TABLET, FILM COATED ORAL at 23:09

## 2025-05-25 RX ADMIN — NICOTINE 7 MG: 7 PATCH, EXTENDED RELEASE TRANSDERMAL at 08:30

## 2025-05-25 RX ADMIN — ATORVASTATIN CALCIUM 40 MG: 40 TABLET, FILM COATED ORAL at 17:16

## 2025-05-25 RX ADMIN — VALACYCLOVIR HYDROCHLORIDE 1000 MG: 500 TABLET, FILM COATED ORAL at 08:29

## 2025-05-25 RX ADMIN — LIDOCAINE: 40 CREAM TOPICAL at 07:38

## 2025-05-25 RX ADMIN — ACETAMINOPHEN 650 MG: 325 TABLET, FILM COATED ORAL at 17:16

## 2025-05-25 RX ADMIN — SPIRONOLACTONE 25 MG: 25 TABLET ORAL at 08:29

## 2025-05-25 RX ADMIN — ENOXAPARIN SODIUM 40 MG: 40 INJECTION SUBCUTANEOUS at 08:30

## 2025-05-25 NOTE — PLAN OF CARE
Problem: Potential for Falls  Goal: Patient will remain free of falls  Description: INTERVENTIONS:  - Educate patient/family on patient safety including physical limitations  - Instruct patient to call for assistance with activity   - Consider consulting OT/PT to assist with strengthening/mobility based on AM PAC & JH-HLM score  - Consult OT/PT to assist with strengthening/mobility   - Keep Call bell within reach  - Keep bed low and locked with side rails adjusted as appropriate  - Keep care items and personal belongings within reach  - Initiate and maintain comfort rounds  - Make Fall Risk Sign visible to staff  - Offer Toileting every 2 Hours, in advance of need  - Initiate/Maintain bed alarm  - Obtain necessary fall risk management equipment:   - Apply yellow socks and bracelet for high fall risk patients  - Consider moving patient to room near nurses station  Outcome: Progressing     Problem: NEUROSENSORY - ADULT  Goal: Achieves stable or improved neurological status  Description: INTERVENTIONS  - Monitor and report changes in neurological status  - Monitor vital signs such as temperature, blood pressure, glucose, and any other labs ordered   - Initiate measures to prevent increased intracranial pressure  - Monitor for seizure activity and implement precautions if appropriate      Outcome: Progressing

## 2025-05-25 NOTE — ASSESSMENT & PLAN NOTE
Lab Results   Component Value Date/Time    [id number] 81 (L) 05/21/2025 06:42 PM      Plan  Cyanocobalamin inj 1000mg weekly

## 2025-05-25 NOTE — ASSESSMENT & PLAN NOTE
Systolic (24hrs), Av , Min:143 , Max:177     Diastolic (24hrs), Av, Min:93, Max:143    Previously not on medication  Continue recently started medications: toprol XL 25mg, losartan 25mg, started 3 days ago, aldactone 25mg started 2 days ago.  Patient BP still not controlled, will  increasing losartan 50mg
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
## 2025-05-25 NOTE — ASSESSMENT & PLAN NOTE
Noted to have leukocytosis.  Denied any fever or chills.  Patient denies any abdominal pain, dysuria or increased urinary frequency  WBC is trending up current WBC is 16.5>>14  No signs of infection, will continue to monitor off antibiotics.  AM CBC

## 2025-05-26 LAB
ANION GAP SERPL CALCULATED.3IONS-SCNC: 7 MMOL/L (ref 4–13)
BASOPHILS # BLD AUTO: 0.06 THOUSANDS/ÂΜL (ref 0–0.1)
BASOPHILS NFR BLD AUTO: 0 % (ref 0–1)
BUN SERPL-MCNC: 10 MG/DL (ref 5–25)
CALCIUM SERPL-MCNC: 8.9 MG/DL (ref 8.4–10.2)
CHLORIDE SERPL-SCNC: 105 MMOL/L (ref 96–108)
CO2 SERPL-SCNC: 24 MMOL/L (ref 21–32)
CREAT SERPL-MCNC: 0.57 MG/DL (ref 0.6–1.3)
EOSINOPHIL # BLD AUTO: 0.53 THOUSAND/ÂΜL (ref 0–0.61)
EOSINOPHIL NFR BLD AUTO: 3 % (ref 0–6)
ERYTHROCYTE [DISTWIDTH] IN BLOOD BY AUTOMATED COUNT: 15.4 % (ref 11.6–15.1)
GFR SERPL CREATININE-BSD FRML MDRD: 113 ML/MIN/1.73SQ M
GLUCOSE SERPL-MCNC: 79 MG/DL (ref 65–140)
HCT VFR BLD AUTO: 43.5 % (ref 34.8–46.1)
HGB BLD-MCNC: 14.6 G/DL (ref 11.5–15.4)
IMM GRANULOCYTES # BLD AUTO: 0.08 THOUSAND/UL (ref 0–0.2)
IMM GRANULOCYTES NFR BLD AUTO: 1 % (ref 0–2)
LYMPHOCYTES # BLD AUTO: 4.03 THOUSANDS/ÂΜL (ref 0.6–4.47)
LYMPHOCYTES NFR BLD AUTO: 25 % (ref 14–44)
MCH RBC QN AUTO: 34 PG (ref 26.8–34.3)
MCHC RBC AUTO-ENTMCNC: 33.6 G/DL (ref 31.4–37.4)
MCV RBC AUTO: 101 FL (ref 82–98)
MONOCYTES # BLD AUTO: 1.25 THOUSAND/ÂΜL (ref 0.17–1.22)
MONOCYTES NFR BLD AUTO: 8 % (ref 4–12)
NEUTROPHILS # BLD AUTO: 10.25 THOUSANDS/ÂΜL (ref 1.85–7.62)
NEUTS SEG NFR BLD AUTO: 63 % (ref 43–75)
NRBC BLD AUTO-RTO: 0 /100 WBCS
PLATELET # BLD AUTO: 370 THOUSANDS/UL (ref 149–390)
PMV BLD AUTO: 8.9 FL (ref 8.9–12.7)
POTASSIUM SERPL-SCNC: 4.2 MMOL/L (ref 3.5–5.3)
PROCALCITONIN SERPL-MCNC: 0.07 NG/ML
RBC # BLD AUTO: 4.29 MILLION/UL (ref 3.81–5.12)
SODIUM SERPL-SCNC: 136 MMOL/L (ref 135–147)
WBC # BLD AUTO: 16.2 THOUSAND/UL (ref 4.31–10.16)

## 2025-05-26 PROCEDURE — 80048 BASIC METABOLIC PNL TOTAL CA: CPT

## 2025-05-26 PROCEDURE — 99232 SBSQ HOSP IP/OBS MODERATE 35: CPT | Performed by: FAMILY MEDICINE

## 2025-05-26 PROCEDURE — 84145 PROCALCITONIN (PCT): CPT

## 2025-05-26 PROCEDURE — 85025 COMPLETE CBC W/AUTO DIFF WBC: CPT

## 2025-05-26 RX ADMIN — LOSARTAN POTASSIUM 50 MG: 50 TABLET, FILM COATED ORAL at 10:08

## 2025-05-26 RX ADMIN — CLOPIDOGREL BISULFATE 75 MG: 75 TABLET, FILM COATED ORAL at 10:08

## 2025-05-26 RX ADMIN — CELECOXIB 200 MG: 100 CAPSULE ORAL at 17:37

## 2025-05-26 RX ADMIN — ATORVASTATIN CALCIUM 40 MG: 40 TABLET, FILM COATED ORAL at 17:37

## 2025-05-26 RX ADMIN — SPIRONOLACTONE 25 MG: 25 TABLET ORAL at 10:08

## 2025-05-26 RX ADMIN — Medication 3 MG: at 23:24

## 2025-05-26 RX ADMIN — CELECOXIB 200 MG: 100 CAPSULE ORAL at 10:08

## 2025-05-26 RX ADMIN — FOLIC ACID 1 MG: 1 TABLET ORAL at 10:09

## 2025-05-26 RX ADMIN — ENOXAPARIN SODIUM 40 MG: 40 INJECTION SUBCUTANEOUS at 10:09

## 2025-05-26 RX ADMIN — METOPROLOL SUCCINATE ER TABLETS 25 MG: 25 TABLET, FILM COATED, EXTENDED RELEASE ORAL at 10:08

## 2025-05-26 RX ADMIN — ACETAMINOPHEN 650 MG: 325 TABLET, FILM COATED ORAL at 23:24

## 2025-05-26 RX ADMIN — NICOTINE 7 MG: 7 PATCH, EXTENDED RELEASE TRANSDERMAL at 10:09

## 2025-05-26 RX ADMIN — VALACYCLOVIR HYDROCHLORIDE 1000 MG: 500 TABLET, FILM COATED ORAL at 10:09

## 2025-05-26 RX ADMIN — ASPIRIN 81 MG: 81 TABLET, CHEWABLE ORAL at 10:08

## 2025-05-26 RX ADMIN — ACETAMINOPHEN 650 MG: 325 TABLET, FILM COATED ORAL at 11:42

## 2025-05-26 NOTE — PLAN OF CARE
Problem: Potential for Falls  Goal: Patient will remain free of falls  Description: INTERVENTIONS:  - Educate patient/family on patient safety including physical limitations  - Instruct patient to call for assistance with activity   - Consider consulting OT/PT to assist with strengthening/mobility based on AM PAC & JH-HLM score  - Consult OT/PT to assist with strengthening/mobility   - Keep Call bell within reach  - Keep bed low and locked with side rails adjusted as appropriate  - Keep care items and personal belongings within reach  - Initiate and maintain comfort rounds  - Make Fall Risk Sign visible to staff  - Apply yellow socks and bracelet for high fall risk patients  - Consider moving patient to room near nurses station  Outcome: Progressing     Problem: NEUROSENSORY - ADULT  Goal: Achieves stable or improved neurological status  Description: INTERVENTIONS  - Monitor and report changes in neurological status  - Monitor vital signs such as temperature, blood pressure, glucose, and any other labs ordered   - Initiate measures to prevent increased intracranial pressure  - Monitor for seizure activity and implement precautions if appropriate      Outcome: Progressing
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
## 2025-05-26 NOTE — ASSESSMENT & PLAN NOTE
Systolic (24hrs), Av , Min:141 , Max:147     Diastolic (24hrs), Av, Min:93, Max:103    Previously not on medication  Continue recently started medications: toprol XL 25mg, losartan 25mg, started 3 days ago, aldactone 25mg started 2 days ago.  increased losartan 50mg on

## 2025-05-26 NOTE — ASSESSMENT & PLAN NOTE
New cardiomyopathy with LVEF 35-40%, moderate global hypokinesis with regional variation, mildly reduced RV systolic function, grade 1 DD, mild MR   Nuclear stress test done today without evidence of ischemic or scarring.   Etiology unclear    Plan:  Cardiology following  Continue on GDMT Metoprolol succ 25 mg qd, Spironolactone 25 mg qd, Losartan  Needs follow up OP with cardiology  NPO at midnight

## 2025-05-27 ENCOUNTER — ANESTHESIA (INPATIENT)
Dept: NON INVASIVE DIAGNOSTICS | Facility: HOSPITAL | Age: 45
DRG: 045 | End: 2025-05-27
Payer: COMMERCIAL

## 2025-05-27 ENCOUNTER — APPOINTMENT (INPATIENT)
Dept: NON INVASIVE DIAGNOSTICS | Facility: HOSPITAL | Age: 45
DRG: 045 | End: 2025-05-27
Attending: NURSE PRACTITIONER
Payer: COMMERCIAL

## 2025-05-27 ENCOUNTER — TELEPHONE (OUTPATIENT)
Age: 45
End: 2025-05-27

## 2025-05-27 LAB
AORTIC ROOT: 3 CM
ASCENDING AORTA: 2.7 CM
BASOPHILS # BLD AUTO: 0.07 THOUSANDS/ÂΜL (ref 0–0.1)
BASOPHILS NFR BLD AUTO: 1 % (ref 0–1)
EOSINOPHIL # BLD AUTO: 0.53 THOUSAND/ÂΜL (ref 0–0.61)
EOSINOPHIL NFR BLD AUTO: 4 % (ref 0–6)
ERYTHROCYTE [DISTWIDTH] IN BLOOD BY AUTOMATED COUNT: 15.2 % (ref 11.6–15.1)
HCT VFR BLD AUTO: 43.1 % (ref 34.8–46.1)
HGB BLD-MCNC: 14.6 G/DL (ref 11.5–15.4)
IMM GRANULOCYTES # BLD AUTO: 0.06 THOUSAND/UL (ref 0–0.2)
IMM GRANULOCYTES NFR BLD AUTO: 0 % (ref 0–2)
LYMPHOCYTES # BLD AUTO: 3.49 THOUSANDS/ÂΜL (ref 0.6–4.47)
LYMPHOCYTES NFR BLD AUTO: 23 % (ref 14–44)
MCH RBC QN AUTO: 34.3 PG (ref 26.8–34.3)
MCHC RBC AUTO-ENTMCNC: 33.9 G/DL (ref 31.4–37.4)
MCV RBC AUTO: 101 FL (ref 82–98)
MONOCYTES # BLD AUTO: 1.42 THOUSAND/ÂΜL (ref 0.17–1.22)
MONOCYTES NFR BLD AUTO: 9 % (ref 4–12)
NEUTROPHILS # BLD AUTO: 9.58 THOUSANDS/ÂΜL (ref 1.85–7.62)
NEUTS SEG NFR BLD AUTO: 63 % (ref 43–75)
NRBC BLD AUTO-RTO: 0 /100 WBCS
PLATELET # BLD AUTO: 371 THOUSANDS/UL (ref 149–390)
PMV BLD AUTO: 9.1 FL (ref 8.9–12.7)
RBC # BLD AUTO: 4.26 MILLION/UL (ref 3.81–5.12)
WBC # BLD AUTO: 15.15 THOUSAND/UL (ref 4.31–10.16)

## 2025-05-27 PROCEDURE — 85025 COMPLETE CBC W/AUTO DIFF WBC: CPT

## 2025-05-27 PROCEDURE — 93312 ECHO TRANSESOPHAGEAL: CPT | Performed by: INTERNAL MEDICINE

## 2025-05-27 PROCEDURE — 99232 SBSQ HOSP IP/OBS MODERATE 35: CPT

## 2025-05-27 PROCEDURE — 93320 DOPPLER ECHO COMPLETE: CPT | Performed by: INTERNAL MEDICINE

## 2025-05-27 PROCEDURE — B24BZZ4 ULTRASONOGRAPHY OF HEART WITH AORTA, TRANSESOPHAGEAL: ICD-10-PCS | Performed by: INTERNAL MEDICINE

## 2025-05-27 PROCEDURE — 93312 ECHO TRANSESOPHAGEAL: CPT

## 2025-05-27 PROCEDURE — 93325 DOPPLER ECHO COLOR FLOW MAPG: CPT | Performed by: INTERNAL MEDICINE

## 2025-05-27 RX ORDER — PROPOFOL 10 MG/ML
INJECTION, EMULSION INTRAVENOUS AS NEEDED
Status: DISCONTINUED | OUTPATIENT
Start: 2025-05-27 | End: 2025-05-27

## 2025-05-27 RX ORDER — LIDOCAINE HYDROCHLORIDE 20 MG/ML
INJECTION, SOLUTION EPIDURAL; INFILTRATION; INTRACAUDAL; PERINEURAL AS NEEDED
Status: DISCONTINUED | OUTPATIENT
Start: 2025-05-27 | End: 2025-05-27

## 2025-05-27 RX ORDER — SODIUM CHLORIDE 9 MG/ML
INJECTION, SOLUTION INTRAVENOUS CONTINUOUS PRN
Status: DISCONTINUED | OUTPATIENT
Start: 2025-05-27 | End: 2025-05-27

## 2025-05-27 RX ADMIN — PROPOFOL 20 MG: 10 INJECTION, EMULSION INTRAVENOUS at 10:52

## 2025-05-27 RX ADMIN — METOPROLOL SUCCINATE ER TABLETS 25 MG: 25 TABLET, FILM COATED, EXTENDED RELEASE ORAL at 11:49

## 2025-05-27 RX ADMIN — PROPOFOL 20 MG: 10 INJECTION, EMULSION INTRAVENOUS at 10:56

## 2025-05-27 RX ADMIN — CELECOXIB 200 MG: 100 CAPSULE ORAL at 11:47

## 2025-05-27 RX ADMIN — PROPOFOL 20 MG: 10 INJECTION, EMULSION INTRAVENOUS at 10:54

## 2025-05-27 RX ADMIN — LIDOCAINE HYDROCHLORIDE 100 MG: 20 INJECTION, SOLUTION EPIDURAL; INFILTRATION; INTRACAUDAL at 10:47

## 2025-05-27 RX ADMIN — PROPOFOL 40 MG: 10 INJECTION, EMULSION INTRAVENOUS at 11:01

## 2025-05-27 RX ADMIN — ATORVASTATIN CALCIUM 40 MG: 40 TABLET, FILM COATED ORAL at 17:35

## 2025-05-27 RX ADMIN — CELECOXIB 200 MG: 100 CAPSULE ORAL at 17:35

## 2025-05-27 RX ADMIN — PROPOFOL 40 MG: 10 INJECTION, EMULSION INTRAVENOUS at 10:48

## 2025-05-27 RX ADMIN — ACETAMINOPHEN 650 MG: 325 TABLET, FILM COATED ORAL at 20:07

## 2025-05-27 RX ADMIN — SODIUM CHLORIDE: 9 INJECTION, SOLUTION INTRAVENOUS at 10:28

## 2025-05-27 RX ADMIN — PROPOFOL 20 MG: 10 INJECTION, EMULSION INTRAVENOUS at 10:57

## 2025-05-27 RX ADMIN — NICOTINE 7 MG: 7 PATCH, EXTENDED RELEASE TRANSDERMAL at 09:47

## 2025-05-27 RX ADMIN — LOSARTAN POTASSIUM 75 MG: 50 TABLET, FILM COATED ORAL at 17:35

## 2025-05-27 RX ADMIN — FOLIC ACID 1 MG: 1 TABLET ORAL at 11:49

## 2025-05-27 RX ADMIN — ASPIRIN 81 MG: 81 TABLET, CHEWABLE ORAL at 11:47

## 2025-05-27 RX ADMIN — PROPOFOL 20 MG: 10 INJECTION, EMULSION INTRAVENOUS at 10:50

## 2025-05-27 RX ADMIN — SPIRONOLACTONE 25 MG: 25 TABLET ORAL at 11:50

## 2025-05-27 RX ADMIN — PROPOFOL 60 MG: 10 INJECTION, EMULSION INTRAVENOUS at 10:47

## 2025-05-27 RX ADMIN — CLOPIDOGREL BISULFATE 75 MG: 75 TABLET, FILM COATED ORAL at 11:48

## 2025-05-27 RX ADMIN — PROPOFOL 40 MG: 10 INJECTION, EMULSION INTRAVENOUS at 10:59

## 2025-05-27 RX ADMIN — ENOXAPARIN SODIUM 40 MG: 40 INJECTION SUBCUTANEOUS at 09:46

## 2025-05-27 RX ADMIN — LIDOCAINE 1 APPLICATION: 40 CREAM TOPICAL at 20:33

## 2025-05-27 RX ADMIN — VALACYCLOVIR HYDROCHLORIDE 1000 MG: 500 TABLET, FILM COATED ORAL at 09:47

## 2025-05-27 NOTE — DISCHARGE INSTR - AVS FIRST PAGE
Dear Lorri Montes,     It was our pleasure to care for you here at [facility].  It is our hope that we were always able to exceed the expected standards for your care during your stay.  You were hospitalized due to stroke.  You were cared for on the 3rd floor by Stalin Schmid MD MPH under the service of Anisa Macias,  with the [facility] who covers for your primary care physician (PCP), Linda Diane PA-C, while you were hospitalized.  If you have any questions or concerns related to this hospitalization, you may contact us at .  For follow up as well as any medication refills, we recommend that you follow up with your primary care physician.  A registered nurse will reach out to you by phone within a few days after your discharge to answer any additional questions that you may have after going home.  However, at this time we provide for you here, the most important instructions / recommendations at discharge:     Notable Medication Adjustments -   You were started goal-directed management of heart failure this includes  Metoprolol succinate 25 mg daily  Losartan 75mg daily at nights   Spironolactone 25mg daily  You were started on dual antiplatelet therapy to reduce your risks of recurrent stroke  Please continue to take Plavix 75 mg daily for 13 more days   Please continue to take aspirin 81 mg daily  Please continue to take atorvastatin 40 mg daily  Depo-Provera has been stopped due to the potential to increase risk of stroke. Please speak with your OBGYN regarding this.   Testing Required after Discharge -   Please ask your primary care doctor to follow up on your aldosterone renin level that was tested here but has not yet resulted.  Consider obtaining a thrombosis panel  Recommend repeat BMP 1-2 weeks  ** Please contact your PCP to request testing orders for any of the testing recommended here **  Important  follow up information -   Please follow-up with your PCP within 1 week of discharge  Please continue to follow-up with orthopedic surgery   Please follow-up with physical therapy  Please follow-up with cardiology, referral was placed and appointment is scheduled for 6/6/2025  Please follow-up with behavioral health for talk therapy, referral placed  Other Instructions -   Please call your doctor or return to the hospital if symptoms return  Please review this entire after visit summary as additional general instructions including medication list, appointments, activity, diet, any pertinent wound care, and other additional recommendations from your care team that may be provided for you.      Sincerely,     Stalin Schmid MD MPH

## 2025-05-27 NOTE — UTILIZATION REVIEW
Initial Clinical Review    Observation 5/21 1550 changed to inpatient 5/23 1542. Pt requiring continued stayf or management of stroke.     Admission: Date/Time/Statement:   Admission Orders (From admission, onward)       Ordered        05/23/25 1542  INPATIENT ADMISSION  Once            05/21/25 1550  Place in Observation  Once                          Orders Placed This Encounter   Procedures    INPATIENT ADMISSION     Standing Status:   Standing     Number of Occurrences:   1     Level of Care:   Med Surg [16]     Estimated length of stay:   More than 2 Midnights     Certification:   I certify that inpatient services are medically necessary for this patient for a duration of greater than two midnights. See H&P and MD Progress Notes for additional information about the patient's course of treatment.     ED Arrival Information       Expected   -    Arrival   5/21/2025 14:00    Acuity   Urgent              Means of arrival   Walk-In    Escorted by   Self    Service   Hospitalist    Admission type   Emergency              Arrival complaint   left side body pain             Chief Complaint   Patient presents with    Arm Pain     Pt has bilateral torn rotator cuffs with a herniated disc is now c/o 10/10 pain with left arm numbness. Pt is tearful.        Initial Presentation: 45 y.o. female  with a PMH of anxiety, polyarthralgia, right rotator cuff tear, smoking who presents with left-sided numbness. Patient reports that her symptoms started last night around 8 PM when she was going to bed which interrupted her sleep and kept her awake until 4 AM. However, they got worse this morning. Symptoms include feeling numbness over her left face, left leg, left throat, left arm, left trunk, and left leg. Upon my evaluation, she stated that the numbness is still there. Denied weakness, blurry vision, or headache. Smokes 5 cigarettes/day since the age of 16. Plan: Observation for stroke like symptoms, leukocytosis, hypokalemia,  "tobacco abuse, right rotator cuff tear, HSV: stroke pathway, Neurology consult, Plavix load and then Plavix 75 mg and ASA 81 mg, MRI brain, Echo, permissive HTN, lipid panel and HgbA1c, telemetry, PT/OT/ST evals, CBC in am, replete potassium then check at 11 pm, BMP in am, NRT, continue home Celebrex and Valtrex.     5/22:    Neurology consult: MRI brain \"Acute lacunar infarcts in the right thalamus and posterior right putamen. No acute hemorrhage.\" Echo: LV dilated, EF 35-40%, systolic function moderately reduced, moderate global hypokinesis with regional variation, L atrium dilated, no PFO. Recommend Cardiology consult. Continue ASA, Plavix, atorvastatin. Telemetry. PT/OT/ST evals. Neuro checks.     Cardiology consult: normotension OK- add Toprol XL 25 mg daily and losartan 25 mg daily for GDMT. Titrate as able. Nuclear stress test tomorrow. Telemetry. Continue ASA, statin, Plavix.     Internal medicine: MRI: Acute lacunar infarcts in the right thalamus and posterior right putamen. No acute hemorrhage. Continue stroke pathway. Continue ASA and Plavix. Telemetry. PT/OT eval. CBC and BMP in am.     5/23 Observation changed to inpatient.     Neurology: continue stroke pathway. MRI and Echo completed. Recommend RUCHI. Continue ASA, Plavix, atorvastatin. Telemetry. PT/OT/ST evals. Neuro checks.     Cardiology: Nuclear stress test done today without evidence of ischemic or scarring. Neurology requesting RUCHI; would not be done inpatient until 5/27 due to weekend and holiday. Could arrange on 6/3 or 6/5 at OP; will defer to neurology wether this needs to be done prior to discharge. Started on Metoprolol succinate 25mg daily and losartan 25mg daily yesterday; BPs still elevated; add spironolactone 25mg daily today.     ED Treatment-Medication Administration from 05/21/2025 1400 to 05/21/2025 1655         Date/Time Order Dose Route Action     05/21/2025 1445 LORazepam (ATIVAN) tablet 0.5 mg 0.5 mg Oral Given     05/21/2025 " 1445 acetaminophen (TYLENOL) tablet 975 mg 975 mg Oral Given     05/21/2025 1526 potassium chloride (Klor-Con M20) CR tablet 40 mEq 40 mEq Oral Given     05/21/2025 1526 potassium chloride 20 mEq IVPB (premix) 20 mEq Intravenous New Bag     05/21/2025 1515 iohexol (OMNIPAQUE) 350 MG/ML injection (MULTI-DOSE) 85 mL 85 mL Intravenous Given            Scheduled Medications:  aspirin, 81 mg, Oral, Daily  atorvastatin, 40 mg, Oral, QPM  celecoxib, 200 mg, Oral, BID  clopidogrel, 75 mg, Oral, Daily  cyanocobalamin, 1,000 mcg, Intramuscular, Weekly  enoxaparin, 40 mg, Subcutaneous, Q24H ANDREW  folic acid, 1 mg, Oral, Daily  losartan, 50 mg, Oral, Daily  metoprolol succinate, 25 mg, Oral, Daily  nicotine, 7 mg, Transdermal, Daily  spironolactone, 25 mg, Oral, Daily  valACYclovir, 1,000 mg, Oral, Daily      Continuous IV Infusions:     PRN Meds:  acetaminophen, 650 mg, Oral, Q6H PRN  lidocaine, , Topical, Daily PRN  melatonin, 3 mg, Oral, HS PRN      ED Triage Vitals   Temperature Pulse Respirations Blood Pressure SpO2 Pain Score   05/21/25 1409 05/21/25 1409 05/21/25 1409 05/21/25 1411 05/21/25 1409 05/21/25 1409   98.7 °F (37.1 °C) 105 20 (!) 174/112 98 % 10 - Worst Possible Pain     Weight (last 2 days)       Date/Time Weight    05/27/25 0444 74 (163.2)    05/26/25 0450 74 (163.2)    05/25/25 0559 73.9 (162.92)            Vital Signs (last 3 days)       Date/Time Temp Pulse Resp BP MAP (mmHg) SpO2 O2 Device Patient Position - Orthostatic VS [place] Coma Scale Score Pain    05/27/25 08:01:59 98.1 °F (36.7 °C) 75 -- 139/92 108 94 % -- -- -- --    05/26/25 23:30:44 98.5 °F (36.9 °C) 85 18 155/103 120 98 % -- -- -- --    05/26/25 2324 -- -- -- -- -- -- -- -- -- 7    05/26/25 21:36:17 98.3 °F (36.8 °C) 81 16 141/102 115 99 % -- -- -- --    05/26/25 2000 -- -- -- -- -- -- -- -- 15 No Pain    05/26/25 19:04:38 98.4 °F (36.9 °C) 84 16 152/108 123 97 % -- -- -- --    05/26/25 1737 -- -- -- -- -- -- -- -- -- 8    05/26/25 1600 -- --  -- -- -- -- -- -- 15 --    05/26/25 15:30:04 98.8 °F (37.1 °C) 78 16 152/93 113 98 % -- -- -- --    05/26/25 1142 -- -- -- -- -- -- -- -- -- 7    05/26/25 10:45:33 -- 71 -- 138/90 106 99 % -- Lying -- --    05/26/25 0800 -- -- -- -- -- -- None (Room air) -- 15 No Pain    05/26/25 07:17:28 98.6 °F (37 °C) 73 -- 133/84 100 98 % -- -- -- --    05/26/25 03:21:24 98.5 °F (36.9 °C) 68 -- 145/103 117 98 % -- -- -- --    05/25/25 2309 -- -- -- -- -- -- -- -- -- 6 05/25/25 22:19:47 97.4 °F (36.3 °C) 84 -- 143/101 115 99 % -- -- -- --    05/25/25 2000 -- -- -- -- -- -- -- -- 15 No Pain    05/25/25 14:55:34 97.8 °F (36.6 °C) 68 -- 141/93 109 99 % -- -- -- --    05/25/25 0800 -- -- -- -- -- -- -- -- 15 --    05/25/25 0737 -- -- -- -- -- -- -- -- -- 6 05/25/25 07:06:51 98.2 °F (36.8 °C) 80 17 147/97 114 97 % -- -- -- --    05/25/25 04:42:02 -- 75 -- 150/93 112 97 % -- -- -- --    05/25/25 0000 -- -- -- -- -- -- -- -- 15 --    05/24/25 2331 -- -- -- -- -- -- -- -- -- 6 05/24/25 22:24:57 98.3 °F (36.8 °C) 66 -- 157/99 118 99 % -- -- -- --    05/24/25 22:01:24 -- 76 -- 153/101 118 99 % -- -- -- --    05/24/25 21:59:04 -- 66 -- 175/143 154 99 % -- -- -- --    05/24/25 20:07:52 -- 77 -- 177/142 154 99 % -- -- -- --    05/24/25 2000 -- -- -- -- -- -- -- -- 15 --    05/24/25 19:15:16 98.3 °F (36.8 °C) 67 -- 152/98 116 100 % -- -- -- --    05/24/25 15:20:14 98.8 °F (37.1 °C) 69 -- 143/94 110 99 % -- -- -- --    05/24/25 1127 -- -- -- -- -- -- -- -- -- 8    05/24/25 1100 98.3 °F (36.8 °C) 79 -- 146/101 116 98 % -- -- -- --    05/24/25 0900 -- -- -- -- -- -- -- -- 15 --    05/24/25 0713 97.8 °F (36.6 °C) 84 -- 145/104 118 99 % -- -- -- --    05/24/25 07:12:51 97.8 °F (36.6 °C) 83 -- 145/104 118 98 % -- -- -- --    05/24/25 0535 -- 89 -- -- -- 97 % -- -- -- --    05/24/25 0530 -- 66 -- -- -- 100 % -- -- -- --    05/24/25 0525 -- 71 -- -- -- 99 % -- -- -- --    05/24/25 0520 -- 73 -- -- -- 97 % -- -- -- --    05/24/25 0515 -- 65 --  -- -- 98 % -- -- -- --    05/24/25 0510 -- 74 -- -- -- 97 % -- -- -- --    05/24/25 0505 -- 65 -- -- -- 96 % -- -- -- --    05/24/25 0500 -- 64 -- -- -- 98 % -- -- -- --    05/24/25 0455 -- 76 -- -- -- 97 % -- -- -- --    05/24/25 0450 -- 72 -- -- -- 96 % -- -- -- --    05/24/25 0445 -- 76 -- -- -- 96 % -- -- -- --    05/24/25 0440 -- 78 -- -- -- 96 % -- -- -- --    05/24/25 0435 -- 73 -- -- -- 97 % -- -- -- --    05/24/25 0434 -- -- -- -- -- -- -- -- -- 7    05/24/25 0430 -- 72 -- 140/95 110 97 % -- Sitting -- --    05/24/25 0425 -- 74 -- -- -- 97 % -- -- -- --    05/24/25 0420 -- 72 -- -- -- 97 % -- -- -- --    05/24/25 0415 -- 75 -- -- -- 98 % -- -- -- --    05/24/25 0410 -- 71 -- -- -- 98 % -- -- -- --    05/24/25 0405 -- 78 -- -- -- 97 % -- -- -- --    05/24/25 0400 -- 69 16 140/95 110 98 % None (Room air) Sitting 15 --    05/24/25 0355 -- 70 -- -- -- 97 % -- -- -- --    05/24/25 0350 -- 70 -- -- -- 97 % -- -- -- --    05/24/25 0345 -- 75 -- -- -- 97 % -- -- -- --    05/24/25 0340 -- 75 -- -- -- 97 % -- -- -- --    05/24/25 0335 -- 74 -- -- -- 96 % -- -- -- --    05/24/25 0330 -- 65 -- -- -- 99 % -- -- -- --    05/24/25 0325 -- 80 -- -- -- 97 % -- -- -- --    05/24/25 0320 -- 73 -- -- -- 97 % -- -- -- --    05/24/25 0315 -- 80 -- -- -- 97 % -- -- -- --    05/24/25 0310 -- 72 -- -- -- 97 % -- -- -- --    05/24/25 0305 -- 74 -- -- -- 97 % -- -- -- --    05/24/25 0300 -- 75 -- -- -- 96 % -- -- -- --    05/24/25 0255 -- 70 -- -- -- 97 % -- -- -- --    05/24/25 0250 -- 75 -- -- -- 97 % -- -- -- --    05/24/25 0245 -- 81 -- -- -- 97 % -- -- -- --    05/24/25 0240 -- 77 -- -- -- 98 % -- -- -- --    05/24/25 0235 -- 83 -- -- -- 97 % -- -- -- --    05/24/25 0230 -- 86 -- -- -- 96 % -- -- -- --    05/24/25 0225 -- 77 -- -- -- 97 % -- -- -- --    05/24/25 0220 -- 68 -- -- -- 97 % -- -- -- --    05/24/25 0215 -- 74 -- -- -- 97 % -- -- -- --    05/24/25 0210 -- 79 -- -- -- 96 % -- -- -- --    05/24/25 0205 -- 78 -- -- -- 96  % -- -- -- --    05/24/25 0200 -- 71 -- -- -- 98 % -- -- -- --    05/24/25 0155 -- 68 -- -- -- 98 % -- -- -- --    05/24/25 0150 -- 74 -- -- -- 98 % -- -- -- --    05/24/25 0145 -- 79 -- -- -- 98 % -- -- -- --    05/24/25 0140 -- 77 -- -- -- 98 % -- -- -- --    05/24/25 0135 -- 65 -- -- -- 99 % -- -- -- --    05/24/25 0130 -- 67 -- -- -- 97 % -- -- -- --    05/24/25 0125 -- 69 -- -- -- 98 % -- -- -- --    05/24/25 0120 -- 69 -- -- -- 98 % -- -- -- --    05/24/25 0115 -- 72 -- -- -- 98 % -- -- -- --    05/24/25 0110 -- 76 -- -- -- 97 % -- -- -- --    05/24/25 0105 -- 91 -- -- -- 97 % -- -- -- --    05/24/25 0100 -- 72 -- -- -- 96 % -- -- -- --    05/24/25 0055 -- 94 -- -- -- 98 % -- -- -- --    05/24/25 0050 -- 71 -- -- -- 97 % -- -- -- --    05/24/25 0045 -- 70 -- -- -- 97 % -- -- -- --    05/24/25 0040 -- 76 -- -- -- 97 % -- -- -- --    05/24/25 0035 -- 83 -- -- -- 96 % -- -- -- --    05/24/25 0030 -- 76 -- -- -- 97 % -- -- -- --    05/24/25 0025 -- 77 -- -- -- 98 % -- -- -- --    05/24/25 0020 -- 78 -- -- -- 98 % -- -- -- --    05/24/25 0015 -- 76 -- -- -- 98 % -- -- -- --    05/24/25 0010 -- 79 -- -- -- 98 % -- -- -- --    05/24/25 0005 -- 76 -- -- -- 98 % -- -- -- --    05/24/25 0000 -- 68 18 130/82 -- 97 % None (Room air) Sitting 15 --              Pertinent Labs/Diagnostic Test Results:   Radiology:  MRI brain wo contrast   Final Interpretation by E. Alec Schoenberger, MD (05/22 0808)   Acute lacunar infarcts in the right thalamus and posterior right putamen. No acute hemorrhage.      The study was marked in EPIC for immediate notification.      Workstation performed: [id number]         CTA head and neck with and without contrast   Final Interpretation by Gregoria Burt MD (05/21 1157)      CT Brain:  No acute intracranial abnormality.      CT Angiography: Normal CTA neck and brain.               Workstation performed: [id number]         XR chest 1 view portable   Final Interpretation by Matt Venegas,   (05/21 1600)      No acute cardiopulmonary disease.            Workstation performed: [id number]           Cardiology:  NM myocardial perfusion spect (rx stress and/or rest)   Final Result by Stuart Stallings DO (05/23 1053)        Stress ECG: The patient and had a maximal HR of 117 bpm (66% of MPHR)    and 1.0 METS. The patient experienced no angina during the test. The    patient reached the end of the protocol. The patient reported mild    shortness of breath during the stress test. Symptoms began during stress    and ended during recovery. The patient reported no other cardiac symptoms    during the stress test.     Stress ECG: No ST deviation is noted. The ECG was not diagnostic due to    pharmacological (vasodilator) stress.     Perfusion: There are no perfusion defects.     Stress Function: Left ventricular function post-stress is abnormal.    Stress ejection fraction is 37%.     Stress Combined Conclusion: Left ventricular perfusion is normal.      Ejection fraction 37%.  No ischemia or scar.         Echo complete w/ contrast if indicated   Final Result by Radha Mae MD (05/22 1125)        Left Ventricle: Left ventricular cavity size is dilated. Wall thickness    is mildly increased. The left ventricular ejection fraction is 35-40%.    Systolic function is moderately reduced. There is moderate global    hypokinesis with regional variation. Diastolic function is mildly    abnormal, consistent with grade I (abnormal) relaxation.     IVS: There is abnormal septal motion of unclear etiology.     Right Ventricle: Right ventricular cavity size is normal. Systolic    function is mildly reduced.     Left Atrium: The atrium is dilated.     Atrial Septum: There is lipomatous hypertrophy of the interatrial    septum. No patent foramen ovale detected, confirmed by provocation with    cough, using agitated saline contrast and with valsalva, using agitated    saline contrast.         ECG 12 lead   Final  Result by Kylah Mujica MD (05/22 0810)   Normal sinus rhythm   Nonspecific ST abnormality   Prolonged QT   Abnormal ECG   When compared with ECG of 17-Nov-2014 12:02,   QT has lengthened   Confirmed by Kylah Mujica ([id number]) on 5/22/2025 8:10:52 AM        GI:  No orders to display           Results from last 7 days   Lab Units 05/27/25  0444 05/26/25  0455 05/25/25  0505 05/24/25  0428 05/23/25  0429   WBC Thousand/uL 15.15* 16.20* 16.52* 14.08* 13.21*   HEMOGLOBIN g/dL 14.6 14.6 13.7 13.9 13.5   HEMATOCRIT % 43.1 43.5 40.7 41.4 39.5   PLATELETS Thousands/uL 371 370 359 350 369   TOTAL NEUT ABS Thousands/µL 9.58* 10.25*  --  8.20* 8.18*         Results from last 7 days   Lab Units 05/26/25  0455 05/25/25  0505 05/24/25  0428 05/23/25  0429 05/22/25  0426   SODIUM mmol/L 136 137 138 138 136   POTASSIUM mmol/L 4.2 4.2 4.1 3.3* 3.3*   CHLORIDE mmol/L 105 104 106 103 101   CO2 mmol/L 24 25 24 26 27   ANION GAP mmol/L 7 8 8 9 8   BUN mg/dL 10 9 7 5 9   CREATININE mg/dL 0.57* 0.51* 0.50* 0.52* 0.58*   EGFR ml/min/1.73sq m 113 117 118 116 112   CALCIUM mg/dL 8.9 8.6 8.6 8.5 8.5   MAGNESIUM mg/dL  --  2.0 1.6* 1.7*  --      Results from last 7 days   Lab Units 05/21/25  1440   AST U/L 12*   ALT U/L 8   ALK PHOS U/L 55   TOTAL PROTEIN g/dL 7.6   ALBUMIN g/dL 4.3   TOTAL BILIRUBIN mg/dL 0.42     Results from last 7 days   Lab Units 05/21/25  1634 05/21/25  1533   POC GLUCOSE mg/dl 140 66     Results from last 7 days   Lab Units 05/26/25  0455 05/25/25  0505 05/24/25  0428 05/23/25  0429 05/22/25  0426 05/21/25  1440   GLUCOSE RANDOM mg/dL 79 85 86 85 86 87         Results from last 7 days   Lab Units 05/21/25  1440   HEMOGLOBIN A1C % 5.5   EAG mg/dl 111           Results from last 7 days   Lab Units 05/21/25  1842 05/21/25  1641 05/21/25  1440   HS TNI 0HR ng/L  --   --  4   HS TNI 2HR ng/L  --  5  --    HSTNI D2 ng/L  --  1  --    HS TNI 4HR ng/L 6  --   --    HSTNI D4 ng/L 2  --   --          Results from last 7 days    Lab Units 05/23/25  0429   PROTIME seconds 16.1*   INR  1.21*     Results from last 7 days   Lab Units 05/21/25  1842   TSH 3RD GENERATON uIU/mL 1.106     Results from last 7 days   Lab Units 05/26/25  0455   PROCALCITONIN ng/ml 0.07     Past Medical History[1]  Present on Admission:   Tobacco use disorder   Traumatic complete tear of right rotator cuff   Thyroid nodule   Hypertension   Cardiomyopathy (HCC)      Admitting Diagnosis: Hypokalemia [E87.6]  Arm pain [M79.603]  Stroke-like symptoms [R29.90]  Age/Sex: 45 y.o. female    Network Utilization Review Department  ATTENTION: Please call with any questions or concerns to [phone] and carefully listen to the prompts so that you are directed to the right person. All voicemails are confidential.   For Discharge needs, contact Care Management DC Support Team at [phone] opt. [phone]  Send all requests for admission clinical reviews, approved or denied determinations and any other requests to dedicated fax number below belonging to the [facility] where the patient is receiving treatment. List of dedicated fax numbers for the Facilities:  FACILITY NAME UR FAX NUMBER   ADMISSION DENIALS (Administrative/Medical Necessity) [phone]   DISCHARGE SUPPORT TEAM (NETWORK) [phone]   PARENT CHILD HEALTH (Maternity/NICU/Pediatrics) [phone]   [facility] [phone]   [facility] [phone]   [facility] [phone]   [facility] [phone]   [facility] [phone]   [facility] [phone]   [facility] [phone]   [facility] [phone]   [facility] [phone]   [facility] [phone]   [facility] [phone]   [facility]  [place] [phone]              [1]   Past Medical History:  Diagnosis Date    Arthritis     Closed fracture of left tibial plateau 7/27/2022    Depression     DJD (degenerative joint disease)     Herpes     Hidradenitis     MRSA carrier

## 2025-05-27 NOTE — QUICK NOTE
Patient seen with attending neurologist.  Patient reports that she is doing well.  RUCHI completed and findings note: EF 40%, no thrombus or PFO noted. Discussed with attending neurologist- recommend DAPT x 21 days, then transition to aspirin monotherapy. Recommend Zio patch/loop recorder placement. Continue atorvastatin 40 mg. Monitor neuro exam; notify with any changes. Medical management and supportive care per primary team, including correction of any metabolic or infectious disturbances. No further inpatient neurology recommendations at this time. Please call with any questions. Case and treatment plan reviewed with attending neurologist, Dr. Denise. Please see attending attestation for any further recommendations.          Lorri Montes will need follow-up in in 6 weeks with neurovascular team for Small vessel lacunar infarct in 60 minute appointment. They will not require outpatient neurological testing.

## 2025-05-27 NOTE — ASSESSMENT & PLAN NOTE
Noted to have leukocytosis.  Denied any fever or chills.  Patient denies any abdominal pain, dysuria or increased urinary frequency  WBC is trending up current WBC is 15.5>>14  No signs of infection, will continue to monitor off antibiotics.  AM CBC
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
## 2025-05-27 NOTE — ANESTHESIA PREPROCEDURE EVALUATION
Procedure:  RUCHI    Admitted 5/21/25 with left sided numbness, NIHSS 2  Currently on ASA/Plavix    Relevant Problems   CARDIO   (+) Hypertension      MUSCULOSKELETAL   (+) DJD (degenerative joint disease)   (+) Impingement syndrome of right shoulder      NEURO/PSYCH   (+) Depressive disorder   (+) Stroke (cerebrum) (HCC)      Behavioral Health   (+) Tobacco use disorder      Blood   (+) Leukocytosis      Rheumatology   (+) Traumatic complete tear of right rotator cuff      Cardiovascular/Peripheral Vascular   (+) Cardiomyopathy (HCC)      Other   (+) HSV (herpes simplex virus) infection   (+) Hypokalemia     EKG 5/21/25  Normal sinus rhythm  Nonspecific ST abnormality  Prolonged QT  Abnormal ECG  TTE 5/22/25    Left Ventricle: Left ventricular cavity size is dilated. Wall thickness  is mildly increased. The left ventricular ejection fraction is 35-40%.  Systolic function is moderately reduced. There is moderate global  hypokinesis with regional variation. Diastolic function is mildly  abnormal, consistent with grade I (abnormal) relaxation.    IVS: There is abnormal septal motion of unclear etiology.    Right Ventricle: Right ventricular cavity size is normal. Systolic  function is mildly reduced.    Left Atrium: The atrium is dilated.    Atrial Septum: There is lipomatous hypertrophy of the interatrial  septum. No patent foramen ovale detected, confirmed by provocation with  cough, using agitated saline contrast and with valsalva, using agitated  saline contrast.    Physical Exam    Airway     Mallampati score: II  TM Distance: >3 FB  Neck ROM: full      Cardiovascular      Dental       Pulmonary      Neurological    She appears awake, alert and oriented x3.      Other Findings  post-pubertal.      Anesthesia Plan  ASA Score- 3     Anesthesia Type- IV sedation with anesthesia with ASA Monitors.         Additional Monitors:     Airway Plan:     Comment: Recent labs personally reviewed:  Lab Results       Component                 Value               Date                       WBC                      15.15 (H)           05/27/2025                 HGB                      14.6                05/27/2025                 PLT                      371                 05/27/2025            Lab Results       Component                Value               Date                       NA                       133 (L)             12/22/2014                 K                        4.2                 05/26/2025                 BUN                      10                  05/26/2025                 CREATININE               0.57 (L)            05/26/2025                 GLUCOSE                  111                 12/22/2014            Lab Results       Component                Value               Date                       PTT                      30                  11/17/2014             Lab Results       Component                Value               Date                       INR                      1.21 (H)            05/23/2025              Blood type A    Patient was consented for sedation with IV anesthetic. Discussed that we will maintain spontaneous respirations and utilize supplemental O2. I discussed the risks of aspiration, hypoxia, laryngospasm and bronchospasm. I discussed the scenarios related to conversion to general anesthetic. All questions answered.     I, Elizabeth Covarrubias MD, have personally seen and evaluated the patient prior to anesthetic care.  I have reviewed the pre-anesthetic record, medical history, allergies, medications and any other medical records if appropriate to the anesthetic care.  If a CRNA is involved in the case, I have reviewed the CRNA assessment, if present, and agree. Patient consented for IV Sedation, general anesthesia as back up. Discussed risks of aspiration, IV infiltration, indications for conversion to general anesthesia. All questions and concerns addressed.   .       Plan Factors-Exercise tolerance  (METS): >4 METS.    Chart reviewed. EKG reviewed. Imaging results reviewed. Existing labs reviewed. Patient summary reviewed.    Patient is a current smoker.  Patient instructed to abstain from smoking on day of procedure. Patient did not smoke on day of surgery.    Obstructive sleep apnea risk education given perioperatively.        Induction-     Postoperative Plan- .   Monitoring Plan - Monitoring plan - standard ASA monitoring      Perioperative Resuscitation Plan - Level 1 - Full Code.       Informed Consent- Anesthetic plan and risks discussed with patient.  I personally reviewed this patient with the CRNA. Discussed and agreed on the Anesthesia Plan with the CRNA..      NPO Status:  Vitals Value Taken Time   Date of last liquid 05/26/25 05/27/25 10:05   Time of last liquid 2330 05/27/25 10:05   Date of last solid 05/26/25 05/27/25 10:05   Time of last solid 2330 05/27/25 10:05

## 2025-05-27 NOTE — ASSESSMENT & PLAN NOTE
New cardiomyopathy with LVEF 35-40%, moderate global hypokinesis with regional variation, mildly reduced RV systolic function, grade 1 DD, mild MR   Nuclear stress test done today without evidence of ischemic or scarring.   Etiology unclear    Plan:  Cardiology following  Continue on GDMT Metoprolol succ 25 mg qd, Spironolactone 25 mg qd, Losartan 75mg at nights  Needs follow up OP with cardiology

## 2025-05-27 NOTE — UTILIZATION REVIEW
Continued Stay Review    SEE INITIAL REVIEW AT BOTTOM   Date: 5/24/25                          Current Patient Class: Inpatient  Current Level of Care: MS    HPI:45 y.o. female initially admitted on 5/21/25 to observation and converted 5/23/25 to inpatient   Current Diagnosis:Stroke/New Cardiomyopathy/hypokalemia    Assessment/Plan:     5/27/2025 .  Patient presents with  no overnight events  On exam non focal  Abnormal labs or imaging:  wbc 14.08.       MRI: Acute lacunar infarcts in the right thalamus and posterior right putamen. No acute hemorrhage.      Echo LVEF 35-40%, moderate global hypokinesis with regional variation, mildly reduced RV systolic function, grade 1 DD, mild MR   Plan    Continue telemetry.  DAPT.  RUCHI on 5/27.  Smoking cessation.  Monitor neuro status.   Continue GDMT:  metoprolol succ, spironolactone, Losartan.  Replete electrolytes as needed.      Medications:   Scheduled Medications:  aspirin, 81 mg, Oral, Daily  atorvastatin, 40 mg, Oral, QPM  celecoxib, 200 mg, Oral, BID  clopidogrel, 75 mg, Oral, Daily  cyanocobalamin, 1,000 mcg, Intramuscular, Weekly  enoxaparin, 40 mg, Subcutaneous, Q24H ANDREW  folic acid, 1 mg, Oral, Daily  losartan, 25 mg, Oral, Daily  magnesium sulfate, 4 g, Intravenous, Once - 0907 5/24  metoprolol succinate, 25 mg, Oral, Daily  nicotine, 7 mg, Transdermal, Daily  spironolactone, 25 mg, Oral, Daily  valACYclovir, 1,000 mg, Oral, Daily    Continuous IV Infusions:     PRN Meds:  acetaminophen, 650 mg, Oral, Q6H PRN x 2 5/24/25  lidocaine, , Topical, Daily PRN    Discharge Plan: to be determined.     Vital Signs (last 3 days)       Date/Time Temp Pulse Resp BP MAP (mmHg) SpO2 O2 Device Patient Position - Orthostatic VS Brianne Coma Scale Score Pain    05/27/25 08:01:59 98.1 °F (36.7 °C) 75 -- 139/92 108 94 % -- -- -- --    05/26/25 23:30:44 98.5 °F (36.9 °C) 85 18 155/103 120 98 % -- -- -- --    05/26/25 2324 -- -- -- -- -- -- -- -- -- 7    05/26/25 21:36:17 98.3 °F  (36.8 °C) 81 16 141/102 115 99 % -- -- -- --    05/26/25 2000 -- -- -- -- -- -- -- -- 15 No Pain    05/26/25 19:04:38 98.4 °F (36.9 °C) 84 16 152/108 123 97 % -- -- -- --    05/26/25 1737 -- -- -- -- -- -- -- -- -- 8    05/26/25 1600 -- -- -- -- -- -- -- -- 15 --    05/26/25 15:30:04 98.8 °F (37.1 °C) 78 16 152/93 113 98 % -- -- -- --    05/26/25 1142 -- -- -- -- -- -- -- -- -- 7    05/26/25 10:45:33 -- 71 -- 138/90 106 99 % -- Lying -- --    05/26/25 0800 -- -- -- -- -- -- None (Room air) -- 15 No Pain    05/26/25 07:17:28 98.6 °F (37 °C) 73 -- 133/84 100 98 % -- -- -- --    05/26/25 03:21:24 98.5 °F (36.9 °C) 68 -- 145/103 117 98 % -- -- -- --    05/25/25 2309 -- -- -- -- -- -- -- -- -- 6 05/25/25 22:19:47 97.4 °F (36.3 °C) 84 -- 143/101 115 99 % -- -- -- --    05/25/25 2000 -- -- -- -- -- -- -- -- 15 No Pain    05/25/25 14:55:34 97.8 °F (36.6 °C) 68 -- 141/93 109 99 % -- -- -- --    05/25/25 0800 -- -- -- -- -- -- -- -- 15 --    05/25/25 0737 -- -- -- -- -- -- -- -- -- 6    05/25/25 07:06:51 98.2 °F (36.8 °C) 80 17 147/97 114 97 % -- -- -- --    05/25/25 04:42:02 -- 75 -- 150/93 112 97 % -- -- -- --    05/25/25 0000 -- -- -- -- -- -- -- -- 15 --    05/24/25 2331 -- -- -- -- -- -- -- -- -- 6    05/24/25 22:24:57 98.3 °F (36.8 °C) 66 -- 157/99 118 99 % -- -- -- --    05/24/25 22:01:24 -- 76 -- 153/101 118 99 % -- -- -- --    05/24/25 21:59:04 -- 66 -- 175/143 154 99 % -- -- -- --    05/24/25 20:07:52 -- 77 -- 177/142 154 99 % -- -- -- --    05/24/25 2000 -- -- -- -- -- -- -- -- 15 --    05/24/25 19:15:16 98.3 °F (36.8 °C) 67 -- 152/98 116 100 % -- -- -- --    05/24/25 15:20:14 98.8 °F (37.1 °C) 69 -- 143/94 110 99 % -- -- -- --    05/24/25 1127 -- -- -- -- -- -- -- -- -- 8    05/24/25 1100 98.3 °F (36.8 °C) 79 -- 146/101 116 98 % -- -- -- --    05/24/25 0900 -- -- -- -- -- -- -- -- 15 --    05/24/25 0713 97.8 °F (36.6 °C) 84 -- 145/104 118 99 % -- -- -- --    05/24/25 07:12:51 97.8 °F (36.6 °C) 83 -- 145/104 118  98 % -- -- -- --    05/24/25 0535 -- 89 -- -- -- 97 % -- -- -- --    05/24/25 0530 -- 66 -- -- -- 100 % -- -- -- --    05/24/25 0525 -- 71 -- -- -- 99 % -- -- -- --    05/24/25 0520 -- 73 -- -- -- 97 % -- -- -- --    05/24/25 0515 -- 65 -- -- -- 98 % -- -- -- --    05/24/25 0510 -- 74 -- -- -- 97 % -- -- -- --    05/24/25 0505 -- 65 -- -- -- 96 % -- -- -- --    05/24/25 0500 -- 64 -- -- -- 98 % -- -- -- --    05/24/25 0455 -- 76 -- -- -- 97 % -- -- -- --    05/24/25 0450 -- 72 -- -- -- 96 % -- -- -- --    05/24/25 0445 -- 76 -- -- -- 96 % -- -- -- --    05/24/25 0440 -- 78 -- -- -- 96 % -- -- -- --    05/24/25 0435 -- 73 -- -- -- 97 % -- -- -- --    05/24/25 0434 -- -- -- -- -- -- -- -- -- 7    05/24/25 0430 -- 72 -- 140/95 110 97 % -- Sitting -- --    05/24/25 0425 -- 74 -- -- -- 97 % -- -- -- --    05/24/25 0420 -- 72 -- -- -- 97 % -- -- -- --    05/24/25 0415 -- 75 -- -- -- 98 % -- -- -- --    05/24/25 0410 -- 71 -- -- -- 98 % -- -- -- --    05/24/25 0405 -- 78 -- -- -- 97 % -- -- -- --    05/24/25 0400 -- 69 16 140/95 110 98 % None (Room air) Sitting 15 --    05/24/25 0355 -- 70 -- -- -- 97 % -- -- -- --    05/24/25 0350 -- 70 -- -- -- 97 % -- -- -- --    05/24/25 0345 -- 75 -- -- -- 97 % -- -- -- --    05/24/25 0340 -- 75 -- -- -- 97 % -- -- -- --    05/24/25 0335 -- 74 -- -- -- 96 % -- -- -- --    05/24/25 0330 -- 65 -- -- -- 99 % -- -- -- --    05/24/25 0325 -- 80 -- -- -- 97 % -- -- -- --    05/24/25 0320 -- 73 -- -- -- 97 % -- -- -- --    05/24/25 0315 -- 80 -- -- -- 97 % -- -- -- --    05/24/25 0310 -- 72 -- -- -- 97 % -- -- -- --    05/24/25 0305 -- 74 -- -- -- 97 % -- -- -- --    05/24/25 0300 -- 75 -- -- -- 96 % -- -- -- --    05/24/25 0255 -- 70 -- -- -- 97 % -- -- -- --    05/24/25 0250 -- 75 -- -- -- 97 % -- -- -- --    05/24/25 0245 -- 81 -- -- -- 97 % -- -- -- --    05/24/25 0240 -- 77 -- -- -- 98 % -- -- -- --    05/24/25 0235 -- 83 -- -- -- 97 % -- -- -- --    05/24/25 0230 -- 86 -- -- -- 96 % --  -- -- --    05/24/25 0225 -- 77 -- -- -- 97 % -- -- -- --    05/24/25 0220 -- 68 -- -- -- 97 % -- -- -- --    05/24/25 0215 -- 74 -- -- -- 97 % -- -- -- --    05/24/25 0210 -- 79 -- -- -- 96 % -- -- -- --    05/24/25 0205 -- 78 -- -- -- 96 % -- -- -- --    05/24/25 0200 -- 71 -- -- -- 98 % -- -- -- --    05/24/25 0155 -- 68 -- -- -- 98 % -- -- -- --    05/24/25 0150 -- 74 -- -- -- 98 % -- -- -- --    05/24/25 0145 -- 79 -- -- -- 98 % -- -- -- --    05/24/25 0140 -- 77 -- -- -- 98 % -- -- -- --    05/24/25 0135 -- 65 -- -- -- 99 % -- -- -- --    05/24/25 0130 -- 67 -- -- -- 97 % -- -- -- --    05/24/25 0125 -- 69 -- -- -- 98 % -- -- -- --    05/24/25 0120 -- 69 -- -- -- 98 % -- -- -- --    05/24/25 0115 -- 72 -- -- -- 98 % -- -- -- --    05/24/25 0110 -- 76 -- -- -- 97 % -- -- -- --    05/24/25 0105 -- 91 -- -- -- 97 % -- -- -- --    05/24/25 0100 -- 72 -- -- -- 96 % -- -- -- --    05/24/25 0055 -- 94 -- -- -- 98 % -- -- -- --    05/24/25 0050 -- 71 -- -- -- 97 % -- -- -- --    05/24/25 0045 -- 70 -- -- -- 97 % -- -- -- --    05/24/25 0040 -- 76 -- -- -- 97 % -- -- -- --    05/24/25 0035 -- 83 -- -- -- 96 % -- -- -- --    05/24/25 0030 -- 76 -- -- -- 97 % -- -- -- --    05/24/25 0025 -- 77 -- -- -- 98 % -- -- -- --    05/24/25 0020 -- 78 -- -- -- 98 % -- -- -- --    05/24/25 0015 -- 76 -- -- -- 98 % -- -- -- --    05/24/25 0010 -- 79 -- -- -- 98 % -- -- -- --    05/24/25 0005 -- 76 -- -- -- 98 % -- -- -- --    05/24/25 0000 -- 68 18 130/82 -- 97 % None (Room air) Sitting 15 --          Weight (last 2 days)       Date/Time Weight    05/27/25 0444 74 (163.2)    05/26/25 0450 74 (163.2)    05/25/25 0559 73.9 (162.92)            Date and Time Trauma Responsiveness Trauma Length of LOC (Seconds) R Pupil Size (mm) L Pupil Size (mm) R Pupil Reaction L Pupil Reaction   05/24/25 0400 -- -- 3 3 Brisk Brisk   05/24/25 0000 -- -- 3 3 Brisk Brisk   05/23/25 2000 -- -- 3 3 Brisk Brisk   05/23/25 1600 -- -- 3 3 Brisk Brisk    05/23/25 1200 -- -- 3 3 Brisk Brisk   05/23/25 0800 -- -- 3 3 Brisk Brisk   05/23/25 0400 -- -- 3 3 Brisk Brisk   05/23/25 0000 -- -- 3 3 Brisk Brisk   05/22/25 2000 -- -- 3 3 Brisk Brisk   05/22/25 1600 -- -- 3 3 Brisk Brisk   05/22/25 1200 -- -- 3 3 Brisk Brisk   05/22/25 0800 -- -- 3 3 Brisk Brisk   05/22/25 0400 -- -- 3 3 Brisk Brisk   05/22/25 0155 -- -- 3 3 Brisk Brisk   05/22/25 0000 -- -- 3 3 Brisk Brisk   05/21/25 2200 -- -- 3 3 Brisk Brisk   05/21/25 1955 -- -- 3 3 Brisk Brisk   05/21/25 1900 -- -- 3 3 Brisk Brisk   05/21/25 1800 -- -- 3 3 Brisk Brisk   05/21/25 1700 -- -- 3 3 Brisk Brisk   05/21/25 1500 -- -- 3 3 Brisk Brisk     Date and Time Eye Opening Best Verbal Response Best Motor Response [place] Coma Scale Score   05/24/25 0400 4 5 6 15   05/24/25 0000 4 5 6 15   05/23/25 2000 4 5 6 15   05/23/25 1600 4 5 6 15   05/23/25 1200 4 5 6 15   05/23/25 0800 4 5 6 15   05/23/25 0400 4 5 6 15   05/23/25 0000 4 5 6 15   05/22/25 2000 4 5 6 15   05/22/25 1600 4 5 6 15   05/22/25 1200 4 5 6 15   05/22/25 0800 4 5 6 15   05/22/25 0400 4 5 6 15   05/22/25 0155 4 5 6 15   05/21/25 2200 4 5 6 15   05/21/25 1955 4 5 6 15   05/21/25 1500 4 5 6 15     Pertinent Labs/Diagnostic Results:   Radiology:  MRI brain wo contrast   Final Interpretation by E. Alec Schoenberger, MD (05/22 0808)   Acute lacunar infarcts in the right thalamus and posterior right putamen. No acute hemorrhage.      The study was marked in EPIC for immediate notification.      Workstation performed: [id number]         CTA head and neck with and without contrast   Final Interpretation by Gregoria Burt MD (05/21 1538)      CT Brain:  No acute intracranial abnormality.      CT Angiography: Normal CTA neck and brain.               Workstation performed: [id number]         XR chest 1 view portable   Final Interpretation by Matt Venegas DO (05/21 1600)      No acute cardiopulmonary disease.            Workstation performed: [id number]            Cardiology:  NM myocardial perfusion spect (rx stress and/or rest)   Final Result by Stuart Stallings DO (05/23 0578)        Stress ECG: The patient and had a maximal HR of 117 bpm (66% of MPHR)    and 1.0 METS. The patient experienced no angina during the test. The    patient reached the end of the protocol. The patient reported mild    shortness of breath during the stress test. Symptoms began during stress    and ended during recovery. The patient reported no other cardiac symptoms    during the stress test.     Stress ECG: No ST deviation is noted. The ECG was not diagnostic due to    pharmacological (vasodilator) stress.     Perfusion: There are no perfusion defects.     Stress Function: Left ventricular function post-stress is abnormal.    Stress ejection fraction is 37%.     Stress Combined Conclusion: Left ventricular perfusion is normal.      Ejection fraction 37%.  No ischemia or scar.         Echo complete w/ contrast if indicated   Final Result by Radha Mae MD (05/22 9129)        Left Ventricle: Left ventricular cavity size is dilated. Wall thickness    is mildly increased. The left ventricular ejection fraction is 35-40%.    Systolic function is moderately reduced. There is moderate global    hypokinesis with regional variation. Diastolic function is mildly    abnormal, consistent with grade I (abnormal) relaxation.     IVS: There is abnormal septal motion of unclear etiology.     Right Ventricle: Right ventricular cavity size is normal. Systolic    function is mildly reduced.     Left Atrium: The atrium is dilated.     Atrial Septum: There is lipomatous hypertrophy of the interatrial    septum. No patent foramen ovale detected, confirmed by provocation with    cough, using agitated saline contrast and with valsalva, using agitated    saline contrast.         ECG 12 lead   Final Result by Kylah Mujica MD (05/22 9683)   Normal sinus rhythm   Nonspecific ST abnormality   Prolonged QT    Abnormal ECG   When compared with ECG of 17-Nov-2014 12:02,   QT has lengthened   Confirmed by Kylah Mujica ([id number]) on 5/22/2025 8:10:52 AM        GI:  No orders to display       Results from last 7 days   Lab Units 05/27/25  0444 05/26/25  0455 05/25/25  0505 05/24/25  0428 05/23/25  0429   WBC Thousand/uL 15.15* 16.20* 16.52* 14.08* 13.21*   HEMOGLOBIN g/dL 14.6 14.6 13.7 13.9 13.5   HEMATOCRIT % 43.1 43.5 40.7 41.4 39.5   PLATELETS Thousands/uL 371 370 359 350 369   TOTAL NEUT ABS Thousands/µL 9.58* 10.25*  --  8.20* 8.18*     Results from last 7 days   Lab Units 05/26/25  0455 05/25/25  0505 05/24/25 0428 05/23/25 0429 05/22/25  0426   SODIUM mmol/L 136 137 138 138 136   POTASSIUM mmol/L 4.2 4.2 4.1 3.3* 3.3*   CHLORIDE mmol/L 105 104 106 103 101   CO2 mmol/L 24 25 24 26 27   ANION GAP mmol/L 7 8 8 9 8   BUN mg/dL 10 9 7 5 9   CREATININE mg/dL 0.57* 0.51* 0.50* 0.52* 0.58*   EGFR ml/min/1.73sq m 113 117 118 116 112   CALCIUM mg/dL 8.9 8.6 8.6 8.5 8.5   MAGNESIUM mg/dL  --  2.0 1.6* 1.7*  --      Results from last 7 days   Lab Units 05/21/25  1440   AST U/L 12*   ALT U/L 8   ALK PHOS U/L 55   TOTAL PROTEIN g/dL 7.6   ALBUMIN g/dL 4.3   TOTAL BILIRUBIN mg/dL 0.42     Results from last 7 days   Lab Units 05/21/25  1634 05/21/25  1533   POC GLUCOSE mg/dl 140 66     Results from last 7 days   Lab Units 05/26/25  0455 05/25/25  0505 05/24/25  0428 05/23/25  0429 05/22/25  0426 05/21/25  1440   GLUCOSE RANDOM mg/dL 79 85 86 85 86 87     Results from last 7 days   Lab Units 05/21/25  1440   HEMOGLOBIN A1C % 5.5   EAG mg/dl 111     Results from last 7 days   Lab Units 05/21/25  1842 05/21/25  1641 05/21/25  1440   HS TNI 0HR ng/L  --   --  4   HS TNI 2HR ng/L  --  5  --    HSTNI D2 ng/L  --  1  --    HS TNI 4HR ng/L 6  --   --    HSTNI D4 ng/L 2  --   --      Results from last 7 days   Lab Units 05/23/25  0429   PROTIME seconds 16.1*   INR  1.21*     Results from last 7 days   Lab Units 05/21/25  1842   TSH 3RD  RICHELLE uIU/mL 1.106       Network Utilization Review Department  ATTENTION: Please call with any questions or concerns to [phone] and carefully listen to the prompts so that you are directed to the right person. All voicemails are confidential.   For Discharge needs, contact Care Management DC Support Team at [phone] opt. [phone]  Send all requests for admission clinical reviews, approved or denied determinations and any other requests to dedicated fax number below belonging to the campus where the patient is receiving treatment. List of dedicated fax numbers for the Facilities:  FACILITY NAME UR FAX NUMBER   ADMISSION DENIALS (Administrative/Medical Necessity) [phone]   DISCHARGE SUPPORT TEAM (NETWORK) [phone]   PARENT CHILD HEALTH (Maternity/NICU/Pediatrics) [phone]   [facility] [phone]   [facility] [phone]   [facility] [phone]   [facility] [phone]   [facility] [phone]   [facility] [phone]   [facility] [phone]   [facility] [phone]   [facility] [phone]   [facility] [phone]   [facility] [phone]   [facility] [phone]

## 2025-05-27 NOTE — ASSESSMENT & PLAN NOTE
Lab Results   Component Value Date/Time    [id number] 81 (L) 05/21/2025 06:42 PM      Plan  Cyanocobalamin inj 1000mg weekly

## 2025-05-27 NOTE — TELEPHONE ENCOUNTER
STILL ADMITTED:5/21/2025 - present (6 days)  [facility]          1ST ATTEMPT,     VIA [employer]     Thank you,     Argelia       WILLIAMS/ PRATIK CONNELLY/ Small vessel lacunar infarct     ----- Message from KEYANA Curry sent at 5/27/2025  4:46 PM EDT -----  Regarding: HFU  Lorri Montes will need follow-up in in 6 weeks with neurovascular team for Small vessel lacunar infarct in 60 minute appointment. They will not require outpatient neurological testing.

## 2025-05-27 NOTE — ASSESSMENT & PLAN NOTE
Systolic (24hrs), Av , Min:124 , Max:155     Diastolic (24hrs), Av, Min:72, Max:108    Previously not on medication  Continue recently started medications: toprol XL 25mg, losartan 25mg, started 3 days ago, aldactone 25mg started 2 days ago.  increased losartan 75mg nightly on

## 2025-05-27 NOTE — ANESTHESIA POSTPROCEDURE EVALUATION
Post-Op Assessment Note    CV Status:  Stable    Pain management: adequate       Mental Status:  Alert and awake   Hydration Status:  Euvolemic   PONV Controlled:  Controlled   Airway Patency:  Patent  Two or more mitigation strategies used for obstructive sleep apnea   Post Op Vitals Reviewed: Yes    No anethesia notable event occurred.            Last Filed PACU Vitals:  Vitals Value Taken Time   Temp 97 °F (36.1 °C) 05/27/25 11:35   Pulse 77 05/27/25 11:35   /72 05/27/25 11:35   Resp 16 05/27/25 11:35   SpO2 99 % 05/27/25 11:35

## 2025-05-27 NOTE — ANESTHESIA POSTPROCEDURE EVALUATION
Post-Op Assessment Note    CV Status:  Stable    Pain management: adequate       Mental Status:  Sleepy   Hydration Status:  Euvolemic   PONV Controlled:  Controlled   Airway Patency:  Patent     Post Op Vitals Reviewed: Yes    No anethesia notable event occurred.    Staff: CRNA           Last Filed PACU Vitals:  Vitals Value Taken Time   Temp     Pulse 67    /61    Resp 11    SpO2 100

## 2025-05-27 NOTE — ASSESSMENT & PLAN NOTE
"45 y.o. RHD  female with depression, tobacco use, who presents to [facility] on 5/22/25 with gradual onset of L sided numbness/tingling of face/LUE/LLE. Per ED notes, LKW between 1500/1600 5/20. BP on arrival 174/112. Per ED notes NIHSS 2 (L facial weakness, L sensory deficit). Pt AP/AC naive PTA.     Workup:  - CTA H/N wwo contrast 5/21/25:   \"CT Brain:  No acute intracranial abnormality.   CT Angiography: Normal CTA neck and brain.\"  - MRI brain wo contrast 5/22/25:   \"Acute lacunar infarcts in the right thalamus and posterior right putamen. No acute hemorrhage. \"  - Labs  - total cholesterol 155, LDL 97   - hemoglobin A1c 5.5   - Echo: LV dilated, EF 35-40%, systolic function moderately reduced, moderate global hypokinesis with regional variation, L atrium dilated, no PFO   - CT chest abdomen pelvis 4/23 - \"No evidence of acute trauma in the chest, abdomen or pelvis.\"    Plan  - Stroke pathway  RUCHI  S/p Plavix 300 mg x1 on 5/21 (Pt took ASA 81 mg prior to coming to the ED 5/21)  Started DAPT with ASA 81 mg daily and Plavix 75 mg daily on 5/22/25. Likely will continue DAPT x21 days, then stop Plavix and continue with ASA monotherapy.   Consider hematology evaluation/thrombosis panel as out patient.   Atorvastatin 40 mg  Smoking cessation  Goal normotension; avoid hypotension  Euglycemic, normothermic goal  Continue telemetry  PT/OT/ST  Stroke education  Frequent neuro checks. Continue to monitor and notify neurology with any changes.  STAT CT head for any acute change in neuro exam  - Medical management and supportive care per primary team. Correction of any metabolic or infectious disturbances.   -Case and treatment plan reviewed with attending neurologist,  ***. Please see attending attestation for any further recommendations.  "

## 2025-05-28 VITALS
WEIGHT: 162.2 LBS | BODY MASS INDEX: 27.69 KG/M2 | SYSTOLIC BLOOD PRESSURE: 147 MMHG | HEIGHT: 64 IN | HEART RATE: 76 BPM | DIASTOLIC BLOOD PRESSURE: 98 MMHG | TEMPERATURE: 99.1 F | RESPIRATION RATE: 19 BRPM | OXYGEN SATURATION: 97 %

## 2025-05-28 LAB
ALDOST SERPL-MCNC: 7.8 NG/DL (ref 0–30)
ALDOST/RENIN PLAS-RTO: 1.6 {RATIO} (ref 0–30)
RENIN PLAS-CCNC: 4.75 NG/ML/HR (ref 0.17–5.38)

## 2025-05-28 PROCEDURE — 99239 HOSP IP/OBS DSCHRG MGMT >30: CPT

## 2025-05-28 RX ORDER — ASPIRIN 81 MG/1
81 TABLET, CHEWABLE ORAL DAILY
Qty: 30 TABLET | Refills: 0 | Status: SHIPPED | OUTPATIENT
Start: 2025-05-29

## 2025-05-28 RX ORDER — METOPROLOL SUCCINATE 25 MG/1
25 TABLET, EXTENDED RELEASE ORAL DAILY
Qty: 30 TABLET | Refills: 0 | Status: SHIPPED | OUTPATIENT
Start: 2025-05-29 | End: 2025-06-04 | Stop reason: SDUPTHER

## 2025-05-28 RX ORDER — LOSARTAN POTASSIUM 25 MG/1
75 TABLET ORAL DAILY
Qty: 90 TABLET | Refills: 0 | Status: SHIPPED | OUTPATIENT
Start: 2025-05-28 | End: 2025-06-04 | Stop reason: SDUPTHER

## 2025-05-28 RX ORDER — SPIRONOLACTONE 25 MG/1
25 TABLET ORAL DAILY
Qty: 30 TABLET | Refills: 0 | Status: SHIPPED | OUTPATIENT
Start: 2025-05-29 | End: 2025-06-04 | Stop reason: SDUPTHER

## 2025-05-28 RX ORDER — FOLIC ACID 1 MG/1
1 TABLET ORAL DAILY
Qty: 30 TABLET | Refills: 0 | Status: SHIPPED | OUTPATIENT
Start: 2025-05-29

## 2025-05-28 RX ORDER — CLOPIDOGREL BISULFATE 75 MG/1
75 TABLET ORAL DAILY
Qty: 13 TABLET | Refills: 0 | Status: SHIPPED | OUTPATIENT
Start: 2025-05-29 | End: 2025-06-13

## 2025-05-28 RX ORDER — ATORVASTATIN CALCIUM 40 MG/1
40 TABLET, FILM COATED ORAL EVERY EVENING
Qty: 30 TABLET | Refills: 0 | Status: SHIPPED | OUTPATIENT
Start: 2025-05-28

## 2025-05-28 RX ORDER — CYANOCOBALAMIN 1000 UG/ML
1000 INJECTION, SOLUTION INTRAMUSCULAR; SUBCUTANEOUS WEEKLY
Qty: 1 ML | Refills: 2 | Status: SHIPPED | OUTPATIENT
Start: 2025-05-29 | End: 2025-06-09 | Stop reason: CLARIF

## 2025-05-28 RX ADMIN — ASPIRIN 81 MG: 81 TABLET, CHEWABLE ORAL at 08:48

## 2025-05-28 RX ADMIN — CELECOXIB 200 MG: 100 CAPSULE ORAL at 08:48

## 2025-05-28 RX ADMIN — VALACYCLOVIR HYDROCHLORIDE 1000 MG: 500 TABLET, FILM COATED ORAL at 08:48

## 2025-05-28 RX ADMIN — LIDOCAINE: 40 CREAM TOPICAL at 08:53

## 2025-05-28 RX ADMIN — CLOPIDOGREL BISULFATE 75 MG: 75 TABLET, FILM COATED ORAL at 08:48

## 2025-05-28 RX ADMIN — NICOTINE 7 MG: 7 PATCH, EXTENDED RELEASE TRANSDERMAL at 08:49

## 2025-05-28 RX ADMIN — SPIRONOLACTONE 25 MG: 25 TABLET ORAL at 08:48

## 2025-05-28 RX ADMIN — FOLIC ACID 1 MG: 1 TABLET ORAL at 08:48

## 2025-05-28 RX ADMIN — ENOXAPARIN SODIUM 40 MG: 40 INJECTION SUBCUTANEOUS at 08:48

## 2025-05-28 RX ADMIN — METOPROLOL SUCCINATE ER TABLETS 25 MG: 25 TABLET, FILM COATED, EXTENDED RELEASE ORAL at 08:48

## 2025-05-28 RX ADMIN — ACETAMINOPHEN 650 MG: 325 TABLET, FILM COATED ORAL at 08:53

## 2025-05-28 NOTE — PLAN OF CARE
Problem: Potential for Falls  Goal: Patient will remain free of falls  Description: INTERVENTIONS:  - Educate patient/family on patient safety including physical limitations  - Instruct patient to call for assistance with activity   - Consider consulting OT/PT to assist with strengthening/mobility based on AM PAC & JH-HLM score  - Consult OT/PT to assist with strengthening/mobility   - Keep Call bell within reach  - Keep bed low and locked with side rails adjusted as appropriate  - Keep care items and personal belongings within reach  - Initiate and maintain comfort rounds  - Make Fall Risk Sign visible to staff  - Offer Toileting every 2 Hours, in advance of need  - Initiate/Maintain alarm  - Obtain necessary fall risk management equipment:   - Apply yellow socks and bracelet for high fall risk patients  - Consider moving patient to room near nurses station  5/28/2025 1246 by Marla Mendez RN  Outcome: Adequate for Discharge  5/28/2025 1019 by Marla Mendez RN  Outcome: Progressing  5/28/2025 1019 by Marla Mendez RN  Outcome: Progressing     Problem: NEUROSENSORY - ADULT  Goal: Achieves stable or improved neurological status  Description: INTERVENTIONS  - Monitor and report changes in neurological status  - Monitor vital signs such as temperature, blood pressure, glucose, and any other labs ordered   - Initiate measures to prevent increased intracranial pressure  - Monitor for seizure activity and implement precautions if appropriate      5/28/2025 1246 by Marla Mendez RN  Outcome: Adequate for Discharge  5/28/2025 1019 by Marla Mendez RN  Outcome: Progressing  5/28/2025 1019 by Marla Mendez RN  Outcome: Progressing     Problem: PAIN - ADULT  Goal: Verbalizes/displays adequate comfort level or baseline comfort level  Description: Interventions:  - Encourage patient to monitor pain and request assistance  - Assess pain using appropriate pain scale  - Administer analgesics as ordered based on type and severity  of pain and evaluate response  - Implement non-pharmacological measures as appropriate and evaluate response  - Consider cultural and social influences on pain and pain management  - Notify physician/advanced practitioner if interventions unsuccessful or patient reports new pain  - Educate patient/family on pain management process including their role and importance of  reporting pain   - Provide non-pharmacologic/complimentary pain relief interventions  5/28/2025 1246 by Marla Mendez RN  Outcome: Adequate for Discharge  5/28/2025 1019 by Marla Mendez RN  Outcome: Progressing     Problem: INFECTION - ADULT  Goal: Absence or prevention of progression during hospitalization  Description: INTERVENTIONS:  - Assess and monitor for signs and symptoms of infection  - Monitor lab/diagnostic results  - Monitor all insertion sites, i.e. indwelling lines, tubes, and drains  - Monitor endotracheal if appropriate and nasal secretions for changes in amount and color  - [place] appropriate cooling/warming therapies per order  - Administer medications as ordered  - Instruct and encourage patient and family to use good hand hygiene technique  - Identify and instruct in appropriate isolation precautions for identified infection/condition  5/28/2025 1246 by Marla Mendez RN  Outcome: Adequate for Discharge  5/28/2025 1019 by Marla Mendez RN  Outcome: Progressing  Goal: Absence of fever/infection during neutropenic period  Description: INTERVENTIONS:  - Monitor WBC  - Perform strict hand hygiene  - Limit to healthy visitors only  - No plants, dried, fresh or silk flowers with mar in patient room  5/28/2025 1246 by Marla Mendez RN  Outcome: Adequate for Discharge  5/28/2025 1019 by Marla Mendez RN  Outcome: Progressing     Problem: SAFETY ADULT  Goal: Patient will remain free of falls  Description: INTERVENTIONS:  - Educate patient/family on patient safety including physical limitations  - Instruct patient to call for assistance  with activity   - Consider consulting OT/PT to assist with strengthening/mobility based on AM PAC & JH-HLM score  - Consult OT/PT to assist with strengthening/mobility   - Keep Call bell within reach  - Keep bed low and locked with side rails adjusted as appropriate  - Keep care items and personal belongings within reach  - Initiate and maintain comfort rounds  - Make Fall Risk Sign visible to staff  - Offer Toileting every 2 Hours, in advance of need  - Initiate/Maintain alarm  - Obtain necessary fall risk management equipment:   - Apply yellow socks and bracelet for high fall risk patients  - Consider moving patient to room near nurses station  5/28/2025 1246 by Marla Mendez RN  Outcome: Adequate for Discharge  5/28/2025 1019 by Marla Mendez RN  Outcome: Progressing  5/28/2025 1019 by Marla Mendez RN  Outcome: Progressing  Goal: Maintain or return to baseline ADL function  Description: INTERVENTIONS:  -  Assess patient's ability to carry out ADLs; assess patient's baseline for ADL function and identify physical deficits which impact ability to perform ADLs (bathing, care of mouth/teeth, toileting, grooming, dressing, etc.)  - Assess/evaluate cause of self-care deficits   - Assess range of motion  - Assess patient's mobility; develop plan if impaired  - Assess patient's need for assistive devices and provide as appropriate  - Encourage maximum independence but intervene and supervise when necessary  - Involve family in performance of ADLs  - Assess for home care needs following discharge   - Consider OT consult to assist with ADL evaluation and planning for discharge  - Provide patient education as appropriate  - Monitor functional capacity and physical performance, use of AM PAC & JH-HLM   - Monitor gait, balance and fatigue with ambulation    5/28/2025 1246 by Marla Mendez RN  Outcome: Adequate for Discharge  5/28/2025 1019 by Marla Mendez RN  Outcome: Progressing  Goal: Maintains/Returns to pre admission  functional level  Description: INTERVENTIONS:  - Perform AM-PAC 6 Click Basic Mobility/ Daily Activity assessment daily.  - Set and communicate daily mobility goal to care team and patient/family/caregiver.   - Collaborate with rehabilitation services on mobility goals if consulted  - Perform Range of Motion 2 times a day.  - Reposition patient every 2 hours.  - Dangle patient 2 times a day  - Stand patient 2 times a day  - Ambulate patient 2 times a day  - Out of bed to chair 2 times a day   - Out of bed for meals 2 times a day  - Out of bed for toileting  - Record patient progress and toleration of activity level   5/28/2025 1246 by Marla Mendez RN  Outcome: Adequate for Discharge  5/28/2025 1019 by Marla Mendez RN  Outcome: Progressing     Problem: DISCHARGE PLANNING  Goal: Discharge to home or other facility with appropriate resources  Description: INTERVENTIONS:  - Identify barriers to discharge w/patient and caregiver  - Arrange for needed discharge resources and transportation as appropriate  - Identify discharge learning needs (meds, wound care, etc.)  - Arrange for interpretive services to assist at discharge as needed  - Refer to Case Management Department for coordinating discharge planning if the patient needs post-hospital services based on physician/advanced practitioner order or complex needs related to functional status, cognitive ability, or social support system  5/28/2025 1246 by Marla Mendez RN  Outcome: Adequate for Discharge  5/28/2025 1019 by Marla Mendez RN  Outcome: Progressing     Problem: Knowledge Deficit  Goal: Patient/family/caregiver demonstrates understanding of disease process, treatment plan, medications, and discharge instructions  Description: Complete learning assessment and assess knowledge base.  Interventions:  - Provide teaching at level of understanding  - Provide teaching via preferred learning methods  5/28/2025 1246 by Marla Mendez RN  Outcome: Adequate for  Discharge  5/28/2025 1019 by Marla Mendez, RN  Outcome: Progressing

## 2025-05-28 NOTE — TELEPHONE ENCOUNTER
Patient called back to schedule HFU    Scheduled 7/18/25 1 PM KEYANA Corea [facility]; placed on wait list for [facility] office location only per request.  HFU/ PRATIK CONNELLY/ Small vessel lacunar infarct   DC-5/28/25-Home     ----- Message from An KEYANA Dawson sent at 5/27/2025  4:46 PM EDT -----  Regarding: HFU  Lorri Montes will need follow-up in in 6 weeks with neurovascular team for Small vessel lacunar infarct in 60 minute appointment. They will not require outpatient neurological testing.       Thank you

## 2025-05-28 NOTE — PLAN OF CARE
Problem: Potential for Falls  Goal: Patient will remain free of falls  Description: INTERVENTIONS:  - Educate patient/family on patient safety including physical limitations  - Instruct patient to call for assistance with activity   - Consider consulting OT/PT to assist with strengthening/mobility based on AM PAC & JH-HLM score  - Consult OT/PT to assist with strengthening/mobility   - Keep Call bell within reach  - Keep bed low and locked with side rails adjusted as appropriate  - Keep care items and personal belongings within reach  - Initiate and maintain comfort rounds  - Make Fall Risk Sign visible to staff  - Offer Toileting every 2 Hours, in advance of need  - Initiate/Maintain alarm  - Obtain necessary fall risk management equipment:   - Apply yellow socks and bracelet for high fall risk patients  - Consider moving patient to room near nurses station  5/28/2025 1019 by Marla Mendez RN  Outcome: Progressing  5/28/2025 1019 by Marla Mendez RN  Outcome: Progressing     Problem: NEUROSENSORY - ADULT  Goal: Achieves stable or improved neurological status  Description: INTERVENTIONS  - Monitor and report changes in neurological status  - Monitor vital signs such as temperature, blood pressure, glucose, and any other labs ordered   - Initiate measures to prevent increased intracranial pressure  - Monitor for seizure activity and implement precautions if appropriate      5/28/2025 1019 by Marla Mendez RN  Outcome: Progressing  5/28/2025 1019 by Marla Mendez RN  Outcome: Progressing     Problem: PAIN - ADULT  Goal: Verbalizes/displays adequate comfort level or baseline comfort level  Description: Interventions:  - Encourage patient to monitor pain and request assistance  - Assess pain using appropriate pain scale  - Administer analgesics as ordered based on type and severity of pain and evaluate response  - Implement non-pharmacological measures as appropriate and evaluate response  - Consider cultural and  social influences on pain and pain management  - Notify physician/advanced practitioner if interventions unsuccessful or patient reports new pain  - Educate patient/family on pain management process including their role and importance of  reporting pain   - Provide non-pharmacologic/complimentary pain relief interventions  Outcome: Progressing     Problem: INFECTION - ADULT  Goal: Absence or prevention of progression during hospitalization  Description: INTERVENTIONS:  - Assess and monitor for signs and symptoms of infection  - Monitor lab/diagnostic results  - Monitor all insertion sites, i.e. indwelling lines, tubes, and drains  - Monitor endotracheal if appropriate and nasal secretions for changes in amount and color  - [place] appropriate cooling/warming therapies per order  - Administer medications as ordered  - Instruct and encourage patient and family to use good hand hygiene technique  - Identify and instruct in appropriate isolation precautions for identified infection/condition  Outcome: Progressing  Goal: Absence of fever/infection during neutropenic period  Description: INTERVENTIONS:  - Monitor WBC  - Perform strict hand hygiene  - Limit to healthy visitors only  - No plants, dried, fresh or silk flowers with mar in patient room  Outcome: Progressing     Problem: SAFETY ADULT  Goal: Patient will remain free of falls  Description: INTERVENTIONS:  - Educate patient/family on patient safety including physical limitations  - Instruct patient to call for assistance with activity   - Consider consulting OT/PT to assist with strengthening/mobility based on AM PAC & JH-HLM score  - Consult OT/PT to assist with strengthening/mobility   - Keep Call bell within reach  - Keep bed low and locked with side rails adjusted as appropriate  - Keep care items and personal belongings within reach  - Initiate and maintain comfort rounds  - Make Fall Risk Sign visible to staff  - Offer Toileting every 2 Hours, in advance  of need  - Initiate/Maintain alarm  - Obtain necessary fall risk management equipment:   - Apply yellow socks and bracelet for high fall risk patients  - Consider moving patient to room near nurses station  5/28/2025 1019 by Marla Mendez RN  Outcome: Progressing  5/28/2025 1019 by Marla Mendez RN  Outcome: Progressing  Goal: Maintain or return to baseline ADL function  Description: INTERVENTIONS:  -  Assess patient's ability to carry out ADLs; assess patient's baseline for ADL function and identify physical deficits which impact ability to perform ADLs (bathing, care of mouth/teeth, toileting, grooming, dressing, etc.)  - Assess/evaluate cause of self-care deficits   - Assess range of motion  - Assess patient's mobility; develop plan if impaired  - Assess patient's need for assistive devices and provide as appropriate  - Encourage maximum independence but intervene and supervise when necessary  - Involve family in performance of ADLs  - Assess for home care needs following discharge   - Consider OT consult to assist with ADL evaluation and planning for discharge  - Provide patient education as appropriate  - Monitor functional capacity and physical performance, use of AM PAC & JH-HLM   - Monitor gait, balance and fatigue with ambulation    Outcome: Progressing  Goal: Maintains/Returns to pre admission functional level  Description: INTERVENTIONS:  - Perform AM-PAC 6 Click Basic Mobility/ Daily Activity assessment daily.  - Set and communicate daily mobility goal to care team and patient/family/caregiver.   - Collaborate with rehabilitation services on mobility goals if consulted  - Perform Range of Motion 2 times a day.  - Reposition patient every 2 hours.  - Dangle patient 2 times a day  - Stand patient 2 times a day  - Ambulate patient 2 times a day  - Out of bed to chair 2 times a day   - Out of bed for meals 2 times a day  - Out of bed for toileting  - Record patient progress and toleration of activity level    Outcome: Progressing     Problem: DISCHARGE PLANNING  Goal: Discharge to home or other facility with appropriate resources  Description: INTERVENTIONS:  - Identify barriers to discharge w/patient and caregiver  - Arrange for needed discharge resources and transportation as appropriate  - Identify discharge learning needs (meds, wound care, etc.)  - Arrange for interpretive services to assist at discharge as needed  - Refer to Case Management Department for coordinating discharge planning if the patient needs post-hospital services based on physician/advanced practitioner order or complex needs related to functional status, cognitive ability, or social support system  Outcome: Progressing     Problem: Knowledge Deficit  Goal: Patient/family/caregiver demonstrates understanding of disease process, treatment plan, medications, and discharge instructions  Description: Complete learning assessment and assess knowledge base.  Interventions:  - Provide teaching at level of understanding  - Provide teaching via preferred learning methods  Outcome: Progressing

## 2025-05-29 ENCOUNTER — PATIENT OUTREACH (OUTPATIENT)
Dept: CASE MANAGEMENT | Facility: OTHER | Age: 45
End: 2025-05-29

## 2025-05-29 ENCOUNTER — TRANSITIONAL CARE MANAGEMENT (OUTPATIENT)
Dept: INTERNAL MEDICINE CLINIC | Facility: CLINIC | Age: 45
End: 2025-05-29

## 2025-05-29 NOTE — UTILIZATION REVIEW
NOTIFICATION OF ADMISSION DISCHARGE   This is a Notification of Discharge from [facility]. Please be advised that this patient has been discharge from our facility. Below you will find the admission and discharge date and time including the patient’s disposition.   UTILIZATION REVIEW CONTACT:  Utilization Review Assistants  Network Utilization Review Department  Phone: [phone] x carefully listen to the prompts. All voicemails are confidential.  Email: [email or web address]     ADMISSION INFORMATION  PRESENTATION DATE: 5/21/2025  2:04 PM  OBERVATION ADMISSION DATE: 05/21/2025 1550  INPATIENT ADMISSION DATE: 5/23/25  3:42 PM   DISCHARGE DATE: 5/28/2025 12:47 PM   DISPOSITION:Home/Self Care    Network Utilization Review Department  ATTENTION: Please call with any questions or concerns to [phone] and carefully listen to the prompts so that you are directed to the right person. All voicemails are confidential.   For Discharge needs, contact Care Management DC Support Team at [phone] opt. 2  Send all requests for admission clinical reviews, approved or denied determinations and any other requests to dedicated fax number below belonging to the campus where the patient is receiving treatment. List of dedicated fax numbers for the Facilities:  FACILITY NAME UR FAX NUMBER   ADMISSION DENIALS (Administrative/Medical Necessity) [phone]   DISCHARGE SUPPORT TEAM ([facility]) [phone]   PARENT CHILD HEALTH (Maternity/NICU/Pediatrics) [phone]   [facility] [phone]   [facility] [phone]   [facility] [phone]   [facility] [phone]   [facility] [phone]   [facility] [phone]   [facility] [phone]   [facility] [phone]    [facility] [phone]   [facility] [phone]   [facility] [phone]   [facility] [phone]

## 2025-05-31 ENCOUNTER — TELEPHONE (OUTPATIENT)
Dept: OTHER | Facility: OTHER | Age: 45
End: 2025-05-31

## 2025-06-01 NOTE — TELEPHONE ENCOUNTER
Pt called in wanting to schedule an appointment with her OB to discuss other options of contraception     Please give her a call back

## 2025-06-02 ENCOUNTER — TELEPHONE (OUTPATIENT)
Dept: NEUROLOGY | Facility: CLINIC | Age: 45
End: 2025-06-02

## 2025-06-02 NOTE — TELEPHONE ENCOUNTER
05/21-25 d/c 05/28/2025   5 day neuro call: S/w patient for 5 day post discharge call. She states she still has same LEFT side upper extremity weakness.  She ambulates with 1 for extra support measures. She states this is just as support because she still feels a little imbalanced not because lower extremity weakness. She reports she is continuing with Plavix and ASA as directed by physician. She does have neuro follow up scheduled in computer 07*18*2025 . I also gave her central scheduling so she can schedule  her physical therapy appointments.

## 2025-06-04 ENCOUNTER — PATIENT OUTREACH (OUTPATIENT)
Dept: INTERNAL MEDICINE CLINIC | Facility: CLINIC | Age: 45
End: 2025-06-04

## 2025-06-04 ENCOUNTER — TELEPHONE (OUTPATIENT)
Dept: INTERNAL MEDICINE CLINIC | Facility: CLINIC | Age: 45
End: 2025-06-04

## 2025-06-04 ENCOUNTER — OFFICE VISIT (OUTPATIENT)
Dept: INTERNAL MEDICINE CLINIC | Facility: CLINIC | Age: 45
End: 2025-06-04

## 2025-06-04 VITALS
HEART RATE: 80 BPM | DIASTOLIC BLOOD PRESSURE: 82 MMHG | OXYGEN SATURATION: 98 % | TEMPERATURE: 98 F | BODY MASS INDEX: 29.21 KG/M2 | SYSTOLIC BLOOD PRESSURE: 124 MMHG | WEIGHT: 170.2 LBS

## 2025-06-04 DIAGNOSIS — I63.89 CEREBROVASCULAR ACCIDENT (CVA) DUE TO OTHER MECHANISM (HCC): Primary | ICD-10-CM

## 2025-06-04 DIAGNOSIS — I10 HYPERTENSION, UNSPECIFIED TYPE: ICD-10-CM

## 2025-06-04 DIAGNOSIS — F17.200 TOBACCO USE DISORDER: ICD-10-CM

## 2025-06-04 DIAGNOSIS — Z78.9 NEEDS ASSISTANCE WITH COMMUNITY RESOURCES: Primary | ICD-10-CM

## 2025-06-04 DIAGNOSIS — M75.101 TEAR OF RIGHT SUPRASPINATUS TENDON: ICD-10-CM

## 2025-06-04 DIAGNOSIS — I42.9 CARDIOMYOPATHY, UNSPECIFIED TYPE (HCC): ICD-10-CM

## 2025-06-04 PROBLEM — S09.92XD NASAL INJURY, SUBSEQUENT ENCOUNTER: Status: ACTIVE | Noted: 2025-06-04

## 2025-06-04 PROBLEM — R93.1 ABNORMAL ECHOCARDIOGRAM: Status: ACTIVE | Noted: 2025-06-04

## 2025-06-04 PROCEDURE — 99496 TRANSJ CARE MGMT HIGH F2F 7D: CPT | Performed by: FAMILY MEDICINE

## 2025-06-04 RX ORDER — SPIRONOLACTONE 25 MG/1
25 TABLET ORAL DAILY
Qty: 90 TABLET | Refills: 1 | Status: SHIPPED | OUTPATIENT
Start: 2025-06-04

## 2025-06-04 RX ORDER — METOPROLOL SUCCINATE 25 MG/1
25 TABLET, EXTENDED RELEASE ORAL DAILY
Qty: 90 TABLET | Refills: 1 | Status: SHIPPED | OUTPATIENT
Start: 2025-06-04

## 2025-06-04 RX ORDER — LOSARTAN POTASSIUM 25 MG/1
75 TABLET ORAL DAILY
Qty: 270 TABLET | Refills: 1 | Status: SHIPPED | OUTPATIENT
Start: 2025-06-04 | End: 2025-06-06

## 2025-06-04 NOTE — PROGRESS NOTES
RN CM met with patient during PCP visit. Pt tearful at time of meeting. Pt reports she is depressed and has financial concerns. RN CM will discuss with SIRISHA YUN.     Discussed post stroke follow up care. Pt has scheduled PT appointment. Pt has follow up neurology appointment.     Medications reviewed. Pt has questions regarding Vitamin B12 injection. Advised patient to schedule nurse visit at practice and to bring medication. Injection will be administered by clinical staff. Pt verbalizes understanding.     Pt is requesting handicap placard. Application completed and mailed to [place] [employer]. Pt provided with copy of completed application.     RN CM will continue to follow.

## 2025-06-04 NOTE — TELEPHONE ENCOUNTER
Folder (To be completed by) - Dr. Cortez     Name of Form - Disability Placard    Color folder ( Purple    Form to be given back to patient    Patient was made aware of the 7-10 business day form policy.
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
## 2025-06-04 NOTE — ASSESSMENT & PLAN NOTE
Patient following with ortho she has significant pain and debility , continue local care , she will see orthopedist in follow up to schedule surgical repair

## 2025-06-04 NOTE — PROGRESS NOTES
SW has received request to met with pt who was emotionally upset/ tearful  and to assist as indicated.   SW has introduced self and role and pt was agreeable to work with SW.  Pt was recently dx with a stroke and shares that she will be going back to work next week.    Her emplyer has called and wants her back as ASAP.    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  She had applied for SSD in the past but has recently re-applied and is pursuing same SW asked if she felt she wanted a  or help to pursue but she declined.    Pt shares she is on SNAP but has recently lost an Internet discount program she was on.  Finances are tight and pt is struggeling to make ends meet.    Pt shares she resides with her disabled Mother in her home.  She also has her 10 y/o dgt living with her.  Her dgt's father is on SSD and so her dgt also gets a SS payment due to her Father's disability.  She has relationship issues with her X (Dgt's father ) but is involved in OP  with [employer].  Pt denies any SI and shares eh has the [facility] # should her symptoms worsen.   Pt was calling them back to schedule with her Therapist when she got home.  Pt denies need of assistance with same.  Sw has provided motivational and supportive counseling.     She share her Mother is supportive and helps her financially but she is struggling as she wants to do things independently.  She also shares she is her Mother's care provider. Her Mother is a  and her  was a [place] so they are pursuing a Care an Attendant Program from [facility] and dgt may eventually become a paid care giver.    Pt is also shares she is on the  [employer] list for some time She checks in with them regularly and she is hopefully to get her own place for herself and her dgt.    Patient does not have any further questions, concerns, or other needs at this time.  Patient has my contact # and PCP office # if needed.  Social Work to remain available  to assist as indicated.    Please re-consult Social Work if needed.

## 2025-06-04 NOTE — PROGRESS NOTES
Name: Lorri Montes      : 1980      MRN: [id number]  Encounter Provider: [facility] [place]  Encounter Date: 2025   Encounter [facility]  :  Assessment & Plan  Encounter for other general counseling or advice on contraception  Patient was provided with information on the suitable methods for her specific case.   Patient elected to having tubal ligation and interested in ablation for her heavy period prior to using Depo-provera.   Patient was provided with the MA-31 form to allow for a cooling period before a final decision on TL.         History of Present Illness   HPI  Lorri Montes is a 45 y.o. female who presents to discuss birth control. Lorri is currently on Depo Provera injections and reports that she will be off it by . Patient has a recent ischemic stroke on the 25. Patient received appropriate care and she is recovering with a return of sensation and strength. Patient has a history of cardiomyopathy with reduced EF @37% and controlled HTN for which she is followed by cardiology and on medications to which she is adherent. Patient has a history of heavy bleeding with cramps that she had before using depo-provera as well as PMS. We discussed the various methods of contraceptions that is suitable for her condition and explained that IUCD based on progestin are one method she can consider and it would work for both contraception and reducing heavy menstrual bleeding but patient seemed to have set her mind on TL and showed interest in ablation with an understanding that it will not reduce her menstrual pain or PMS.   History obtained from: patient    Review of Systems   Constitutional:  Negative for chills and fever.   HENT:  Negative for ear pain and sore throat.    Eyes:  Negative for pain and visual disturbance.   Respiratory:  Negative for cough and shortness of breath.    Cardiovascular:  Negative for  "chest pain and palpitations.   Gastrointestinal:  Negative for abdominal pain and vomiting.   Genitourinary:  Negative for dysuria and hematuria.   Musculoskeletal:  Negative for arthralgias and back pain.   Skin:  Negative for color change and rash.   Neurological:  Negative for seizures and syncope.   All other systems reviewed and are negative.      Medical History Reviewed by provider this encounter:  Tobacco  Allergies  Meds  Problems  Med Hx  Surg Hx  Fam Hx     .  Past Medical History   Past Medical History[1]  Past Surgical History[2]  Family History[3]   reports that she has been smoking cigarettes. She has quit using smokeless tobacco. She reports current alcohol use. She reports current drug use. Drug: Marijuana.  Current Outpatient Medications   Medication Instructions    acetaminophen (TYLENOL) 1,000 mg, Oral, Every 6 hours PRN    aspirin 81 mg, Oral, Daily    atorvastatin (LIPITOR) 40 mg, Oral, Every evening    celecoxib (CELEBREX) 200 mg, Oral, 2 times daily    clopidogrel (PLAVIX) 75 mg, Oral, Daily    cyanocobalamin 1,000 mcg, Intramuscular, Weekly    cyclobenzaprine (FLEXERIL) 10 mg, Oral, 3 times daily PRN    Diclofenac Sodium (VOLTAREN) 2 g, Topical, 4 times daily    folic acid (FOLVITE) 1 mg, Oral, Daily    lidocaine (XYLOCAINE) 5 % ointment Topical, 2 times daily PRN    losartan (COZAAR) 75 mg, Oral, Daily    metoprolol succinate (TOPROL-XL) 25 mg, Oral, Daily    spironolactone (ALDACTONE) 25 mg, Oral, Daily    valACYclovir (VALTREX) 1,000 mg, Oral, Daily   Allergies[4]   Medications Ordered Prior to Encounter[5]   Social History[6]     Objective   /90 (BP Location: Left arm, Patient Position: Sitting, Cuff Size: Large)   Pulse 88   Ht 5' 4\" (1.626 m)   Wt 77.6 kg (171 lb)   BMI 29.35 kg/m²      Physical Exam  Vitals and nursing note reviewed.   Constitutional:       General: She is not in acute distress.     Appearance: She is well-developed.   HENT:      Head: Normocephalic " and atraumatic.     Eyes:      Conjunctiva/sclera: Conjunctivae normal.       Cardiovascular:      Rate and Rhythm: Normal rate and regular rhythm.   Pulmonary:      Effort: Pulmonary effort is normal. No respiratory distress.      Breath sounds: Normal breath sounds.   Abdominal:      Palpations: Abdomen is soft.      Tenderness: There is no abdominal tenderness.     Musculoskeletal:         General: No swelling.      Cervical back: Neck supple.     Skin:     General: Skin is warm and dry.      Capillary Refill: Capillary refill takes less than 2 seconds.     Neurological:      Mental Status: She is alert.     Psychiatric:         Behavior: Behavior normal.         Thought Content: Thought content normal.      Comments: Anxious, irritable             [1]   Past Medical History:  Diagnosis Date    Arthritis     Closed fracture of left tibial plateau 7/27/2022    Depression     DJD (degenerative joint disease)     Herpes     Hidradenitis     MRSA carrier    [2]   Past Surgical History:  Procedure Laterality Date    FL INJECTION RIGHT HIP (ARTHROGRAM)  1/6/2020    FL INJECTION RIGHT HIP (NON ARTHROGRAM)  7/24/2019    FL INJECTION RIGHT SHOULDER (ARTHROGRAM)  10/7/2019    NO PAST SURGERIES     [3]   Family History  Problem Relation Name Age of Onset    Arthritis Mother      Hypertension Mother      Diabetes Father      Stroke Father      Heart attack Father      Post-traumatic stress disorder Father      Other Father          nerve gas exposure-war    Schizophrenia Sister      Bipolar disorder Brother      Schizophrenia Brother      Sleep apnea Daughter      Obesity Daughter      Hypertension Daughter      ADD / ADHD Daughter      Depression Son      Post-traumatic stress disorder Son      Arthritis Maternal Grandmother      Heart attack Maternal Grandfather      Diabetes Paternal Grandmother      Stroke Paternal Grandmother      Heart attack Paternal Grandfather      Post-traumatic stress disorder Paternal Grandfather       Ovarian cysts Sister     [4] No Known Allergies  [5]   Current Outpatient Medications on File Prior to Visit   Medication Sig Dispense Refill    acetaminophen (TYLENOL) 500 mg tablet Take 2 tablets (1,000 mg total) by mouth every 6 (six) hours as needed for mild pain      aspirin 81 mg chewable tablet Chew 1 tablet (81 mg total) daily 30 tablet 0    atorvastatin (LIPITOR) 40 mg tablet Take 1 tablet (40 mg total) by mouth every evening 30 tablet 0    celecoxib (CeleBREX) 200 mg capsule take 1 capsule by mouth twice a day 180 capsule 0    clopidogrel (PLAVIX) 75 mg tablet Take 1 tablet (75 mg total) by mouth daily for 13 doses 13 tablet 0    cyanocobalamin 1,000 mcg/mL Inject 1 mL (1,000 mcg total) into a muscle once a week for 3 doses 1 mL 2    cyclobenzaprine (FLEXERIL) 10 mg tablet Take 1 tablet (10 mg total) by mouth 3 (three) times a day as needed for muscle spasms 60 tablet 0    Diclofenac Sodium (VOLTAREN) 1 % Apply 2 g topically 4 (four) times a day 100 g 2    folic acid (FOLVITE) 1 mg tablet Take 1 tablet (1 mg total) by mouth daily 30 tablet 0    lidocaine (XYLOCAINE) 5 % ointment Apply topically 2 (two) times a day as needed for mild pain 35.44 g 2    metoprolol succinate (TOPROL-XL) 25 mg 24 hr tablet Take 1 tablet (25 mg total) by mouth daily 90 tablet 1    spironolactone (ALDACTONE) 25 mg tablet Take 1 tablet (25 mg total) by mouth daily 90 tablet 1    valACYclovir (VALTREX) 1,000 mg tablet Take 1 tablet (1,000 mg total) by mouth daily 360 tablet 0    losartan (COZAAR) 25 mg tablet Take 3 tablets (75 mg total) by mouth daily for 30 doses (Patient not taking: Reported on 6/5/2025) 270 tablet 1     No current facility-administered medications on file prior to visit.   [6]   Social History  Tobacco Use    Smoking status: Every Day     Current packs/day: 0.25     Types: Cigarettes    Smokeless tobacco: Former    Tobacco comments:     4-5 cigarettes a day   Vaping Use    Vaping status: Never Used    Substance and Sexual Activity    Alcohol use: Yes     Comment: holidays     Drug use: Yes     Types: Marijuana     Comment: few times a day     Sexual activity: Yes     Partners: Male     Birth control/protection: Injection

## 2025-06-04 NOTE — ASSESSMENT & PLAN NOTE
Orders:    metoprolol succinate (TOPROL-XL) 25 mg 24 hr tablet; Take 1 tablet (25 mg total) by mouth daily    spironolactone (ALDACTONE) 25 mg tablet; Take 1 tablet (25 mg total) by mouth daily

## 2025-06-04 NOTE — ASSESSMENT & PLAN NOTE
See detailed HPI , patient had noted numbness L arm before going to bed , when she woke she had L facial numbness , L side of body numb , L leg was weak , and she had L facial droop , in ED , labs cbc , cmp , troponins negative , CTA head neck negative , MRI brain acute lacunar infarcts R thalamus and R Putamen , no acute hemorrhage RUCHI  EF 40 % , LV mild dilated , mild increase in wall thickness , syst function mildly reduced , valves no significant findings   She had never had echo in the past ,  will get zio patch x 2 weeks , DAPT x 21 days ASA , statin , she begins P.T tomorrow   Since home she has had resolution of facial numbness , still with L arm hand and L leg  numbness , L leg weakness improved   She was released to RTW 6/6 with restriction of 15- 20 hr work week   BP in office 124/82 , continue same dosing of BP meds , she has cardiology appt 6/6/2025 , neurology 7/18/2025

## 2025-06-04 NOTE — LETTER
June 4, 2025     Patient: Lorri Montes  YOB: 1980  Date of Visit: 6/4/2025      To Whom it May Concern:    Lorri Montes is under my professional care. Lorri was seen in my office on 6/4/2025. Lorri may return to work with limitations 6/6/2025 she may work 15-18 hours per week .    If you have any questions or concerns, please don't hesitate to call.         Sincerely,          Dana Claros MD        CC: No Recipients

## 2025-06-04 NOTE — ASSESSMENT & PLAN NOTE
BP well controlled on current medications , will continue same doses , follow up with cardiology on 6/6/2025   Orders:    losartan (COZAAR) 25 mg tablet; Take 3 tablets (75 mg total) by mouth daily for 30 doses

## 2025-06-05 ENCOUNTER — EVALUATION (OUTPATIENT)
Dept: PHYSICAL THERAPY | Facility: CLINIC | Age: 45
End: 2025-06-05
Payer: COMMERCIAL

## 2025-06-05 ENCOUNTER — OFFICE VISIT (OUTPATIENT)
Dept: OBGYN CLINIC | Facility: CLINIC | Age: 45
End: 2025-06-05

## 2025-06-05 VITALS
SYSTOLIC BLOOD PRESSURE: 128 MMHG | HEIGHT: 64 IN | BODY MASS INDEX: 29.19 KG/M2 | HEART RATE: 88 BPM | WEIGHT: 171 LBS | DIASTOLIC BLOOD PRESSURE: 90 MMHG

## 2025-06-05 DIAGNOSIS — I63.89 CEREBROVASCULAR ACCIDENT (CVA) DUE TO OTHER MECHANISM (HCC): Primary | ICD-10-CM

## 2025-06-05 DIAGNOSIS — Z30.09 ENCOUNTER FOR OTHER GENERAL COUNSELING OR ADVICE ON CONTRACEPTION: Primary | ICD-10-CM

## 2025-06-05 PROCEDURE — 3080F DIAST BP >= 90 MM HG: CPT | Performed by: OBSTETRICS & GYNECOLOGY

## 2025-06-05 PROCEDURE — 97112 NEUROMUSCULAR REEDUCATION: CPT

## 2025-06-05 PROCEDURE — 97162 PT EVAL MOD COMPLEX 30 MIN: CPT

## 2025-06-05 PROCEDURE — 3074F SYST BP LT 130 MM HG: CPT | Performed by: OBSTETRICS & GYNECOLOGY

## 2025-06-05 PROCEDURE — 99213 OFFICE O/P EST LOW 20 MIN: CPT | Performed by: OBSTETRICS & GYNECOLOGY
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
## 2025-06-06 ENCOUNTER — OFFICE VISIT (OUTPATIENT)
Dept: CARDIOLOGY CLINIC | Facility: CLINIC | Age: 45
End: 2025-06-06
Payer: COMMERCIAL

## 2025-06-06 VITALS
WEIGHT: 172.8 LBS | HEIGHT: 64 IN | HEART RATE: 111 BPM | DIASTOLIC BLOOD PRESSURE: 88 MMHG | SYSTOLIC BLOOD PRESSURE: 136 MMHG | BODY MASS INDEX: 29.5 KG/M2 | OXYGEN SATURATION: 98 %

## 2025-06-06 DIAGNOSIS — F17.200 TOBACCO USE DISORDER: ICD-10-CM

## 2025-06-06 DIAGNOSIS — I10 HYPERTENSION, UNSPECIFIED TYPE: ICD-10-CM

## 2025-06-06 DIAGNOSIS — I42.9 CARDIOMYOPATHY, UNSPECIFIED TYPE (HCC): ICD-10-CM

## 2025-06-06 DIAGNOSIS — E78.5 DYSLIPIDEMIA: ICD-10-CM

## 2025-06-06 DIAGNOSIS — R00.0 TACHYCARDIA: ICD-10-CM

## 2025-06-06 DIAGNOSIS — I10 PRIMARY HYPERTENSION: ICD-10-CM

## 2025-06-06 DIAGNOSIS — I63.9 CEREBROVASCULAR ACCIDENT (CVA), UNSPECIFIED MECHANISM (HCC): ICD-10-CM

## 2025-06-06 PROCEDURE — 99214 OFFICE O/P EST MOD 30 MIN: CPT

## 2025-06-06 RX ORDER — FUROSEMIDE 40 MG/1
40 TABLET ORAL DAILY
Qty: 30 TABLET | Refills: 1 | Status: SHIPPED | OUTPATIENT
Start: 2025-06-06

## 2025-06-06 RX ORDER — LOSARTAN POTASSIUM 25 MG/1
25 TABLET ORAL DAILY
Qty: 90 TABLET | Refills: 1 | Status: SHIPPED | OUTPATIENT
Start: 2025-06-06

## 2025-06-06 NOTE — LETTER
June 6, 2025     Patient: Lorri Montes  YOB: 1980  Date of Visit: 6/6/2025      To Whom it May Concern:    Lorri Montes is under my professional care. Lorri was seen in my office on 6/6/2025. Lorri may return to work on 6/6/2025 and able to work  15-18 hours a week. Weight restriction of 10 lb.      If you have any questions or concerns, please don't hesitate to call.         Sincerely,          KEYANA Plasencia        CC: No Recipients

## 2025-06-06 NOTE — PROGRESS NOTES
Lorri Montes  1980  [id number]  [facility] [address] [phone]    1. Primary hypertension        2. Cardiomyopathy, unspecified type (HCC)  Basic metabolic panel    B-Type Natriuretic Peptide(BNP)    furosemide (LASIX) 40 mg tablet      3. Tobacco use disorder        4. Dyslipidemia        5. Hypertension, unspecified type  losartan (COZAAR) 25 mg tablet      6. Cerebrovascular accident (CVA), unspecified mechanism (HCC)  Zio Monitor      7. Tachycardia  POCT ECG          Summary/Discussion:  Cardiomyopathy   - new cardiomyopathy noted during recent hospitalization on 5/21/2025. Cardiology was consulted  echo with LVEF 35-40%, moderate global hypokinesis with regional variation, mildly reduced RV systolic function, grade 1 DD, mild MR  troponin negative  EKG NSR with nonspecific ST wave abnormality  NM stress test without evidence of ischemia or scarring   etiology presumed nonischemic   started on GDMT metoprolol succinate 25mg daily, losartan 25mg daily and spironolactone 25 mg daily   recommendations for outpatient follow up with cardiology   - no clinical s/s of volume overload  will continue lasix 40 mg daily + spirolactone 25 mg daily   repeat BMP and BNP  short term follow up in 1 week   heart failure education provided   advised her to contact us for any acute weight gain and/or new/worsening HF s/s  educated her on being able to take an additional dose of lasix for the same   - continue GDMT  plan for maximal medical management with GDMT if able   - plan for repeat echo in 3 months to follow up on LVEF    Stroke (cerebrum)  - noted during recent hospitalization   - MRI brain positive for right sided lacunar infarcts  - RUCHI no PFO  - on aspirin, Plavix, and statin per neurology  - obtain 2 week zio      Sinus tachycardia  - EKG today demonstrating sinus tachycardia,  bpm  - continue beta blocker    Hypertension:  -  controlled   - continue present medication regimen  - lifestyle modification   - close blood pressure monitoring     Dyslipidemia:  - Lipid Profile:    Latest Reference Range & Units 05/21/25 14:40   Cholesterol See Comment mg/dL 155   Triglycerides See Comment mg/dL 103   HDL >=50 mg/dL 37 (L)   LDL Calculated 0 - 100 mg/dL 97   - on atorvastatin 20 mg daily    - encouraged low cholesterol diet and annual lipid follow up    Tobacco use:  - complete smoking cessation encouraged    Interval History: Lorri Montes is a 45 y.o. year old female with history mentioned in problem list who presents to the office today a hospital follow up.     Since her recent hospital admission she does not express any significant cardiac complaints. She denies any chest pain/pressure/discomfort or shortness of breath. She denies lower extremity edema, orthopnea, and PND. She denies lightheadedness, dizziness, and syncope. She denies palpitations.      She will RTO in 1 week for a volume check or sooner if necessary. She will call with any concerns.         Medical Problems       Problem List       Encounter for surveillance of injectable contraceptive    Hidradenitis suppurativa    Obesity    Impingement syndrome of right shoulder    Tobacco use disorder    Tear of right supraspinatus tendon    Overview Signed 11/12/2019  9:42 AM by Michael Brown MD   Added automatically from request for surgery [id number]         DJD (degenerative joint disease)    Thyroid nodule    Posterior tibial tendinitis of right lower extremity    Depressive disorder    Urinary incontinence, mixed    Traumatic complete tear of right rotator cuff    Neck pain    Stroke (cerebrum) (HCC)    HSV (herpes simplex virus) infection    Leukocytosis    Hypokalemia    Hypertension    Cardiomyopathy (HCC)    Vitamin B12 deficiency    Abnormal echocardiogram    Nasal injury, subsequent encounter        Past Medical History[1]  Social History     Socioeconomic History     Marital status: Single     Spouse name: Not on file    Number of children: Not on file    Years of education: Not on file    Highest education level: Not on file   Occupational History    Not on file   Tobacco Use    Smoking status: Every Day     Current packs/day: 0.25     Types: Cigarettes    Smokeless tobacco: Former    Tobacco comments:     4-5 cigarettes a day   Vaping Use    Vaping status: Never Used   Substance and Sexual Activity    Alcohol use: Yes     Comment: holidays     Drug use: Yes     Types: Marijuana     Comment: few times a day     Sexual activity: Yes     Partners: Male     Birth control/protection: Injection   Other Topics Concern    Not on file   Social History Narrative    Not on file     Social Drivers of Health     Financial Resource Strain: High Risk (1/27/2025)    Overall Financial Resource Strain (CARDIA)     Difficulty of Paying Living Expenses: Very hard   Food Insecurity: No Food Insecurity (5/24/2025)    Nursing - Inadequate Food Risk Classification     Worried About Running Out of Food in the Last Year: Never true     Ran Out of Food in the Last Year: Never true     Ran Out of Food in the Last Year: Never true   Transportation Needs: No Transportation Needs (5/24/2025)    Nursing - Transportation Risk Classification     Lack of Transportation: Not on file     Lack of Transportation: No   Physical Activity: Not on file   Stress: Stress Concern Present (8/25/2022)    [employer] - Occupational Stress Questionnaire     Feeling of Stress : Rather much   Social Connections: Not on file   Intimate Partner Violence: Unknown (5/24/2025)    Nursing IPS     Feels Physically and Emotionally Safe: Not on file     Physically Hurt by Someone: Not on file     Humiliated or Emotionally Abused by Someone: Not on file     Physically Hurt by Someone: No     Hurt or Threatened by Someone: No   Housing Stability: Low Risk  (6/5/2025)    Housing Stability Vital Sign      "Unable to Pay for Housing in the Last Year: No     Number of Times Moved in the Last Year: 0     Homeless in the Last Year: No      Family History[2]  Past Surgical History[3]  Current Medications[4]  No Known Allergies    Labs:     Chemistry        Component Value Date/Time     (L) 12/22/2014 1745    K 4.2 06/09/2025 0922    K 3.5 12/22/2014 1745     06/09/2025 0922     12/22/2014 1745    CO2 23 06/09/2025 0922    CO2 22 (L) 12/22/2014 1745    BUN 7 06/09/2025 0922    BUN 5 12/22/2014 1745    CREATININE 0.54 (L) 06/09/2025 0922    CREATININE 0.65 12/22/2014 1745        Component Value Date/Time    CALCIUM 8.8 06/09/2025 0922    CALCIUM 8.6 12/22/2014 1745    ALKPHOS 55 05/21/2025 1440    ALKPHOS 102 12/22/2014 1832    AST 12 (L) 05/21/2025 1440    AST 10 12/22/2014 1832    ALT 8 05/21/2025 1440    ALT 16 12/22/2014 1832    BILITOT 0.38 12/22/2014 1832            No results found for: \"CHOL\"  Lab Results   Component Value Date    HDL 37 (L) 05/21/2025    HDL 32 (L) 01/16/2023    HDL 33 (L) 04/25/2019     Lab Results   Component Value Date    LDLCALC 97 05/21/2025    LDLCALC 101 (H) 01/16/2023    LDLCALC 103 (H) 04/25/2019     Lab Results   Component Value Date    TRIG 103 05/21/2025    TRIG 76 01/16/2023    TRIG 64 04/25/2019     No results found for: \"CHOLHDL\"    Imaging: RUCHI  Result Date: 5/27/2025  Narrative:   Left Ventricle: Left ventricular cavity size is mildly dilated. Wall thickness is mildly increased. The left ventricular ejection fraction is 40%. Systolic function is moderately reduced. Wall motion cannot be accurately assessed.   Atrial Septum: There is lipomatous hypertrophy of the interatrial septum. No patent foramen ovale detected, confirmed at rest using color doppler and using agitated saline contrast, confirmed by provocation with abdominal compression, using agitated saline contrast.   Left Atrial Appendage: There is normal function. There is no thrombus.     NM myocardial " perfusion spect (rx stress and/or rest)  Result Date: 5/23/2025  Narrative:   Stress ECG: The patient and had a maximal HR of 117 bpm (66% of MPHR) and 1.0 METS. The patient experienced no angina during the test. The patient reached the end of the protocol. The patient reported mild shortness of breath during the stress test. Symptoms began during stress and ended during recovery. The patient reported no other cardiac symptoms during the stress test.   Stress ECG: No ST deviation is noted. The ECG was not diagnostic due to pharmacological (vasodilator) stress.   Perfusion: There are no perfusion defects.   Stress Function: Left ventricular function post-stress is abnormal. Stress ejection fraction is 37%.   Stress Combined Conclusion: Left ventricular perfusion is normal. Ejection fraction 37%.  No ischemia or scar.     Stress strip  Result Date: 5/23/2025  Narrative: Confirmed by JOB CONNELLY ([id number]),  Onelia Rabago ([id number]) on 5/23/2025 10:04:21 AM    Echo complete w/ contrast if indicated  Result Date: 5/22/2025  Narrative:   Left Ventricle: Left ventricular cavity size is dilated. Wall thickness is mildly increased. The left ventricular ejection fraction is 35-40%. Systolic function is moderately reduced. There is moderate global hypokinesis with regional variation. Diastolic function is mildly abnormal, consistent with grade I (abnormal) relaxation.   IVS: There is abnormal septal motion of unclear etiology.   Right Ventricle: Right ventricular cavity size is normal. Systolic function is mildly reduced.   Left Atrium: The atrium is dilated.   Atrial Septum: There is lipomatous hypertrophy of the interatrial septum. No patent foramen ovale detected, confirmed by provocation with cough, using agitated saline contrast and with valsalva, using agitated saline contrast.     MRI brain wo contrast  Result Date: 5/22/2025  Narrative: MRI BRAIN WITHOUT CONTRAST INDICATION: numbness tingling- r/o stroke.  COMPARISON:   CT/CTA from yesterday. TECHNIQUE:  Multiplanar, multisequence imaging of the brain was performed. IMAGE QUALITY:  Diagnostic. FINDINGS: BRAIN PARENCHYMA: There are foci of restricted diffusion in the right thalamus and posterior right putamen consistent with acute lacunar infarcts. No acute hemorrhage. Stable chronic lacunar infarct in the left caudate. Focal hemosiderin deposition in the left thalamus. VENTRICLES:  Normal for the patient's age. SELLA AND PITUITARY GLAND:  Normal. ORBITS:  Normal. PARANASAL SINUSES:  Normal. VASCULATURE:  Evaluation of the major intracranial vasculature demonstrates appropriate flow voids. CALVARIUM AND SKULL BASE:  Normal. EXTRACRANIAL SOFT TISSUES:  Normal.     Impression: Acute lacunar infarcts in the right thalamus and posterior right putamen. No acute hemorrhage. The study was marked in EPIC for immediate notification. Workstation performed: [id number]     XR chest 1 view portable  Result Date: 5/21/2025  Narrative: XR CHEST PORTABLE INDICATION: Left facial droop/sensory disturbance > 24 hrs. COMPARISON: CT chest, abdomen pelvis 4/23/2025 FINDINGS: Clear lungs. No pneumothorax or pleural effusion. Normal cardiomediastinal silhouette. Bones are unremarkable for age. Normal upper abdomen.     Impression: No acute cardiopulmonary disease. Workstation performed: [id number]     CTA head and neck with and without contrast  Result Date: 5/21/2025  Narrative: CTA NECK AND BRAIN WITH AND WITHOUT CONTRAST INDICATION: L sensory disturbance and L facial droop >24 hrs patient complains of 10/10 pain with left arm numbness. COMPARISON:   4/23/2025 cervical spine and head CT's TECHNIQUE:  Routine CT imaging of the Brain without contrast.Post contrast imaging was performed after administration of iodinated contrast through the neck and brain. Post contrast axial 0.625 mm images timed to opacify the arterial system.  3D rendering was performed on an independent workstation.    MIP reconstructions performed. Coronal and sagittal reconstructions were performed of the non contrast portion of the brain. Radiation dose length product (DLP) for this visit:  2215 mGy-cm. .  This examination, like all CT scans performed in the [facility], was performed utilizing techniques to minimize radiation dose exposure, including the use of iterative reconstruction and automated exposure control. IV Contrast:  85 mL of iohexol (OMNIPAQUE) IMAGE QUALITY:   Diagnostic FINDINGS: NONCONTRAST BRAIN PARENCHYMA AND EXTRA-AXIAL SPACES: No hemorrhage, extra-axial fluid collection, mass effect or midline shift. Similar, mild nonspecific periventricular white matter lucencies are typically related to changes of microangiopathy and small chronic left caudate lacunar infarct. Preserved grey-white matter differentiation. VENTRICLES:  Within normal limits for age. VISUALIZED ORBITS AND PARANASAL SINUSES:  Globes and orbits are within normal limits. Sinuses are well aerated. CTA NECK ARCH AND GREAT VESSELS: Normal aorta caliber and enhancement. Patent subclavian arteries. VERTEBRAL ARTERIES: Normal caliber and enhancement bilaterally. RIGHT CAROTID:  Normal caliber and enhancement. LEFT CAROTID: Normal caliber and enhancement. NASCET criteria was used to determine the degree of internal carotid artery diameter stenosis. CTA BRAIN: INTERNAL CAROTID ARTERIES:  Normal caliber and enhancement. ANTERIOR CEREBRAL ARTERY CIRCULATION:  Normal caliber and enhancement. MIDDLE CEREBRAL ARTERY CIRCULATION:  Normal bilateral M1 segment caliber and enhancement. Patent distal branches. DISTAL VERTEBRAL ARTERIES:  Patent bilaterally. Patent posterior inferior cerebellar arteries. BASILAR ARTERY:  Normal caliber and enhancement. Patent superior cerebellar artery origins. POSTERIOR CEREBRAL ARTERIES: Bilateral fetal origins. Both are patent and normal in caliber. VENOUS STRUCTURES:  Patent. NON VASCULAR ANATOMY BONY  "STRUCTURES: Degenerative changes. Clear mastoid air cells. SOFT TISSUES OF THE NECK: Chronic thyroid nodules. Last thyroid ultrasound 1/8/2024. Recommend follow-up per ultrasound recommendations. THORACIC INLET:  Within normal limits.     Impression: CT Brain:  No acute intracranial abnormality. CT Angiography: Normal CTA neck and brain. Workstation performed: [id number]     MRI shoulder right wo contrast  Result Date: 5/13/2025  Narrative: MRI SHOULDER RIGHT WO CONTRAST INDICATION:   S46.011A: Strain of muscle(s) and tendon(s) of the rotator cuff of right shoulder, initial encounter. COMPARISON: Radiographs 4/22/2025. TECHNIQUE:  Multiplanar/multisequence MR of the right shoulder was performed. FINDINGS: SUBCUTANEOUS TISSUES: Normal JOINT EFFUSION: Small joint effusion. ACROMION PROCESS: Normal. ROTATOR CUFF: Complete tears of the supraspinatus and infraspinatus with retraction to the humeral head apex. Subscapularis and teres minor are intact. SUBACROMIAL/SUBDELTOID BURSA: Communicating fluid. LONG HEAD OF BICEPS TENDON: Intact. The extra-articular portion is normally situated within the bicipital groove. GLENOID LABRUM: Intact. GLENOHUMERAL JOINT: Superior elevation of the humeral head with respect to the glenoid. ACROMIOCLAVICULAR JOINT:  Normal. BONES: Normal.     Impression: Complete retracted tears of the supraspinatus and infraspinatus as described. Workstation performed: [id number]       ECG:  sinus tachycardia     Review of Systems   All other systems reviewed and are negative.      Vitals:    06/06/25 1446   BP: 136/88   Pulse: (!) 111   SpO2: 98%     Vitals:    06/06/25 1446   Weight: 78.4 kg (172 lb 12.8 oz)     Height: 5' 4\" (162.6 cm)   Body mass index is 29.66 kg/m².    Physical Exam  Vitals and nursing note reviewed.   Constitutional:       General: She is not in acute distress.     Appearance: Normal appearance.   HENT:      Head: Normocephalic.      Nose: Nose normal.      Mouth/Throat:      " Mouth: Mucous membranes are moist.      Pharynx: Oropharynx is clear.     Cardiovascular:      Rate and Rhythm: Regular rhythm. Tachycardia present.      Pulses: Normal pulses.      Heart sounds: Normal heart sounds.   Pulmonary:      Breath sounds: Decreased breath sounds present.     Musculoskeletal:         General: Normal range of motion.      Cervical back: Normal range of motion.      Right lower leg: No edema.      Left lower leg: No edema.     Skin:     General: Skin is warm and dry.     Neurological:      Mental Status: She is alert and oriented to person, place, and time.     Psychiatric:         Mood and Affect: Mood normal.         Behavior: Behavior normal.               [1]   Past Medical History:  Diagnosis Date    Arthritis     Closed fracture of left tibial plateau 07/27/2022    CVA (cerebrovascular accident) ([facility])     Depression     DJD (degenerative joint disease)     Heart disease     Herpes     Hidradenitis     MRSA carrier     Stroke ([facility])    [2]   Family History  Problem Relation Name Age of Onset    Arthritis Mother      Hypertension Mother      Diabetes Father      Stroke Father      Heart attack Father      Post-traumatic stress disorder Father      Other Father          nerve gas exposure-war    Schizophrenia Sister      Bipolar disorder Brother      Schizophrenia Brother      Sleep apnea Daughter      Obesity Daughter      Hypertension Daughter      ADD / ADHD Daughter      Depression Son      Post-traumatic stress disorder Son      Arthritis Maternal Grandmother      Heart attack Maternal Grandfather      Diabetes Paternal Grandmother      Stroke Paternal Grandmother      Heart attack Paternal Grandfather      Post-traumatic stress disorder Paternal Grandfather      Ovarian cysts Sister     [3]   Past Surgical History:  Procedure Laterality Date    FL INJECTION RIGHT HIP (ARTHROGRAM)  1/6/2020    FL INJECTION RIGHT HIP (NON ARTHROGRAM)  7/24/2019    FL INJECTION RIGHT SHOULDER  (ARTHROGRAM)  10/7/2019    NO PAST SURGERIES     [4]   Current Outpatient Medications:     acetaminophen (TYLENOL) 500 mg tablet, Take 2 tablets (1,000 mg total) by mouth every 6 (six) hours as needed for mild pain, Disp: , Rfl:     aspirin 81 mg chewable tablet, Chew 1 tablet (81 mg total) daily, Disp: 30 tablet, Rfl: 0    atorvastatin (LIPITOR) 40 mg tablet, Take 1 tablet (40 mg total) by mouth every evening, Disp: 30 tablet, Rfl: 0    celecoxib (CeleBREX) 200 mg capsule, take 1 capsule by mouth twice a day, Disp: 180 capsule, Rfl: 0    clopidogrel (PLAVIX) 75 mg tablet, Take 1 tablet (75 mg total) by mouth daily for 13 doses (Patient not taking: Reported on 6/13/2025), Disp: 13 tablet, Rfl: 0    cyclobenzaprine (FLEXERIL) 10 mg tablet, Take 1 tablet (10 mg total) by mouth 3 (three) times a day as needed for muscle spasms, Disp: 60 tablet, Rfl: 0    Diclofenac Sodium (VOLTAREN) 1 %, Apply 2 g topically 4 (four) times a day, Disp: 100 g, Rfl: 2    folic acid (FOLVITE) 1 mg tablet, Take 1 tablet (1 mg total) by mouth daily, Disp: 30 tablet, Rfl: 0    furosemide (LASIX) 40 mg tablet, Take 1 tablet (40 mg total) by mouth daily, Disp: 30 tablet, Rfl: 1    lidocaine (XYLOCAINE) 5 % ointment, Apply topically 2 (two) times a day as needed for mild pain, Disp: 35.44 g, Rfl: 2    losartan (COZAAR) 25 mg tablet, Take 1 tablet (25 mg total) by mouth daily, Disp: 90 tablet, Rfl: 1    metoprolol succinate (TOPROL-XL) 25 mg 24 hr tablet, Take 1 tablet (25 mg total) by mouth daily, Disp: 90 tablet, Rfl: 1    spironolactone (ALDACTONE) 25 mg tablet, Take 1 tablet (25 mg total) by mouth daily, Disp: 90 tablet, Rfl: 1    valACYclovir (VALTREX) 1,000 mg tablet, Take 1 tablet (1,000 mg total) by mouth daily, Disp: 360 tablet, Rfl: 0    Current Facility-Administered Medications:     cyanocobalamin injection 1,000 mcg, 1,000 mcg, Intramuscular, Q30 Days, , 1,000 mcg at 06/09/25 1007

## 2025-06-09 ENCOUNTER — CLINICAL SUPPORT (OUTPATIENT)
Dept: INTERNAL MEDICINE CLINIC | Facility: CLINIC | Age: 45
End: 2025-06-09

## 2025-06-09 ENCOUNTER — TELEPHONE (OUTPATIENT)
Dept: MULTI SPECIALTY CLINIC | Facility: CLINIC | Age: 45
End: 2025-06-09

## 2025-06-09 ENCOUNTER — APPOINTMENT (OUTPATIENT)
Dept: LAB | Facility: CLINIC | Age: 45
End: 2025-06-09
Payer: COMMERCIAL

## 2025-06-09 ENCOUNTER — RESULTS FOLLOW-UP (OUTPATIENT)
Dept: CARDIOLOGY CLINIC | Facility: CLINIC | Age: 45
End: 2025-06-09

## 2025-06-09 DIAGNOSIS — E53.8 VITAMIN B12 DEFICIENCY: Primary | ICD-10-CM

## 2025-06-09 DIAGNOSIS — Z01.419 ENCOUNTER FOR ANNUAL ROUTINE GYNECOLOGICAL EXAMINATION: ICD-10-CM

## 2025-06-09 DIAGNOSIS — I42.9 CARDIOMYOPATHY, UNSPECIFIED TYPE (HCC): ICD-10-CM

## 2025-06-09 LAB
ANION GAP SERPL CALCULATED.3IONS-SCNC: 9 MMOL/L (ref 4–13)
BNP SERPL-MCNC: 6 PG/ML (ref 0–100)
BUN SERPL-MCNC: 7 MG/DL (ref 5–25)
CALCIUM SERPL-MCNC: 8.8 MG/DL (ref 8.4–10.2)
CHLORIDE SERPL-SCNC: 106 MMOL/L (ref 96–108)
CO2 SERPL-SCNC: 23 MMOL/L (ref 21–32)
CREAT SERPL-MCNC: 0.54 MG/DL (ref 0.6–1.3)
GFR SERPL CREATININE-BSD FRML MDRD: 115 ML/MIN/1.73SQ M
GLUCOSE P FAST SERPL-MCNC: 84 MG/DL (ref 65–99)
POTASSIUM SERPL-SCNC: 4.2 MMOL/L (ref 3.5–5.3)
SODIUM SERPL-SCNC: 138 MMOL/L (ref 135–147)

## 2025-06-09 PROCEDURE — 87340 HEPATITIS B SURFACE AG IA: CPT

## 2025-06-09 PROCEDURE — 87389 HIV-1 AG W/HIV-1&-2 AB AG IA: CPT

## 2025-06-09 PROCEDURE — 80048 BASIC METABOLIC PNL TOTAL CA: CPT

## 2025-06-09 PROCEDURE — 36415 COLL VENOUS BLD VENIPUNCTURE: CPT

## 2025-06-09 PROCEDURE — 96372 THER/PROPH/DIAG INJ SC/IM: CPT | Performed by: INTERNAL MEDICINE

## 2025-06-09 PROCEDURE — 86803 HEPATITIS C AB TEST: CPT

## 2025-06-09 PROCEDURE — 83880 ASSAY OF NATRIURETIC PEPTIDE: CPT

## 2025-06-09 PROCEDURE — 86780 TREPONEMA PALLIDUM: CPT

## 2025-06-09 RX ORDER — CYANOCOBALAMIN 1000 UG/ML
1000 INJECTION, SOLUTION INTRAMUSCULAR; SUBCUTANEOUS
Status: SHIPPED | OUTPATIENT
Start: 2025-06-09 | End: 2025-10-07

## 2025-06-09 RX ADMIN — CYANOCOBALAMIN 1000 MCG: 1000 INJECTION, SOLUTION INTRAMUSCULAR; SUBCUTANEOUS at 10:07

## 2025-06-09 NOTE — PROGRESS NOTES
Patient came in today 6/9/25 as a nurse visit for her first B-12 injection. Administered 1 ml on left deltoid. Patient tolerated well. Patient was made aware to return in a month for her next B-12 injection.  Patient provided B-12 vials. Patient has two vials left in office.       NDC- 4873-2767-32  LOT- [id number]  EXP-10/30/2026

## 2025-06-09 NOTE — TELEPHONE ENCOUNTER
Dana Claros MD Physician 9:58 AM     Copy     Patient saw cardiologist on 6/6/2025 , she has low vitamin B 12 , she needs to have serial injections , will start 1000 mcg IM monthly x 4 then recheck

## 2025-06-09 NOTE — PROGRESS NOTES
Patient saw cardiologist on 6/6/2025 , she has low vitamin B 12 , she needs to have serial injections , will start 1000 mcg IM monthly x 4 then recheck

## 2025-06-10 ENCOUNTER — APPOINTMENT (OUTPATIENT)
Dept: PHYSICAL THERAPY | Facility: CLINIC | Age: 45
End: 2025-06-10
Payer: COMMERCIAL

## 2025-06-10 LAB
HBV SURFACE AG SER QL: NORMAL
HCV AB SER QL: NORMAL
HIV 1+2 AB+HIV1 P24 AG SERPL QL IA: NORMAL
TREPONEMA PALLIDUM IGG+IGM AB [PRESENCE] IN SERUM OR PLASMA BY IMMUNOASSAY: NORMAL

## 2025-06-11 ENCOUNTER — PATIENT OUTREACH (OUTPATIENT)
Dept: INTERNAL MEDICINE CLINIC | Facility: CLINIC | Age: 45
End: 2025-06-11

## 2025-06-11 NOTE — PROGRESS NOTES
Pt due for outreach. Left VM requesting return call. RN CM contact information provided.     RN CM will continue to follow .

## 2025-06-12 ENCOUNTER — OFFICE VISIT (OUTPATIENT)
Dept: PHYSICAL THERAPY | Facility: CLINIC | Age: 45
End: 2025-06-12
Payer: COMMERCIAL

## 2025-06-12 DIAGNOSIS — I63.89 CEREBROVASCULAR ACCIDENT (CVA) DUE TO OTHER MECHANISM (HCC): Primary | ICD-10-CM

## 2025-06-12 PROCEDURE — 97140 MANUAL THERAPY 1/> REGIONS: CPT

## 2025-06-12 PROCEDURE — 97110 THERAPEUTIC EXERCISES: CPT

## 2025-06-12 PROCEDURE — 97112 NEUROMUSCULAR REEDUCATION: CPT

## 2025-06-12 NOTE — PROGRESS NOTES
Daily Note     Today's date: 2025  Patient name: Lorri Montes  : 1980  MRN: [id number]  Referring provider: Neri Marquez DO  Dx:   Encounter Diagnosis     ICD-10-CM    1. Cerebrovascular accident (CVA) due to other mechanism (HCC)  I63.89           Start Time: 1230  Stop Time: 1315  Total time in clinic (min): 45 minutes    Subjective: reports rolling her ankle last week, says she's constantly rolling her ankles when she's walking, feels better when she's wearing a thick compression sleeve on it, feels more stable. Also reports having a lot of stiffness in her neck and feels like a lot of the numbness in her arms and fingers is coming from her neck      Objective: See treatment diary below              Balance Testing   Modified Clinical Test of Sensory Interaction of Balance  (MCTSIB) (seconds): IE:   Firm EO: 30  Firm EC: 30  Foam EO: 30  Foam EC: 30   Guo Balance Scale (BBS):  <45 greater risk for falls (Guo et al 1992)     MCID:   4 points 45-56 initially  5 points 35-44 initially  7 points 25-34 initially  5 points 0-24 initially   IE: not assessed   Functional Gait Assessment (FGA)  < Or = 22/30 greater risk for falls     MDC:  Geriatric: 4  Stroke: 4  Parkinson's Disease: 4  Vestibular: 6 IE: 30             Mobility & Endurance Testing   5x Sit to Stand (seconds)  >15 seconds greater risk for falls (elderly)     MCID: 2.3 seconds IE: 15.32s no UE   Timed Up-and-Go (seconds)  >13.5 seconds greater risk for falls    IE: 9.50s no UE   6 Minute Walk Test (meters)  Geriatrics:  Meters:         Male  Female  60-69 years  572    538  70-79 years  527    471  80-89 years  417    392     MCID: 50 meters/164 feet IE: 750ft no AD   Gait speed: Self-selected & fast (meters/second)     1.0 m/s normal  1.2 m/s community-level speed     MCID: 0.10 m/s IE: 0.87m/s no AD         Gait Analysis: dec step length b/l; occ lateral deviations, narrow TONI, dec recip UE swing     Stair Navigation: recip  "ascending/descending with BHRs      Assessment: Tolerated treatment fair. Patient would benefit from continued PT Functional testing completed today which revealed minimal deficits in overall balance and placing pt at slight fall risk, however this could be due to ankle instability from previous injuries/falls. Pt did report increased sx with cervical compression but no change in sx with distraction. Opted to trial SOR and STM for cervical extensors and UT b/l, then reviewed seated cervical stretches for her to continue at home. For next week, please cont to incorporate vestibular system with weightshifting, head movements and eyes closed. The following week will then reassess L ankle and incorporate ankle strengthening to stabilize the joint more before further challenging balance reactions.       Plan: Continue per plan of care.      Short Term Goal Expiration Date:(7/5/25 4 weeks)  Long Term Goal Expiration Date: (8/5/25 8 weeks)  POC Expiration Date: (8/5/25 8 weeks)    POC expires Unit limit Auth Expiration date PT/OT/ST + Visit Limit?   8/5/25 bomn pending bomn                           Visit/Unit Tracking  AUTH Status:  Date 6/5 iE 6/12            24 auth (5/28-12/31) Used 1 2             Remaining  23 22                  Precautions hx of CVA; HTN       Manuals 6/5 IE 6/12      STM  SOR supine   STM cervical extensors and b/l UT x10min total                              Neuro Re-Ed  Pt education regarding fall risk cut offs, outcome measure testing, POC and goals for full return to work, process for scheduling OT and SLP lizzy  Completed functional testing                                                               Ther Ex        Self stretching for cervical spine  Seated UT and seated lev scap   30\" holds x3 ea side                                                               Ther Activity                        Gait Training                        Modalities                                      "

## 2025-06-12 NOTE — PROGRESS NOTES
Advanced Heart Failure / Pulmonary Hypertension Outpatient Visit    Lorri Montes 45 y.o. female   MRN: [id number]  Encounter: [id number]    Assessment:  Patient Active Problem List    Diagnosis Date Noted    Abnormal echocardiogram 06/04/2025    Nasal injury, subsequent encounter 06/04/2025    Hypertension 05/22/2025    Cardiomyopathy (HCC) 05/22/2025    Vitamin B12 deficiency 05/22/2025    Stroke (cerebrum) (HCC) 05/21/2025    HSV (herpes simplex virus) infection 05/21/2025    Leukocytosis 05/21/2025    Hypokalemia 05/21/2025    Traumatic complete tear of right rotator cuff 04/25/2025    Neck pain 04/25/2025    Urinary incontinence, mixed 01/27/2025    Posterior tibial tendinitis of right lower extremity 02/14/2024    Depressive disorder 02/14/2024    Thyroid nodule 01/07/2024    DJD (degenerative joint disease) 12/10/2019    Tear of right supraspinatus tendon 11/12/2019    Tobacco use disorder 06/10/2019    Impingement syndrome of right shoulder 05/20/2019    Encounter for surveillance of injectable contraceptive 01/30/2018    Hidradenitis suppurativa 01/08/2014    Obesity 10/07/2013       Today's Plan:  Continue Lasix 40 QD--JVD stable. Weights trending down w/diuretic.  GDMT Toprol, Losartan, Spironolactone.  Price check Jardiance--start next OV if affordable.  BP's have room for uptitrating medications  HF booklet, Don't pass the salt booklet given  Daily weights, 2 L FR, low sodium diet.  Encouraged to reduce Na in diet and cut back on fluid intake.  Smoking cessation- discussed risks of CVA, CAD   Apply Zio patch when received in mail  Report worsening HF symptoms, rapid weight gain  Establish with HF Cardiologist.  RTO in 3 weeks with HF AP      Plan:  Newly diagnosed HFrEF, LVEF 35-40%, Grade 1 DD  Etiology: NICMP per stress test, r/o Atrial Fib in s/o CVA, untreated HTN, stress induced, genetic?     EKG NSR , Troponins negative X 2, BNP 6  DC on Lasix 40 mg QD    Weights: on  lbs-->168 lbs  today  Last BMP 6/9: , K 4.2, creat 0.54  BP today: 132/78  Volume status: no JVD, no LE edema.  DUBON with long distances remains.  Weights trending down    Pharmacotherapies / Neurohormonal Blockade:  --Beta Blocker: Toprol XL 25  QD  --ARNi / ACEi / ARB: Losartan 25 QD  --Aldosterone Antagonist: Spironolactone 25 QD  --SGLT2 Inhibitor: price check in progress  --Diuretic: lasix 40 mg QD    Sudden Cardiac Death Risk Reduction:  --ICD: EF 35-40%    Stroke  MRI: Acute lacunar infarcts in the right thalamus and posterior right putamen. No acute hemorrhage.   Zio Patch ordered 6/13    Hypertension  RX: see GDMT above  BP on admit >170's  Bp today 130's      Tobacco use d/o  Vitamin B12 defic    HPI:   5/21/25: hosp admit: Lorri Montes is a 45 y.o. female with a PMH of anxiety, polyarthralgia, right rotator cuff tear, smoking who presents with left-sided numbness.  Patient reports that her symptoms started last night around 8 PM when she was going to bed which interrupted her sleep and kept her awake until 4 AM.  However, they got worse this morning.  Symptoms include feeling numbness over her left face, left leg, left throat, left arm, left trunk, and left leg. MRI shows acute lacunar infarcts in the right thalamus and posterior right putamen. Echo showed cardiomyopathy with an ejection fraction of 35 to Patient was also seen by neurology who recommended transesophageal echo, and starting dual antiplatelet therapy with aspirin 81 mg daily and Plavix 75 mg daily, and notes that dual antiplatelets should be continued for 21 days after which her Plavix will be stopped and continue aspirin.  Patient was also started on atorvastatin 40 mg and counseled about smoking cessation. Due to new onset of cardiomyopathy patient was seen by cardiology and was scheduled a cardiac stress test which shows normal myocardial perfusion with calculated left ejection fraction of 37 and no clinical heart failure.  Patient was  then started on goal-directed management of metoprolol succinate 25, losartan 75 and spironolactone 25.     6/13/25 TA: Pleasant patient.  Reports some DUBON with longer distances.  Admits to smoking 1 cig/day, +daily marijuana use.  She initially had a 10 lb weight gain after d/c then lost 4 lbs after starting Lasix. Weight today at home 166 lbs.  Denies dizziness, CP, palpitations, LE edema.  Drinking more than 2 L/day.  Trying to follow a low NA diet, but does order out some nights.  She is med compliant.  Waiting for Zio patch delivery to apply.  Has family hx of cardiac disease on Mom and Dad side.  Currently, not working d/t her health.  Applying for disability.     Past Medical History[1]    Review of Systems   Constitutional:  Positive for unexpected weight change. Negative for activity change and fatigue.   Respiratory:  Positive for shortness of breath.         Sob when walking long distances   Cardiovascular:  Negative for chest pain, palpitations and leg swelling.   Gastrointestinal: Negative.    Endocrine: Negative.    Genitourinary: Negative.    Musculoskeletal: Negative.    Skin: Negative.    Allergic/Immunologic: Negative.    Neurological: Negative.    Hematological: Negative.    Psychiatric/Behavioral: Negative.         Allergies[2]    Current Medications[3]    Social History     Socioeconomic History    Marital status: Single     Spouse name: Not on file    Number of children: Not on file    Years of education: Not on file    Highest education level: Not on file   Occupational History    Not on file   Tobacco Use    Smoking status: Every Day     Current packs/day: 0.25     Types: Cigarettes    Smokeless tobacco: Former    Tobacco comments:     4-5 cigarettes a day   Vaping Use    Vaping status: Never Used   Substance and Sexual Activity    Alcohol use: Yes     Comment: holidays     Drug use: Yes     Types: Marijuana     Comment: few times a day     Sexual activity: Yes     Partners: Male     Birth  control/protection: Injection   Other Topics Concern    Not on file   Social History Narrative    Not on file     Social Drivers of Health     Financial Resource Strain: High Risk (1/27/2025)    Overall Financial Resource Strain (CARDIA)     Difficulty of Paying Living Expenses: Very hard   Food Insecurity: No Food Insecurity (5/24/2025)    Nursing - Inadequate Food Risk Classification     Worried About Running Out of Food in the Last Year: Never true     Ran Out of Food in the Last Year: Never true     Ran Out of Food in the Last Year: Never true   Transportation Needs: No Transportation Needs (5/24/2025)    Nursing - Transportation Risk Classification     Lack of Transportation: Not on file     Lack of Transportation: No   Physical Activity: Not on file   Stress: Stress Concern Present (8/25/2022)    [employer] [place] [employer] - Occupational Stress Questionnaire     Feeling of Stress : Rather much   Social Connections: Not on file   Intimate Partner Violence: Unknown (5/24/2025)    Nursing IPS     Feels Physically and Emotionally Safe: Not on file     Physically Hurt by Someone: Not on file     Humiliated or Emotionally Abused by Someone: Not on file     Physically Hurt by Someone: No     Hurt or Threatened by Someone: No   Housing Stability: Low Risk  (6/5/2025)    Housing Stability Vital Sign     Unable to Pay for Housing in the Last Year: No     Number of Times Moved in the Last Year: 0     Homeless in the Last Year: No     Family History[4]    Vitals:   Blood pressure 132/78, pulse 83, weight 76.2 kg (168 lb), SpO2 99%, not currently breastfeeding.    Wt Readings from Last 10 Encounters:   06/13/25 76.2 kg (168 lb)   06/06/25 78.4 kg (172 lb 12.8 oz)   06/05/25 77.6 kg (171 lb)   06/04/25 77.2 kg (170 lb 3.2 oz)   05/28/25 73.6 kg (162 lb 3.2 oz)   01/27/25 73.8 kg (162 lb 12.8 oz)   11/11/24 76.6 kg (168 lb 12.8 oz)   04/01/24 80.3 kg (177 lb)   02/23/24 80.3 kg (177 lb)   02/12/24 80.3 kg (177  lb)     Vitals:    06/13/25 0813   BP: 132/78   BP Location: Right arm   Patient Position: Sitting   Cuff Size: Standard   Pulse: 83   SpO2: 99%   Weight: 76.2 kg (168 lb)       Physical Exam  Constitutional:       Appearance: Normal appearance.   Neck:      Vascular: No JVD.     Cardiovascular:      Rate and Rhythm: Normal rate and regular rhythm. No extrasystoles are present.     Heart sounds: S1 normal and S2 normal.   Pulmonary:      Effort: No respiratory distress.      Breath sounds: Normal air entry. Decreased breath sounds present.     Musculoskeletal:      Right lower leg: No edema.      Left lower leg: No edema.     Skin:     General: Skin is warm and dry.     Neurological:      Mental Status: She is alert and oriented to person, place, and time. Mental status is at baseline.     Psychiatric:         Behavior: Behavior is cooperative.         Cognition and Memory: Cognition normal.       Labs & Results:  Lab Results   Component Value Date    WBC 15.15 (H) 05/27/2025    HGB 14.6 05/27/2025    HCT 43.1 05/27/2025     (H) 05/27/2025     05/27/2025     Lab Results   Component Value Date    SODIUM 138 06/09/2025    K 4.2 06/09/2025     06/09/2025    CO2 23 06/09/2025    BUN 7 06/09/2025    CREATININE 0.54 (L) 06/09/2025    GLUC 79 05/26/2025    CALCIUM 8.8 06/09/2025     Lab Results   Component Value Date    INR 1.21 (H) 05/23/2025    INR 1.23 (H) 11/17/2014    PROTIME 16.1 (H) 05/23/2025    PROTIME 15.2 (H) 11/17/2014     Lab Results   Component Value Date    BNP 6 06/09/2025        KEYANA Dwyer         [1]   Past Medical History:  Diagnosis Date    Arthritis     Closed fracture of left tibial plateau 7/27/2022    Depression     DJD (degenerative joint disease)     Herpes     Hidradenitis     MRSA carrier    [2] No Known Allergies  [3]   Current Outpatient Medications:     acetaminophen (TYLENOL) 500 mg tablet, Take 2 tablets (1,000 mg total) by mouth every 6 (six) hours as needed  for mild pain, Disp: , Rfl:     aspirin 81 mg chewable tablet, Chew 1 tablet (81 mg total) daily, Disp: 30 tablet, Rfl: 0    atorvastatin (LIPITOR) 40 mg tablet, Take 1 tablet (40 mg total) by mouth every evening, Disp: 30 tablet, Rfl: 0    celecoxib (CeleBREX) 200 mg capsule, take 1 capsule by mouth twice a day, Disp: 180 capsule, Rfl: 0    cyclobenzaprine (FLEXERIL) 10 mg tablet, Take 1 tablet (10 mg total) by mouth 3 (three) times a day as needed for muscle spasms, Disp: 60 tablet, Rfl: 0    Diclofenac Sodium (VOLTAREN) 1 %, Apply 2 g topically 4 (four) times a day, Disp: 100 g, Rfl: 2    folic acid (FOLVITE) 1 mg tablet, Take 1 tablet (1 mg total) by mouth daily, Disp: 30 tablet, Rfl: 0    furosemide (LASIX) 40 mg tablet, Take 1 tablet (40 mg total) by mouth daily, Disp: 30 tablet, Rfl: 1    lidocaine (XYLOCAINE) 5 % ointment, Apply topically 2 (two) times a day as needed for mild pain, Disp: 35.44 g, Rfl: 2    losartan (COZAAR) 25 mg tablet, Take 1 tablet (25 mg total) by mouth daily, Disp: 90 tablet, Rfl: 1    metoprolol succinate (TOPROL-XL) 25 mg 24 hr tablet, Take 1 tablet (25 mg total) by mouth daily, Disp: 90 tablet, Rfl: 1    spironolactone (ALDACTONE) 25 mg tablet, Take 1 tablet (25 mg total) by mouth daily, Disp: 90 tablet, Rfl: 1    valACYclovir (VALTREX) 1,000 mg tablet, Take 1 tablet (1,000 mg total) by mouth daily, Disp: 360 tablet, Rfl: 0    clopidogrel (PLAVIX) 75 mg tablet, Take 1 tablet (75 mg total) by mouth daily for 13 doses, Disp: 13 tablet, Rfl: 0    Current Facility-Administered Medications:     cyanocobalamin injection 1,000 mcg, 1,000 mcg, Intramuscular, Q30 Days, , 1,000 mcg at 06/09/25 1007  [4]   Family History  Problem Relation Name Age of Onset    Arthritis Mother      Hypertension Mother      Diabetes Father      Stroke Father      Heart attack Father      Post-traumatic stress disorder Father      Other Father          nerve gas exposure-war    Schizophrenia Sister      Bipolar  disorder Brother      Schizophrenia Brother      Sleep apnea Daughter      Obesity Daughter      Hypertension Daughter      ADD / ADHD Daughter      Depression Son      Post-traumatic stress disorder Son      Arthritis Maternal Grandmother      Heart attack Maternal Grandfather      Diabetes Paternal Grandmother      Stroke Paternal Grandmother      Heart attack Paternal Grandfather      Post-traumatic stress disorder Paternal Grandfather      Ovarian cysts Sister

## 2025-06-13 ENCOUNTER — OFFICE VISIT (OUTPATIENT)
Dept: CARDIOLOGY CLINIC | Facility: CLINIC | Age: 45
End: 2025-06-13
Payer: COMMERCIAL

## 2025-06-13 ENCOUNTER — TELEPHONE (OUTPATIENT)
Age: 45
End: 2025-06-13

## 2025-06-13 ENCOUNTER — OFFICE VISIT (OUTPATIENT)
Dept: OBGYN CLINIC | Facility: CLINIC | Age: 45
End: 2025-06-13

## 2025-06-13 VITALS
DIASTOLIC BLOOD PRESSURE: 78 MMHG | OXYGEN SATURATION: 99 % | SYSTOLIC BLOOD PRESSURE: 132 MMHG | BODY MASS INDEX: 28.84 KG/M2 | WEIGHT: 168 LBS | HEART RATE: 83 BPM

## 2025-06-13 VITALS
RESPIRATION RATE: 18 BRPM | SYSTOLIC BLOOD PRESSURE: 128 MMHG | HEART RATE: 78 BPM | HEIGHT: 64 IN | WEIGHT: 169.4 LBS | DIASTOLIC BLOOD PRESSURE: 86 MMHG | BODY MASS INDEX: 28.92 KG/M2

## 2025-06-13 DIAGNOSIS — I63.89 CEREBROVASCULAR ACCIDENT (CVA) DUE TO OTHER MECHANISM (HCC): ICD-10-CM

## 2025-06-13 DIAGNOSIS — Z30.431 ENCOUNTER FOR MANAGEMENT OF INTRAUTERINE CONTRACEPTIVE DEVICE (IUD), UNSPECIFIED IUD MANAGEMENT TYPE: Primary | ICD-10-CM

## 2025-06-13 DIAGNOSIS — R29.90 STROKE-LIKE SYMPTOMS: ICD-10-CM

## 2025-06-13 DIAGNOSIS — I42.9 CARDIOMYOPATHY, UNSPECIFIED TYPE (HCC): Primary | ICD-10-CM

## 2025-06-13 PROCEDURE — 99213 OFFICE O/P EST LOW 20 MIN: CPT | Performed by: OBSTETRICS & GYNECOLOGY

## 2025-06-13 PROCEDURE — 3079F DIAST BP 80-89 MM HG: CPT | Performed by: OBSTETRICS & GYNECOLOGY

## 2025-06-13 PROCEDURE — 99214 OFFICE O/P EST MOD 30 MIN: CPT

## 2025-06-13 PROCEDURE — 3074F SYST BP LT 130 MM HG: CPT | Performed by: OBSTETRICS & GYNECOLOGY

## 2025-06-13 NOTE — PATIENT INSTRUCTIONS
Continue all current medications    Will price check Jardiance and consider starting next office visit    Apply Zio patch when it comes in    Please weigh yourself every day (after emptying your bladder) and keep a detailed log of weights.     Contact Cardiology at [phone] if you gain 3+ lbs overnight or 5+ lbs in 5-7 days.    Limit daily sodium/salt intake to 2000 mg daily to prevent fluid retention.    Avoid canned foods, fast food/Chinese food, and processed meats (hot dogs, lunch meat, and sausage etc.). Caution with condiments.    Limit fluid intake to 2000 mL or 2 liters (about 60-65 ounces) daily.    Avoid electrolyte replacement drinks (such as Gatorade, Pedialyte, Propel, Liquid IV, etc.).    Bring complete list of medications and log of daily weights to your follow-up appointment.

## 2025-06-13 NOTE — TELEPHONE ENCOUNTER
Called Pt regarding a routine referral, in an attempt to verify services needed/interested, and advise of current wait list. Pt interested in talk therapy services. Added to [facility].    2nd attempt. Closing referral.

## 2025-06-13 NOTE — PROGRESS NOTES
Name: Lorri Montes      : 1980      MRN: [id number]  Encounter Provider: Dominique George MD  Encounter Date: 2025   Encounter department: [facility]  :  Assessment & Plan  Encounter for management of intrauterine contraceptive device (IUD), unspecified IUD management type  Discussed that given her recent health issues, it likely would be at least 6 months prior to when she would be able to get a bilateral salpingectomy but that an IUD could be safely placed sooner.   IUD paperwork filled out  RTO for insertion following device arrival            History of Present Illness   HPI  Lorri Montes is a 45 y.o. female who presents today to discuss contraceptive options. She had an ischenic stroke one motnh ago. She has been on depo-provera for many years but was told she needed to discontinue it. She expresses that she wants a bilateral salpingectomy and an ablation for contraception and treatment of her heavy periods. We discussed that she would likely have to wait 6 months in order to have an elective procedure. She is very concerned about her ability to get pregnant once she is outside of her depo window and does not want to wait that long which is understandable. We discussed that a Mirena IUD would be something that we could do in the new few weeks that would reliably prevent pregnancy and also likely help to control her periods which were heavy prior to starting depo.   She is amenable to an IUD at this time.    Discussed with Dr. Saldivar     Review of Systems   Constitutional:  Negative for chills and fever.   HENT:  Negative for ear pain and sore throat.    Eyes:  Negative for pain and visual disturbance.   Respiratory:  Negative for cough and shortness of breath.    Cardiovascular:  Negative for chest pain and palpitations.   Gastrointestinal:  Negative for abdominal pain and vomiting.   Genitourinary:  Negative for dysuria and hematuria.  "  Musculoskeletal:  Negative for arthralgias and back pain.   Skin:  Negative for color change and rash.   Neurological:  Negative for seizures and syncope.   All other systems reviewed and are negative.         Objective   /86 (BP Location: Left arm, Patient Position: Sitting, Cuff Size: Large)   Pulse 78   Resp 18   Ht 5' 4\" (1.626 m)   Wt 76.8 kg (169 lb 6.4 oz)   LMP  (LMP Unknown)   BMI 29.08 kg/m²      Physical Exam  Vitals and nursing note reviewed.   Constitutional:       General: She is not in acute distress.     Appearance: She is well-developed.   HENT:      Head: Normocephalic and atraumatic.     Eyes:      Conjunctiva/sclera: Conjunctivae normal.       Cardiovascular:      Rate and Rhythm: Normal rate and regular rhythm.      Heart sounds: No murmur heard.  Pulmonary:      Effort: Pulmonary effort is normal. No respiratory distress.      Breath sounds: Normal breath sounds.   Abdominal:      Palpations: Abdomen is soft.      Tenderness: There is no abdominal tenderness.     Musculoskeletal:         General: No swelling.      Cervical back: Neck supple.     Skin:     General: Skin is warm and dry.      Capillary Refill: Capillary refill takes less than 2 seconds.     Neurological:      Mental Status: She is alert.     Psychiatric:         Mood and Affect: Mood normal.             Dominique George MD  OBGYN PGY-3  6/13/2025 11:25 AM    "

## 2025-06-16 ENCOUNTER — APPOINTMENT (OUTPATIENT)
Dept: PHYSICAL THERAPY | Facility: CLINIC | Age: 45
End: 2025-06-16
Payer: COMMERCIAL

## 2025-06-17 ENCOUNTER — TELEPHONE (OUTPATIENT)
Dept: CARDIOLOGY CLINIC | Facility: CLINIC | Age: 45
End: 2025-06-17

## 2025-06-17 ENCOUNTER — OFFICE VISIT (OUTPATIENT)
Dept: PHYSICAL THERAPY | Facility: CLINIC | Age: 45
End: 2025-06-17
Payer: COMMERCIAL

## 2025-06-17 DIAGNOSIS — I63.89 CEREBROVASCULAR ACCIDENT (CVA) DUE TO OTHER MECHANISM (HCC): Primary | ICD-10-CM

## 2025-06-17 PROCEDURE — 97110 THERAPEUTIC EXERCISES: CPT

## 2025-06-17 PROCEDURE — 97140 MANUAL THERAPY 1/> REGIONS: CPT

## 2025-06-17 PROCEDURE — 93000 ELECTROCARDIOGRAM COMPLETE: CPT

## 2025-06-17 NOTE — PROGRESS NOTES
Daily Note     Today's date: 2025  Patient name: Lorri Montes  : 1980  MRN: [id number]  Referring provider: Neri Marquez DO  Dx:   Encounter Diagnosis     ICD-10-CM    1. Cerebrovascular accident (CVA) due to other mechanism (HCC)  I63.89           Start Time: 0939  Stop Time: 1017  Total time in clinic (min): 38 minutes    Subjective: Patient reports pain in neck is 9/10.  Patient states that she continues to experience numbness radiating down both arms.  Patient reports that she also has a torn RTC on R, which limits her RUE ROM.      Objective: See treatment diary below      Assessment: Tolerated treatment well.   Patient participated in skilled PT session focused on strengthening, stretching, and ROM.  Patient able to complete exercise program with a mild relief in cervical pain.  Patient demonstrates improved cervical ROM after manual.  Patient demonstrates some mild L hip weakness during ambulation resulting in occasional L foot drag.  Patient would benefit from strengthening L LE.  Patient would continue to benefit from skilled PT interventions to address strengthening, stretching, and ROM. Patient demonstrated fatigue post treatment      Plan: Continue per plan of care.      Short Term Goal Expiration Date:(25 4 weeks)  Long Term Goal Expiration Date: (25 8 weeks)  POC Expiration Date: (25 8 weeks)    POC expires Unit limit Auth Expiration date PT/OT/ST + Visit Limit?   25 bomn pending bomn                           Visit/Unit Tracking  AUTH Status:  Date  iE            24 auth (-) Used 1 2 3            Remaining  23 22 21                 Precautions hx of CVA; HTN       Manuals  IE      STM  SOR supine   STM cervical extensors and b/l UT x10min total SOR supine STM cervical extensors and B/L UT x 10 min                             Neuro Re-Ed  Pt education regarding fall risk cut offs, outcome measure testing, POC and goals for full  "return to work, process for scheduling OT and SLP lizzy  Completed functional testing                                                               Ther Ex        Self stretching for cervical spine  Seated UT and seated lev scap   30\" holds x3 ea side  Seated UT/LS stretches.  30\" 3x ea        Scap retraction 5\" 10x        Cervical AROM  3\" 10x ea        UBE Retro                                     Ther Activity                        Gait Training                        Modalities                                        "

## 2025-06-17 NOTE — TELEPHONE ENCOUNTER
Price check for Jardiance 10 mg daily for a 30 day is $0.00.     ----- Message from KEYANA Vuong sent at 6/13/2025  8:52 AM EDT -----  Regarding: jardiance  Please price check jardiance 10 mg QD for HF  Ty

## 2025-06-18 ENCOUNTER — PATIENT OUTREACH (OUTPATIENT)
Dept: INTERNAL MEDICINE CLINIC | Facility: CLINIC | Age: 45
End: 2025-06-18

## 2025-06-18 NOTE — PROGRESS NOTES
"Pt due for outreach. S/W patient.     Pt reports she is \"breaking down.\" Tearful. States she was fired from her job. Reports she feels \"useless and hopeless.\" Denies thoughts of harming self or others.     Pt reports she is trying to get in touch with her psychiatrist. She is on the wait list for [facility].     Pt reports she is monitoring her weight. Weight has been stable. Pt is avoiding sodium.     Outreach to SW CM. Message sent regarding patient concerns. RN CM will follow up.     S/W  SW CM to discuss concerns.     Left detailed message for patient with SW CM contact information and RN CM contact information.   RN CM will continue to follow.       "

## 2025-06-18 NOTE — PROGRESS NOTES
SW has reached out to pt as per request from Stephy CHONG/CM who has spoken to pt who was depressed and very emotional today due to losing her job .   Pt has denies SI.  She said pt may need help to apply for Unemployment.  .  SW had to leave a message for pt to return SW call.      SW has spoken to pt in the past and she had shared she was going to re-establish with [facility] for OP MH.  SW did confirm today the pt has scheduled  to resume TX with them on 6/25/25 with therapist Philip Mac at 10 AM.  She had last seen them in 10/2024.    SW to f/u again to reach pt.

## 2025-06-19 ENCOUNTER — TELEPHONE (OUTPATIENT)
Dept: OBGYN CLINIC | Facility: CLINIC | Age: 45
End: 2025-06-19

## 2025-06-19 ENCOUNTER — APPOINTMENT (OUTPATIENT)
Dept: PHYSICAL THERAPY | Facility: CLINIC | Age: 45
End: 2025-06-19
Payer: COMMERCIAL

## 2025-06-23 ENCOUNTER — APPOINTMENT (OUTPATIENT)
Dept: PHYSICAL THERAPY | Facility: CLINIC | Age: 45
End: 2025-06-23
Payer: COMMERCIAL

## 2025-06-23 NOTE — PROGRESS NOTES
Daily Note     Today's date: 2025  Patient name: Lorri Montes  : 1980  MRN: [id number]  Referring provider: Neri Marquez DO  Dx:   No diagnosis found.                 Subjective: Patient reports pain in neck is 9/10.  Patient states that she continues to experience numbness radiating down both arms.  Patient reports that she also has a torn RTC on R, which limits her RUE ROM.      Objective: See treatment diary below      Assessment: Tolerated treatment well.   Patient participated in skilled PT session focused on strengthening, stretching, and ROM.  Patient able to complete exercise program with a mild relief in cervical pain.  Patient demonstrates improved cervical ROM after manual.  Patient demonstrates some mild L hip weakness during ambulation resulting in occasional L foot drag.  Patient would benefit from strengthening L LE.  Patient would continue to benefit from skilled PT interventions to address strengthening, stretching, and ROM. Patient demonstrated fatigue post treatment      Plan: Continue per plan of care.      Short Term Goal Expiration Date:(25 4 weeks)  Long Term Goal Expiration Date: (25 8 weeks)  POC Expiration Date: (25 8 weeks)    POC expires Unit limit Auth Expiration date PT/OT/ST + Visit Limit?   25 bomn pending bomn                           Visit/Unit Tracking  AUTH Status:  Date  iE           24 auth (-) Used 1 2 3            Remaining  23 22 21                 Precautions hx of CVA; HTN       Manuals  IE     STM  SOR supine   STM cervical extensors and b/l UT x10min total SOR supine STM cervical extensors and B/L UT x 10 min                             Neuro Re-Ed  Pt education regarding fall risk cut offs, outcome measure testing, POC and goals for full return to work, process for scheduling OT and SLP lizzy  Completed functional testing                                                               Ther Ex  "       Self stretching for cervical spine  Seated UT and seated lev scap   30\" holds x3 ea side  Seated UT/LS stretches.  30\" 3x ea        Scap retraction 5\" 10x        Cervical AROM  3\" 10x ea        UBE Retro                                     Ther Activity                        Gait Training                        Modalities                                        "

## 2025-06-24 ENCOUNTER — OFFICE VISIT (OUTPATIENT)
Dept: PHYSICAL THERAPY | Facility: CLINIC | Age: 45
End: 2025-06-24
Payer: COMMERCIAL

## 2025-06-24 DIAGNOSIS — I63.89 CEREBROVASCULAR ACCIDENT (CVA) DUE TO OTHER MECHANISM (HCC): Primary | ICD-10-CM

## 2025-06-24 PROCEDURE — 97110 THERAPEUTIC EXERCISES: CPT

## 2025-06-24 PROCEDURE — 97140 MANUAL THERAPY 1/> REGIONS: CPT

## 2025-06-24 PROCEDURE — 97112 NEUROMUSCULAR REEDUCATION: CPT

## 2025-06-24 NOTE — PROGRESS NOTES
Daily Note     Today's date: 2025  Patient name: Lorri Montes  : 1980  MRN: [id number]  Referring provider: Neri Marquez DO  Dx:   Encounter Diagnosis     ICD-10-CM    1. Cerebrovascular accident (CVA) due to other mechanism (HCC)  I63.89             Start Time: 1015  Stop Time: 1100  Total time in clinic (min): 45 minutes    Subjective: Patient reports pain in neck is 6-7/10. Reports going to the pool yesterday and this really helped her neck, arms and L ankle pain so wants to go there more, maybe will go today!      Objective: See treatment diary below      Assessment: Tolerated treatment well.   Continued to report relief with manual techniques for cervical extensors, upper traps and SOR more so on R side than L side.  Remainder of session focused on BLE strengthening and ankle stability on compliant surfaces, which she had some difficulty with when UE support was dec and postural stability was challenged with weighted carries. Patient would continue to benefit from skilled PT interventions to address strengthening, stretching, and ROM. Patient demonstrated fatigue post treatment      Plan: Continue per plan of care.      Short Term Goal Expiration Date:(25 4 weeks)  Long Term Goal Expiration Date: (25 8 weeks)  POC Expiration Date: (25 8 weeks)    POC expires Unit limit Auth Expiration date PT/OT/ST + Visit Limit?   25 bomn pending bomn                           Visit/Unit Tracking  AUTH Status:  Date  iE           24 auth (-) Used 1 2 3 4           Remaining  23 22 21 20                Precautions hx of CVA; HTN       Manuals  IE     STM  SOR supine   STM cervical extensors and b/l UT x10min total SOR supine STM cervical extensors and B/L UT x 10 min SOR supine STM cervical extensors and B/L UT x 10 min                            Neuro Re-Ed  Pt education regarding fall risk cut offs, outcome measure testing, POC and goals for full  "return to work, process for scheduling OT and SLP lizzy  Completed functional testing       Step ups    // bars UE PRN  8\" step ups   Fwd leading with LLE only 3x5   Lat leading with LLE only 3x5   Add AirEx pad in front of step  2x5 fwd ea LE  2x5 lat ea LE  Add: holding 6# med ball   X10 fwd ea LE  X10 lat ea LE                                                    Ther Ex        Self stretching for cervical spine  Seated UT and seated lev scap   30\" holds x3 ea side  Seated UT/LS stretches.  30\" 3x ea        Scap retraction 5\" 10x     AROM to tolerance   Cervical AROM  3\" 10x ea Seated in between other exercises focusing on rotation and flexion/extension       UBE Retro UE bike fwd/bwd 4' ea                                     Ther Activity                        Gait Training                        Modalities                                        "

## 2025-06-25 ENCOUNTER — OFFICE VISIT (OUTPATIENT)
Dept: PHYSICAL THERAPY | Facility: CLINIC | Age: 45
End: 2025-06-25
Payer: COMMERCIAL

## 2025-06-25 ENCOUNTER — PATIENT OUTREACH (OUTPATIENT)
Dept: INTERNAL MEDICINE CLINIC | Facility: CLINIC | Age: 45
End: 2025-06-25

## 2025-06-25 ENCOUNTER — PROCEDURE VISIT (OUTPATIENT)
Dept: OBGYN CLINIC | Facility: CLINIC | Age: 45
End: 2025-06-25

## 2025-06-25 VITALS
SYSTOLIC BLOOD PRESSURE: 120 MMHG | HEART RATE: 102 BPM | BODY MASS INDEX: 28.51 KG/M2 | WEIGHT: 167 LBS | DIASTOLIC BLOOD PRESSURE: 93 MMHG | HEIGHT: 64 IN

## 2025-06-25 DIAGNOSIS — Z30.430 ENCOUNTER FOR IUD INSERTION: Primary | ICD-10-CM

## 2025-06-25 DIAGNOSIS — I63.89 CEREBROVASCULAR ACCIDENT (CVA) DUE TO OTHER MECHANISM (HCC): Primary | ICD-10-CM

## 2025-06-25 PROCEDURE — 97112 NEUROMUSCULAR REEDUCATION: CPT

## 2025-06-25 PROCEDURE — 97110 THERAPEUTIC EXERCISES: CPT

## 2025-06-25 NOTE — PROGRESS NOTES
IUD Procedure    Date/Time: 6/25/2025 3:45 PM    Performed by: KEYANA Thacker  Authorized by: KEYANA Thacker    Verbal consent obtained?: Yes    Written consent obtained?: Yes    Risks and benefits: Risks, benefits and alternatives were discussed    Consent given by:  Patient  Time Out:     Time out: Immediately prior to the procedure a time out was called    Patient states understanding of procedure being performed: Yes    Patient's understanding of procedure matches consent: Yes    Procedure consent matches procedure scheduled: Yes    Relevant documents present and verified: Yes    Test results available and properly labeled: Yes    Patient identity confirmed:  Verbally with patient  Select procedure: IUD insertion    IUD Insertion:     Pelvic exam performed: yes      Negative urine pregnancy test: yes      Cervix cleaned and prepped: yes      Speculum placed in vagina: yes      Tenaculum applied to cervix: yes      IUD inserted with no complications: yes      Strings trimmed: yes      Uterus sounded: yes      Uterus sound depth (cm):  8.5    IUD type:  1 each Levonorgestrel 20 MCG/DAY  Post-procedure:     Patient tolerated procedure well: yes      Patient will follow up after next period: yes

## 2025-06-25 NOTE — PROGRESS NOTES
Pt due for outreach. S/W patient. Pt reports she has followed up with her therapist. Reminded patient that SW CM is available if any social or mental health needs arise. Pt verbalizes understanding.     Pt has followed up with her appointments. Pt has avoided readmission or ED visits. Thirty days will be up on 6/28. RN CM will close episode at that time.

## 2025-06-25 NOTE — PROGRESS NOTES
Daily Note     Today's date: 2025  Patient name: Lorri Montes  : 1980  MRN: [id number]  Referring provider: Neri Marquez DO  Dx:   Encounter Diagnosis     ICD-10-CM    1. Cerebrovascular accident (CVA) due to other mechanism (HCC)  I63.89               Start Time: 1645  Stop Time: 1730  Total time in clinic (min): 45 minutes    Subjective: received contraceptive shot today so not feeling great, having cramps and feeling tired; reports having more motion in her neck though!      Objective: See treatment diary below      Assessment: Tolerated treatment well.   Progressed SLS balance and step height with taller hurdles and alternating between which LE she led with, also without UE support but in // bars in event of LOB. Had more difficulty with lateral stepping specifically when SLS on LLE. Challenged with ankle stability/balance reactions on foam beams especially when Patient would continue to benefit from skilled PT interventions to address strengthening, stretching, and ROM. Patient demonstrated fatigue post treatment      Plan: Continue per plan of care.      Short Term Goal Expiration Date:(25 4 weeks)  Long Term Goal Expiration Date: (25 8 weeks)  POC Expiration Date: (25 8 weeks)    POC expires Unit limit Auth Expiration date PT/OT/ST + Visit Limit?   25 bomn pending bomn                           Visit/Unit Tracking  AUTH Status:  Date  iE          24 auth (-) Used 1 2 3 4 5          Remaining  23 22 21 20 19               Precautions hx of CVA; HTN       Manuals  IE    STM  SOR supine   STM cervical extensors and b/l UT x10min total SOR supine STM cervical extensors and B/L UT x 10 min SOR supine STM cervical extensors and B/L UT x 10 min                            Neuro Re-Ed  Pt education regarding fall risk cut offs, outcome measure testing, POC and goals for full return to work, process for scheduling OT and SLP  "lizzy  Completed functional testing       Step ups    // bars UE PRN  8\" step ups   Fwd leading with LLE only 3x5   Lat leading with LLE only 3x5   Add AirEx pad in front of step  2x5 fwd ea LE  2x5 lat ea LE  Add: holding 6# med ball   X10 fwd ea LE  X10 lat ea LE    Hurdles      // bars (long) no UE  12\" (6)  Alt bwt leading with either LE   X4 laps fwd  X4 laps lat    Foam beams      // bars (long) no UE  Sidestepping on beams x3 laps   Add: 4 Blazepods x15 hits                                   Ther Ex        Self stretching for cervical spine  Seated UT and seated lev scap   30\" holds x3 ea side  Seated UT/LS stretches.  30\" 3x ea  Seated UT and seated lev scap   1' holds x3 ea side       Scap retraction 5\" 10x     AROM to tolerance   Cervical AROM  3\" 10x ea Seated in between other exercises focusing on rotation and flexion/extension       UBE Retro UE bike fwd/bwd 4' ea  UE bike fwd/bwd 4' ea                                    Ther Activity                        Gait Training                        Modalities                                        "

## 2025-06-30 ENCOUNTER — OFFICE VISIT (OUTPATIENT)
Dept: PHYSICAL THERAPY | Facility: CLINIC | Age: 45
End: 2025-06-30
Payer: COMMERCIAL

## 2025-06-30 ENCOUNTER — PATIENT OUTREACH (OUTPATIENT)
Age: 45
End: 2025-06-30

## 2025-06-30 DIAGNOSIS — I63.89 CEREBROVASCULAR ACCIDENT (CVA) DUE TO OTHER MECHANISM (HCC): Primary | ICD-10-CM

## 2025-06-30 DIAGNOSIS — I42.9 CARDIOMYOPATHY, UNSPECIFIED TYPE (HCC): ICD-10-CM

## 2025-06-30 PROCEDURE — 97110 THERAPEUTIC EXERCISES: CPT

## 2025-06-30 PROCEDURE — 97140 MANUAL THERAPY 1/> REGIONS: CPT

## 2025-06-30 PROCEDURE — 97150 GROUP THERAPEUTIC PROCEDURES: CPT

## 2025-06-30 NOTE — PROGRESS NOTES
Daily Note     Today's date: 2025  Patient name: Lorri Montes  : 1980  MRN: [id number]  Referring provider: Neri Marquez DO  Dx:   Encounter Diagnosis     ICD-10-CM    1. Cerebrovascular accident (CVA) due to other mechanism (HCC)  I63.89                 Start Time: 0945  Stop Time: 1030  Total time in clinic (min): 45 minutes    Subjective: ankle is still really bothering her today, rates pain 8/10, is still icing it at home and has Lidocaine cream on it today along with supportive wrap, admits to not staying off of it over the weekend      Objective: See treatment diary below    Group [id number]    Assessment: Tolerated treatment well.   Due to inc ankle pain today and reports of neck stiffness, opted to focus on cervical ROM and sx management with manual therapy, self stretching, and gentle strengthening for postural stability. After postural strengthening for cervical and thoracic regions, reported inc low back pain which is likely due to poor postural strength global. Patient would continue to benefit from skilled PT interventions to address strengthening, stretching, and ROM. Patient demonstrated fatigue post treatment      Plan: Continue per plan of care.      Short Term Goal Expiration Date:(25 4 weeks)  Long Term Goal Expiration Date: (25 8 weeks)  POC Expiration Date: (25 8 weeks)    POC expires Unit limit Auth Expiration date PT/OT/ST + Visit Limit?   25 bomn pending bomn                           Visit/Unit Tracking  AUTH Status:  Date  iE         24 auth (-) Used 1 2 3 4 5 6         Remaining  23 22 21 20 19 18              Precautions hx of CVA; HTN       Manuals    STM SOR supine STM cervical extensors and B/L UT x 10 min SOR supine   STM cervical extensors and b/l UT x10min total SOR supine STM cervical extensors and B/L UT x 10 min SOR supine STM cervical extensors and B/L UT x 10 min                      "       Neuro Re-Ed   Completed functional testing       Step ups    // bars UE PRN  8\" step ups   Fwd leading with LLE only 3x5   Lat leading with LLE only 3x5   Add AirEx pad in front of step  2x5 fwd ea LE  2x5 lat ea LE  Add: holding 6# med ball   X10 fwd ea LE  X10 lat ea LE    Hurdles      // bars (long) no UE  12\" (6)  Alt bwt leading with either LE   X4 laps fwd  X4 laps lat    Foam beams      // bars (long) no UE  Sidestepping on beams x3 laps   Add: 4 Blazepods x15 hits                                   Ther Ex        Self stretching for cervical spine Seated UT and seated lev scap   30\" holds x3 ea side  Seated UT and seated lev scap   30\" holds x3 ea side  Seated UT/LS stretches.  30\" 3x ea  Seated UT and seated lev scap   1' holds x3 ea side    Postural strengthening Scap retractions seated 5\" 2x10    Seated chin tucks seated  5\" 2x10  Scap retraction 5\" 10x     AROM to tolerance   Cervical AROM  3\" 10x ea Seated in between other exercises focusing on rotation and flexion/extension    UE bike UE bike fwd/bwd 4' ea   UBE Retro UE bike fwd/bwd 4' ea  UE bike fwd/bwd 4' ea    Lumbar rollouts Seated using rolling stool 5\" holds x10 ea direction                               Ther Activity                        Gait Training                        Modalities                                        "

## 2025-07-01 RX ORDER — FUROSEMIDE 40 MG/1
40 TABLET ORAL DAILY
Qty: 90 TABLET | Refills: 1 | Status: SHIPPED | OUTPATIENT
Start: 2025-07-01 | End: 2025-07-10 | Stop reason: SDUPTHER

## 2025-07-03 ENCOUNTER — APPOINTMENT (OUTPATIENT)
Dept: PHYSICAL THERAPY | Facility: CLINIC | Age: 45
End: 2025-07-03
Payer: COMMERCIAL

## 2025-07-07 ENCOUNTER — EVALUATION (OUTPATIENT)
Dept: PHYSICAL THERAPY | Facility: CLINIC | Age: 45
End: 2025-07-07
Payer: COMMERCIAL

## 2025-07-07 DIAGNOSIS — I63.89 CEREBROVASCULAR ACCIDENT (CVA) DUE TO OTHER MECHANISM (HCC): Primary | ICD-10-CM

## 2025-07-07 PROCEDURE — 97112 NEUROMUSCULAR REEDUCATION: CPT

## 2025-07-07 NOTE — PROGRESS NOTES
Progress Note     Today's date: 2025  Patient name: Lorri Montes  : 1980  MRN: [id number]  Referring provider: Neri Marquez DO  Dx:   Encounter Diagnosis     ICD-10-CM    1. Cerebrovascular accident (CVA) due to other mechanism (HCC)  I63.89                   Start Time: 0930  Stop Time: 1015  Total time in clinic (min): 45 minutes    Subjective: feels like her neck is getting better, still tight but feels like it's a little less since starting therapy       Objective: See treatment diary below      Assessment: Tolerated treatment well.   Progress note complete today. Pt has made great improvements in all functional testing, including dynamic balance, gait speed and ambulation endurance. She still reports ankle instability that greatly affects her ability to navigate uneven surfaces and could lead to falls while performing work-related tasks. POC will continue to focus on ankle strengthening and stabilty in closed chain positions including SLS and uneven surfaces. Also scheduled pt for OT evaluation to further assess  strength/LUE strength and asked for speech therapy referral to be placed. Patient would continue to benefit from skilled PT interventions to address strengthening, stretching, and ROM. Patient demonstrated fatigue post treatment             Balance Testing   Modified Clinical Test of Sensory Interaction of Balance  (MCTSIB) (seconds): IE:   Firm EO: 30  Firm EC: 30  Foam EO: 30  Foam EC: 30    PN :  Firm EO: 30  Firm EC: 30  Foam EO: 30  Foam EC: 30   Guo Balance Scale (BBS):  <45 greater risk for falls (Guo et al 1992)     MCID:   4 points 45-56 initially  5 points 35-44 initially  7 points 25-34 initially  5 points 0-24 initially   IE: not assessed   Functional Gait Assessment (FGA)  < Or = 22/30 greater risk for falls     MDC:  Geriatric: 4  Stroke: 4  Parkinson's Disease: 4  Vestibular: 6 IE: /30  PN : 26/30               Mobility & Endurance Testing   5x Sit to  Stand (seconds)  >15 seconds greater risk for falls (elderly)     MCID: 2.3 seconds IE: 15.32s no UE  PN : 11.62s no UE   Timed Up-and-Go (seconds)  >13.5 seconds greater risk for falls    IE: 9.50s no UE  PN : 8.72s no AD   6 Minute Walk Test (meters)  Geriatrics:  Meters:         Male  Female  60-69 years  572    538  70-79 years  527    471  80-89 years  417    392     MCID: 50 meters/164 feet IE: 750ft no AD  PN : 1210 no AD   Gait speed: Self-selected & fast (meters/second)     1.0 m/s normal  1.2 m/s community-level speed     MCID: 0.10 m/s IE: 0.87m/s no AD  PN : 1.09m/s        Goals  STG:  Patient will improve 6MWT by 150ft demonstrating significant improvements in endurance  w/in 4 weeks  Patient will demonstrates significant improvements in dynamic balance by improving FGA score by 6 secs or more within 4 weeks-met   Patient will be scheduled for OT and SLP evaluations within 4 weeks-in progress   Patient will be independent with HEP within 4 weeks-in progress   LTG:  Patient will improve 6MWT by 300ft demonstrating significant improvements in endurance  w/in 8 weeks  Patient will demonstrated improved LE strength and endurance by improved 5xSTS to 15s or less within 8 weeks-met   Patient will improve FGA to 22/30 or greater indicating they are no longer at higher risk for falls within 8 weeks - met   Patient will be independent with progress HEP within 8 weeks   Patient will improve gait speed to 1.2 m/s indicating they are safe community ambulator within 8 weeks    Patient Goals  Patient goals for therapy: improved balance, increased motion, decreased pain, increased strength and return to work  Patient goal: walk better, be able to climb ladders for work to get to higher shelves  Pain  Current pain rating: 10  At best pain ratin  At worst pain rating: 10  Location: both shoulders and neck  Quality: tight     Plan  Patient would benefit from: skilled physical therapy, OT  "eval and speech eval  Planned modality interventions: neuromuscular electric stimulation and thermotherapy: hydrocollator packs     Planned therapy interventions: abdominal trunk stabilization, balance, body mechanics training, cognitive skills, coordination, flexibility, functional ROM exercises, gait training, graded activity, graded exercise, home exercise program, work reintegration, therapeutic exercise, therapeutic activities, stretching, strengthening, sensory integrative techniques, postural training, patient/caregiver education, neuromuscular re-education, motor coordination training and manual therapy     Frequency: 2x week  Duration in weeks: 8  Plan of Care beginning date: 6/5/2025  Plan of Care expiration date: 8/5/2025  Treatment plan discussed with: patient and family  Plan details: Complete FGA, 6minWT, gait speed, and MCTSIB  Provide standing BLE HEP   Reach for referrals to SLP and OT      Plan: Continue per plan of care.      Short Term Goal Expiration Date:(7/5/25 4 weeks)  Long Term Goal Expiration Date: (8/5/25 8 weeks)  POC Expiration Date: (8/5/25 8 weeks)    POC expires Unit limit Auth Expiration date PT/OT/ST + Visit Limit?   8/5/25 bomn pending bomn                           Visit/Unit Tracking  AUTH Status:  Date 6/5 iE 6/12 6/17 6/24 6/25 6/30 7/7 PN       24 auth (5/28-12/31) Used 1 2 3 4 5 6 7        Remaining  23 22 21 20 19 18 17             Precautions hx of CVA; HTN       Manuals 6/30 7/7 PN 6/17 6/24 6/25   STM SOR supine STM cervical extensors and B/L UT x 10 min  SOR supine STM cervical extensors and B/L UT x 10 min SOR supine STM cervical extensors and B/L UT x 10 min                            Neuro Re-Ed   PN testing (see above)    Schedule for OT IE; contact PCP for speech referral      Step ups    // bars UE PRN  8\" step ups   Fwd leading with LLE only 3x5   Lat leading with LLE only 3x5   Add AirEx pad in front of step  2x5 fwd ea LE  2x5 lat ea LE  Add: holding 6# med " "ball   X10 fwd ea LE  X10 lat ea LE    Hurdles      // bars (long) no UE  12\" (6)  Alt bwt leading with either LE   X4 laps fwd  X4 laps lat    Foam beams      // bars (long) no UE  Sidestepping on beams x3 laps   Add: 4 Blazepods x15 hits                                   Ther Ex        Self stretching for cervical spine Seated UT and seated lev scap   30\" holds x3 ea side   Seated UT/LS stretches.  30\" 3x ea  Seated UT and seated lev scap   1' holds x3 ea side    Postural strengthening Scap retractions seated 5\" 2x10    Seated chin tucks seated  5\" 2x10  Scap retraction 5\" 10x     AROM to tolerance   Cervical AROM  3\" 10x ea Seated in between other exercises focusing on rotation and flexion/extension    UE bike UE bike fwd/bwd 4' ea   UBE Retro UE bike fwd/bwd 4' ea  UE bike fwd/bwd 4' ea    Lumbar rollouts Seated using rolling stool 5\" holds x10 ea direction                               Ther Activity                        Gait Training                        Modalities                                        "

## 2025-07-08 NOTE — PROGRESS NOTES
Advanced Heart Failure / Pulmonary Hypertension Outpatient Visit    Lorri Montes 45 y.o. female   MRN: [id number]  Encounter: [id number]    Assessment:  Patient Active Problem List    Diagnosis Date Noted    Acute heart failure with reduced ejection fraction (HFrEF, <= 40%) (Formerly [facility]) 07/10/2025    Abnormal echocardiogram 06/04/2025    Nasal injury, subsequent encounter 06/04/2025    Hypertension 05/22/2025    Cardiomyopathy ([facility]) 05/22/2025    Vitamin B12 deficiency 05/22/2025    Stroke (cerebrum) (Formerly [facility]) 05/21/2025    HSV (herpes simplex virus) infection 05/21/2025    Leukocytosis 05/21/2025    Hypokalemia 05/21/2025    Traumatic complete tear of right rotator cuff 04/25/2025    Neck pain 04/25/2025    Urinary incontinence, mixed 01/27/2025    Posterior tibial tendinitis of right lower extremity 02/14/2024    Depressive disorder 02/14/2024    Thyroid nodule 01/07/2024    DJD (degenerative joint disease) 12/10/2019    Tear of right supraspinatus tendon 11/12/2019    Tobacco use disorder 06/10/2019    Impingement syndrome of right shoulder 05/20/2019    Encounter for surveillance of injectable contraceptive 01/30/2018    Hidradenitis suppurativa 01/08/2014    Obesity 10/07/2013       Today's Plan:  Decrease Lasix from  40 to 20 mg after starting Jardiance today--JVD stable. Weights stable.   GDMT Toprol, Losartan, Spironolactone.  Start Jardiance today.    Zio patch r/o Atrial Fib in s/o CVA--results pending   Repeat BMP within next 2 weeks  RTO with Dr. Quintanilla 8/20/25--recommend to repeat ECHO to assess EF on full GDMT    Plan:  Newly diagnosed HFrEF, LVEF 35-40%, Grade 1 DD  Etiology: NICMP per stress test, r/o Atrial Fib in s/o CVA, untreated HTN, stress induced, genetic?      EKG NSR , Troponins negative X 2, BNP 6  DC on Lasix 40 mg QD     Weights: on  lbs-->168 lbs--171 lbs today  Last BMP 6/9: , K 4.2, creat 0.54  BP today: 110/70  Volume status: no JVD, no LE edema.  DUBON with long distances  remains.  Weights stable     Pharmacotherapies / Neurohormonal Blockade:  --Beta Blocker: Toprol XL 25  QD  --ARNi / ACEi / ARB: Losartan 25 QD  --Aldosterone Antagonist: Spironolactone 25 QD  --SGLT2 Inhibitor: Jardiance 10 QD  --Diuretic: lasix 40 mg QD-->Lasix 20 QD     Sudden Cardiac Death Risk Reduction:  --ICD: EF 35-40%     Stroke  MRI: Acute lacunar infarcts in the right thalamus and posterior right putamen. No acute hemorrhage.   Zio Patch ordered 6/13--results still pending     Hypertension  RX: see GDMT above  BP on admit >170's  SBp today 110        Tobacco use d/o  Vitamin B12 defic     HPI:   5/21/25: hosp admit: Lorri Montes is a 45 y.o. female with a PMH of anxiety, polyarthralgia, right rotator cuff tear, smoking who presents with left-sided numbness.  Patient reports that her symptoms started last night around 8 PM when she was going to bed which interrupted her sleep and kept her awake until 4 AM.  However, they got worse this morning.  Symptoms include feeling numbness over her left face, left leg, left throat, left arm, left trunk, and left leg. MRI shows acute lacunar infarcts in the right thalamus and posterior right putamen. Echo showed cardiomyopathy with an ejection fraction of 35 to Patient was also seen by neurology who recommended transesophageal echo, and starting dual antiplatelet therapy with aspirin 81 mg daily and Plavix 75 mg daily, and notes that dual antiplatelets should be continued for 21 days after which her Plavix will be stopped and continue aspirin.  Patient was also started on atorvastatin 40 mg and counseled about smoking cessation. Due to new onset of cardiomyopathy patient was seen by cardiology and was scheduled a cardiac stress test which shows normal myocardial perfusion with calculated left ejection fraction of 37 and no clinical heart failure.  Patient was then started on goal-directed management of metoprolol succinate 25, losartan 75 and spironolactone  25.      6/13/25 TA: Pleasant patient.  Reports some DUBON with longer distances.  Admits to smoking 1 cig/day, +daily marijuana use.  She initially had a 10 lb weight gain after d/c then lost 4 lbs after starting Lasix. Weight today at home 166 lbs.  Denies dizziness, CP, palpitations, LE edema.  Drinking more than 2 L/day.  Trying to follow a low NA diet, but does order out some nights.  She is med compliant.  Waiting for Zio patch delivery to apply.  Has family hx of cardiac disease on Mom and Dad side.  Currently, not working d/t her health.  Applying for disability.     7/10/25: TA 2 week f/u: pt reports edema in feet and hands.  DUBON with long distances remains.  Weights at home stable.  Reports some weight influx d/t eating more and more sedentary lifestyle since not working.  Following low NA diet and drinking 2 L/Day.  Pt reports increased depression and stress.  Trying to apply for disability d/t heatlh conditions.  Planning to send Zio patch back this week--wore for 7 days.        Past Medical History[1]    Review of Systems   Constitutional:  Negative for activity change and unexpected weight change.   HENT: Negative.     Eyes: Negative.    Respiratory: Negative.     Cardiovascular: Negative.    Gastrointestinal: Negative.    Endocrine: Negative.    Genitourinary: Negative.    Musculoskeletal: Negative.    Skin: Negative.    Allergic/Immunologic: Negative.    Neurological: Negative.    Hematological: Negative.    Psychiatric/Behavioral: Negative.         Allergies[2]    Current Medications[3]    Social History     Socioeconomic History    Marital status: Single     Spouse name: Not on file    Number of children: Not on file    Years of education: Not on file    Highest education level: Not on file   Occupational History    Not on file   Tobacco Use    Smoking status: Every Day     Current packs/day: 0.25     Types: Cigarettes    Smokeless tobacco: Former    Tobacco comments:     4-5 cigarettes a day  "  Vaping Use    Vaping status: Never Used   Substance and Sexual Activity    Alcohol use: Yes     Comment: holidays     Drug use: Yes     Types: Marijuana     Comment: few times a day     Sexual activity: Yes     Partners: Male     Birth control/protection: Injection   Other Topics Concern    Not on file   Social History Narrative    Not on file     Social Drivers of Health     Financial Resource Strain: High Risk (1/27/2025)    Overall Financial Resource Strain (CARDIA)     Difficulty of Paying Living Expenses: Very hard   Food Insecurity: No Food Insecurity (5/24/2025)    Nursing - Inadequate Food Risk Classification     Worried About Running Out of Food in the Last Year: Never true     Ran Out of Food in the Last Year: Never true     Ran Out of Food in the Last Year: Never true   Transportation Needs: No Transportation Needs (5/24/2025)    Nursing - Transportation Risk Classification     Lack of Transportation: Not on file     Lack of Transportation: No   Physical Activity: Not on file   Stress: Stress Concern Present (8/25/2022)    [employer] - Occupational Stress Questionnaire     Feeling of Stress : Rather much   Social Connections: Not on file   Intimate Partner Violence: Unknown (5/24/2025)    Nursing IPS     Feels Physically and Emotionally Safe: Not on file     Physically Hurt by Someone: Not on file     Humiliated or Emotionally Abused by Someone: Not on file     Physically Hurt by Someone: No     Hurt or Threatened by Someone: No   Housing Stability: Low Risk  (6/5/2025)    Housing Stability Vital Sign     Unable to Pay for Housing in the Last Year: No     Number of Times Moved in the Last Year: 0     Homeless in the Last Year: No     Family History[4]    Vitals:   Blood pressure 110/70, pulse 100, height 5' 4\" (1.626 m), weight 77.9 kg (171 lb 12.8 oz), SpO2 98%, not currently breastfeeding.    Wt Readings from Last 10 Encounters:   07/10/25 77.9 kg (171 lb 12.8 oz) " "  06/25/25 75.8 kg (167 lb)   06/13/25 76.8 kg (169 lb 6.4 oz)   06/13/25 76.2 kg (168 lb)   06/06/25 78.4 kg (172 lb 12.8 oz)   06/05/25 77.6 kg (171 lb)   06/04/25 77.2 kg (170 lb 3.2 oz)   05/28/25 73.6 kg (162 lb 3.2 oz)   01/27/25 73.8 kg (162 lb 12.8 oz)   11/11/24 76.6 kg (168 lb 12.8 oz)     Vitals:    07/10/25 1554   BP: 110/70   BP Location: Right arm   Patient Position: Sitting   Cuff Size: Standard   Pulse: 100   SpO2: 98%   Weight: 77.9 kg (171 lb 12.8 oz)   Height: 5' 4\" (1.626 m)       Physical Exam  Constitutional:       Appearance: Normal appearance.   Neck:      Vascular: No JVD.     Cardiovascular:      Rate and Rhythm: Normal rate and regular rhythm.      Heart sounds: S1 normal and S2 normal.   Pulmonary:      Effort: No respiratory distress.      Breath sounds: Normal air entry. Decreased breath sounds present.     Musculoskeletal:      Right lower leg: No edema.      Left lower leg: No edema.     Skin:     General: Skin is warm and dry.     Neurological:      Mental Status: She is alert and oriented to person, place, and time. Mental status is at baseline.     Psychiatric:         Mood and Affect: Mood is anxious and depressed.         Behavior: Behavior is cooperative.         Cognition and Memory: Cognition normal.       Labs & Results:  Lab Results   Component Value Date    WBC 15.15 (H) 05/27/2025    HGB 14.6 05/27/2025    HCT 43.1 05/27/2025     (H) 05/27/2025     05/27/2025     Lab Results   Component Value Date    SODIUM 138 06/09/2025    K 4.2 06/09/2025     06/09/2025    CO2 23 06/09/2025    BUN 7 06/09/2025    CREATININE 0.54 (L) 06/09/2025    GLUC 79 05/26/2025    CALCIUM 8.8 06/09/2025     Lab Results   Component Value Date    INR 1.21 (H) 05/23/2025    INR 1.23 (H) 11/17/2014    PROTIME 16.1 (H) 05/23/2025    PROTIME 15.2 (H) 11/17/2014     Lab Results   Component Value Date    BNP 6 06/09/2025        KEYANA Dwyer         [1]   Past Medical " History:  Diagnosis Date    Arthritis     Closed fracture of left tibial plateau 07/27/2022    CVA (cerebrovascular accident) ([facility])     Depression     DJD (degenerative joint disease)     Heart disease     Herpes     Hidradenitis     MRSA carrier     Stroke ([facility])    [2] No Known Allergies  [3]   Current Outpatient Medications:     acetaminophen (TYLENOL) 500 mg tablet, Take 2 tablets (1,000 mg total) by mouth every 6 (six) hours as needed for mild pain, Disp: , Rfl:     aspirin 81 mg chewable tablet, Chew 1 tablet (81 mg total) daily, Disp: 30 tablet, Rfl: 0    atorvastatin (LIPITOR) 40 mg tablet, Take 1 tablet (40 mg total) by mouth every evening, Disp: 30 tablet, Rfl: 0    celecoxib (CeleBREX) 200 mg capsule, take 1 capsule by mouth twice a day, Disp: 180 capsule, Rfl: 0    cyclobenzaprine (FLEXERIL) 10 mg tablet, Take 1 tablet (10 mg total) by mouth 3 (three) times a day as needed for muscle spasms, Disp: 60 tablet, Rfl: 0    Diclofenac Sodium (VOLTAREN) 1 %, Apply 2 g topically 4 (four) times a day, Disp: 100 g, Rfl: 2    Empagliflozin (Jardiance) 10 MG TABS tablet, Take 1 tablet (10 mg total) by mouth every morning, Disp: 90 tablet, Rfl: 3    folic acid (FOLVITE) 1 mg tablet, Take 1 tablet (1 mg total) by mouth daily, Disp: 30 tablet, Rfl: 0    furosemide (LASIX) 20 mg tablet, Take 1 tablet (20 mg total) by mouth daily, Disp: 90 tablet, Rfl: 3    lidocaine (XYLOCAINE) 5 % ointment, Apply topically 2 (two) times a day as needed for mild pain, Disp: 35.44 g, Rfl: 2    losartan (COZAAR) 25 mg tablet, Take 1 tablet (25 mg total) by mouth daily, Disp: 90 tablet, Rfl: 1    metoprolol succinate (TOPROL-XL) 25 mg 24 hr tablet, Take 1 tablet (25 mg total) by mouth daily, Disp: 90 tablet, Rfl: 1    Mirena, 52 MG, 20 MCG/DAY IUD, , Disp: , Rfl:     spironolactone (ALDACTONE) 25 mg tablet, Take 1 tablet (25 mg total) by mouth daily, Disp: 90 tablet, Rfl: 1    valACYclovir (VALTREX) 1,000 mg tablet, Take 1 tablet (1,000 mg  total) by mouth daily, Disp: 360 tablet, Rfl: 0    clopidogrel (PLAVIX) 75 mg tablet, Take 1 tablet (75 mg total) by mouth daily for 13 doses (Patient not taking: Reported on 6/13/2025), Disp: 13 tablet, Rfl: 0    Current Facility-Administered Medications:     cyanocobalamin injection 1,000 mcg, 1,000 mcg, Intramuscular, Q30 Days, , 1,000 mcg at 06/09/25 1007  [4]   Family History  Problem Relation Name Age of Onset    Arthritis Mother      Hypertension Mother      Diabetes Father      Stroke Father      Heart attack Father      Post-traumatic stress disorder Father      Other Father          nerve gas exposure-war    Schizophrenia Sister      Bipolar disorder Brother      Schizophrenia Brother      Sleep apnea Daughter      Obesity Daughter      Hypertension Daughter      ADD / ADHD Daughter      Depression Son      Post-traumatic stress disorder Son      Arthritis Maternal Grandmother      Heart attack Maternal Grandfather      Diabetes Paternal Grandmother      Stroke Paternal Grandmother      Heart attack Paternal Grandfather      Post-traumatic stress disorder Paternal Grandfather      Ovarian cysts Sister

## 2025-07-09 ENCOUNTER — APPOINTMENT (OUTPATIENT)
Dept: OCCUPATIONAL THERAPY | Facility: CLINIC | Age: 45
End: 2025-07-09
Payer: COMMERCIAL

## 2025-07-10 ENCOUNTER — EVALUATION (OUTPATIENT)
Dept: OCCUPATIONAL THERAPY | Facility: CLINIC | Age: 45
End: 2025-07-10
Payer: COMMERCIAL

## 2025-07-10 ENCOUNTER — OFFICE VISIT (OUTPATIENT)
Dept: CARDIOLOGY CLINIC | Facility: CLINIC | Age: 45
End: 2025-07-10
Payer: COMMERCIAL

## 2025-07-10 VITALS
HEIGHT: 64 IN | HEART RATE: 100 BPM | OXYGEN SATURATION: 98 % | BODY MASS INDEX: 29.33 KG/M2 | WEIGHT: 171.8 LBS | DIASTOLIC BLOOD PRESSURE: 70 MMHG | SYSTOLIC BLOOD PRESSURE: 110 MMHG

## 2025-07-10 DIAGNOSIS — I50.21 ACUTE HEART FAILURE WITH REDUCED EJECTION FRACTION (HFREF, <= 40%) (HCC): Primary | ICD-10-CM

## 2025-07-10 DIAGNOSIS — I63.9 CEREBROVASCULAR ACCIDENT (CVA), UNSPECIFIED MECHANISM (HCC): Primary | ICD-10-CM

## 2025-07-10 PROCEDURE — 99214 OFFICE O/P EST MOD 30 MIN: CPT

## 2025-07-10 PROCEDURE — 97166 OT EVAL MOD COMPLEX 45 MIN: CPT

## 2025-07-10 RX ORDER — LEVONORGESTREL 52 MG/1
INTRAUTERINE DEVICE INTRAUTERINE
COMMUNITY
Start: 2025-06-16

## 2025-07-10 RX ORDER — FUROSEMIDE 20 MG/1
20 TABLET ORAL DAILY
Qty: 90 TABLET | Refills: 3 | Status: SHIPPED | OUTPATIENT
Start: 2025-07-10

## 2025-07-10 NOTE — PROGRESS NOTES
OCCUPATIONAL THERAPY INITIAL EVALUATION:    7/10/25  Lorri Montes  1980  [id number]  Neri Marquez DO   Diagnosis ICD-10-CM Associated Orders   1. Cerebrovascular accident (CVA), unspecified mechanism (HCC)  I63.9             Assessment/Plan    Skilled Analysis:  Lorri Montes is a 45 y.o. female referred to Occupational Therapy s/p Cerebrovascular accident (CVA), unspecified mechanism (HCC) [I63.9].   Pt participated in skilled OT evaluation and following formalized testing as well as clinical observation, Pt presents with the following areas of deficit: reports of pain and UE weakness, as well as sensation difficulties, and challenges with ADLs and IADL completion. Pt has a hx of b/l torn rotator cuff tear, fully torn in R shoulder which at this point as not yet been manages due to stroke. Many reports from Pt correlate with rotator cuff injury, however further testing to be completed nest session for sensation and coordination. Overall Pt with good  strength in both hands as well as pinch strength. Discussed with Pt referral to ortho for shoulder injuries in future. Pt will benefit from skilled Occupational Therapy services 1x/week for 6 weeks, with further testing to be completed , goals to be added as needed, and POC adjusted as needed, with focus on there act, there ex, and HEP.  Lorri Montes is in agreement with POC.    *Pt 30 min late to evaluation this date*    POC expires Unit limit Auth Expiration date PT/OT/ST + Visit Limit?   8/21/25 N/A TBD 24 approved                           Visit/Unit Tracking  AUTH Status:  Date 7/10             24 approved Used 1              Remaining  23                  Duration in weeks: IE ONLY  Plan of care beginning date: 7/10/25  Plan of care expiration date: 7/11/25  IE ONLY    Precautions: B/L rotator cuff injuries              GOALS    Short Term Goals   - n/a, eval only     Long Term Goals     - n/a, eval only  "      Subjective    PATIENT GOAL: \"To be able to not have as much pain and be able to function better\"    Patient-Specific Functional Scale   Task is scored 0 (unable to perform activity) to 10 (ability to perform activity independently)    Activity Date: Date:    strength     2.   FMC     3.   Sensation     4.            HISTORY OF PRESENT ILLNESS:     Pt is a 45 y.o. female who was referred to Occupational Therapy s/p Cerebrovascular accident (CVA), unspecified mechanism (HCC) [I63.9]. Pt reports to have had a stroke on the 22nd of May. She was experianing one  strength difficulties as well some numbness, and pain. Pt also has a hx of b/l rotator cuff tears but they will not do anything for that for about 6 months.   Pt reports that she lives at home with her daughter, mother, and daughters father. She reports to need assistance with showering and bathing, dressing, and completing ADLs, mainly in regards to being able to lift up her arms.  Pt drives right now but reports that that is hard for her due to challenges with holding onto the wheel often using both hands to make sure she can turn safely. Pt completes all cooking and cleaning at home but reports that her arm will start to shake as she gets more tired. She also reports possible feeling of numbness in her L hand but sometimes also in her R hand.     Per chart review on 5/21/25, \"Lorri Montes is a 45 y.o. female with a PMH of anxiety, polyarthralgia, right rotator cuff tear, smoking who presents with left-sided numbness.  Patient reports that her symptoms started last night around 8 PM when she was going to bed which interrupted her sleep and kept her awake until 4 AM.  However, they got worse this morning.  Symptoms include feeling numbness over her left face, left leg, left throat, left arm, left trunk, and left leg. Upon my evaluation, she stated that the numbness is still there.  Denied weakness, blurry vision, or headache.  She never " "had similar symptoms      Patient was admitted for strokelike symptoms was placed on stroke pathway in the ED. in the ED she received a loading dose of Plavix 300 and was started on Plavix 75 and aspirin 81 maintenance dose.  Patient also had a leukocytosis however at that time denies any fevers or chills and as antibiotics were not started.  CT and x-ray in the ED shows no acute abnormality or intracranial abnormality.  EKG showed normal sinus rhythm, patient A1c and lipids at that time which was done in  were within normal range her troponins were negative x 2.  Patient was admitted to the floor for further evaluation and observation\"      PMH: Past Medical History[1]      Pain Levels   Restin- in L arm    With Activity:  8- In L arm      Objective    Impairment Observations:        TYSHAWN Nixon           UPPER EXTREMITY FUNCTION   Intact Impaired Dominant Hand: R     /PINCH STRENGTH             Dynamometer    - Gross Grasp 65 lbs 77 lbs within normal limits   Pinch Meter     - Pincer 12 lbs 15 lbs within normal limits    - Tripod 16 lbs 18 lbs within normal limits    - Lateral 16 lbs  16 lbs within normal limits     AROM (Seated)             Scapula   - Retraction/Protraction  - Elevation/Depression  - Upward/Downward rotation      Shoulder FF Full  Full   B/L rotator cuff injuries   Shoulder Ext Full   Full   B/L rotator cuff injuries   Shoulder internal rotation    B/L rotator cuff injuries   Shoulder external rotation    B/L rotator cuff injuries   Shoulder Abd 90*  Full  B/L rotator cuff injuries   Shoulder Add Full  Full B/L rotator cuff injuries   Horizontal Abd     B/L rotator cuff injuries   Horizontal Add     B/L rotator cuff injuries   Elbow Flex WFL   WFL     Elbow Ext WFL  WFL     Pronation WFL  WFL     Supination WFL  WFL     Wrist Flex WFL  WFL     Wrist Ext WFL  WFL     Digit Flex WFL  WFL     Digit Ex WFL  WFL     Composite Grasp        Hook Grasp        Subluxation      Inferior, " superior, anterior      MMT             Shoulder FF 4/5 5/5    Shoulder Ext 5/5 5/5    Shoulder Abduction 5/5 5/5    Shoulder Adduction 5/5 5/5    Elbow Flex 5/5 5/5    Elbow Ext 5/5 5/5    Wrist Flex 5/5 5/5    Wrist Ext 5/5 5/5    Gross Grasp 5/5 5/5      SENSATION     To be completed next session   Monofilament Testing  Normal: 2.83 mm    Diminished light touch: 3.61 mm    Diminished protective sensation: 4.31 mm    Loss of protective sensation: 4.56    Complete loss of sensation / deep pressure: 6.65      Sharp / Dull       Proprioception      Hot/Cold Temp      Stereognosis         COORDINATION      Opposition WFL WFL    Finger to Nose WFL WFL    Rapid Alternating Movement WFL WFL     9 Hole Peg Test  seconds  seconds To be tested   Fxnl Dexterity Test  seconds  seconds To be tested        ADL Simulation  Donning of pants-   seconds with  assist  Donning t-shirt/jacket-    seconds with  assist         Neuro QOL  Upper Extremity Function (Fine motor, ADL):     Are you able to turn a key in a lock? With much difficulty  Are you able to brush your teeth? With much difficulty  Are you able to make a phone call using a touch tone or key-pad? With much difficulty  Are you able to  coins from a table top? With much difficulty  Are you are able to write with a pen or pencil With much difficulty  Are you able to open and close a zipper? With much difficulty  Are you able to wash and dry your body? With much difficulty  Are you able to shampoo your hair? With much difficulty  Are you able to open previously opened jars? Unable to do  Are you able to hold a plate full of food? With much difficulty  Are you able to pull on trousers? With much difficulty  Are you able to button your shirt? With much difficulty  Are you able to trim your fingernails? With much difficulty  Are you able to cut your toe nails? With much difficulty  Are you able to bend down and  clothing from the floor? With much difficulty  How  much DIFFICULTY do you currently have using a spoon to eat a meal? A lot of difficulty  How much DIFFICULTY do you currently have putting on a pullover shirt? A lot of difficulty  How much DIFFICULTY do you currently have taking off a pullover shirt? A lot of difficulty  How much DIFFICULTY do you currently have removing wrappings from small objects? A lot of difficulty  How much DIFFUCULTY do you currently have opening medications or vitamin containers (e.g. childproof containers, small bottles)? A lot of difficulty        OTHER PLANNED THERAPY INTERVENTIONS:   Supine, seated, and in stance neuro re-ed  Task-Oriented Training  Tricep AG  NMES/FES  Cryotherapy for tone reduction  Hot Packs for pain  TENS for pain  FMC/prehension  GMC  Proximal to distal teaming  Timed Trials  Manual tx  IASTM  Hand to target  Sensory re-ed (Cutaneous/Proprioceptive)  Seated functional reach: crossing midline  Supine place and hold  WBearing strategies   Closed chain activities  Open chain activities  Mirror Therapy  HEP         [1]   Past Medical History:  Diagnosis Date    Arthritis     Closed fracture of left tibial plateau 07/27/2022    CVA (cerebrovascular accident) ([facility])     Depression     DJD (degenerative joint disease)     Heart disease     Herpes     Hidradenitis     MRSA carrier     Stroke ([facility])

## 2025-07-10 NOTE — PATIENT INSTRUCTIONS
Decrease lasix to 20 mg every day    Start Jardiance 10 mg every day    Repeat Blood work within next 2 weeks--if abnormal results, I will call    Please weigh yourself every day (after emptying your bladder) and keep a detailed log of weights.     Contact Cardiology at [phone] if you gain 3+ lbs overnight or 5+ lbs in 5-7 days.    Limit daily sodium/salt intake to 2000 mg daily to prevent fluid retention.    Avoid canned foods, fast food/Chinese food, and processed meats (hot dogs, lunch meat, and sausage etc.). Caution with condiments.    Limit fluid intake to 2000 mL or 2 liters (about 60-65 ounces) daily.    Avoid electrolyte replacement drinks (such as Gatorade, Pedialyte, Propel, Liquid IV, etc.).    Bring complete list of medications and log of daily weights to your follow-up appointment.

## 2025-07-11 ENCOUNTER — OFFICE VISIT (OUTPATIENT)
Dept: OCCUPATIONAL THERAPY | Facility: CLINIC | Age: 45
End: 2025-07-11
Payer: COMMERCIAL

## 2025-07-11 ENCOUNTER — OFFICE VISIT (OUTPATIENT)
Dept: PHYSICAL THERAPY | Facility: CLINIC | Age: 45
End: 2025-07-11
Payer: COMMERCIAL

## 2025-07-11 DIAGNOSIS — I63.9 CEREBROVASCULAR ACCIDENT (CVA), UNSPECIFIED MECHANISM (HCC): Primary | ICD-10-CM

## 2025-07-11 DIAGNOSIS — I63.89 CEREBROVASCULAR ACCIDENT (CVA) DUE TO OTHER MECHANISM (HCC): Primary | ICD-10-CM

## 2025-07-11 PROCEDURE — 97140 MANUAL THERAPY 1/> REGIONS: CPT

## 2025-07-11 PROCEDURE — 97530 THERAPEUTIC ACTIVITIES: CPT

## 2025-07-11 PROCEDURE — 97110 THERAPEUTIC EXERCISES: CPT

## 2025-07-11 NOTE — PROGRESS NOTES
Daily Note     Today's date: 2025  Patient name: Lorri Montes  : 1980  MRN: [id number]  Referring provider: Dana Metcalf MD  Dx:   Encounter Diagnosis     ICD-10-CM    1. Cerebrovascular accident (CVA) due to other mechanism (HCC)  I63.89                     Start Time: 0930  Stop Time: 1015  Total time in clinic (min): 45 minutes    Subjective:neck is extremely tight today on the right side, getting pins and needles sometimes in her right hand and arm       Objective: See treatment diary below      Assessment: Tolerated treatment well.   Session focused on cervical mobility with STM along right side and then progressed to light cervical mobs in lower cervical spine which pt reported slight relief in sx with. Introduced ankle ROM and strengthening exercises as well and will progress in future sessions. Patient would continue to benefit from skilled PT interventions to address strengthening, stretching, and ROM. Patient demonstrated fatigue post treatment      Plan: Continue per plan of care.      Short Term Goal Expiration Date:(25 4 weeks)  Long Term Goal Expiration Date: (25 8 weeks)  POC Expiration Date: (25 8 weeks)    POC expires Unit limit Auth Expiration date PT/OT/ST + Visit Limit?   25 bomn pending bomn                           Visit/Unit Tracking  AUTH Status:  Date  iE  PN       24 auth (-) Used 1 2 3 4 5 6 7 8       Remaining  23 22 21 20 19 18 17 16            Precautions hx of CVA; HTN       Manuals  7 PN    STM SOR supine STM cervical extensors and B/L UT x 10 min  SOR supine STM cervical extensors and B/L UT x 15min SOR supine STM cervical extensors and B/L UT x 10 min    Cervical mobs   Prone Grade 1-2 at C5-C7    Seated Grade 1-2 with movement during R rotation     x15 mins total                      Neuro Re-Ed   PN testing (see above)    Schedule for OT IE; contact PCP for speech  "referral      Step ups    // bars UE PRN  8\" step ups   Fwd leading with LLE only 3x5   Lat leading with LLE only 3x5   Add AirEx pad in front of step  2x5 fwd ea LE  2x5 lat ea LE  Add: holding 6# med ball   X10 fwd ea LE  X10 lat ea LE    Hurdles      // bars (long) no UE  12\" (6)  Alt bwt leading with either LE   X4 laps fwd  X4 laps lat    Foam beams      // bars (long) no UE  Sidestepping on beams x3 laps   Add: 4 Blazepods x15 hits                                   Ther Ex        Self stretching for cervical spine Seated UT and seated lev scap   30\" holds x3 ea side     Seated UT and seated lev scap   1' holds x3 ea side    Postural strengthening Scap retractions seated 5\" 2x10    Seated chin tucks seated  5\" 2x10       AROM to tolerance    Seated in between other exercises focusing on rotation and flexion/extension    UE bike UE bike fwd/bwd 4' ea    UE bike fwd/bwd 4' ea  UE bike fwd/bwd 4' ea    Lumbar rollouts Seated using rolling stool 5\" holds x10 ea direction       Ankle ROM/TE   Seated L ankle on [place] wobble board   DF/PF only x20  CW/CCW x20 ea direction    Standing heel raises in // bars x15 3\" holds    Seated ABCs x1 round L ankle only                      Ther Activity                        Gait Training                        Modalities                                        "

## 2025-07-11 NOTE — PROGRESS NOTES
OCCUPATIONAL THERAPY INITIAL EVALUATION ADDENDUM:     7/11/25  Lorri Montes  1980  [id number]  Dana Metcalf MD   Diagnosis ICD-10-CM Associated Orders   1. Cerebrovascular accident (CVA), unspecified mechanism (HCC)  I63.9               Assessment/Plan    Skilled Analysis:  Lorri Montes is a 45 y.o. female referred to Occupational Therapy s/p Cerebrovascular accident (CVA), unspecified mechanism (HCC) [I63.9].   Pt participated in skilled OT evaluation addendum this date due to tardiness and inability to complete all testing, completed all testing and and following formalized testing as well as clinical observation, Pt presents with the following areas of deficit: reports of pain and UE weakness, as well as sensation difficulties, and challenges with ADLs and IADL completion. Pt has a hx of b/l torn rotator cuff tear, fully torn in R shoulder which at this point as not yet been managed due to stroke. Many reports from Pt correlate with rotator cuff injury. Pt completed sensation and coordination testing this date with overall WNL for all. Pt reports that sensation challenges were happenig prior to stroke, but then started to feel in her L hand post stroke, however continues to be consistent with rotator cuff injury and neck pain/tightness. Overall Pt with good  strength in both hands as well as pinch strength. Discussed with Pt referral to ortho for shoulder injuries in future after being assessed by ortho. At this time, pt to continue with normal daily routine as able, and continue with PT. Pt to be discharged from OT POC with eval only. Pt educated if anything were to change to obtain new script and come back for re-assessment.  Lorri Montes is in agreement with POC.        POC expires Unit limit Auth Expiration date PT/OT/ST + Visit Limit?   8/21/25 N/A TBD 24 approved                           Visit/Unit Tracking  AUTH Status:  Date 7/10 7/11            24 approved  "Used 1 2             Remaining  23 22                 Duration in weeks: Eval only  Plan of care beginning date: 7/10/25  Plan of care expiration date: 7/11/25  N/A- eval only    Precautions: B/L rotator cuff injuries              GOALS    Short Term Goals     - n/a, eval only       Long Term Goals    - n/a, eval only       Subjective    PATIENT GOAL: \"To be able to not have as much pain and be able to function better\"    Patient-Specific Functional Scale   Task is scored 0 (unable to perform activity) to 10 (ability to perform activity independently)    Activity Date: Date:    strength     2.   FMC     3.   Sensation     4.            HISTORY OF PRESENT ILLNESS:     Pt is a 45 y.o. female who was referred to Occupational Therapy s/p Cerebrovascular accident (CVA), unspecified mechanism (HCC) [I63.9]. Pt reports to have had a stroke on the 22nd of May. She was experianing one  strength difficulties as well some numbness, and pain. Pt also has a hx of b/l rotator cuff tears but they will not do anything for that for about 6 months.   Pt reports that she lives at home with her daughter, mother, and daughters father. She reports to need assistance with showering and bathing, dressing, and completing ADLs, mainly in regards to being able to lift up her arms.  Pt drives right now but reports that that is hard for her due to challenges with holding onto the wheel often using both hands to make sure she can turn safely. Pt completes all cooking and cleaning at home but reports that her arm will start to shake as she gets more tired. She also reports possible feeling of numbness in her L hand but sometimes also in her R hand.     Per chart review on 5/21/25, \"Lorri Montes is a 45 y.o. female with a PMH of anxiety, polyarthralgia, right rotator cuff tear, smoking who presents with left-sided numbness.  Patient reports that her symptoms started last night around 8 PM when she was going to bed which " "interrupted her sleep and kept her awake until 4 AM.  However, they got worse this morning.  Symptoms include feeling numbness over her left face, left leg, left throat, left arm, left trunk, and left leg. Upon my evaluation, she stated that the numbness is still there.  Denied weakness, blurry vision, or headache.  She never had similar symptoms      Patient was admitted for strokelike symptoms was placed on stroke pathway in the ED. in the ED she received a loading dose of Plavix 300 and was started on Plavix 75 and aspirin 81 maintenance dose.  Patient also had a leukocytosis however at that time denies any fevers or chills and as antibiotics were not started.  CT and x-ray in the ED shows no acute abnormality or intracranial abnormality.  EKG showed normal sinus rhythm, patient A1c and lipids at that time which was done in  were within normal range her troponins were negative x 2.  Patient was admitted to the floor for further evaluation and observation\"      PMH: Past Medical History[1]      Pain Levels   Restin- in L arm    With Activity:  8- In L arm      Objective    Impairment Observations:        TYSHAWN CORTEZ Comments           UPPER EXTREMITY FUNCTION   Intact Impaired Dominant Hand: R     /PINCH STRENGTH             Dynamometer    - Gross Grasp 65 lbs 77 lbs within normal limits   Pinch Meter     - Pincer 12 lbs 15 lbs within normal limits    - Tripod 16 lbs 18 lbs within normal limits    - Lateral 16 lbs  16 lbs within normal limits     AROM (Seated)             Scapula   - Retraction/Protraction  - Elevation/Depression  - Upward/Downward rotation      Shoulder FF Full  Full   B/L rotator cuff injuries   Shoulder Ext Full   Full   B/L rotator cuff injuries   Shoulder internal rotation    B/L rotator cuff injuries   Shoulder external rotation    B/L rotator cuff injuries   Shoulder Abd 90*  Full  B/L rotator cuff injuries   Shoulder Add Full  Full B/L rotator cuff injuries   Horizontal Abd     " B/L rotator cuff injuries   Horizontal Add     B/L rotator cuff injuries   Elbow Flex WFL   WFL     Elbow Ext WFL  WFL     Pronation WFL  WFL     Supination WFL  WFL     Wrist Flex WFL  WFL     Wrist Ext WFL  WFL     Digit Flex WFL  WFL     Digit Ex WFL  WFL     Composite Grasp        Hook Grasp        Subluxation      Inferior, superior, anterior      MMT             Shoulder FF 4/5 5/5    Shoulder Ext 5/5 5/5    Shoulder Abduction 5/5 5/5    Shoulder Adduction 5/5 5/5    Elbow Flex 5/5 5/5    Elbow Ext 5/5 5/5    Wrist Flex 5/5 5/5    Wrist Ext 5/5 5/5    Gross Grasp 5/5 5/5      SENSATION        Monofilament Testing  Normal: 2.83 mm    Diminished light touch: 3.61 mm    Diminished protective sensation: 4.31 mm    Loss of protective sensation: 4.56    Complete loss of sensation / deep pressure: 6.65 Normal: 2.83 mm Normal: 2.83 mm    Sharp / Dull       Proprioception      Hot/Cold Temp      Stereognosis         COORDINATION      Opposition WFL WFL    Finger to Nose WFL WFL    Rapid Alternating Movement WFL WFL     9 Hole Peg Test 23 seconds 23 seconds WFL   Fxnl Dexterity Test 30 seconds 32 seconds WFL       ADL Simulation  Donning of pants-   seconds with  assist  Donning t-shirt/jacket-    seconds with  assist         Neuro QOL  Upper Extremity Function (Fine motor, ADL):     Are you able to turn a key in a lock? With much difficulty  Are you able to brush your teeth? With much difficulty  Are you able to make a phone call using a touch tone or key-pad? With much difficulty  Are you able to  coins from a table top? With much difficulty  Are you are able to write with a pen or pencil With much difficulty  Are you able to open and close a zipper? With much difficulty  Are you able to wash and dry your body? With much difficulty  Are you able to shampoo your hair? With much difficulty  Are you able to open previously opened jars? Unable to do  Are you able to hold a plate full of food? With much  difficulty  Are you able to pull on trousers? With much difficulty  Are you able to button your shirt? With much difficulty  Are you able to trim your fingernails? With much difficulty  Are you able to cut your toe nails? With much difficulty  Are you able to bend down and  clothing from the floor? With much difficulty  How much DIFFICULTY do you currently have using a spoon to eat a meal? A lot of difficulty  How much DIFFICULTY do you currently have putting on a pullover shirt? A lot of difficulty  How much DIFFICULTY do you currently have taking off a pullover shirt? A lot of difficulty  How much DIFFICULTY do you currently have removing wrappings from small objects? A lot of difficulty  How much DIFFUCULTY do you currently have opening medications or vitamin containers (e.g. childproof containers, small bottles)? A lot of difficulty        OTHER PLANNED THERAPY INTERVENTIONS:   Supine, seated, and in stance neuro re-ed  Task-Oriented Training  Tricep AG  NMES/FES  Cryotherapy for tone reduction  Hot Packs for pain  TENS for pain  FMC/prehension  GMC  Proximal to distal teaming  Timed Trials  Manual tx  IASTM  Hand to target  Sensory re-ed (Cutaneous/Proprioceptive)  Seated functional reach: crossing midline  Supine place and hold  WBearing strategies   Closed chain activities  Open chain activities  Mirror Therapy  HEP         [1]   Past Medical History:  Diagnosis Date    Arthritis     Closed fracture of left tibial plateau 07/27/2022    CVA (cerebrovascular accident) ([facility])     Depression     DJD (degenerative joint disease)     Heart disease     Herpes     Hidradenitis     MRSA carrier     Stroke ([facility])

## 2025-07-15 ENCOUNTER — APPOINTMENT (OUTPATIENT)
Dept: PHYSICAL THERAPY | Facility: CLINIC | Age: 45
End: 2025-07-15
Payer: COMMERCIAL

## 2025-07-15 ENCOUNTER — OFFICE VISIT (OUTPATIENT)
Dept: OBGYN CLINIC | Facility: CLINIC | Age: 45
End: 2025-07-15
Payer: COMMERCIAL

## 2025-07-15 ENCOUNTER — HOSPITAL ENCOUNTER (OUTPATIENT)
Dept: RADIOLOGY | Facility: HOSPITAL | Age: 45
Discharge: HOME | End: 2025-07-15
Payer: COMMERCIAL

## 2025-07-15 ENCOUNTER — CONSULT (OUTPATIENT)
Dept: PAIN MEDICINE | Facility: CLINIC | Age: 45
End: 2025-07-15
Payer: COMMERCIAL

## 2025-07-15 ENCOUNTER — CLINICAL SUPPORT (OUTPATIENT)
Dept: INTERNAL MEDICINE CLINIC | Facility: CLINIC | Age: 45
End: 2025-07-15

## 2025-07-15 ENCOUNTER — PATIENT OUTREACH (OUTPATIENT)
Dept: INTERNAL MEDICINE CLINIC | Facility: CLINIC | Age: 45
End: 2025-07-15

## 2025-07-15 VITALS — BODY MASS INDEX: 29.19 KG/M2 | HEIGHT: 64 IN | WEIGHT: 171 LBS

## 2025-07-15 VITALS
DIASTOLIC BLOOD PRESSURE: 79 MMHG | HEART RATE: 72 BPM | BODY MASS INDEX: 29.19 KG/M2 | HEIGHT: 64 IN | WEIGHT: 171 LBS | SYSTOLIC BLOOD PRESSURE: 118 MMHG

## 2025-07-15 DIAGNOSIS — M54.12 CERVICAL RADICULOPATHY: ICD-10-CM

## 2025-07-15 DIAGNOSIS — S46.011A TRAUMATIC COMPLETE TEAR OF RIGHT ROTATOR CUFF, INITIAL ENCOUNTER: Primary | ICD-10-CM

## 2025-07-15 DIAGNOSIS — M75.101 TEAR OF RIGHT SUPRASPINATUS TENDON: ICD-10-CM

## 2025-07-15 DIAGNOSIS — M54.2 NECK PAIN: ICD-10-CM

## 2025-07-15 DIAGNOSIS — S46.011A TRAUMATIC COMPLETE TEAR OF RIGHT ROTATOR CUFF, INITIAL ENCOUNTER: ICD-10-CM

## 2025-07-15 DIAGNOSIS — E53.8 VITAMIN B12 DEFICIENCY: Primary | ICD-10-CM

## 2025-07-15 PROCEDURE — 99212 OFFICE O/P EST SF 10 MIN: CPT | Performed by: ORTHOPAEDIC SURGERY

## 2025-07-15 PROCEDURE — 72050 X-RAY EXAM NECK SPINE 4/5VWS: CPT

## 2025-07-15 PROCEDURE — 99244 OFF/OP CNSLTJ NEW/EST MOD 40: CPT | Performed by: PHYSICAL MEDICINE & REHABILITATION

## 2025-07-15 PROCEDURE — 96372 THER/PROPH/DIAG INJ SC/IM: CPT | Performed by: FAMILY MEDICINE

## 2025-07-15 RX ADMIN — CYANOCOBALAMIN 1000 MCG: 1000 INJECTION, SOLUTION INTRAMUSCULAR; SUBCUTANEOUS at 13:52

## 2025-07-16 ENCOUNTER — OFFICE VISIT (OUTPATIENT)
Dept: PHYSICAL THERAPY | Facility: CLINIC | Age: 45
End: 2025-07-16
Payer: COMMERCIAL

## 2025-07-16 ENCOUNTER — APPOINTMENT (OUTPATIENT)
Dept: LAB | Facility: CLINIC | Age: 45
End: 2025-07-16
Payer: COMMERCIAL

## 2025-07-16 ENCOUNTER — APPOINTMENT (OUTPATIENT)
Dept: LAB | Facility: CLINIC | Age: 45
End: 2025-07-16
Attending: PSYCHIATRY & NEUROLOGY
Payer: COMMERCIAL

## 2025-07-16 DIAGNOSIS — Z79.899 ENCOUNTER FOR LONG-TERM (CURRENT) USE OF MEDICATIONS: ICD-10-CM

## 2025-07-16 DIAGNOSIS — I50.21 ACUTE HEART FAILURE WITH REDUCED EJECTION FRACTION (HFREF, <= 40%) (HCC): ICD-10-CM

## 2025-07-16 DIAGNOSIS — I63.89 CEREBROVASCULAR ACCIDENT (CVA) DUE TO OTHER MECHANISM (HCC): Primary | ICD-10-CM

## 2025-07-16 LAB
ALBUMIN SERPL BCG-MCNC: 4.3 G/DL (ref 3.5–5)
ALP SERPL-CCNC: 55 U/L (ref 34–104)
ALT SERPL W P-5'-P-CCNC: 10 U/L (ref 7–52)
ANION GAP SERPL CALCULATED.3IONS-SCNC: 5 MMOL/L (ref 4–13)
AST SERPL W P-5'-P-CCNC: 12 U/L (ref 13–39)
ATRIAL RATE: 74 BPM
BASOPHILS # BLD AUTO: 0.07 THOUSANDS/ÂΜL (ref 0–0.1)
BASOPHILS NFR BLD AUTO: 1 % (ref 0–1)
BILIRUB SERPL-MCNC: 0.49 MG/DL (ref 0.2–1)
BUN SERPL-MCNC: 5 MG/DL (ref 5–25)
CALCIUM SERPL-MCNC: 8.8 MG/DL (ref 8.4–10.2)
CHLORIDE SERPL-SCNC: 104 MMOL/L (ref 96–108)
CO2 SERPL-SCNC: 26 MMOL/L (ref 21–32)
CREAT SERPL-MCNC: 0.66 MG/DL (ref 0.6–1.3)
EOSINOPHIL # BLD AUTO: 0.23 THOUSAND/ÂΜL (ref 0–0.61)
EOSINOPHIL NFR BLD AUTO: 2 % (ref 0–6)
ERYTHROCYTE [DISTWIDTH] IN BLOOD BY AUTOMATED COUNT: 12.2 % (ref 11.6–15.1)
GFR SERPL CREATININE-BSD FRML MDRD: 107 ML/MIN/1.73SQ M
GLUCOSE P FAST SERPL-MCNC: 92 MG/DL (ref 65–99)
HCT VFR BLD AUTO: 42.1 % (ref 34.8–46.1)
HGB BLD-MCNC: 14.4 G/DL (ref 11.5–15.4)
IMM GRANULOCYTES # BLD AUTO: 0.03 THOUSAND/UL (ref 0–0.2)
IMM GRANULOCYTES NFR BLD AUTO: 0 % (ref 0–2)
LYMPHOCYTES # BLD AUTO: 4.03 THOUSANDS/ÂΜL (ref 0.6–4.47)
LYMPHOCYTES NFR BLD AUTO: 35 % (ref 14–44)
MCH RBC QN AUTO: 33.3 PG (ref 26.8–34.3)
MCHC RBC AUTO-ENTMCNC: 34.2 G/DL (ref 31.4–37.4)
MCV RBC AUTO: 97 FL (ref 82–98)
MONOCYTES # BLD AUTO: 0.9 THOUSAND/ÂΜL (ref 0.17–1.22)
MONOCYTES NFR BLD AUTO: 8 % (ref 4–12)
NEUTROPHILS # BLD AUTO: 6.26 THOUSANDS/ÂΜL (ref 1.85–7.62)
NEUTS SEG NFR BLD AUTO: 54 % (ref 43–75)
NRBC BLD AUTO-RTO: 0 /100 WBCS
P AXIS: 54 DEGREES
PLATELET # BLD AUTO: 376 THOUSANDS/UL (ref 149–390)
PMV BLD AUTO: 9 FL (ref 8.9–12.7)
POTASSIUM SERPL-SCNC: 3.9 MMOL/L (ref 3.5–5.3)
PR INTERVAL: 128 MS
PROT SERPL-MCNC: 7.5 G/DL (ref 6.4–8.4)
QRS AXIS: 19 DEGREES
QRSD INTERVAL: 96 MS
QT INTERVAL: 402 MS
QTC INTERVAL: 446 MS
RBC # BLD AUTO: 4.33 MILLION/UL (ref 3.81–5.12)
SODIUM SERPL-SCNC: 135 MMOL/L (ref 135–147)
T WAVE AXIS: 23 DEGREES
TSH SERPL DL<=0.05 MIU/L-ACNC: 0.26 UIU/ML (ref 0.45–4.5)
VENTRICULAR RATE: 74 BPM
WBC # BLD AUTO: 11.52 THOUSAND/UL (ref 4.31–10.16)

## 2025-07-16 PROCEDURE — 36415 COLL VENOUS BLD VENIPUNCTURE: CPT

## 2025-07-16 PROCEDURE — 97140 MANUAL THERAPY 1/> REGIONS: CPT

## 2025-07-16 PROCEDURE — 97110 THERAPEUTIC EXERCISES: CPT

## 2025-07-16 PROCEDURE — 84443 ASSAY THYROID STIM HORMONE: CPT

## 2025-07-16 PROCEDURE — 93010 ELECTROCARDIOGRAM REPORT: CPT | Performed by: INTERNAL MEDICINE

## 2025-07-16 PROCEDURE — 85025 COMPLETE CBC W/AUTO DIFF WBC: CPT

## 2025-07-16 PROCEDURE — 80053 COMPREHEN METABOLIC PANEL: CPT

## 2025-07-16 NOTE — PROGRESS NOTES
Daily Note     Today's date: 2025  Patient name: Lorri Montes  : 1980  MRN: [id number]  Referring provider: Dana Metcalf MD  Dx:   Encounter Diagnosis     ICD-10-CM    1. Cerebrovascular accident (CVA) due to other mechanism (HCC)  I63.89                       Start Time:   Stop Time: 0  Total time in clinic (min): 45 minutes    Subjective: neck is still tight, went to her doctor and is going to get an MRI, will be getting PT for her shoulders starting next week       Objective: See treatment diary below      Assessment: Tolerated treatment well.   First portion of session focused on manual therapy for cervical sx, still reporting improvement in sx post-manual. Today utilized prone with right rotation which was more symptomatic for patient. Rest of session focused on ankle strengthening and introduced tib ant raises. Patient would continue to benefit from skilled PT interventions to address strengthening, stretching, and ROM. Patient demonstrated fatigue post treatment      Plan: Continue per plan of care.      Short Term Goal Expiration Date:(25 4 weeks)  Long Term Goal Expiration Date: (25 8 weeks)  POC Expiration Date: (25 8 weeks)    POC expires Unit limit Auth Expiration date PT/OT/ST + Visit Limit?   25 bomn 25 bomn                           Visit/Unit Tracking  AUTH Status:  Date  iE  PN      24 auth (-) Used 1 2 3 4 5 6 7 8 9      Remaining  23 22 21 20 19 18 17 16 15           Precautions hx of CVA; HTN       Manuals  PN    STM SOR supine STM cervical extensors and B/L UT x 10 min  SOR supine STM cervical extensors and B/L UT x 15min SOR supine STM cervical extensors and B/L UT x 5 min    Cervical mobs   Prone Grade 1-2 at C5-C7    Seated Grade 1-2 with movement during R rotation     x15 mins total  Prone Grade 1-2 at C5-C7  Central PA and unilateral PA on Right side; neutral  "cervical position and with R rotation   [id number]                    Neuro Re-Ed   PN testing (see above)    Schedule for OT IE; contact PCP for speech referral      Step ups        Hurdles      // bars (long) no UE  12\" (6)  Alt bwt leading with either LE   X4 laps fwd  X4 laps lat    Foam beams      // bars (long) no UE  Sidestepping on beams x3 laps   Add: 4 Blazepods x15 hits                                   Ther Ex        Self stretching for cervical spine Seated UT and seated lev scap   30\" holds x3 ea side     Seated UT and seated lev scap   1' holds x3 ea side    Postural strengthening Scap retractions seated 5\" 2x10    Seated chin tucks seated  5\" 2x10       AROM to tolerance        UE bike UE bike fwd/bwd 4' ea     UE bike fwd/bwd 4' ea    Lumbar rollouts Seated using rolling stool 5\" holds x10 ea direction       Ankle ROM/TE   Seated L ankle on [place] wobble board   DF/PF only x20  CW/CCW x20 ea direction    Standing heel raises in // bars x15 3\" holds    Seated ABCs x1 round L ankle only  Standing heel raises in // bars 3x10 3\" holds    Standing tib ant raises 3\" holds 3x8    Walking lunges in // bars BUE   2X2 laps fwd    Recumbent bike for CV and BLE    No resistance [id number]            Ther Activity                        Gait Training                        Modalities                                        "

## 2025-07-17 ENCOUNTER — TELEPHONE (OUTPATIENT)
Dept: NEUROLOGY | Facility: CLINIC | Age: 45
End: 2025-07-17

## 2025-07-17 NOTE — PATIENT INSTRUCTIONS
Stroke:    Additional testing/referrals:  - ENT referral: Please contact them at [phone] to schedule your appointment.  -blood work to complete prior to next visit.  This is fasting. Cholesterol panel, CMP, CBC around October   -referral for sleep study to check for sleep apnea.  They will reach out to you to schedule  -reach out to cardiology with regard to cardiac monitor/patch.     Recommendations:   - For ongoing stroke prevention continue: aspirin and statin.  - Discussed the importance of antiplatelet/anticoagulant management with the patient to prevent future strokes.   - Recommend to check blood pressure occasionally away from the doctor's office to make sure that those numbers are typically less than 130/80.  If they are frequently higher than that, we recommend checking a little more often and to follow up with primary care team.  -Continue all medication for blood pressure control as prescribed be your primary care team/cardiologist.   - Will defer to primary care team for monitoring of cholesterol panel and blood sugar numbers with target LDL cholesterol of less than 70 and hemoglobin A1c less than 7%.  - Recommend following a low salt, mediterranean diet.   - Recommend routine physical exercise as tolerated.  - Avoid using NSAIDs (like ibuprofen, motrin, or aleve) for headaches or other pain and to use tylenol if needed.     We will plan for you to return to the office in 4 months to see myself or MD, but would be happy to see you sooner if the need should arise.  If you have any symptoms concerning for TIA or stroke including: sudden painless loss of vision or double vision, difficulty speaking or swallowing, vertigo/room spinning that does not quickly resolve, or weakness/numbness/loss of coordination affecting 1 side of the face or body, you should proceed by ambulance to the nearest emergency room immediately.
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
## 2025-07-18 ENCOUNTER — OFFICE VISIT (OUTPATIENT)
Dept: NEUROLOGY | Facility: CLINIC | Age: 45
End: 2025-07-18
Payer: COMMERCIAL

## 2025-07-18 ENCOUNTER — APPOINTMENT (OUTPATIENT)
Dept: PHYSICAL THERAPY | Facility: CLINIC | Age: 45
End: 2025-07-18
Payer: COMMERCIAL

## 2025-07-18 ENCOUNTER — TELEPHONE (OUTPATIENT)
Age: 45
End: 2025-07-18

## 2025-07-18 VITALS — SYSTOLIC BLOOD PRESSURE: 140 MMHG | DIASTOLIC BLOOD PRESSURE: 84 MMHG | HEART RATE: 80 BPM

## 2025-07-18 DIAGNOSIS — E78.5 HYPERLIPIDEMIA: Primary | ICD-10-CM

## 2025-07-18 DIAGNOSIS — I69.30 HISTORY OF STROKE WITH RESIDUAL DEFICIT: ICD-10-CM

## 2025-07-18 DIAGNOSIS — R09.81 NASAL CONGESTION: ICD-10-CM

## 2025-07-18 DIAGNOSIS — R06.83 SNORING: ICD-10-CM

## 2025-07-18 DIAGNOSIS — R06.81 WITNESSED EPISODE OF APNEA: ICD-10-CM

## 2025-07-18 PROCEDURE — 99215 OFFICE O/P EST HI 40 MIN: CPT

## 2025-07-18 RX ORDER — ASPIRIN 81 MG/1
81 TABLET, CHEWABLE ORAL DAILY
Qty: 90 TABLET | Refills: 3 | Status: SHIPPED | OUTPATIENT
Start: 2025-07-18

## 2025-07-18 RX ORDER — ATORVASTATIN CALCIUM 40 MG/1
40 TABLET, FILM COATED ORAL EVERY EVENING
Qty: 90 TABLET | Refills: 3 | Status: SHIPPED | OUTPATIENT
Start: 2025-07-18

## 2025-07-18 NOTE — TELEPHONE ENCOUNTER
Patient called in has referral for sleep study  Advised once referral is reviewed we can schedule   # [phone]

## 2025-07-18 NOTE — PROGRESS NOTES
Name: Lorri Montes      : 1980      MRN: [id number]  Encounter Provider: KEYANA Corea  Encounter Date: 2025   Encounter department: [facility] [place]  :  Assessment & Plan      {Ambulatory Patient Instructions (Optional):50817}    History of Present Illness {?Quick Links Encounters * My Last Note * Last Note in Specialty * Snapshot * Since Last Visit * History :90559}  HPI   Review of Systems   Constitutional:  Negative for appetite change, fatigue and fever.   HENT: Negative.  Negative for hearing loss, tinnitus, trouble swallowing and voice change.    Eyes:  Positive for visual disturbance. Negative for photophobia and pain.        Patient states she does having blurred vision in both eyes.    Respiratory: Negative.  Negative for shortness of breath.    Cardiovascular: Negative.  Negative for palpitations.   Gastrointestinal: Negative.  Negative for nausea and vomiting.   Endocrine: Negative.  Negative for cold intolerance.   Genitourinary: Negative.  Negative for dysuria, frequency and urgency.   Musculoskeletal:  Negative for back pain, gait problem, myalgias, neck pain and neck stiffness.   Skin: Negative.  Negative for rash.   Allergic/Immunologic: Negative.    Neurological:  Positive for numbness and headaches. Negative for dizziness, tremors, seizures, syncope, facial asymmetry, speech difficulty, weakness and light-headedness.        Patient states she is still having numbness in the arms, legs and abdomin. She state she states she is also having HA on the R side that are controlled with tylenol.     Hematological: Negative.  Does not bruise/bleed easily.   Psychiatric/Behavioral: Negative.  Negative for confusion, hallucinations and sleep disturbance.     I have personally reviewed the MA's review of systems and made changes as necessary.    {Select to insert medical history sections (Optional):02298}     Objective {?Quick Links Trend Vitals *  Enter New Vitals * Results Review * Timeline (Adult) * Labs * Imaging * Cardiology * Procedures * Lung Cancer Screening * Surgical eConsent :32469}  There were no vitals taken for this visit.    Physical Exam  Neurological Exam    {Radiology Results Review (Optional):26058}    {Administrative / Billing Section (Optional):58498}

## 2025-07-21 ENCOUNTER — APPOINTMENT (OUTPATIENT)
Dept: PHYSICAL THERAPY | Facility: CLINIC | Age: 45
End: 2025-07-21
Payer: COMMERCIAL

## 2025-07-21 ENCOUNTER — OFFICE VISIT (OUTPATIENT)
Dept: OBGYN CLINIC | Facility: CLINIC | Age: 45
End: 2025-07-21
Payer: COMMERCIAL

## 2025-07-21 DIAGNOSIS — S46.011A TRAUMATIC COMPLETE TEAR OF RIGHT ROTATOR CUFF, INITIAL ENCOUNTER: ICD-10-CM

## 2025-07-21 DIAGNOSIS — I63.9 CEREBROVASCULAR ACCIDENT (CVA), UNSPECIFIED MECHANISM (HCC): ICD-10-CM

## 2025-07-21 DIAGNOSIS — M75.101 TEAR OF RIGHT SUPRASPINATUS TENDON: ICD-10-CM

## 2025-07-21 DIAGNOSIS — I50.21 ACUTE HEART FAILURE WITH REDUCED EJECTION FRACTION (HFREF, <= 40%) (HCC): ICD-10-CM

## 2025-07-21 DIAGNOSIS — F17.200 NICOTINE USE DISORDER: Primary | ICD-10-CM

## 2025-07-21 DIAGNOSIS — I42.9 CARDIOMYOPATHY, UNSPECIFIED TYPE (HCC): ICD-10-CM

## 2025-07-21 PROCEDURE — 99214 OFFICE O/P EST MOD 30 MIN: CPT | Performed by: STUDENT IN AN ORGANIZED HEALTH CARE EDUCATION/TRAINING PROGRAM

## 2025-07-21 NOTE — ASSESSMENT & PLAN NOTE
We reviewed their current history, physical exam, and imaging in detail today with the patient.  This is consistent with complete rotator cuff tear.  We reviewed surgical as well as conservative management in detail today.  We discussed that for surgical intervention the patient would need to be medically cleared prior to proceeding with any surgical intervention.  Per discussion with her, we will plan to wait least 6 months from her stroke prior to any surgical treatment.  We reviewed possible options rotator cuff repair versus repair with augmentation versus tendon transfer. Discussed attending formal physical therapy for strengthening, range of motion, and at home exercise program.  PT prescription provided to the patient today. All of their questions were answered in detail today.  The patient is agreeable to this plan.  They will follow-up in clinic in 3 months without x-rays.        Orders:    Ambulatory Referral to Orthopedic Surgery    Ambulatory Referral to Physical Therapy; Future

## 2025-07-21 NOTE — ASSESSMENT & PLAN NOTE
Discussion was had with the patient given how her medical comorbidities including cardiomyopathy and stroke completed to the treatment algorithm.  Did discuss that once she was cleared from this perspective, we could certainly consider surgical treatment, and I emphasized importance of medical optimization.

## 2025-07-21 NOTE — ASSESSMENT & PLAN NOTE
Wt Readings from Last 3 Encounters:   07/15/25 77.6 kg (171 lb)   07/15/25 77.6 kg (171 lb)   07/10/25 77.9 kg (171 lb 12.8 oz)
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
## 2025-07-22 ENCOUNTER — EVALUATION (OUTPATIENT)
Dept: PHYSICAL THERAPY | Facility: CLINIC | Age: 45
End: 2025-07-22
Attending: ORTHOPAEDIC SURGERY
Payer: COMMERCIAL

## 2025-07-22 DIAGNOSIS — S46.011A TRAUMATIC COMPLETE TEAR OF RIGHT ROTATOR CUFF, INITIAL ENCOUNTER: ICD-10-CM

## 2025-07-22 DIAGNOSIS — M75.101 TEAR OF RIGHT SUPRASPINATUS TENDON: ICD-10-CM

## 2025-07-22 PROCEDURE — 97112 NEUROMUSCULAR REEDUCATION: CPT

## 2025-07-22 PROCEDURE — 97161 PT EVAL LOW COMPLEX 20 MIN: CPT

## 2025-07-22 PROCEDURE — 97535 SELF CARE MNGMENT TRAINING: CPT

## 2025-07-22 NOTE — HOME EXERCISE EDUCATION
Program_ID:[id number]   Access Code: [email or web address]  URL: [email or web address]  Date: 07-  Prepared By: Vera Novoa    Program Notes      Exercises      - Shoulder Flexion Overhead with Dowel - 2 x daily - 7 x weekly - 2 sets - 10 reps - 5 hold      - Standing Shoulder Abduction AAROM with Dowel - 2 x daily - 7 x weekly - 1 sets - 10 reps - 5 hold      - Standing Shoulder External Rotation AAROM with Dowel - 2 x daily - 7 x weekly - 1 sets - 10 reps - 5 hold      - Seated Scapular Retraction - 1 x daily - 7 x weekly - 2 sets - 10 reps - 5 hold      - Standing Isometric Shoulder External Rotation with Doorway - 1 x daily - 7 x weekly - 2 sets - 10 reps - 5 hold

## 2025-07-23 ENCOUNTER — OFFICE VISIT (OUTPATIENT)
Dept: OBGYN CLINIC | Facility: CLINIC | Age: 45
End: 2025-07-23

## 2025-07-23 ENCOUNTER — OFFICE VISIT (OUTPATIENT)
Dept: PHYSICAL THERAPY | Facility: CLINIC | Age: 45
End: 2025-07-23
Payer: COMMERCIAL

## 2025-07-23 VITALS
BODY MASS INDEX: 18.34 KG/M2 | SYSTOLIC BLOOD PRESSURE: 151 MMHG | HEIGHT: 64 IN | WEIGHT: 107.4 LBS | DIASTOLIC BLOOD PRESSURE: 98 MMHG | HEART RATE: 76 BPM

## 2025-07-23 DIAGNOSIS — Z30.431 IUD CHECK UP: Primary | ICD-10-CM

## 2025-07-23 DIAGNOSIS — I63.89 CEREBROVASCULAR ACCIDENT (CVA) DUE TO OTHER MECHANISM (HCC): Primary | ICD-10-CM

## 2025-07-23 PROCEDURE — 97112 NEUROMUSCULAR REEDUCATION: CPT

## 2025-07-23 PROCEDURE — 3080F DIAST BP >= 90 MM HG: CPT | Performed by: NURSE PRACTITIONER

## 2025-07-23 PROCEDURE — 97116 GAIT TRAINING THERAPY: CPT

## 2025-07-23 PROCEDURE — 99212 OFFICE O/P EST SF 10 MIN: CPT | Performed by: NURSE PRACTITIONER

## 2025-07-23 PROCEDURE — 3077F SYST BP >= 140 MM HG: CPT | Performed by: NURSE PRACTITIONER

## 2025-07-23 RX ORDER — SERTRALINE HYDROCHLORIDE 25 MG/1
25 TABLET, FILM COATED ORAL DAILY
COMMUNITY

## 2025-07-23 NOTE — PROGRESS NOTES
"PROBLEM GYNECOLOGICAL VISIT    Lorri Montes is a 45 y.o. female who presents today for an IUD check.  Her general medical history has been reviewed and she reports it as follows:    Past Medical History[1]  Past Surgical History[2]  OB History          3    Para   2    Term   2            AB   1    Living   2         SAB   1    IAB        Ectopic        Multiple        Live Births                   Social History[3]  Social History     Substance and Sexual Activity   Sexual Activity Yes    Partners: Male    Birth control/protection: I.U.D.       Current Outpatient Medications   Medication Instructions    acetaminophen (TYLENOL) 1,000 mg, Oral, Every 6 hours PRN    aspirin 81 mg, Oral, Daily    atorvastatin (LIPITOR) 40 mg, Oral, Every evening    celecoxib (CELEBREX) 200 mg, Oral, 2 times daily    cyclobenzaprine (FLEXERIL) 10 mg, Oral, 3 times daily PRN    Diclofenac Sodium (VOLTAREN) 2 g, Topical, 4 times daily    Empagliflozin (JARDIANCE) 10 mg, Oral, Every morning    folic acid (FOLVITE) 1 mg, Oral, Daily    furosemide (LASIX) 20 mg, Oral, Daily    lidocaine (XYLOCAINE) 5 % ointment Topical, 2 times daily PRN    losartan (COZAAR) 25 mg, Oral, Daily    metoprolol succinate (TOPROL-XL) 25 mg, Oral, Daily    Mirena, 52 MG, 20 MCG/DAY IUD     sertraline (ZOLOFT) 25 mg, Daily    spironolactone (ALDACTONE) 25 mg, Oral, Daily    valACYclovir (VALTREX) 1,000 mg, Oral, Daily       History of Present Illness:   Lorri presents today for an IUD check. She had a Mirena IUD placed on 25. She has had some brown bleeding since insertion but denies any heavy bleeding or severe pain. During intercourse she has felt a poking sensation.     Review of Systems:  Review of Systems   Constitutional: Negative.    Gastrointestinal: Negative.    Genitourinary: Negative.        Physical Exam:  /98 (BP Location: Left arm, Patient Position: Sitting)   Pulse 76   Ht 5' 4\" (1.626 m)   Wt 48.7 kg (107 lb " 6.4 oz)   LMP  (LMP Unknown)   BMI 18.44 kg/m²   Physical Exam  Constitutional:       General: She is not in acute distress.     Appearance: Normal appearance.   Genitourinary:      Vulva normal.      Vaginal exam comments: Small about of brown blood .      IUD strings visualized.     Neurological:      Mental Status: She is alert.     Skin:     General: Skin is warm and dry.     Psychiatric:         Mood and Affect: Mood normal.         Behavior: Behavior normal.   Vitals reviewed.       Assessment:   1. IUD check up     Plan:   1. IUD strings visualized, strings trimmed slightly.    2. Reviewed common bleeding patterns with newly placed IUD.    3. She is planning to move forward with sterilization surgery in the future, had been advised to wait at least 6 months due to recent stroke history.     Reviewed with patient that test results are available in [facility] immediately, but that they will not necessarily be reviewed by me immediately.  Explained that I will review results at my earliest opportunity and contact patient appropriately.         [1]   Past Medical History:  Diagnosis Date    Arthritis     Closed fracture of left tibial plateau 07/27/2022    CVA (cerebrovascular accident) ([facility])     Depression     DJD (degenerative joint disease)     Heart disease     Herpes     Hidradenitis     MRSA carrier     Stroke ([facility])    [2]   Past Surgical History:  Procedure Laterality Date    FL INJECTION RIGHT HIP (ARTHROGRAM)  1/6/2020    FL INJECTION RIGHT HIP (NON ARTHROGRAM)  7/24/2019    FL INJECTION RIGHT SHOULDER (ARTHROGRAM)  10/7/2019    NO PAST SURGERIES     [3]   Social History  Tobacco Use    Smoking status: Every Day     Current packs/day: 0.25     Types: Cigarettes    Smokeless tobacco: Former    Tobacco comments:     4-5 cigarettes a day   Vaping Use    Vaping status: Never Used   Substance Use Topics    Alcohol use: Yes     Alcohol/week: 3.0 standard drinks of alcohol     Types: 3 Standard drinks or  equivalent per week     Comment: social    Drug use: Yes     Types: Marijuana     Comment: few times a day

## 2025-07-23 NOTE — PROGRESS NOTES
Daily Note     Today's date: 2025  Patient name: Lorri Montes  : 1980  MRN: [id number]  Referring provider: Neri Marquez DO  Dx:   Encounter Diagnosis     ICD-10-CM    1. Cerebrovascular accident (CVA) due to other mechanism (HCC)  I63.89                         Start Time:   Stop Time: 0  Total time in clinic (min): 45 minutes    Subjective: neck is still tight, went to her doctor and is going to get an MRI, will be getting PT for her shoulders starting next week       Objective: See treatment diary below      Assessment: Tolerated treatment well.   Due to pt now going to PT for her shoulders, focused on BLE strengthening and balance reactions on compliant surfaces. Challenged with walking lunges and SLS for cone taps on foam beams.  Patient would continue to benefit from skilled PT interventions to address strengthening, stretching, and ROM. Patient demonstrated fatigue post treatment      Plan: Continue per plan of care.      Short Term Goal Expiration Date:(25 4 weeks)  Long Term Goal Expiration Date: (25 8 weeks)  POC Expiration Date: (25 8 weeks)    POC expires Unit limit Auth Expiration date PT/OT/ST + Visit Limit?   25 bomn 25 bomn                           Visit/Unit Tracking  AUTH Status:  Date  iE  PN     24 auth (-) Used 1 2 3 4 5 6 7 8 9 10     Remaining  23 22 21 20 19 18 17 16 15 14          Precautions hx of CVA; HTN       Manuals  7 PN    STM SOR supine STM cervical extensors and B/L UT x 10 min  SOR supine STM cervical extensors and B/L UT x 15min SOR supine STM cervical extensors and B/L UT x 5 min    Cervical mobs   Prone Grade 1-2 at C5-C7    Seated Grade 1-2 with movement during R rotation     x15 mins total  Prone Grade 1-2 at C5-C7  Central PA and unilateral PA on Right side; neutral cervical position and with R rotation   [id number]                    Neuro Re-Ed   PN  "testing (see above)    Schedule for OT IE; contact PCP for speech referral      Step ups        Hurdles         Foam beams      SOLO sidestepping x3 laps   Add 12\" hurdles   2x3 laps   Add cone taps bwt hurdles   2x3 laps    HKM     SOLO 12' distance   Coupled with recip UE swings  X5 laps   Walking lunges     SOLO 4# b/l AW  15' distance x3 laps                    Ther Ex        Self stretching for cervical spine Seated UT and seated lev scap   30\" holds x3 ea side        Postural strengthening Scap retractions seated 5\" 2x10    Seated chin tucks seated  5\" 2x10       AROM to tolerance        UE bike UE bike fwd/bwd 4' ea        Lumbar rollouts Seated using rolling stool 5\" holds x10 ea direction       Ankle ROM/TE   Seated L ankle on [place] wobble board   DF/PF only x20  CW/CCW x20 ea direction    Standing heel raises in // bars x15 3\" holds    Seated ABCs x1 round L ankle only  Standing heel raises in // bars 3x10 3\" holds    Standing tib ant raises 3\" holds 3x8    Walking lunges in // bars BUE   2X2 laps fwd    Recumbent bike for CV and BLE    No resistance [id number]            Ther Activity                        Gait Training        TM     SOLO 4# b/l AW  BUE   1.6mph 5% incline [id number]     Focus on increasing step length and height against resistance           Modalities                                        "

## 2025-07-24 ENCOUNTER — TRANSCRIBE ORDERS (OUTPATIENT)
Dept: SLEEP CENTER | Facility: CLINIC | Age: 45
End: 2025-07-24

## 2025-07-24 DIAGNOSIS — R06.81 WITNESSED EPISODE OF APNEA: ICD-10-CM

## 2025-07-24 DIAGNOSIS — R06.83 SNORING: Primary | ICD-10-CM

## 2025-07-25 ENCOUNTER — APPOINTMENT (OUTPATIENT)
Dept: PHYSICAL THERAPY | Facility: CLINIC | Age: 45
End: 2025-07-25
Payer: COMMERCIAL

## 2025-07-28 ENCOUNTER — OFFICE VISIT (OUTPATIENT)
Dept: PHYSICAL THERAPY | Facility: CLINIC | Age: 45
End: 2025-07-28
Payer: COMMERCIAL

## 2025-07-28 ENCOUNTER — TELEPHONE (OUTPATIENT)
Age: 45
End: 2025-07-28

## 2025-07-28 DIAGNOSIS — I63.89 CEREBROVASCULAR ACCIDENT (CVA) DUE TO OTHER MECHANISM (HCC): Primary | ICD-10-CM

## 2025-07-28 PROCEDURE — 97116 GAIT TRAINING THERAPY: CPT

## 2025-07-28 PROCEDURE — 97112 NEUROMUSCULAR REEDUCATION: CPT

## 2025-07-30 ENCOUNTER — OFFICE VISIT (OUTPATIENT)
Dept: ENDOCRINOLOGY | Facility: CLINIC | Age: 45
End: 2025-07-30
Payer: COMMERCIAL

## 2025-07-30 VITALS
SYSTOLIC BLOOD PRESSURE: 124 MMHG | HEART RATE: 65 BPM | OXYGEN SATURATION: 98 % | HEIGHT: 64 IN | DIASTOLIC BLOOD PRESSURE: 84 MMHG | BODY MASS INDEX: 18.44 KG/M2

## 2025-07-30 DIAGNOSIS — E05.90 SUBCLINICAL HYPERTHYROIDISM: ICD-10-CM

## 2025-07-30 DIAGNOSIS — E53.8 VITAMIN B 12 DEFICIENCY: ICD-10-CM

## 2025-07-30 DIAGNOSIS — E04.1 LEFT THYROID NODULE: Primary | ICD-10-CM

## 2025-07-30 PROCEDURE — 99245 OFF/OP CONSLTJ NEW/EST HI 55: CPT | Performed by: INTERNAL MEDICINE

## 2025-07-31 ENCOUNTER — HOSPITAL ENCOUNTER (OUTPATIENT)
Dept: MRI IMAGING | Facility: HOSPITAL | Age: 45
Discharge: HOME | End: 2025-07-31
Payer: COMMERCIAL

## 2025-07-31 ENCOUNTER — TELEPHONE (OUTPATIENT)
Dept: INTERNAL MEDICINE CLINIC | Facility: CLINIC | Age: 45
End: 2025-07-31

## 2025-07-31 DIAGNOSIS — M54.12 CERVICAL RADICULOPATHY: ICD-10-CM

## 2025-07-31 DIAGNOSIS — S46.011A TRAUMATIC COMPLETE TEAR OF RIGHT ROTATOR CUFF, INITIAL ENCOUNTER: ICD-10-CM

## 2025-07-31 DIAGNOSIS — M54.2 NECK PAIN: ICD-10-CM

## 2025-07-31 PROCEDURE — 72141 MRI NECK SPINE W/O DYE: CPT

## 2025-08-01 ENCOUNTER — TELEPHONE (OUTPATIENT)
Dept: ENDOCRINOLOGY | Facility: CLINIC | Age: 45
End: 2025-08-01

## 2025-08-05 ENCOUNTER — OFFICE VISIT (OUTPATIENT)
Dept: PHYSICAL THERAPY | Facility: CLINIC | Age: 45
End: 2025-08-05
Payer: COMMERCIAL

## 2025-08-05 DIAGNOSIS — I63.89 CEREBROVASCULAR ACCIDENT (CVA) DUE TO OTHER MECHANISM (HCC): Primary | ICD-10-CM

## 2025-08-05 PROCEDURE — 97110 THERAPEUTIC EXERCISES: CPT

## 2025-08-05 PROCEDURE — 97150 GROUP THERAPEUTIC PROCEDURES: CPT

## 2025-08-06 ENCOUNTER — OFFICE VISIT (OUTPATIENT)
Dept: PHYSICAL THERAPY | Facility: CLINIC | Age: 45
End: 2025-08-06
Attending: ORTHOPAEDIC SURGERY
Payer: COMMERCIAL

## 2025-08-06 DIAGNOSIS — S46.011A TRAUMATIC COMPLETE TEAR OF RIGHT ROTATOR CUFF, INITIAL ENCOUNTER: Primary | ICD-10-CM

## 2025-08-06 DIAGNOSIS — M75.101 TEAR OF RIGHT SUPRASPINATUS TENDON: ICD-10-CM

## 2025-08-06 PROCEDURE — 97112 NEUROMUSCULAR REEDUCATION: CPT

## 2025-08-06 PROCEDURE — 97110 THERAPEUTIC EXERCISES: CPT

## 2025-08-11 ENCOUNTER — TELEPHONE (OUTPATIENT)
Dept: INTERNAL MEDICINE CLINIC | Facility: CLINIC | Age: 45
End: 2025-08-11

## 2025-08-12 ENCOUNTER — HOSPITAL ENCOUNTER (EMERGENCY)
Facility: HOSPITAL | Age: 45
Discharge: HOME | End: 2025-08-12
Attending: EMERGENCY MEDICINE
Payer: COMMERCIAL

## 2025-08-12 ENCOUNTER — APPOINTMENT (EMERGENCY)
Dept: RADIOLOGY | Facility: HOSPITAL | Age: 45
End: 2025-08-12
Payer: COMMERCIAL

## 2025-08-13 ENCOUNTER — HOSPITAL ENCOUNTER (OUTPATIENT)
Dept: NUCLEAR MEDICINE | Facility: HOSPITAL | Age: 45
Discharge: HOME | End: 2025-08-13
Attending: INTERNAL MEDICINE
Payer: COMMERCIAL

## 2025-08-14 ENCOUNTER — OFFICE VISIT (OUTPATIENT)
Dept: PAIN MEDICINE | Facility: CLINIC | Age: 45
End: 2025-08-14
Payer: COMMERCIAL

## 2025-08-14 ENCOUNTER — HOSPITAL ENCOUNTER (OUTPATIENT)
Dept: NUCLEAR MEDICINE | Facility: HOSPITAL | Age: 45
Discharge: HOME | End: 2025-08-14
Attending: INTERNAL MEDICINE
Payer: COMMERCIAL

## 2025-08-15 ENCOUNTER — TELEPHONE (OUTPATIENT)
Dept: PAIN MEDICINE | Facility: CLINIC | Age: 45
End: 2025-08-15

## 2025-08-15 ENCOUNTER — CLINICAL SUPPORT (OUTPATIENT)
Dept: INTERNAL MEDICINE CLINIC | Facility: CLINIC | Age: 45
End: 2025-08-15

## 2025-08-18 ENCOUNTER — RESULTS FOLLOW-UP (OUTPATIENT)
Dept: ENDOCRINOLOGY | Facility: CLINIC | Age: 45
End: 2025-08-18

## 2025-08-18 ENCOUNTER — OFFICE VISIT (OUTPATIENT)
Dept: PHYSICAL THERAPY | Facility: CLINIC | Age: 45
End: 2025-08-18
Attending: ORTHOPAEDIC SURGERY
Payer: COMMERCIAL

## 2025-08-18 DIAGNOSIS — E04.1 LEFT THYROID NODULE: Primary | ICD-10-CM

## 2025-08-18 DIAGNOSIS — M75.101 TEAR OF RIGHT SUPRASPINATUS TENDON: ICD-10-CM

## 2025-08-18 DIAGNOSIS — I63.89 CEREBROVASCULAR ACCIDENT (CVA) DUE TO OTHER MECHANISM (HCC): ICD-10-CM

## 2025-08-18 DIAGNOSIS — S46.011A TRAUMATIC COMPLETE TEAR OF RIGHT ROTATOR CUFF, INITIAL ENCOUNTER: Primary | ICD-10-CM

## 2025-08-18 PROCEDURE — 97110 THERAPEUTIC EXERCISES: CPT

## 2025-08-18 PROCEDURE — 97112 NEUROMUSCULAR REEDUCATION: CPT

## 2025-08-20 ENCOUNTER — OFFICE VISIT (OUTPATIENT)
Dept: CARDIOLOGY CLINIC | Facility: CLINIC | Age: 45
End: 2025-08-20
Payer: COMMERCIAL

## 2025-08-20 VITALS
HEART RATE: 71 BPM | OXYGEN SATURATION: 98 % | WEIGHT: 167.3 LBS | DIASTOLIC BLOOD PRESSURE: 68 MMHG | SYSTOLIC BLOOD PRESSURE: 102 MMHG | BODY MASS INDEX: 28.56 KG/M2 | HEIGHT: 64 IN

## 2025-08-20 DIAGNOSIS — I10 PRIMARY HYPERTENSION: ICD-10-CM

## 2025-08-20 DIAGNOSIS — I63.89 CEREBROVASCULAR ACCIDENT (CVA) DUE TO OTHER MECHANISM (HCC): ICD-10-CM

## 2025-08-20 DIAGNOSIS — I42.9 CARDIOMYOPATHY, UNSPECIFIED TYPE (HCC): Primary | ICD-10-CM

## 2025-08-20 PROCEDURE — 99204 OFFICE O/P NEW MOD 45 MIN: CPT | Performed by: STUDENT IN AN ORGANIZED HEALTH CARE EDUCATION/TRAINING PROGRAM
